# Patient Record
Sex: FEMALE | Race: WHITE | NOT HISPANIC OR LATINO | Employment: OTHER | RURAL
[De-identification: names, ages, dates, MRNs, and addresses within clinical notes are randomized per-mention and may not be internally consistent; named-entity substitution may affect disease eponyms.]

---

## 2017-01-13 ENCOUNTER — CONVERSION ENCOUNTER (OUTPATIENT)
Dept: PAIN MEDICINE | Facility: CLINIC | Age: 47
End: 2017-01-13

## 2017-01-18 ENCOUNTER — HOSPITAL ENCOUNTER (OUTPATIENT)
Dept: PAIN MEDICINE | Facility: HOSPITAL | Age: 47
Discharge: HOME | End: 2017-01-18
Attending: PHYSICAL MEDICINE & REHABILITATION | Admitting: PHYSICAL MEDICINE & REHABILITATION

## 2017-01-18 ENCOUNTER — CONVERSION ENCOUNTER (OUTPATIENT)
Dept: PAIN MEDICINE | Facility: CLINIC | Age: 47
End: 2017-01-18

## 2017-03-15 ENCOUNTER — CONVERSION ENCOUNTER (OUTPATIENT)
Dept: PAIN MEDICINE | Facility: CLINIC | Age: 47
End: 2017-03-15

## 2017-03-15 ENCOUNTER — HOSPITAL ENCOUNTER (OUTPATIENT)
Dept: PAIN MEDICINE | Facility: HOSPITAL | Age: 47
Discharge: HOME | End: 2017-03-15
Attending: PHYSICAL MEDICINE & REHABILITATION | Admitting: PHYSICAL MEDICINE & REHABILITATION

## 2017-04-03 ENCOUNTER — CONVERSION ENCOUNTER (OUTPATIENT)
Dept: PAIN MEDICINE | Facility: CLINIC | Age: 47
End: 2017-04-03

## 2017-04-24 ENCOUNTER — CONVERSION ENCOUNTER (OUTPATIENT)
Dept: PAIN MEDICINE | Facility: CLINIC | Age: 47
End: 2017-04-24

## 2017-05-02 ENCOUNTER — CONVERSION ENCOUNTER (OUTPATIENT)
Dept: PAIN MEDICINE | Facility: CLINIC | Age: 47
End: 2017-05-02

## 2017-05-10 ENCOUNTER — HOSPITAL ENCOUNTER (OUTPATIENT)
Dept: PAIN MEDICINE | Facility: HOSPITAL | Age: 47
Discharge: HOME | End: 2017-05-10
Attending: PHYSICAL MEDICINE & REHABILITATION | Admitting: PHYSICAL MEDICINE & REHABILITATION

## 2017-05-10 ENCOUNTER — CONVERSION ENCOUNTER (OUTPATIENT)
Dept: PAIN MEDICINE | Facility: CLINIC | Age: 47
End: 2017-05-10

## 2017-06-05 ENCOUNTER — HOSPITAL ENCOUNTER (OUTPATIENT)
Dept: SLEEP MEDICINE | Facility: HOSPITAL | Age: 47
Discharge: HOME | End: 2017-06-05
Attending: INTERNAL MEDICINE | Admitting: INTERNAL MEDICINE

## 2017-06-07 ENCOUNTER — CONVERSION ENCOUNTER (OUTPATIENT)
Dept: PAIN MEDICINE | Facility: CLINIC | Age: 47
End: 2017-06-07

## 2017-06-07 ENCOUNTER — HOSPITAL ENCOUNTER (OUTPATIENT)
Dept: ORTHOPEDIC SURGERY | Facility: CLINIC | Age: 47
Discharge: HOME | End: 2017-06-07
Attending: PHYSICIAN ASSISTANT | Admitting: PHYSICIAN ASSISTANT

## 2017-06-14 ENCOUNTER — CONVERSION ENCOUNTER (OUTPATIENT)
Dept: PAIN MEDICINE | Facility: CLINIC | Age: 47
End: 2017-06-14

## 2017-06-14 ENCOUNTER — HOSPITAL ENCOUNTER (OUTPATIENT)
Dept: PAIN MEDICINE | Facility: HOSPITAL | Age: 47
Discharge: HOME | End: 2017-06-14
Attending: PHYSICAL MEDICINE & REHABILITATION | Admitting: PHYSICAL MEDICINE & REHABILITATION

## 2017-07-12 ENCOUNTER — CONVERSION ENCOUNTER (OUTPATIENT)
Dept: PAIN MEDICINE | Facility: CLINIC | Age: 47
End: 2017-07-12

## 2017-07-12 ENCOUNTER — HOSPITAL ENCOUNTER (OUTPATIENT)
Dept: PAIN MEDICINE | Facility: HOSPITAL | Age: 47
Discharge: HOME | End: 2017-07-12
Attending: PHYSICAL MEDICINE & REHABILITATION | Admitting: PHYSICAL MEDICINE & REHABILITATION

## 2017-09-06 ENCOUNTER — HOSPITAL ENCOUNTER (OUTPATIENT)
Dept: PAIN MEDICINE | Facility: HOSPITAL | Age: 47
Discharge: HOME | End: 2017-09-06
Attending: PHYSICAL MEDICINE & REHABILITATION | Admitting: PHYSICAL MEDICINE & REHABILITATION

## 2017-09-06 ENCOUNTER — CONVERSION ENCOUNTER (OUTPATIENT)
Dept: PAIN MEDICINE | Facility: CLINIC | Age: 47
End: 2017-09-06

## 2017-10-18 ENCOUNTER — CONVERSION ENCOUNTER (OUTPATIENT)
Dept: PAIN MEDICINE | Facility: CLINIC | Age: 47
End: 2017-10-18

## 2017-11-08 ENCOUNTER — HOSPITAL ENCOUNTER (OUTPATIENT)
Dept: PAIN MEDICINE | Facility: HOSPITAL | Age: 47
Discharge: HOME | End: 2017-11-08
Attending: PHYSICAL MEDICINE & REHABILITATION | Admitting: PHYSICAL MEDICINE & REHABILITATION

## 2017-11-08 ENCOUNTER — CONVERSION ENCOUNTER (OUTPATIENT)
Dept: PAIN MEDICINE | Facility: CLINIC | Age: 47
End: 2017-11-08

## 2018-01-03 ENCOUNTER — HOSPITAL ENCOUNTER (OUTPATIENT)
Dept: PAIN MEDICINE | Facility: HOSPITAL | Age: 48
Discharge: HOME | End: 2018-01-03
Attending: PHYSICAL MEDICINE & REHABILITATION | Admitting: PHYSICAL MEDICINE & REHABILITATION

## 2018-01-03 ENCOUNTER — CONVERSION ENCOUNTER (OUTPATIENT)
Dept: PAIN MEDICINE | Facility: CLINIC | Age: 48
End: 2018-01-03

## 2018-01-30 ENCOUNTER — HOSPITAL ENCOUNTER (OUTPATIENT)
Dept: OTHER | Facility: HOSPITAL | Age: 48
Discharge: HOME | End: 2018-01-30
Attending: FAMILY MEDICINE | Admitting: FAMILY MEDICINE

## 2018-01-30 ENCOUNTER — CONVERSION ENCOUNTER (OUTPATIENT)
Dept: PAIN MEDICINE | Facility: CLINIC | Age: 48
End: 2018-01-30

## 2018-02-22 ENCOUNTER — CONVERSION ENCOUNTER (OUTPATIENT)
Dept: PAIN MEDICINE | Facility: CLINIC | Age: 48
End: 2018-02-22

## 2018-03-02 ENCOUNTER — OFFICE (AMBULATORY)
Dept: URBAN - METROPOLITAN AREA CLINIC 64 | Facility: CLINIC | Age: 48
End: 2018-03-02

## 2018-03-02 ENCOUNTER — HOSPITAL ENCOUNTER (OUTPATIENT)
Dept: PAIN MEDICINE | Facility: HOSPITAL | Age: 48
Discharge: HOME | End: 2018-03-02
Attending: PHYSICAL MEDICINE & REHABILITATION | Admitting: PHYSICAL MEDICINE & REHABILITATION

## 2018-03-02 ENCOUNTER — CONVERSION ENCOUNTER (OUTPATIENT)
Dept: PAIN MEDICINE | Facility: CLINIC | Age: 48
End: 2018-03-02

## 2018-03-02 VITALS
SYSTOLIC BLOOD PRESSURE: 119 MMHG | HEIGHT: 63 IN | HEART RATE: 79 BPM | DIASTOLIC BLOOD PRESSURE: 72 MMHG | WEIGHT: 257 LBS

## 2018-03-02 DIAGNOSIS — Z86.010 PERSONAL HISTORY OF COLONIC POLYPS: ICD-10-CM

## 2018-03-02 DIAGNOSIS — R14.0 ABDOMINAL DISTENSION (GASEOUS): ICD-10-CM

## 2018-03-02 DIAGNOSIS — K21.9 GASTRO-ESOPHAGEAL REFLUX DISEASE WITHOUT ESOPHAGITIS: ICD-10-CM

## 2018-03-02 DIAGNOSIS — R53.83 OTHER FATIGUE: ICD-10-CM

## 2018-03-02 DIAGNOSIS — D50.0 IRON DEFICIENCY ANEMIA SECONDARY TO BLOOD LOSS (CHRONIC): ICD-10-CM

## 2018-03-02 DIAGNOSIS — K59.00 CONSTIPATION, UNSPECIFIED: ICD-10-CM

## 2018-03-02 PROCEDURE — 99214 OFFICE O/P EST MOD 30 MIN: CPT | Performed by: NURSE PRACTITIONER

## 2018-03-02 RX ORDER — POLYETHYLENE GLYCOL 3350 17 G/17G
17 POWDER, FOR SOLUTION ORAL
Qty: 1 | Refills: 11 | Status: COMPLETED
Start: 2018-03-02 | End: 2018-07-26

## 2018-03-02 RX ORDER — ONDANSETRON 4 MG/1
TABLET, ORALLY DISINTEGRATING ORAL
Qty: 30 | Refills: 11 | Status: COMPLETED
End: 2022-08-29

## 2018-03-02 RX ORDER — METOCLOPRAMIDE HYDROCHLORIDE 5 MG/1
TABLET ORAL
Qty: 120 | Refills: 10 | Status: COMPLETED
Start: 2018-03-02 | End: 2018-12-13

## 2018-03-22 ENCOUNTER — CONVERSION ENCOUNTER (OUTPATIENT)
Dept: PAIN MEDICINE | Facility: CLINIC | Age: 48
End: 2018-03-22

## 2018-03-27 ENCOUNTER — CONVERSION ENCOUNTER (OUTPATIENT)
Dept: PAIN MEDICINE | Facility: CLINIC | Age: 48
End: 2018-03-27

## 2018-04-10 ENCOUNTER — CONVERSION ENCOUNTER (OUTPATIENT)
Dept: PAIN MEDICINE | Facility: CLINIC | Age: 48
End: 2018-04-10

## 2018-04-16 ENCOUNTER — CONVERSION ENCOUNTER (OUTPATIENT)
Dept: PAIN MEDICINE | Facility: CLINIC | Age: 48
End: 2018-04-16

## 2018-04-17 ENCOUNTER — HOSPITAL ENCOUNTER (OUTPATIENT)
Dept: MRI IMAGING | Facility: HOSPITAL | Age: 48
Discharge: HOME | End: 2018-04-17
Attending: PSYCHIATRY & NEUROLOGY | Admitting: PSYCHIATRY & NEUROLOGY

## 2018-04-19 ENCOUNTER — HOSPITAL ENCOUNTER (OUTPATIENT)
Dept: GENERAL RADIOLOGY | Facility: HOSPITAL | Age: 48
Discharge: HOME | End: 2018-04-19
Attending: INTERNAL MEDICINE | Admitting: INTERNAL MEDICINE

## 2018-04-20 ENCOUNTER — HOSPITAL ENCOUNTER (OUTPATIENT)
Dept: LAB | Facility: HOSPITAL | Age: 48
Setting detail: SPECIMEN
Discharge: HOME | End: 2018-04-20
Attending: INTERNAL MEDICINE | Admitting: INTERNAL MEDICINE

## 2018-04-20 LAB
ALBUMIN SERPL-MCNC: 4 G/DL (ref 3.5–4.8)
ALBUMIN/GLOB SERPL: 1.3 {RATIO} (ref 1–1.7)
ALP SERPL-CCNC: 113 IU/L (ref 32–91)
ALT SERPL-CCNC: 29 IU/L (ref 14–54)
ANION GAP SERPL CALC-SCNC: 11.6 MMOL/L (ref 10–20)
AST SERPL-CCNC: 25 IU/L (ref 15–41)
BILIRUB SERPL-MCNC: 0.2 MG/DL (ref 0.3–1.2)
BUN SERPL-MCNC: 7 MG/DL (ref 8–20)
BUN/CREAT SERPL: 7.8 (ref 5.4–26.2)
CALCIUM SERPL-MCNC: 9.2 MG/DL (ref 8.9–10.3)
CHLORIDE SERPL-SCNC: 105 MMOL/L (ref 101–111)
CONV CO2: 26 MMOL/L (ref 22–32)
CONV TOTAL PROTEIN: 7.1 G/DL (ref 6.1–7.9)
CREAT UR-MCNC: 0.9 MG/DL (ref 0.4–1)
GLOBULIN UR ELPH-MCNC: 3.1 G/DL (ref 2.5–3.8)
GLUCOSE SERPL-MCNC: 181 MG/DL (ref 65–99)
POTASSIUM SERPL-SCNC: 4.6 MMOL/L (ref 3.6–5.1)
SODIUM SERPL-SCNC: 138 MMOL/L (ref 136–144)

## 2018-05-09 ENCOUNTER — CONVERSION ENCOUNTER (OUTPATIENT)
Dept: PAIN MEDICINE | Facility: CLINIC | Age: 48
End: 2018-05-09

## 2018-05-09 ENCOUNTER — HOSPITAL ENCOUNTER (OUTPATIENT)
Dept: PAIN MEDICINE | Facility: HOSPITAL | Age: 48
Discharge: HOME | End: 2018-05-09
Attending: PHYSICAL MEDICINE & REHABILITATION | Admitting: PHYSICAL MEDICINE & REHABILITATION

## 2018-05-17 ENCOUNTER — CONVERSION ENCOUNTER (OUTPATIENT)
Dept: PAIN MEDICINE | Facility: CLINIC | Age: 48
End: 2018-05-17

## 2018-05-21 ENCOUNTER — HOSPITAL ENCOUNTER (OUTPATIENT)
Dept: LAB | Facility: HOSPITAL | Age: 48
Setting detail: SPECIMEN
Discharge: HOME | End: 2018-05-21
Attending: INTERNAL MEDICINE | Admitting: INTERNAL MEDICINE

## 2018-07-03 ENCOUNTER — HOSPITAL ENCOUNTER (OUTPATIENT)
Dept: LAB | Facility: HOSPITAL | Age: 48
Setting detail: SPECIMEN
Discharge: HOME | End: 2018-07-03
Attending: INTERNAL MEDICINE | Admitting: INTERNAL MEDICINE

## 2018-07-06 ENCOUNTER — HOSPITAL ENCOUNTER (OUTPATIENT)
Dept: PAIN MEDICINE | Facility: HOSPITAL | Age: 48
Discharge: HOME | End: 2018-07-06
Attending: PHYSICAL MEDICINE & REHABILITATION | Admitting: PHYSICAL MEDICINE & REHABILITATION

## 2018-07-06 ENCOUNTER — CONVERSION ENCOUNTER (OUTPATIENT)
Dept: PAIN MEDICINE | Facility: CLINIC | Age: 48
End: 2018-07-06

## 2018-07-10 ENCOUNTER — HOSPITAL ENCOUNTER (OUTPATIENT)
Dept: ULTRASOUND IMAGING | Facility: HOSPITAL | Age: 48
Discharge: HOME | End: 2018-07-10
Attending: INTERNAL MEDICINE | Admitting: INTERNAL MEDICINE

## 2018-07-10 ENCOUNTER — CONVERSION ENCOUNTER (OUTPATIENT)
Dept: PAIN MEDICINE | Facility: CLINIC | Age: 48
End: 2018-07-10

## 2018-07-26 ENCOUNTER — OFFICE (AMBULATORY)
Dept: URBAN - METROPOLITAN AREA CLINIC 64 | Facility: CLINIC | Age: 48
End: 2018-07-26

## 2018-07-26 VITALS
SYSTOLIC BLOOD PRESSURE: 120 MMHG | HEART RATE: 75 BPM | HEIGHT: 63 IN | DIASTOLIC BLOOD PRESSURE: 73 MMHG | WEIGHT: 255 LBS

## 2018-07-26 DIAGNOSIS — R11.0 NAUSEA: ICD-10-CM

## 2018-07-26 DIAGNOSIS — R53.83 OTHER FATIGUE: ICD-10-CM

## 2018-07-26 DIAGNOSIS — K58.0 IRRITABLE BOWEL SYNDROME WITH DIARRHEA: ICD-10-CM

## 2018-07-26 DIAGNOSIS — R14.0 ABDOMINAL DISTENSION (GASEOUS): ICD-10-CM

## 2018-07-26 DIAGNOSIS — K21.9 GASTRO-ESOPHAGEAL REFLUX DISEASE WITHOUT ESOPHAGITIS: ICD-10-CM

## 2018-07-26 DIAGNOSIS — K30 FUNCTIONAL DYSPEPSIA: ICD-10-CM

## 2018-07-26 PROCEDURE — 99214 OFFICE O/P EST MOD 30 MIN: CPT | Performed by: NURSE PRACTITIONER

## 2018-07-26 RX ORDER — SUCRALFATE 1 G/1
TABLET ORAL
Qty: 2400 | Refills: 0 | Status: COMPLETED
End: 2018-08-23

## 2018-08-13 ENCOUNTER — CONVERSION ENCOUNTER (OUTPATIENT)
Dept: FAMILY MEDICINE CLINIC | Facility: CLINIC | Age: 48
End: 2018-08-13

## 2018-08-14 LAB
ALBUMIN SERPL-MCNC: 3.9 G/DL (ref 3.6–5.1)
ALP SERPL-CCNC: 102 U/L (ref 33–115)
AST SERPL-CCNC: 23 U/L (ref 10–30)
BASOPHILS # BLD AUTO: 100 CELLS/UL (ref 0–200)
BILIRUB SERPL-MCNC: 0.5 MG/DL (ref 0.2–1.2)
BUN SERPL-MCNC: 8 MG/DL (ref 7–25)
BUN/CREAT SERPL: 10 (CALC) (ref 6–22)
CALCIUM SERPL-MCNC: 9.4 MG/DL (ref 8.6–10.2)
CHLORIDE SERPL-SCNC: 101 MMOL/L (ref 98–110)
CHOLEST SERPL-MCNC: 159 MG/DL (ref 125–200)
CONV CO2: 25 MMOL/L (ref 21–33)
CONV TOTAL PROTEIN: 6.9 G/DL (ref 6.2–8.3)
CREAT UR-MCNC: 0.8 MG/DL (ref 0.59–1.07)
EOSINOPHIL # BLD AUTO: 300 CELLS/UL (ref 15–500)
EOSINOPHIL # BLD AUTO: 4 %
ERYTHROCYTE [DISTWIDTH] IN BLOOD BY AUTOMATED COUNT: 14.5 % (ref 11–15)
GLOBULIN UR ELPH-MCNC: 3 MG/DL (ref 2.2–3.9)
GLUCOSE UR QL: 110 MG/DL (ref 65–99)
HCT VFR BLD AUTO: 42.6 % (ref 35–45)
HDLC SERPL-MCNC: 34 MG/DL
HGB BLD-MCNC: 14.4 G/DL (ref 11.7–15.5)
INSULIN SERPL-ACNC: 1.3 (CALC) (ref 1–2.1)
LDLC SERPL CALC-MCNC: 62 MG/DL
LYMPHOCYTES # BLD AUTO: 2000 CELLS/UL (ref 850–3900)
LYMPHOCYTES NFR BLD AUTO: 27 %
MCH RBC QN AUTO: 31.9 PG (ref 27–33)
MCHC RBC AUTO-ENTMCNC: 33.8 G/DL (ref 32–36)
MCV RBC AUTO: 94.3 FL (ref 80–100)
MONOCYTES # BLD AUTO: 300 CELLS/UL (ref 200–950)
MONOCYTES NFR BLD AUTO: 4 %
NEUTROPHILS # BLD AUTO: 4700 CELLS/UL (ref 1500–7800)
NEUTROPHILS NFR BLD AUTO: 64 %
PLATELET # BLD AUTO: 216 10*3/UL (ref 140–400)
PMV BLD AUTO: 9.6 FL (ref 7.5–11.5)
POTASSIUM SERPL-SCNC: 3.8 MMOL/L (ref 3.5–5.3)
RBC # BLD AUTO: 4.52 MILLION/UL (ref 3.8–5.1)
SODIUM SERPL-SCNC: 139 MMOL/L (ref 135–146)
TRIGL SERPL-MCNC: 314 MG/DL
TSH SERPL-ACNC: 4.49 MIU/L
WBC # BLD AUTO: 7.3 10*3/UL (ref 3.8–10.8)

## 2018-08-20 ENCOUNTER — HOSPITAL ENCOUNTER (OUTPATIENT)
Dept: GENERAL RADIOLOGY | Facility: HOSPITAL | Age: 48
Discharge: HOME | End: 2018-08-20
Attending: FAMILY MEDICINE | Admitting: FAMILY MEDICINE

## 2018-08-23 ENCOUNTER — OFFICE (AMBULATORY)
Dept: URBAN - METROPOLITAN AREA CLINIC 64 | Facility: CLINIC | Age: 48
End: 2018-08-23

## 2018-08-23 ENCOUNTER — HOSPITAL ENCOUNTER (OUTPATIENT)
Dept: LAB | Facility: HOSPITAL | Age: 48
Setting detail: SPECIMEN
Discharge: HOME | End: 2018-08-23
Attending: INTERNAL MEDICINE | Admitting: INTERNAL MEDICINE

## 2018-08-23 VITALS
DIASTOLIC BLOOD PRESSURE: 79 MMHG | SYSTOLIC BLOOD PRESSURE: 124 MMHG | HEIGHT: 63 IN | HEART RATE: 59 BPM | WEIGHT: 259 LBS

## 2018-08-23 DIAGNOSIS — E11.43 TYPE 2 DIABETES MELLITUS WITH DIABETIC AUTONOMIC (POLY)NEURO: ICD-10-CM

## 2018-08-23 DIAGNOSIS — K21.9 GASTRO-ESOPHAGEAL REFLUX DISEASE WITHOUT ESOPHAGITIS: ICD-10-CM

## 2018-08-23 DIAGNOSIS — R14.0 ABDOMINAL DISTENSION (GASEOUS): ICD-10-CM

## 2018-08-23 DIAGNOSIS — R11.0 NAUSEA: ICD-10-CM

## 2018-08-23 DIAGNOSIS — K58.0 IRRITABLE BOWEL SYNDROME WITH DIARRHEA: ICD-10-CM

## 2018-08-23 PROCEDURE — 99214 OFFICE O/P EST MOD 30 MIN: CPT | Performed by: NURSE PRACTITIONER

## 2018-08-23 RX ORDER — RIFAXIMIN 550 MG/1
TABLET ORAL
Qty: 42 | Refills: 0 | Status: COMPLETED
Start: 2018-08-23 | End: 2018-09-21

## 2018-08-23 RX ORDER — DOCUSATE SODIUM 100 MG/1
CAPSULE ORAL
Qty: 60 | Refills: 11 | Status: COMPLETED
Start: 2018-08-23 | End: 2018-09-21

## 2018-08-27 ENCOUNTER — CONVERSION ENCOUNTER (OUTPATIENT)
Dept: PAIN MEDICINE | Facility: CLINIC | Age: 48
End: 2018-08-27

## 2018-08-31 ENCOUNTER — HOSPITAL ENCOUNTER (OUTPATIENT)
Dept: PAIN MEDICINE | Facility: HOSPITAL | Age: 48
Discharge: HOME | End: 2018-08-31
Attending: PHYSICAL MEDICINE & REHABILITATION | Admitting: PHYSICAL MEDICINE & REHABILITATION

## 2018-08-31 ENCOUNTER — CONVERSION ENCOUNTER (OUTPATIENT)
Dept: PAIN MEDICINE | Facility: CLINIC | Age: 48
End: 2018-08-31

## 2018-09-21 ENCOUNTER — OFFICE (AMBULATORY)
Dept: URBAN - METROPOLITAN AREA CLINIC 64 | Facility: CLINIC | Age: 48
End: 2018-09-21

## 2018-09-21 VITALS
HEART RATE: 73 BPM | DIASTOLIC BLOOD PRESSURE: 73 MMHG | WEIGHT: 256 LBS | SYSTOLIC BLOOD PRESSURE: 121 MMHG | HEIGHT: 63 IN

## 2018-09-21 DIAGNOSIS — K21.9 GASTRO-ESOPHAGEAL REFLUX DISEASE WITHOUT ESOPHAGITIS: ICD-10-CM

## 2018-09-21 DIAGNOSIS — K58.0 IRRITABLE BOWEL SYNDROME WITH DIARRHEA: ICD-10-CM

## 2018-09-21 DIAGNOSIS — Z86.010 PERSONAL HISTORY OF COLONIC POLYPS: ICD-10-CM

## 2018-09-21 PROCEDURE — 99214 OFFICE O/P EST MOD 30 MIN: CPT | Performed by: NURSE PRACTITIONER

## 2018-09-27 ENCOUNTER — HOSPITAL ENCOUNTER (OUTPATIENT)
Dept: OTHER | Facility: HOSPITAL | Age: 48
Discharge: HOME | End: 2018-09-27
Attending: FAMILY MEDICINE | Admitting: FAMILY MEDICINE

## 2018-11-07 ENCOUNTER — HOSPITAL ENCOUNTER (OUTPATIENT)
Dept: PAIN MEDICINE | Facility: HOSPITAL | Age: 48
Discharge: HOME | End: 2018-11-07
Attending: PHYSICAL MEDICINE & REHABILITATION | Admitting: PHYSICAL MEDICINE & REHABILITATION

## 2018-11-07 ENCOUNTER — CONVERSION ENCOUNTER (OUTPATIENT)
Dept: PAIN MEDICINE | Facility: CLINIC | Age: 48
End: 2018-11-07

## 2018-11-27 ENCOUNTER — ON CAMPUS - OUTPATIENT (AMBULATORY)
Dept: URBAN - METROPOLITAN AREA HOSPITAL 2 | Facility: HOSPITAL | Age: 48
End: 2018-11-27

## 2018-11-27 ENCOUNTER — OFFICE (AMBULATORY)
Dept: URBAN - METROPOLITAN AREA PATHOLOGY 4 | Facility: PATHOLOGY | Age: 48
End: 2018-11-27

## 2018-11-27 ENCOUNTER — HOSPITAL ENCOUNTER (OUTPATIENT)
Dept: OTHER | Facility: HOSPITAL | Age: 48
Setting detail: SPECIMEN
Discharge: HOME | End: 2018-11-27
Attending: INTERNAL MEDICINE | Admitting: INTERNAL MEDICINE

## 2018-11-27 VITALS
SYSTOLIC BLOOD PRESSURE: 117 MMHG | HEIGHT: 63 IN | SYSTOLIC BLOOD PRESSURE: 138 MMHG | OXYGEN SATURATION: 100 % | SYSTOLIC BLOOD PRESSURE: 113 MMHG | TEMPERATURE: 97.7 F | HEART RATE: 79 BPM | OXYGEN SATURATION: 95 % | HEART RATE: 84 BPM | SYSTOLIC BLOOD PRESSURE: 140 MMHG | SYSTOLIC BLOOD PRESSURE: 136 MMHG | HEART RATE: 72 BPM | HEART RATE: 69 BPM | DIASTOLIC BLOOD PRESSURE: 80 MMHG | SYSTOLIC BLOOD PRESSURE: 129 MMHG | SYSTOLIC BLOOD PRESSURE: 135 MMHG | SYSTOLIC BLOOD PRESSURE: 147 MMHG | DIASTOLIC BLOOD PRESSURE: 85 MMHG | RESPIRATION RATE: 16 BRPM | OXYGEN SATURATION: 96 % | OXYGEN SATURATION: 99 % | WEIGHT: 250 LBS | DIASTOLIC BLOOD PRESSURE: 68 MMHG | RESPIRATION RATE: 18 BRPM | SYSTOLIC BLOOD PRESSURE: 116 MMHG | HEART RATE: 80 BPM | OXYGEN SATURATION: 98 % | RESPIRATION RATE: 20 BRPM | HEART RATE: 68 BPM | DIASTOLIC BLOOD PRESSURE: 72 MMHG | DIASTOLIC BLOOD PRESSURE: 79 MMHG | DIASTOLIC BLOOD PRESSURE: 75 MMHG | HEART RATE: 75 BPM | DIASTOLIC BLOOD PRESSURE: 73 MMHG | HEART RATE: 73 BPM | DIASTOLIC BLOOD PRESSURE: 95 MMHG | SYSTOLIC BLOOD PRESSURE: 125 MMHG | DIASTOLIC BLOOD PRESSURE: 81 MMHG

## 2018-11-27 DIAGNOSIS — Z86.010 PERSONAL HISTORY OF COLONIC POLYPS: ICD-10-CM

## 2018-11-27 DIAGNOSIS — I86.4 GASTRIC VARICES: ICD-10-CM

## 2018-11-27 DIAGNOSIS — K21.9 GASTRO-ESOPHAGEAL REFLUX DISEASE WITHOUT ESOPHAGITIS: ICD-10-CM

## 2018-11-27 DIAGNOSIS — R14.0 ABDOMINAL DISTENSION (GASEOUS): ICD-10-CM

## 2018-11-27 DIAGNOSIS — K62.1 RECTAL POLYP: ICD-10-CM

## 2018-11-27 DIAGNOSIS — D12.5 BENIGN NEOPLASM OF SIGMOID COLON: ICD-10-CM

## 2018-11-27 DIAGNOSIS — K63.5 POLYP OF COLON: ICD-10-CM

## 2018-11-27 LAB
GI HISTOLOGY: A. SELECT: (no result)
GI HISTOLOGY: B. UNSPECIFIED: (no result)
GI HISTOLOGY: C. UNSPECIFIED: (no result)
GI HISTOLOGY: PDF REPORT: (no result)

## 2018-11-27 PROCEDURE — 45380 COLONOSCOPY AND BIOPSY: CPT | Mod: PT | Performed by: INTERNAL MEDICINE

## 2018-11-27 PROCEDURE — 88305 TISSUE EXAM BY PATHOLOGIST: CPT | Mod: 26 | Performed by: INTERNAL MEDICINE

## 2018-11-27 PROCEDURE — 43239 EGD BIOPSY SINGLE/MULTIPLE: CPT | Mod: 59 | Performed by: INTERNAL MEDICINE

## 2018-12-13 ENCOUNTER — OFFICE (AMBULATORY)
Dept: URBAN - METROPOLITAN AREA CLINIC 64 | Facility: CLINIC | Age: 48
End: 2018-12-13

## 2018-12-13 ENCOUNTER — CONVERSION ENCOUNTER (OUTPATIENT)
Dept: FAMILY MEDICINE CLINIC | Facility: CLINIC | Age: 48
End: 2018-12-13

## 2018-12-13 VITALS
WEIGHT: 247 LBS | HEIGHT: 63 IN | DIASTOLIC BLOOD PRESSURE: 72 MMHG | HEART RATE: 79 BPM | SYSTOLIC BLOOD PRESSURE: 121 MMHG

## 2018-12-13 DIAGNOSIS — K21.9 GASTRO-ESOPHAGEAL REFLUX DISEASE WITHOUT ESOPHAGITIS: ICD-10-CM

## 2018-12-13 DIAGNOSIS — I86.4 GASTRIC VARICES: ICD-10-CM

## 2018-12-13 DIAGNOSIS — E11.9 TYPE 2 DIABETES MELLITUS WITHOUT COMPLICATIONS: ICD-10-CM

## 2018-12-13 DIAGNOSIS — R11.0 NAUSEA: ICD-10-CM

## 2018-12-13 DIAGNOSIS — D50.0 IRON DEFICIENCY ANEMIA SECONDARY TO BLOOD LOSS (CHRONIC): ICD-10-CM

## 2018-12-13 DIAGNOSIS — K58.0 IRRITABLE BOWEL SYNDROME WITH DIARRHEA: ICD-10-CM

## 2018-12-13 DIAGNOSIS — Z86.010 PERSONAL HISTORY OF COLONIC POLYPS: ICD-10-CM

## 2018-12-13 PROCEDURE — 99214 OFFICE O/P EST MOD 30 MIN: CPT | Performed by: NURSE PRACTITIONER

## 2018-12-13 RX ORDER — OMEGA-3 FATTY ACIDS 1000 MG
2 CAPSULE ORAL
Qty: 60 | Refills: 11 | Status: ACTIVE
Start: 2018-12-13

## 2018-12-13 RX ORDER — FENOFIBRATE 145 MG/1
145 TABLET ORAL
Qty: 90 | Refills: 4 | Status: COMPLETED
Start: 2018-12-13 | End: 2020-05-26

## 2019-01-02 ENCOUNTER — HOSPITAL ENCOUNTER (OUTPATIENT)
Dept: PAIN MEDICINE | Facility: HOSPITAL | Age: 49
Discharge: HOME | End: 2019-01-02
Attending: PHYSICAL MEDICINE & REHABILITATION | Admitting: PHYSICAL MEDICINE & REHABILITATION

## 2019-01-02 ENCOUNTER — CONVERSION ENCOUNTER (OUTPATIENT)
Dept: PAIN MEDICINE | Facility: CLINIC | Age: 49
End: 2019-01-02

## 2019-01-10 ENCOUNTER — OFFICE (AMBULATORY)
Dept: URBAN - METROPOLITAN AREA CLINIC 64 | Facility: CLINIC | Age: 49
End: 2019-01-10

## 2019-01-10 VITALS
WEIGHT: 243 LBS | SYSTOLIC BLOOD PRESSURE: 123 MMHG | DIASTOLIC BLOOD PRESSURE: 76 MMHG | HEIGHT: 63 IN | HEART RATE: 74 BPM

## 2019-01-10 DIAGNOSIS — I86.4 GASTRIC VARICES: ICD-10-CM

## 2019-01-10 DIAGNOSIS — E11.43 TYPE 2 DIABETES MELLITUS WITH DIABETIC AUTONOMIC (POLY)NEURO: ICD-10-CM

## 2019-01-10 DIAGNOSIS — R19.8 OTHER SPECIFIED SYMPTOMS AND SIGNS INVOLVING THE DIGESTIVE S: ICD-10-CM

## 2019-01-10 DIAGNOSIS — K21.9 GASTRO-ESOPHAGEAL REFLUX DISEASE WITHOUT ESOPHAGITIS: ICD-10-CM

## 2019-01-10 PROCEDURE — 99213 OFFICE O/P EST LOW 20 MIN: CPT | Performed by: NURSE PRACTITIONER

## 2019-01-24 ENCOUNTER — HOSPITAL ENCOUNTER (OUTPATIENT)
Dept: PHYSICAL THERAPY | Facility: HOSPITAL | Age: 49
Setting detail: RECURRING SERIES
Discharge: HOME | End: 2019-02-26
Attending: FAMILY MEDICINE | Admitting: FAMILY MEDICINE

## 2019-01-28 ENCOUNTER — CONVERSION ENCOUNTER (OUTPATIENT)
Dept: PAIN MEDICINE | Facility: CLINIC | Age: 49
End: 2019-01-28

## 2019-02-04 ENCOUNTER — HOSPITAL ENCOUNTER (OUTPATIENT)
Dept: OTHER | Facility: HOSPITAL | Age: 49
Discharge: HOME | End: 2019-02-04
Attending: FAMILY MEDICINE | Admitting: FAMILY MEDICINE

## 2019-02-12 LAB — HBA1C MFR BLD: 6.5 %

## 2019-03-06 ENCOUNTER — CONVERSION ENCOUNTER (OUTPATIENT)
Dept: PAIN MEDICINE | Facility: CLINIC | Age: 49
End: 2019-03-06

## 2019-03-06 ENCOUNTER — HOSPITAL ENCOUNTER (OUTPATIENT)
Dept: PAIN MEDICINE | Facility: HOSPITAL | Age: 49
Discharge: HOME | End: 2019-03-06
Attending: PHYSICAL MEDICINE & REHABILITATION | Admitting: PHYSICAL MEDICINE & REHABILITATION

## 2019-03-19 ENCOUNTER — CONVERSION ENCOUNTER (OUTPATIENT)
Dept: PAIN MEDICINE | Facility: CLINIC | Age: 49
End: 2019-03-19

## 2019-05-01 ENCOUNTER — HOSPITAL ENCOUNTER (OUTPATIENT)
Dept: PAIN MEDICINE | Facility: HOSPITAL | Age: 49
Discharge: HOME | End: 2019-05-01
Attending: PHYSICAL MEDICINE & REHABILITATION | Admitting: PHYSICAL MEDICINE & REHABILITATION

## 2019-05-01 ENCOUNTER — CONVERSION ENCOUNTER (OUTPATIENT)
Dept: PAIN MEDICINE | Facility: CLINIC | Age: 49
End: 2019-05-01

## 2019-06-04 VITALS
WEIGHT: 252 LBS | BODY MASS INDEX: 42.85 KG/M2 | OXYGEN SATURATION: 96 % | HEART RATE: 107 BPM | HEIGHT: 64 IN | WEIGHT: 256.38 LBS | RESPIRATION RATE: 16 BRPM | BODY MASS INDEX: 42.85 KG/M2 | HEIGHT: 64 IN | BODY MASS INDEX: 43.77 KG/M2 | SYSTOLIC BLOOD PRESSURE: 144 MMHG | RESPIRATION RATE: 16 BRPM | DIASTOLIC BLOOD PRESSURE: 85 MMHG | HEIGHT: 64 IN | OXYGEN SATURATION: 96 % | HEART RATE: 77 BPM | SYSTOLIC BLOOD PRESSURE: 136 MMHG | RESPIRATION RATE: 16 BRPM | HEART RATE: 84 BPM | OXYGEN SATURATION: 94 % | HEART RATE: 61 BPM | HEIGHT: 64 IN | BODY MASS INDEX: 43.92 KG/M2 | DIASTOLIC BLOOD PRESSURE: 77 MMHG | WEIGHT: 260 LBS | WEIGHT: 247.38 LBS | HEIGHT: 64 IN | RESPIRATION RATE: 16 BRPM | SYSTOLIC BLOOD PRESSURE: 117 MMHG | HEART RATE: 84 BPM | WEIGHT: 260 LBS | OXYGEN SATURATION: 93 % | BODY MASS INDEX: 44.16 KG/M2 | BODY MASS INDEX: 43.36 KG/M2 | HEIGHT: 64 IN | RESPIRATION RATE: 16 BRPM | HEART RATE: 77 BPM | RESPIRATION RATE: 16 BRPM | WEIGHT: 251 LBS | WEIGHT: 251 LBS | WEIGHT: 252 LBS | BODY MASS INDEX: 44.82 KG/M2 | HEIGHT: 64 IN | WEIGHT: 262.5 LBS | HEIGHT: 64 IN | RESPIRATION RATE: 16 BRPM | OXYGEN SATURATION: 96 % | RESPIRATION RATE: 16 BRPM | RESPIRATION RATE: 16 BRPM | HEART RATE: 82 BPM | RESPIRATION RATE: 16 BRPM | BODY MASS INDEX: 44.39 KG/M2 | WEIGHT: 252 LBS | OXYGEN SATURATION: 95 % | SYSTOLIC BLOOD PRESSURE: 136 MMHG | OXYGEN SATURATION: 94 % | HEIGHT: 64 IN | HEIGHT: 64 IN | RESPIRATION RATE: 16 BRPM | DIASTOLIC BLOOD PRESSURE: 84 MMHG | OXYGEN SATURATION: 96 % | RESPIRATION RATE: 16 BRPM | OXYGEN SATURATION: 96 % | HEIGHT: 64 IN | DIASTOLIC BLOOD PRESSURE: 71 MMHG | OXYGEN SATURATION: 95 % | SYSTOLIC BLOOD PRESSURE: 135 MMHG | DIASTOLIC BLOOD PRESSURE: 76 MMHG | SYSTOLIC BLOOD PRESSURE: 123 MMHG | OXYGEN SATURATION: 95 % | WEIGHT: 253.5 LBS | WEIGHT: 252.25 LBS | BODY MASS INDEX: 39.27 KG/M2 | BODY MASS INDEX: 41.32 KG/M2 | HEIGHT: 64 IN | WEIGHT: 251.13 LBS | SYSTOLIC BLOOD PRESSURE: 132 MMHG | OXYGEN SATURATION: 98 % | HEART RATE: 78 BPM | WEIGHT: 252 LBS | OXYGEN SATURATION: 98 % | WEIGHT: 253.38 LBS | RESPIRATION RATE: 16 BRPM | OXYGEN SATURATION: 95 % | RESPIRATION RATE: 18 BRPM | WEIGHT: 256 LBS | DIASTOLIC BLOOD PRESSURE: 85 MMHG | OXYGEN SATURATION: 95 % | HEART RATE: 82 BPM | RESPIRATION RATE: 16 BRPM | HEIGHT: 64 IN | OXYGEN SATURATION: 97 % | RESPIRATION RATE: 16 BRPM | SYSTOLIC BLOOD PRESSURE: 105 MMHG | DIASTOLIC BLOOD PRESSURE: 76 MMHG | HEART RATE: 71 BPM | SYSTOLIC BLOOD PRESSURE: 137 MMHG | HEART RATE: 85 BPM | HEIGHT: 64 IN | SYSTOLIC BLOOD PRESSURE: 122 MMHG | HEART RATE: 84 BPM | SYSTOLIC BLOOD PRESSURE: 126 MMHG | SYSTOLIC BLOOD PRESSURE: 122 MMHG | BODY MASS INDEX: 42.23 KG/M2 | OXYGEN SATURATION: 99 % | HEART RATE: 95 BPM | RESPIRATION RATE: 18 BRPM | SYSTOLIC BLOOD PRESSURE: 124 MMHG | HEIGHT: 64 IN | HEART RATE: 77 BPM | DIASTOLIC BLOOD PRESSURE: 77 MMHG | OXYGEN SATURATION: 96 % | BODY MASS INDEX: 44.43 KG/M2 | HEART RATE: 84 BPM | DIASTOLIC BLOOD PRESSURE: 85 MMHG | OXYGEN SATURATION: 97 % | RESPIRATION RATE: 16 BRPM | DIASTOLIC BLOOD PRESSURE: 82 MMHG | SYSTOLIC BLOOD PRESSURE: 133 MMHG | DIASTOLIC BLOOD PRESSURE: 83 MMHG | SYSTOLIC BLOOD PRESSURE: 134 MMHG | RESPIRATION RATE: 16 BRPM | DIASTOLIC BLOOD PRESSURE: 83 MMHG | DIASTOLIC BLOOD PRESSURE: 78 MMHG | BODY MASS INDEX: 43.71 KG/M2 | SYSTOLIC BLOOD PRESSURE: 110 MMHG | WEIGHT: 242 LBS | SYSTOLIC BLOOD PRESSURE: 133 MMHG | DIASTOLIC BLOOD PRESSURE: 84 MMHG | WEIGHT: 230 LBS | DIASTOLIC BLOOD PRESSURE: 78 MMHG | OXYGEN SATURATION: 94 % | DIASTOLIC BLOOD PRESSURE: 66 MMHG | BODY MASS INDEX: 43.87 KG/M2 | HEART RATE: 81 BPM | BODY MASS INDEX: 44.39 KG/M2 | RESPIRATION RATE: 16 BRPM | HEIGHT: 64 IN | BODY MASS INDEX: 42.94 KG/M2 | WEIGHT: 251.5 LBS | HEART RATE: 88 BPM | HEART RATE: 76 BPM | WEIGHT: 260.25 LBS | HEART RATE: 82 BPM | DIASTOLIC BLOOD PRESSURE: 83 MMHG | DIASTOLIC BLOOD PRESSURE: 73 MMHG | SYSTOLIC BLOOD PRESSURE: 133 MMHG | HEIGHT: 64 IN | HEART RATE: 89 BPM | RESPIRATION RATE: 16 BRPM | WEIGHT: 252 LBS | HEIGHT: 64 IN | DIASTOLIC BLOOD PRESSURE: 75 MMHG | WEIGHT: 260 LBS | HEART RATE: 79 BPM | DIASTOLIC BLOOD PRESSURE: 84 MMHG | HEART RATE: 79 BPM | HEART RATE: 81 BPM | BODY MASS INDEX: 43.71 KG/M2 | WEIGHT: 254 LBS | WEIGHT: 251 LBS | WEIGHT: 251 LBS | OXYGEN SATURATION: 93 % | HEART RATE: 91 BPM | RESPIRATION RATE: 16 BRPM | HEART RATE: 83 BPM | DIASTOLIC BLOOD PRESSURE: 75 MMHG | OXYGEN SATURATION: 95 % | DIASTOLIC BLOOD PRESSURE: 73 MMHG | BODY MASS INDEX: 42.51 KG/M2 | RESPIRATION RATE: 16 BRPM | HEART RATE: 89 BPM | OXYGEN SATURATION: 96 % | WEIGHT: 257.25 LBS | WEIGHT: 257 LBS | SYSTOLIC BLOOD PRESSURE: 125 MMHG | SYSTOLIC BLOOD PRESSURE: 130 MMHG | DIASTOLIC BLOOD PRESSURE: 78 MMHG | RESPIRATION RATE: 16 BRPM | WEIGHT: 256 LBS | SYSTOLIC BLOOD PRESSURE: 134 MMHG | WEIGHT: 249 LBS | DIASTOLIC BLOOD PRESSURE: 83 MMHG | HEIGHT: 64 IN | HEIGHT: 64 IN | OXYGEN SATURATION: 95 % | HEART RATE: 95 BPM | DIASTOLIC BLOOD PRESSURE: 78 MMHG | SYSTOLIC BLOOD PRESSURE: 133 MMHG | WEIGHT: 235.38 LBS | HEART RATE: 80 BPM | SYSTOLIC BLOOD PRESSURE: 116 MMHG | DIASTOLIC BLOOD PRESSURE: 85 MMHG | HEIGHT: 64 IN | SYSTOLIC BLOOD PRESSURE: 126 MMHG | DIASTOLIC BLOOD PRESSURE: 76 MMHG | BODY MASS INDEX: 44.05 KG/M2 | BODY MASS INDEX: 44.39 KG/M2 | SYSTOLIC BLOOD PRESSURE: 119 MMHG | SYSTOLIC BLOOD PRESSURE: 138 MMHG | OXYGEN SATURATION: 94 % | SYSTOLIC BLOOD PRESSURE: 110 MMHG | DIASTOLIC BLOOD PRESSURE: 71 MMHG | DIASTOLIC BLOOD PRESSURE: 84 MMHG | SYSTOLIC BLOOD PRESSURE: 128 MMHG | HEART RATE: 87 BPM | DIASTOLIC BLOOD PRESSURE: 88 MMHG | SYSTOLIC BLOOD PRESSURE: 125 MMHG | RESPIRATION RATE: 16 BRPM | BODY MASS INDEX: 40.19 KG/M2 | SYSTOLIC BLOOD PRESSURE: 120 MMHG | OXYGEN SATURATION: 94 % | SYSTOLIC BLOOD PRESSURE: 136 MMHG | HEART RATE: 87 BPM | OXYGEN SATURATION: 92 % | HEART RATE: 85 BPM | HEART RATE: 80 BPM | SYSTOLIC BLOOD PRESSURE: 145 MMHG | OXYGEN SATURATION: 94 % | OXYGEN SATURATION: 94 % | DIASTOLIC BLOOD PRESSURE: 84 MMHG | DIASTOLIC BLOOD PRESSURE: 70 MMHG | WEIGHT: 258 LBS

## 2019-06-16 RX ORDER — ATORVASTATIN CALCIUM 40 MG/1
TABLET, FILM COATED ORAL
Qty: 30 TABLET | Refills: 0 | Status: SHIPPED | OUTPATIENT
Start: 2019-06-16 | End: 2019-08-10 | Stop reason: SDUPTHER

## 2019-06-29 DIAGNOSIS — J45.909 ASTHMATIC BRONCHITIS WITHOUT COMPLICATION, UNSPECIFIED ASTHMA SEVERITY, UNSPECIFIED WHETHER PERSISTENT: Primary | ICD-10-CM

## 2019-07-01 RX ORDER — POTASSIUM CHLORIDE 1500 MG/1
20 TABLET, FILM COATED, EXTENDED RELEASE ORAL
COMMUNITY
Start: 2015-08-25 | End: 2020-10-26 | Stop reason: SDUPTHER

## 2019-07-01 RX ORDER — GABAPENTIN 300 MG/1
1 CAPSULE ORAL EVERY 8 HOURS
COMMUNITY
Start: 2019-05-29 | End: 2019-10-25 | Stop reason: SDUPTHER

## 2019-07-01 RX ORDER — ALBUTEROL SULFATE 90 UG/1
AEROSOL, METERED RESPIRATORY (INHALATION)
COMMUNITY
Start: 2018-05-26 | End: 2019-12-23

## 2019-07-01 RX ORDER — FLUTICASONE PROPIONATE 50 MCG
SPRAY, SUSPENSION (ML) NASAL
COMMUNITY
Start: 2019-03-02 | End: 2020-06-08

## 2019-07-01 RX ORDER — OMEGA-3/DHA/EPA/FISH OIL 60 MG-90MG
2 CAPSULE ORAL EVERY 24 HOURS
COMMUNITY
Start: 2019-01-28

## 2019-07-01 RX ORDER — THEOPHYLLINE 300 MG/1
TABLET, EXTENDED RELEASE ORAL
COMMUNITY
Start: 2016-10-24 | End: 2019-07-29

## 2019-07-01 RX ORDER — FOLIC ACID 1 MG/1
TABLET ORAL
Qty: 100 TABLET | Refills: 2 | Status: SHIPPED | OUTPATIENT
Start: 2019-07-01 | End: 2019-07-29 | Stop reason: SDUPTHER

## 2019-07-01 RX ORDER — FOLIC ACID 1 MG/1
1 TABLET ORAL EVERY 24 HOURS
COMMUNITY
Start: 2018-05-17 | End: 2020-05-27 | Stop reason: SDUPTHER

## 2019-07-01 RX ORDER — ALBUTEROL SULFATE 90 UG/1
AEROSOL, METERED RESPIRATORY (INHALATION)
Qty: 18 G | Refills: 12 | Status: SHIPPED | OUTPATIENT
Start: 2019-07-01 | End: 2019-07-29 | Stop reason: SDUPTHER

## 2019-07-01 RX ORDER — HYDROCODONE BITARTRATE AND ACETAMINOPHEN 10; 325 MG/1; MG/1
TABLET ORAL
COMMUNITY
Start: 2016-09-14 | End: 2019-07-31 | Stop reason: SDUPTHER

## 2019-07-01 RX ORDER — METFORMIN HYDROCHLORIDE 500 MG/1
500 TABLET, EXTENDED RELEASE ORAL EVERY 24 HOURS
COMMUNITY
Start: 2018-05-17 | End: 2019-10-03 | Stop reason: SDUPTHER

## 2019-07-01 RX ORDER — LANCING DEVICE/LANCETS
KIT MISCELLANEOUS
COMMUNITY

## 2019-07-01 RX ORDER — BUMETANIDE 1 MG/1
1 TABLET ORAL DAILY PRN
COMMUNITY
Start: 2017-12-17 | End: 2019-10-03 | Stop reason: SDUPTHER

## 2019-07-01 RX ORDER — FENOFIBRATE 145 MG/1
TABLET, COATED ORAL EVERY 24 HOURS
COMMUNITY
Start: 2019-01-28

## 2019-07-01 RX ORDER — ASPIRIN 81 MG/1
81 TABLET, CHEWABLE ORAL DAILY
COMMUNITY
End: 2020-01-27

## 2019-07-01 RX ORDER — DEXLANSOPRAZOLE 60 MG/1
1 CAPSULE, DELAYED RELEASE ORAL EVERY 24 HOURS
COMMUNITY
Start: 2017-10-04 | End: 2019-07-15 | Stop reason: SDUPTHER

## 2019-07-01 RX ORDER — LEVOTHYROXINE SODIUM 0.05 MG/1
TABLET ORAL EVERY 24 HOURS
COMMUNITY
Start: 2018-08-27 | End: 2019-07-29 | Stop reason: SDUPTHER

## 2019-07-01 RX ORDER — GLUCOSAMINE HCL/CHONDROITIN SU 500-400 MG
CAPSULE ORAL
COMMUNITY
End: 2022-08-19 | Stop reason: SDUPTHER

## 2019-07-01 RX ORDER — NAPROXEN 500 MG/1
500 TABLET ORAL EVERY 24 HOURS
COMMUNITY
Start: 2019-01-28 | End: 2019-08-29 | Stop reason: SDUPTHER

## 2019-07-11 ENCOUNTER — HOSPITAL ENCOUNTER (OUTPATIENT)
Dept: ULTRASOUND IMAGING | Facility: HOSPITAL | Age: 49
Discharge: HOME | End: 2019-07-11
Admitting: INTERNAL MEDICINE

## 2019-07-11 DIAGNOSIS — E04.1 NONTOXIC UNINODULAR GOITER: ICD-10-CM

## 2019-07-11 DIAGNOSIS — E04.9 ENLARGEMENT OF THYROID: ICD-10-CM

## 2019-07-11 PROCEDURE — 76536 US EXAM OF HEAD AND NECK: CPT

## 2019-07-15 RX ORDER — DEXLANSOPRAZOLE 60 MG/1
CAPSULE, DELAYED RELEASE ORAL
Qty: 30 CAPSULE | Refills: 0 | Status: SHIPPED | OUTPATIENT
Start: 2019-07-15 | End: 2019-08-29 | Stop reason: SDUPTHER

## 2019-07-18 ENCOUNTER — OFFICE VISIT (OUTPATIENT)
Dept: FAMILY MEDICINE CLINIC | Facility: CLINIC | Age: 49
End: 2019-07-18

## 2019-07-18 VITALS
TEMPERATURE: 98.5 F | RESPIRATION RATE: 18 BRPM | HEIGHT: 64 IN | WEIGHT: 236.6 LBS | BODY MASS INDEX: 40.39 KG/M2 | DIASTOLIC BLOOD PRESSURE: 82 MMHG | HEART RATE: 101 BPM | SYSTOLIC BLOOD PRESSURE: 130 MMHG | OXYGEN SATURATION: 94 %

## 2019-07-18 DIAGNOSIS — R26.9 GAIT ABNORMALITY: ICD-10-CM

## 2019-07-18 DIAGNOSIS — J30.1 SEASONAL ALLERGIC RHINITIS DUE TO POLLEN: ICD-10-CM

## 2019-07-18 DIAGNOSIS — J41.0 SIMPLE CHRONIC BRONCHITIS (HCC): ICD-10-CM

## 2019-07-18 DIAGNOSIS — R51.9 SINUS HEADACHE: ICD-10-CM

## 2019-07-18 DIAGNOSIS — J41.0 SIMPLE CHRONIC BRONCHITIS (HCC): Primary | ICD-10-CM

## 2019-07-18 DIAGNOSIS — J01.00 ACUTE NON-RECURRENT MAXILLARY SINUSITIS: ICD-10-CM

## 2019-07-18 DIAGNOSIS — M51.36 DEGENERATION OF INTERVERTEBRAL DISC OF LUMBAR REGION: ICD-10-CM

## 2019-07-18 PROBLEM — E55.9 VITAMIN D DEFICIENCY: Status: ACTIVE | Noted: 2018-05-17

## 2019-07-18 PROBLEM — J44.9 COPD (CHRONIC OBSTRUCTIVE PULMONARY DISEASE): Status: ACTIVE | Noted: 2019-07-18

## 2019-07-18 PROBLEM — E03.9 HYPOTHYROIDISM: Status: ACTIVE | Noted: 2018-08-27

## 2019-07-18 PROBLEM — E78.5 HYPERLIPIDEMIA: Status: ACTIVE | Noted: 2019-07-18

## 2019-07-18 PROBLEM — M51.369 DEGENERATION OF INTERVERTEBRAL DISC OF LUMBAR REGION: Status: ACTIVE | Noted: 2019-03-06

## 2019-07-18 PROCEDURE — 99214 OFFICE O/P EST MOD 30 MIN: CPT | Performed by: FAMILY MEDICINE

## 2019-07-18 RX ORDER — CEPHALEXIN 500 MG/1
500 CAPSULE ORAL 3 TIMES DAILY
Qty: 30 CAPSULE | Refills: 0 | Status: SHIPPED | OUTPATIENT
Start: 2019-07-18 | End: 2019-07-28

## 2019-07-18 RX ORDER — MONTELUKAST SODIUM 10 MG/1
10 TABLET ORAL NIGHTLY
Qty: 30 TABLET | Refills: 12 | Status: SHIPPED | OUTPATIENT
Start: 2019-07-18 | End: 2019-07-18 | Stop reason: SDUPTHER

## 2019-07-18 NOTE — PROGRESS NOTES
Rooming Tab(CC,VS,Pt Hx,Fall Screen)  Chief Complaint   Patient presents with   • Headache   • Facial Pain   • Difficulty Walking     foot turning in       Subjective   Riddhi Cid is a 48 y.o. female.     Headache    This is a new problem. The current episode started in the past 7 days. The problem occurs intermittently. The pain is located in the frontal and bilateral region. The pain quality is similar to prior headaches. The quality of the pain is described as squeezing. The pain is moderate. Associated symptoms include back pain. Pertinent negatives include no coughing, fever, nausea, neck pain, numbness, vomiting or weakness. Nothing aggravates the symptoms. She has tried nothing for the symptoms.   Facial Pain   This is a new problem. The current episode started in the past 7 days. The problem occurs intermittently. The problem has been gradually worsening. Associated symptoms include headaches. Pertinent negatives include no chills, coughing, fever, joint swelling, myalgias, nausea, neck pain, numbness, vomiting or weakness. Nothing aggravates the symptoms. She has tried acetaminophen for the symptoms.   Difficulty Walking   This is a new problem. The current episode started more than 1 month ago. The problem occurs constantly. The problem has been unchanged. Associated symptoms include headaches. Pertinent negatives include no chills, coughing, fever, joint swelling, myalgias, nausea, neck pain, numbness, vomiting or weakness. Nothing aggravates the symptoms. She has tried nothing for the symptoms.        The following portions of the patient's history were reviewed and updated as appropriate: allergies, current medications, past family history, past medical history, past social history, past surgical history and problem list.        Social History     Tobacco Use   • Smoking status: Current Every Day Smoker     Types: Cigarettes   • Smokeless tobacco: Never Used   Substance Use Topics   • Alcohol use:  "No     Frequency: Never       Review of Systems   Constitutional: Negative for chills and fever.   Respiratory: Negative for cough.    Gastrointestinal: Negative for blood in stool, constipation, nausea and vomiting.   Genitourinary: Negative for urinary incontinence, decreased urine volume, dysuria, frequency, hematuria and urgency.   Musculoskeletal: Positive for back pain. Negative for joint swelling, myalgias and neck pain.   Neurological: Negative for weakness and numbness.       Objective     Rooming Tab(CC,VS,Pt Hx,Fall Screen)  /82   Pulse 101   Temp 98.5 °F (36.9 °C)   Resp 18   Ht 161.3 cm (63.5\")   Wt 107 kg (236 lb 9.6 oz)   LMP  (LMP Unknown)   SpO2 94%   BMI 41.25 kg/m²     Body mass index is 41.25 kg/m².    Physical Exam   Constitutional: She is oriented to person, place, and time. She appears well-developed and well-nourished. She is cooperative. No distress.   HENT:   Right Ear: External ear normal.   Left Ear: External ear normal.   Nose: Nose normal.   Mouth/Throat: Oropharynx is clear and moist. No oropharyngeal exudate.   Cardiovascular: Normal rate, regular rhythm, normal heart sounds and intact distal pulses. Exam reveals no gallop and no friction rub.   No murmur heard.  Pulmonary/Chest: Effort normal and breath sounds normal. No respiratory distress. She has no wheezes. She has no rales.   Musculoskeletal: She exhibits no edema or deformity.        Lumbar back: She exhibits normal range of motion, no tenderness, no deformity, no pain and no spasm.        Lymphadenopathy:     She has no cervical adenopathy.   Neurological: She is alert and oriented to person, place, and time. She displays normal reflexes. She exhibits normal muscle tone. Coordination normal.   Skin: Skin is warm and dry. She is not diaphoretic.   Psychiatric: She has a normal mood and affect.   Vitals reviewed.         Problem List Items Addressed This Visit        Respiratory    Sinusitis, acute - Primary       " Musculoskeletal and Integument    Degeneration of intervertebral disc of lumbar region      Other Visit Diagnoses     Sinus headache        Gait abnormality        pronates both feet. Does not seem pathologic. Will place in PT  Discussed switching shoes daily        increase fluids, tylenol for fever, motrin for pain. Humidifier to help with congestion and to sleep at night. Dicussed OTC meds, gargle with warm salt water. If there is recurrent fever, shortness of breath, lethargy, advised to come in to the office or go to the ER.  Dicussed if there is loss of vision, confusion, weakness or numbness in an extremity, dropping of an eyelid or one side of the face, severe pain, intractable vomiting, go to the ER      Wrapup Tab  No Follow-up on file.

## 2019-07-22 ENCOUNTER — TRANSCRIBE ORDERS (OUTPATIENT)
Dept: PHYSICAL THERAPY | Facility: CLINIC | Age: 49
End: 2019-07-22

## 2019-07-22 DIAGNOSIS — R26.9 GAIT ABNORMALITY: ICD-10-CM

## 2019-07-22 DIAGNOSIS — M51.36 DDD (DEGENERATIVE DISC DISEASE), LUMBAR: Primary | ICD-10-CM

## 2019-07-22 RX ORDER — MONTELUKAST SODIUM 10 MG/1
TABLET ORAL
Qty: 90 TABLET | Refills: 12 | Status: SHIPPED | OUTPATIENT
Start: 2019-07-22 | End: 2020-10-13

## 2019-07-24 ENCOUNTER — OFFICE VISIT (OUTPATIENT)
Dept: PHYSICAL THERAPY | Facility: CLINIC | Age: 49
End: 2019-07-24

## 2019-07-24 DIAGNOSIS — R26.2 DIFFICULTY WALKING: ICD-10-CM

## 2019-07-24 DIAGNOSIS — M51.36 DEGENERATION OF LUMBAR INTERVERTEBRAL DISC: Primary | ICD-10-CM

## 2019-07-24 PROCEDURE — 97110 THERAPEUTIC EXERCISES: CPT | Performed by: PHYSICAL THERAPIST

## 2019-07-24 PROCEDURE — G0283 ELEC STIM OTHER THAN WOUND: HCPCS | Performed by: PHYSICAL THERAPIST

## 2019-07-24 PROCEDURE — 97140 MANUAL THERAPY 1/> REGIONS: CPT | Performed by: PHYSICAL THERAPIST

## 2019-07-24 PROCEDURE — 97162 PT EVAL MOD COMPLEX 30 MIN: CPT | Performed by: PHYSICAL THERAPIST

## 2019-07-24 NOTE — PROGRESS NOTES
Physical Therapy Initial Evaluation and Plan of Care    Patient: Riddhi Cid   : 1970  Diagnosis/ICD-10 Code:  Degeneration of lumbar intervertebral disc [M51.36]  Referring practitioner: June Harrison MD  Date of Initial Visit: 2019  Today's Date: 2019  Patient seen for 1 sessions           Subjective Questionnaire: Oswestry: 44%  Seen previously in January for 1 visit, did not return, states she was busy with other things.  Pain in lumbar and post legs, is wearing shoe off medially on left.  No hx of falls.  Hx of frequent ankle sprains left as a child.  Back pain hasn't changed in low back but has increased into upper back.  Hx of fibromyalgia, on [employer] disability      Subjective Evaluation    History of Present Illness  Mechanism of injury: none    Quality of life: fair    Pain  Current pain ratin  Quality: dull ache, discomfort, radiating, tight and pulling  Relieving factors: medications  Aggravating factors: prolonged positioning, ambulation and standing    Treatments  Previous treatment: physical therapy and medication  Current treatment: medication and physical therapy  Patient Goals  Patient goals for therapy: increased strength, return to work, improved balance and decreased pain             Objective       Static Posture     Comments  Kyphotic thoracic spine with increased lumbar lordosis, gait is with decreased stance on right leg with gait.      Active Range of Motion     Additional Active Range of Motion Details  Knee to chest limited to 90 degrees bilaterally and painful in lumbar/SI region bilaterally at end ranges.  Moderate tight thoracic rotation right greater than left.  SLR/hams tight at 80 degrees and negative for dural signs.  Right hip capsule/joint/piriformis limited by 50% compared to the left and painful.  Ankle df tight heel cords at +5 degrees bilaterally and general ankle strength inversion/eversion is 4/5.  Bilateral hip flexors tight and painful at end  ranges    Strength/Myotome Testing     Additional Strength Details  Generally overweight and deconditioned.  No myotomal limits noted.  Poor trunk/stab/control.      Ambulation     Comments   Gait without devices with poor push off and reduced stance noted on right         Assessment & Plan     Assessment  Impairments: abnormal gait, abnormal muscle tone, abnormal or restricted ROM, activity intolerance, impaired physical strength, lacks appropriate home exercise program and pain with function  Assessment details: Postural imbalances leading to increased lumbar/SI loading and moderate tight right hip capsule/piriformis contributing to gait dysfunction  Prognosis: fair  Functional Limitations: lifting, walking, pulling, pushing, sitting and standing  Goals  Plan Goals: Initiate ROM and strength program in 1 week    Independent HEP for self management by discharge    IADL to prior level of function by discharge    WOMAC Subjective questionnaire will be to 44% or less by discharge      Plan  Therapy options: will be seen for skilled physical therapy services  Planned modality interventions: electrical stimulation/Russian stimulation and thermotherapy (paraffin bath)  Planned therapy interventions: abdominal trunk stabilization, manual therapy, neuromuscular re-education, strengthening, stretching, joint mobilization, home exercise program and gait training  Duration in visits: 12  Plan details: Continue per POC        Timed:         Manual Therapy:    15     mins  07507;     Therapeutic Exercise:    15     mins  15158;     Neuromuscular Sha:    0    mins  82181;    Therapeutic Activity:     0     mins  29457;     Gait Trainin     mins  73932;     Ultrasound:     0     mins  45344;    Ionto                               0    mins   63399    Un-Timed:  Electrical Stimulation:    15     mins  18197 ( );  Dry Needling     0     mins self-pay  Traction     0     mins 59455  Low Eval     0     Mins   09207  Mod Eval    15  Mins  50321  High Eval                       0     Mins  65415        Timed Treatment:   30   mins   Total Treatment:     60   mins    PT SIGNATURE: Stephanie Roman, PT   DATE TREATMENT INITIATED: 7/24/2019    Medicare Initial Certification  Certification Period: 10/22/2019  I certify that the therapy services are furnished while this patient is under my care.  The services outlined above are required by this patient, and will be reviewed every 90 days.     PHYSICIAN: June Harrison MD      DATE:     Please sign and return via fax to [phone].. Thank you, [facility].

## 2019-07-25 RX ORDER — ONDANSETRON 4 MG/1
TABLET, ORALLY DISINTEGRATING ORAL
Refills: 0 | COMMUNITY
Start: 2019-06-07 | End: 2022-05-13

## 2019-07-25 RX ORDER — POLYETHYLENE GLYCOL 3350 17 G/17G
POWDER, FOR SOLUTION ORAL
Refills: 11 | COMMUNITY
Start: 2019-05-28 | End: 2022-01-14

## 2019-07-25 RX ORDER — CITALOPRAM 40 MG/1
40 TABLET ORAL DAILY
COMMUNITY
End: 2019-07-29

## 2019-07-25 RX ORDER — AMOXICILLIN 500 MG/1
CAPSULE ORAL
Refills: 0 | COMMUNITY
Start: 2019-06-23 | End: 2019-07-29

## 2019-07-25 RX ORDER — DULOXETIN HYDROCHLORIDE 60 MG/1
60 CAPSULE, DELAYED RELEASE ORAL DAILY
COMMUNITY
End: 2019-09-27

## 2019-07-25 RX ORDER — OCTISALATE, AVOBENZONE, HOMOSALATE, AND OCTOCRYLENE 29.4; 29.4; 49; 25.48 MG/ML; MG/ML; MG/ML; MG/ML
LOTION TOPICAL
COMMUNITY
End: 2019-07-29

## 2019-07-25 RX ORDER — BACLOFEN 10 MG/1
10 TABLET ORAL 3 TIMES DAILY
COMMUNITY
End: 2019-09-27

## 2019-07-29 ENCOUNTER — OFFICE VISIT (OUTPATIENT)
Dept: ENDOCRINOLOGY | Facility: CLINIC | Age: 49
End: 2019-07-29

## 2019-07-29 VITALS
OXYGEN SATURATION: 98 % | HEIGHT: 63 IN | BODY MASS INDEX: 41.82 KG/M2 | SYSTOLIC BLOOD PRESSURE: 120 MMHG | WEIGHT: 236 LBS | HEART RATE: 88 BPM | DIASTOLIC BLOOD PRESSURE: 80 MMHG

## 2019-07-29 DIAGNOSIS — E78.5 HYPERLIPIDEMIA, UNSPECIFIED HYPERLIPIDEMIA TYPE: ICD-10-CM

## 2019-07-29 DIAGNOSIS — E03.9 ACQUIRED HYPOTHYROIDISM: Primary | ICD-10-CM

## 2019-07-29 DIAGNOSIS — E04.1 SOLITARY THYROID NODULE: ICD-10-CM

## 2019-07-29 DIAGNOSIS — R73.03 PREDIABETES: ICD-10-CM

## 2019-07-29 DIAGNOSIS — E55.9 VITAMIN D DEFICIENCY: ICD-10-CM

## 2019-07-29 PROCEDURE — 99214 OFFICE O/P EST MOD 30 MIN: CPT | Performed by: INTERNAL MEDICINE

## 2019-07-29 RX ORDER — LEVOTHYROXINE SODIUM 0.05 MG/1
50 TABLET ORAL DAILY
Qty: 90 TABLET | Refills: 4 | Status: SHIPPED | OUTPATIENT
Start: 2019-07-29 | End: 2020-09-21 | Stop reason: SDUPTHER

## 2019-07-29 NOTE — PROGRESS NOTES
Endocrine Progress Note Outpatient     Patient Care Team:  June Harrison MD as PCP - General (Family Medicine)  Izaiah Fernandez MD as PCP - Claims Attributed    Chief Complaint: Follow up hypothyroidism    HPI: 48-year-old female with history of hypothyroidism, prediabetes, fibromyalgia, peripheral neuropathy, COPD, hyperlipidemia, right nodule , vitamin D deficiency is here for follow-up.    Hypothyroidism: She is on levothyroxine supplementation.  Prediabetes: She is currently on metformin. Weight is a stable.  She is trying to work on her diet and activity.  Vitamin D deficiency: On vitamin D supplementation.  Thyroid nodule: No family history of thyroid cancer no history of radiation exposure to her neck.  Denies dysphagia or choking.  Persistent change in the voice or hoarseness.    She continues to complain of fatigue and tiredness.  Complains of dry skin but denies any loss or constipation.    Past Medical History:   Diagnosis Date   • Allergic    • Anemia    • Anxiety    • Arthritis    • Asthma    • COPD (chronic obstructive pulmonary disease) (CMS/[facility])    • Costochondritis    • Depression    • Diabetes mellitus (CMS/[facility])    • GERD (gastroesophageal reflux disease)    • History of chicken pox    • Hyperlipidemia    • IBS (irritable bowel syndrome)    • Irritable colon    • Obstructive sleep apnea    • Thyroid nodule    • Tired 07/01/2019   • Vitamin D deficiency        Social History     Socioeconomic History   • Marital status:      Spouse name: Not on file   • Number of children: Not on file   • Years of education: Not on file   • Highest education level: Not on file   Tobacco Use   • Smoking status: Current Every Day Smoker     Types: Cigarettes   • Smokeless tobacco: Never Used   Substance and Sexual Activity   • Alcohol use: No     Frequency: Never   • Drug use: No       Family History   Problem Relation Age of Onset   • Stroke Mother    • Hypertension Mother    • Hyperlipidemia Mother     • Hypothyroidism Mother    • Prostate cancer Father    • Other Father    • Diabetes Father    • Hypertension Father    • Hyperlipidemia Father    • Breast cancer Paternal Grandmother    • Hypothyroidism Grandchild        Allergies   Allergen Reactions   • Sulfa Antibiotics Rash       ROS:   Constitutional:  Admit fatigue, tiredness.    Eyes:  Denies change in visual acuity   HENT:  Denies nasal congestion or sore throat   Respiratory: denies cough, shortness of breath.   Cardiovascular:  denies chest pain, edema   GI:  Denies abdominal pain, nausea, vomiting.   Musculoskeletal:  Denies back pain or joint pain   Integument:  Admit dry skin, denies rash   Neurologic:  Denies headache, focal weakness or sensory changes   Endocrine:  Denies polyuria or polydipsia   Psychiatric:  Denies depression or anxiety      Vitals:    07/29/19 1424   BP: 120/80   Pulse: 88   SpO2: 98%       Physical Exam:  GEN: NAD, conversant  EYES: EOMI, PERRL, no conjunctival erythema  NECK: no thyromegaly, full ROM   CV: RRR, no murmurs/rubs/gallops, no peripheral edema  LUNG: CTAB, no wheezes/rales/ronchi  SKIN: no rashes, no acanthosis  MSK: no deformities, full ROM of all extremities  NEURO: no tremors, DTR normal  PSYCH: AOX3, appropriate mood, affect normal      Results Review:     I reviewed the patient's new clinical results.    Lab Results   Component Value Date    HGBA1C 6.5 12/13/2018      Lab Results   Component Value Date    GLUCOSE 181 (H) 04/20/2018    BUN 10 09/25/2018    CREATININE 1.0 09/25/2018    EGFRIFNONA >60 09/25/2018    EGFRIFAFRI >60 09/25/2018    BCR 10.0 09/25/2018    K 3.7 09/25/2018    CO2 22 09/25/2018    CALCIUM 9.5 09/25/2018    ALBUMIN 4.0 09/25/2018    LABIL2 1.3 04/20/2018    AST 28 09/25/2018    ALT 29 04/20/2018    CHOL 159 08/13/2018    TRIG 314 (H) 08/13/2018    LDL 62 08/13/2018    HDL 34 (L) 08/13/2018     Lab Results   Component Value Date    TSH 4.49 08/13/2018     Labs from July 2019 showed a TSH of  2.21, free T4 1.0, A1c 6.1, sodium 141, potassium 3.7, chloride 109, CO2 25, glucose 134, BUN 11, creatinine 1.02, AST 13 and ALT 19.    Thyroid ultrasound from July 2019 showed a right side 9 mm thyroid nodule.  It has increased from 3 mm.    Medication Review: Reviewed.       Current Outpatient Medications:   •  albuterol sulfate HFA (VENTOLIN HFA) 108 (90 Base) MCG/ACT inhaler, Dx: J45.909, Asthma, Disp: , Rfl:   •  aspirin 81 MG chewable tablet, Chew 81 mg Daily. Dx: E11.9, DM type 2, controlled, Disp: , Rfl:   •  ATORVASTATIN CALCIUM PO, Take 40 mg by mouth Daily. Dx: E78.2, mixed hyperlipidema, Disp: , Rfl:   •  bumetanide (BUMEX) 1 MG tablet, Take 1 mg by mouth Daily As Needed for Edema. Dx: R60.0, lower extremity edema, Disp: , Rfl:   •  Cholecalciferol (VITAMIN D3) 5000 units capsule capsule, TAKE 1 CAPSULE BY MOUTH ONCE DAILY, Disp: 90 capsule, Rfl: 2  •  Cyanocobalamin ER (HM VITAMIN B12) 1000 MCG tablet controlled-release, Dx:, Disp: , Rfl:   •  DEXILANT 60 MG capsule, TAKE 1 CAPSULE BY MOUTH EVERY DAY, Disp: 30 capsule, Rfl: 0  •  fenofibrate (TRICOR) 145 MG tablet, Daily. Dx: E78.2, mixed hyperlipidema, Disp: , Rfl:   •  fluticasone (FLONASE) 50 MCG/ACT nasal spray, Dx: J01.00, acute non-recurrent maxillary sinusitis, Disp: , Rfl:   •  fluticasone-salmeterol (ADVAIR DISKUS) 250-50 MCG/DOSE DISKUS, Dx: J45.909, Asthma, Disp: , Rfl:   •  gabapentin (NEURONTIN) 300 MG capsule, 1 capsule Every 8 (Eight) Hours. Dx: M19.90, Arthritis, Disp: , Rfl:   •  Glucose Blood (BLOOD GLUCOSE TEST) strip, by In Vitro route. Dx: E11.9, DM type 2, controlled, Disp: , Rfl:   •  HYDROcodone-acetaminophen (NORCO)  MG per tablet, *PAIN MANAGEMENT RX'S, Disp: , Rfl:   •  Lancets Misc. (ACCU-CHEK FASTCLIX LANCET) kit, Dx: E11.9, DM type 2, controlled, Disp: , Rfl:   •  levothyroxine (SYNTHROID, LEVOTHROID) 50 MCG tablet, Daily. Dx: E40.1, thyroid nodule, Disp: , Rfl:   •  metFORMIN ER (GLUCOPHAGE-XR) 500 MG 24 hr tablet,  "Take 500 mg by mouth Daily. Dx: E11.9, DM type 2, controlled, Disp: , Rfl:   •  montelukast (SINGULAIR) 10 MG tablet, TAKE 1 TABLET BY MOUTH EVERY NIGHT, Disp: 90 tablet, Rfl: 12  •  naproxen (NAPROSYN) 500 MG tablet, Take 500 mg by mouth Daily. Dx: M54.5, acute right sided low back pain, without sciatica, Disp: , Rfl:   •  Omega-3 Fatty Acids (FISH OIL) 500 MG capsule capsule, 2 capsules Daily. Dx: E78.2, mixed hyperlipidema, Disp: , Rfl:   •  ondansetron ODT (ZOFRAN-ODT) 4 MG disintegrating tablet, DIS 1 T UNT Q 6 H PRN, Disp: , Rfl: 0  •  polyethylene glycol (MIRALAX) powder, , Disp: , Rfl: 11  •  potassium chloride ER (K-TAB) 20 MEQ tablet controlled-release ER tablet, Take 20 mEq by mouth. 1 tab to be taken only if taking 2 bumetanide. Dx: R60.0, lower extremity edema, Disp: , Rfl:   •  baclofen (LIORESAL) 10 MG tablet, Take 10 mg by mouth 3 (Three) Times a Day., Disp: , Rfl:   •  DULoxetine (CYMBALTA) 60 MG capsule, Take 60 mg by mouth Daily., Disp: , Rfl:   •  folic acid (FOLVITE) 1 MG tablet, Take 1 mg by mouth Daily., Disp: , Rfl:       Assessment/Plan   1.  Primary hypothyroidism: Well-controlled with TSH of 2.21 and free T4 1.0.  Continue current dose of levothyroxine.  2.  Vitamin D deficiency: On vitamin D replacement, continue current medications  3.  Prediabetes: On metformin.  Once he is a stable at 6.1%.  Advised to continue to work on diet and activity.  4.  Hyperlipidemia: On atorvastatin along with fish oil and fenofibrate.  New current medications.    5.  Right thyroid nodule it has increased in size to 9 mm, at this time the nodule is still too small to do biopsy.  Recommend follow-up ultrasound in 6 months.  Patient explained.          Megan Menard MD FACE.  06/15/19  4:34 PM      EMR Dragon / transcription disclaimer:     \"Dictated utilizing Dragon dictation\".                 "

## 2019-07-31 ENCOUNTER — APPOINTMENT (OUTPATIENT)
Dept: PAIN MEDICINE | Facility: CLINIC | Age: 49
End: 2019-07-31

## 2019-07-31 ENCOUNTER — OFFICE VISIT (OUTPATIENT)
Dept: PAIN MEDICINE | Facility: CLINIC | Age: 49
End: 2019-07-31

## 2019-07-31 ENCOUNTER — OFFICE VISIT (OUTPATIENT)
Dept: PHYSICAL THERAPY | Facility: CLINIC | Age: 49
End: 2019-07-31

## 2019-07-31 VITALS
DIASTOLIC BLOOD PRESSURE: 72 MMHG | BODY MASS INDEX: 41.64 KG/M2 | HEART RATE: 59 BPM | RESPIRATION RATE: 16 BRPM | SYSTOLIC BLOOD PRESSURE: 125 MMHG | OXYGEN SATURATION: 96 % | WEIGHT: 235 LBS | HEIGHT: 63 IN | TEMPERATURE: 98.5 F

## 2019-07-31 DIAGNOSIS — M50.30 DEGENERATION OF INTERVERTEBRAL DISC OF CERVICAL REGION: Primary | ICD-10-CM

## 2019-07-31 DIAGNOSIS — R26.2 DIFFICULTY WALKING: ICD-10-CM

## 2019-07-31 DIAGNOSIS — G89.29 CHRONIC MIDLINE LOW BACK PAIN WITH RIGHT-SIDED SCIATICA: ICD-10-CM

## 2019-07-31 DIAGNOSIS — M51.36 DEGENERATION OF INTERVERTEBRAL DISC OF LUMBAR REGION: ICD-10-CM

## 2019-07-31 DIAGNOSIS — M48.02 CERVICAL STENOSIS OF SPINE: ICD-10-CM

## 2019-07-31 DIAGNOSIS — M54.2 CERVICALGIA: ICD-10-CM

## 2019-07-31 DIAGNOSIS — M54.41 CHRONIC MIDLINE LOW BACK PAIN WITH RIGHT-SIDED SCIATICA: ICD-10-CM

## 2019-07-31 DIAGNOSIS — M51.36 DEGENERATION OF LUMBAR INTERVERTEBRAL DISC: Primary | ICD-10-CM

## 2019-07-31 PROCEDURE — 97110 THERAPEUTIC EXERCISES: CPT | Performed by: PHYSICAL THERAPIST

## 2019-07-31 PROCEDURE — G0283 ELEC STIM OTHER THAN WOUND: HCPCS | Performed by: PHYSICAL THERAPIST

## 2019-07-31 PROCEDURE — 97140 MANUAL THERAPY 1/> REGIONS: CPT | Performed by: PHYSICAL THERAPIST

## 2019-07-31 PROCEDURE — 99213 OFFICE O/P EST LOW 20 MIN: CPT | Performed by: PHYSICAL MEDICINE & REHABILITATION

## 2019-07-31 PROCEDURE — G0463 HOSPITAL OUTPT CLINIC VISIT: HCPCS | Performed by: PHYSICAL MEDICINE & REHABILITATION

## 2019-07-31 RX ORDER — HYDROCODONE BITARTRATE AND ACETAMINOPHEN 10; 325 MG/1; MG/1
1 TABLET ORAL EVERY 6 HOURS PRN
Qty: 120 TABLET | Refills: 0 | Status: SHIPPED | OUTPATIENT
Start: 2019-07-31 | End: 2019-07-31 | Stop reason: SDUPTHER

## 2019-07-31 RX ORDER — HYDROCODONE BITARTRATE AND ACETAMINOPHEN 10; 325 MG/1; MG/1
1 TABLET ORAL EVERY 6 HOURS PRN
Qty: 120 TABLET | Refills: 0 | Status: SHIPPED | OUTPATIENT
Start: 2019-07-31 | End: 2019-10-30 | Stop reason: SDUPTHER

## 2019-07-31 NOTE — PROGRESS NOTES
Subjective   Riddhi Cid is a 48 y.o. female.     Neck pain radiates to b/l shoulders, and left arm pain. ACDF C5/7 (4/11/2016), also h/o fibromyalgia,  also pain in lower back and b/l legs and feet. 10/10 at worst, 6/10 at best, 6/10 today, worse with activity, improves with rest and meds, always present, varies, aching, radiates in BLE. Imaging reviewed, satisfactory ACDF. Referred for pain management. Neck pain improved with ACDF, tapered off Fitzpatrick with worsening pain, still somewhat severe. Taking Cymbalta which helps, tried Lyrica with weight gain, trying to lose weight. Restarted Fitzpatrick at BID - qdaily prn but worsening pain with exercise to lose weight, increased to BID-TID #75, then TID, then QID prn with adequate relief, reduced to #105, tolerating. Gastric sleeve surgery planned for March-April 2017, had to miss cardiac clearance and EGD due to illness and social issues, has decided against surgery, trying to lose weight with diet and exercise. Worsening b/l CTS symptoms, R>L, known h/o of CTS. Saw Juan Franz for worsening neck pain to LUE, ACDF is intact, started Diclofenac for DJD, Elavil. Could not tolerate even low-dose Gabapentin with drowsiness. Started Celebrex but did not feel like doing anything, stopped, stopped Mobic. Stopping numerous meds due to side effects, started Gabapentin with Dr. Pisano with benefit. Pain worsening, DDD on MRI, started PT which is helping.         The following portions of the patient's history were reviewed and updated as appropriate: allergies, current medications, past family history, past medical history, past social history, past surgical history and problem list.    Review of Systems   Constitutional: Negative for chills, fatigue and fever.   HENT: Negative for hearing loss and trouble swallowing.    Eyes: Negative for visual disturbance.   Respiratory: Negative for shortness of breath.    Cardiovascular: Negative for chest pain.   Gastrointestinal: Positive for  constipation and nausea. Negative for abdominal pain, diarrhea and vomiting.   Genitourinary: Negative for urinary incontinence.   Musculoskeletal: Positive for arthralgias, back pain and neck pain. Negative for joint swelling and myalgias.   Neurological: Positive for headache. Negative for dizziness, weakness and numbness.       Objective   Physical Exam   Constitutional: She is oriented to person, place, and time. She appears well-developed and well-nourished.   HENT:   Head: Normocephalic and atraumatic.   Eyes: EOM are normal. Pupils are equal, round, and reactive to light.   Neck: Normal range of motion.   Cardiovascular: Normal rate, regular rhythm, normal heart sounds and intact distal pulses.   Pulmonary/Chest: Breath sounds normal.   Abdominal: Soft. Bowel sounds are normal. She exhibits no distension. There is no tenderness.   Neurological: She is alert and oriented to person, place, and time. She has normal strength and normal reflexes. She displays normal reflexes. No sensory deficit.   Psychiatric: She has a normal mood and affect. Her behavior is normal. Thought content normal.         Assessment/Plan   Riddhi was seen today for back pain, foot pain and leg pain.    Diagnoses and all orders for this visit:    Degeneration of intervertebral disc of cervical region    Degeneration of intervertebral disc of lumbar region    Chronic midline low back pain with right-sided sciatica    Cervicalgia    Cervical stenosis of spine        INspect reviewed, in order. Low risk. Repeat UDS 5/1/19 in order.  Reduced to Norco 10/325mg q6-8h #105, too painful, restart q6h prn #120, will reduce to #105 next visit if pain improves.  Afraid to start Lyrica due to possible weight gain. Restarted Gabapentin, takes 300-900mg/day as tolerated, helping a great deal.  Severe GERD, IBS, family h/o CV dz. Stopped Diclofenac, could not tolerate Celebrex 200mg qdaily. Stopped Mobic 7.5mg qdaily.  Cont other meds as prescribed.  Cont  TENS, unit provided here, patient reports it helps her pain significantly.  Cont b/l CTS braces at night only.  Patient reports she cannot start PT at this time as her daughter currently works 12h shifts, may be able to begin in future if her daughter can change to an 8h shift schedule.  Will begin neuropathic comp cream if her current cream does not work.  Ordered LSO for truncal stability. Cont PT.  RTC 3 months for f/u.

## 2019-08-02 ENCOUNTER — OFFICE VISIT (OUTPATIENT)
Dept: PHYSICAL THERAPY | Facility: CLINIC | Age: 49
End: 2019-08-02

## 2019-08-02 DIAGNOSIS — M51.36 DEGENERATION OF LUMBAR INTERVERTEBRAL DISC: Primary | ICD-10-CM

## 2019-08-02 DIAGNOSIS — R26.2 DIFFICULTY WALKING: ICD-10-CM

## 2019-08-02 PROCEDURE — G0283 ELEC STIM OTHER THAN WOUND: HCPCS | Performed by: PHYSICAL THERAPIST

## 2019-08-02 PROCEDURE — 97140 MANUAL THERAPY 1/> REGIONS: CPT | Performed by: PHYSICAL THERAPIST

## 2019-08-02 PROCEDURE — 97110 THERAPEUTIC EXERCISES: CPT | Performed by: PHYSICAL THERAPIST

## 2019-08-02 NOTE — PROGRESS NOTES
Physical Therapy Daily Progress Note      Patient: Riddhi Cid   : 1970  Diagnosis/ICD-10 Code:  Degeneration of lumbar intervertebral disc [M51.36]  Referring practitioner: June Harrison MD  Date of Initial Visit: Type: THERAPY  Noted: 2019  Today's Date: 2019  Patient seen for 3 sessions         Riddhi Cid reports: she hasn't experienced any change in the low vimal pain at this pint stating it is still very sore. Pt. States treatment has gone well so far ad has not made anything worse or better.    Objective   See Exercise, Manual, and Modality Logs for complete treatment.       Assessment/Plan   Pt. Presents with tightness B in LE musculature that responds well to manual stretching and self stretching. Pt. eversion is very weak and elicits pain with resistance. Pt was instructed to perform eversion isometricaly within comfortable range. Pt. [erformed row exercise well and demonstrated and verbalized good understanding for home exercise.    Progress per Plan of Care           Timed:         Manual Therapy:    15     mins  87049;     Therapeutic Exercise:    15     mins  27091;     Neuromuscular Sha:        mins  66356;    Therapeutic Activity:          mins  91357;     Gait Training:           mins  17566;     Ultrasound:          mins  97158;    Ionto                                   mins   23062  Self Care                            mins   31688  Canalith Repos                   mins  4209    Un-Timed:  Electrical Stimulation:    15     mins  58496 ( );  Dry Needling          mins self-pay  Traction          mins 22045  Low Eval          Mins  12193  Mod Eval          Mins  38805  High Eval                            Mins  62135    Timed Treatment:   30   mins   Total Treatment:     45   mins    Lolly Womack PTA  Physical Therapist Assistant License #[id number]

## 2019-08-07 ENCOUNTER — OFFICE VISIT (OUTPATIENT)
Dept: PHYSICAL THERAPY | Facility: CLINIC | Age: 49
End: 2019-08-07

## 2019-08-07 DIAGNOSIS — R26.2 DIFFICULTY WALKING: ICD-10-CM

## 2019-08-07 DIAGNOSIS — M51.36 DEGENERATION OF LUMBAR INTERVERTEBRAL DISC: Primary | ICD-10-CM

## 2019-08-07 PROCEDURE — 97140 MANUAL THERAPY 1/> REGIONS: CPT | Performed by: PHYSICAL THERAPIST

## 2019-08-07 PROCEDURE — G0283 ELEC STIM OTHER THAN WOUND: HCPCS | Performed by: PHYSICAL THERAPIST

## 2019-08-07 PROCEDURE — 97110 THERAPEUTIC EXERCISES: CPT | Performed by: PHYSICAL THERAPIST

## 2019-08-07 NOTE — PROGRESS NOTES
Physical Therapy Daily Progress Note      Patient: Riddhi Cid   : 1970  Diagnosis/ICD-10 Code:  Degeneration of lumbar intervertebral disc [M51.36]  Referring practitioner: June Harrison MD  Date of Initial Visit: Type: THERAPY  Noted: 2019  Today's Date: 2019  Patient seen for 4 sessions         Riddhi Cid reports: the foot pain has been increased in the top of the foot. Pt. Reports she does not tolerate lying supine well at all and cannot sleep that way.    Objective   See Exercise, Manual, and Modality Logs for complete treatment.       Assessment/Plan   Pt. Tolerated all stretching and exercise well this visit with one moment of cramping in the R hamstring muscle during an attempted stretch of the quadriceps muscle in L side lying. Pt. Reports she does not usually have cramping in her hamstrings. Pt. Had increased tension in the R lumber paraspinals this visit that responded well to STM and stretching. Pt. presented with greater control of L ankle during exercise this visit and shows improvement in strength.    Progress per Plan of Care           Timed:         Manual Therapy:    18     mins  52605;     Therapeutic Exercise:    15     mins  36271;     Neuromuscular Sha:        mins  69889;    Therapeutic Activity:         mins  30994;     Gait Training:           mins  10689;     Ultrasound:          mins  71215;    Ionto                                   mins   63241  Self Care                            mins   36082  Canalith Repos                   mins  4209    Un-Timed:  Electrical Stimulation:    15     mins  30321 ( );  Dry Needling          mins self-pay  Traction          mins 20506  Low Eval          Mins  89452  Mod Eval          Mins  81366  High Eval                            Mins  05917    Timed Treatment:   33   mins   Total Treatment:     48   mins    Lolly Womack PTA  Physical Therapist Assistant License #[id number]

## 2019-08-10 RX ORDER — ATORVASTATIN CALCIUM 40 MG/1
TABLET, FILM COATED ORAL
Qty: 30 TABLET | Refills: 0 | Status: SHIPPED | OUTPATIENT
Start: 2019-08-10 | End: 2019-09-12 | Stop reason: SDUPTHER

## 2019-08-14 ENCOUNTER — OFFICE VISIT (OUTPATIENT)
Dept: PHYSICAL THERAPY | Facility: CLINIC | Age: 49
End: 2019-08-14

## 2019-08-14 DIAGNOSIS — M51.36 DEGENERATION OF LUMBAR INTERVERTEBRAL DISC: Primary | ICD-10-CM

## 2019-08-14 DIAGNOSIS — R26.2 DIFFICULTY WALKING: ICD-10-CM

## 2019-08-14 PROCEDURE — 97140 MANUAL THERAPY 1/> REGIONS: CPT | Performed by: PHYSICAL THERAPIST

## 2019-08-14 PROCEDURE — G0283 ELEC STIM OTHER THAN WOUND: HCPCS | Performed by: PHYSICAL THERAPIST

## 2019-08-14 PROCEDURE — 97110 THERAPEUTIC EXERCISES: CPT | Performed by: PHYSICAL THERAPIST

## 2019-08-14 NOTE — PROGRESS NOTES
Physical Therapy Daily Progress Note      Patient: Riddhi Cid   : 1970  Diagnosis/ICD-10 Code:  Degeneration of lumbar intervertebral disc [M51.36]  Referring practitioner: June Harrison MD  Date of Initial Visit: Type: THERAPY  Noted: 2019  Today's Date: 2019  Patient seen for 5 sessions         Riddhi Cid reports: she is still have pain across the back and is having B foot pain today and she is beginning to wonder if it is due to the new shoes.    Objective   See Exercise, Manual, and Modality Logs for complete treatment.       Assessment/Plan  Pt. Tolerated treatment well this visit and had no increase in low back pain throughout treatment however did report B foot soreness following addition of nustep today stating the feet felt stiff. Pt. Had relief of low back pain with manual intervention and Estim with moist heat application. Pt. Tolerated greater LE stretch today particularly more so with quadriceps.    Progress per Plan of Care           Timed:         Manual Therapy:    15     mins  14930;     Therapeutic Exercise:    16     mins  85226;     Neuromuscular Sha:        mins  88612;    Therapeutic Activity:          mins  47831;     Gait Training:           mins  05578;     Ultrasound:          mins  09694;    Ionto                                   mins   06960  Self Care                            mins   28404  Canalith Repos                   mins  4209    Un-Timed:  Electrical Stimulation:    15     mins  84870 ( );  Dry Needling          mins self-pay  Traction          mins 25299  Low Eval          Mins  73900  Mod Eval          Mins  58363  High Eval                            Mins  56793    Timed Treatment:   31   mins   Total Treatment:     46   mins    Lolly Womack PTA  Physical Therapist Assistant License #[id number]

## 2019-08-16 ENCOUNTER — OFFICE VISIT (OUTPATIENT)
Dept: PHYSICAL THERAPY | Facility: CLINIC | Age: 49
End: 2019-08-16

## 2019-08-16 DIAGNOSIS — R26.2 DIFFICULTY WALKING: ICD-10-CM

## 2019-08-16 DIAGNOSIS — M51.36 DEGENERATION OF LUMBAR INTERVERTEBRAL DISC: Primary | ICD-10-CM

## 2019-08-16 PROCEDURE — G0283 ELEC STIM OTHER THAN WOUND: HCPCS | Performed by: PHYSICAL THERAPIST

## 2019-08-16 PROCEDURE — 97110 THERAPEUTIC EXERCISES: CPT | Performed by: PHYSICAL THERAPIST

## 2019-08-16 PROCEDURE — 97140 MANUAL THERAPY 1/> REGIONS: CPT | Performed by: PHYSICAL THERAPIST

## 2019-08-16 NOTE — PROGRESS NOTES
Physical Therapy Daily Progress Note      Patient: Riddhi Cid   : 1970  Diagnosis/ICD-10 Code:  Degeneration of lumbar intervertebral disc [M51.36]  Referring practitioner: June Harrison MD  Date of Initial Visit: Type: THERAPY  Noted: 2019  Today's Date: 2019  Patient seen for 6 sessions         Riddhi Cid reports: she is only sore in the L low back this morning so far but states throughout the day her foot hurts intermittently and the back pain progressively gets mor sore.    Objective   See Exercise, Manual, and Modality Logs for complete treatment.       Assessment/Plan   Pt. Tolerated exercise well this visit and manual intervention as well. However when sitting from side lying stretches this visit Pt. Reported increased R low back pain despite no reports or pain during stretches. Pt. Continues to improve with exercise but shows no long term improvements in low back relief.    Progress per Plan of Care           Timed:         Manual Therapy:    15     mins  91390;     Therapeutic Exercise:    17     mins  52548;     Neuromuscular Sha:        mins  68259;    Therapeutic Activity:          mins  54937;     Gait Training:           mins  03442;     Ultrasound:          mins  04590;    Ionto                                   mins   76181  Self Care                            mins   44381  Canalith Repos                   mins  4209    Un-Timed:  Electrical Stimulation:    15     mins  88191 ( );  Dry Needling          mins self-pay  Traction          mins 96994  Low Eval          Mins  71007  Mod Eval          Mins  22043  High Eval                            Mins  20063    Timed Treatment:   32   mins   Total Treatment:     47   mins    Lolly Womack PTA  Physical Therapist Assistant License #[id number]

## 2019-08-21 ENCOUNTER — OFFICE VISIT (OUTPATIENT)
Dept: PHYSICAL THERAPY | Facility: CLINIC | Age: 49
End: 2019-08-21

## 2019-08-21 DIAGNOSIS — R26.2 DIFFICULTY WALKING: ICD-10-CM

## 2019-08-21 DIAGNOSIS — M51.36 DEGENERATION OF LUMBAR INTERVERTEBRAL DISC: Primary | ICD-10-CM

## 2019-08-21 PROCEDURE — 97110 THERAPEUTIC EXERCISES: CPT | Performed by: PHYSICAL THERAPIST

## 2019-08-21 PROCEDURE — G0283 ELEC STIM OTHER THAN WOUND: HCPCS | Performed by: PHYSICAL THERAPIST

## 2019-08-21 PROCEDURE — 97140 MANUAL THERAPY 1/> REGIONS: CPT | Performed by: PHYSICAL THERAPIST

## 2019-08-21 NOTE — PROGRESS NOTES
Physical Therapy Daily Progress Note      Patient: Riddhi Cid   : 1970  Diagnosis/ICD-10 Code:  Degeneration of lumbar intervertebral disc [M51.36]  Referring practitioner: June Harrison MD  Date of Initial Visit: Type: THERAPY  Noted: 2019  Today's Date: 2019  Patient seen for 7 sessions         Riddhi Cid reports: she is very sore today and ha increased swelling I her R low back and states she has been have a very rough day as far as walking, standing, and sitting are concerned due to pain in the low back.    Objective   See Exercise, Manual, and Modality Logs for complete treatment.     Assessment/Plan   Pt. tolerated exercise well but continues to have discomfort with transfer on and off therapy table. Pt. Manual interventions were performed only in L side lying this visit due to discomfort with positioning and re-positioning on the therapy table. Pt. reports exercises did not aggravate pain further but she is just uncomfortable today in general. Ice pack was applied alongside Estim today due to Pt. Report of increased swelling in low back.    Progress per Plan of Care           Timed:         Manual Therapy:    11     mins  53995;     Therapeutic Exercise:    18     mins  67258;     Neuromuscular Sha:        mins  78170;    Therapeutic Activity:         mins  67790;     Gait Training:           mins  22173;     Ultrasound:          mins  03904;    Ionto                                   mins   85225  Self Care                            mins   03166  Canalith Repos                   mins  4209    Un-Timed:  Electrical Stimulation:    15     mins  46377 ( );  Dry Needling          mins self-pay  Traction          mins 44772  Low Eval          Mins  30187  Mod Eval          Mins  44687  High Eval                            Mins  48531    Timed Treatment:   29   mins   Total Treatment:     44   mins    Lolly Womack PTA  Physical Therapist Assistant License #[id number]

## 2019-08-23 ENCOUNTER — OFFICE VISIT (OUTPATIENT)
Dept: PHYSICAL THERAPY | Facility: CLINIC | Age: 49
End: 2019-08-23

## 2019-08-23 DIAGNOSIS — R26.2 DIFFICULTY WALKING: ICD-10-CM

## 2019-08-23 DIAGNOSIS — M51.36 DEGENERATION OF LUMBAR INTERVERTEBRAL DISC: Primary | ICD-10-CM

## 2019-08-23 PROCEDURE — G0283 ELEC STIM OTHER THAN WOUND: HCPCS | Performed by: PHYSICAL THERAPIST

## 2019-08-23 PROCEDURE — 97140 MANUAL THERAPY 1/> REGIONS: CPT | Performed by: PHYSICAL THERAPIST

## 2019-08-23 PROCEDURE — 97110 THERAPEUTIC EXERCISES: CPT | Performed by: PHYSICAL THERAPIST

## 2019-08-23 NOTE — PROGRESS NOTES
Physical Therapy Daily Progress Note      Patient: Riddhi Cid   : 1970  Diagnosis/ICD-10 Code:  Degeneration of lumbar intervertebral disc [M51.36]  Referring practitioner: June Harrison MD  Date of Initial Visit: Type: THERAPY  Noted: 2019  Today's Date: 2019  Patient seen for 8 sessions         Riddhi Cid reports: the R low back is still bothering her quite a bit. Pt. Also voiced concern of two raised spots on her L ankle that are soft to the touch and not tender that she states have popped up fairly recently. Pt. States the L ankle is becoming stronger and tolerates more activity now however states she can tell its still weaker than the R.    Objective   See Exercise, Manual, and Modality Logs for complete treatment.       Assessment/Plan   Pt. tolerated exercise and stretching well today. Spots on ankle were assessed and appeared to be raised areas of varicose veins. Pt. Continues to have mild discomfort with bed mobility. Pt. Response to therapy has been notable more in the L ankle however it has been minimal in affecting the low back pain. Pt. Continues to have raised swelling in R thoracic and lumbar paraspinals at this time.    Progress per Plan of Care           Timed:         Manual Therapy:    17     mins  69997;     Therapeutic Exercise:    12     mins  38740;     Neuromuscular Sha:        mins  93324;    Therapeutic Activity:          mins  94570;     Gait Training:           mins  48254;     Ultrasound:          mins  96249;    Ionto                                   mins   12901  Self Care                            mins   25110  Canalith Repos                   mins  4209    Un-Timed:  Electrical Stimulation:    15     mins  03842 ( );  Dry Needling          mins self-pay  Traction          mins 30925  Low Eval          Mins  52430  Mod Eval          Mins  41949  High Eval                            Mins  64964    Timed Treatment:   29   mins   Total Treatment:      44   mins    Lolly Womack PTA  Physical Therapist Assistant License #[id number]

## 2019-08-25 ENCOUNTER — HOSPITAL ENCOUNTER (EMERGENCY)
Facility: HOSPITAL | Age: 49
Discharge: HOME | End: 2019-08-25
Admitting: EMERGENCY MEDICINE

## 2019-08-25 VITALS
HEIGHT: 63 IN | BODY MASS INDEX: 42.38 KG/M2 | RESPIRATION RATE: 17 BRPM | DIASTOLIC BLOOD PRESSURE: 73 MMHG | SYSTOLIC BLOOD PRESSURE: 112 MMHG | WEIGHT: 239.2 LBS | HEART RATE: 71 BPM | TEMPERATURE: 98.2 F | OXYGEN SATURATION: 95 %

## 2019-08-25 DIAGNOSIS — M54.9 CHRONIC RIGHT-SIDED BACK PAIN, UNSPECIFIED BACK LOCATION: Primary | ICD-10-CM

## 2019-08-25 DIAGNOSIS — G89.29 CHRONIC RIGHT-SIDED BACK PAIN, UNSPECIFIED BACK LOCATION: Primary | ICD-10-CM

## 2019-08-25 LAB
BILIRUB UR QL STRIP: NEGATIVE
CLARITY UR: CLEAR
COLOR UR: YELLOW
GLUCOSE UR STRIP-MCNC: NEGATIVE MG/DL
HGB UR QL STRIP.AUTO: NEGATIVE
KETONES UR QL STRIP: NEGATIVE
LEUKOCYTE ESTERASE UR QL STRIP.AUTO: NEGATIVE
NITRITE UR QL STRIP: NEGATIVE
PH UR STRIP.AUTO: 5.5 [PH] (ref 5–8)
PROT UR QL STRIP: NEGATIVE
SP GR UR STRIP: 1.01 (ref 1–1.03)
UROBILINOGEN UR QL STRIP: NORMAL

## 2019-08-25 PROCEDURE — 25010000002 METHYLPREDNISOLONE PER 125 MG: Performed by: PHYSICIAN ASSISTANT

## 2019-08-25 PROCEDURE — 96372 THER/PROPH/DIAG INJ SC/IM: CPT

## 2019-08-25 PROCEDURE — 25010000002 KETOROLAC TROMETHAMINE PER 15 MG: Performed by: PHYSICIAN ASSISTANT

## 2019-08-25 PROCEDURE — 81003 URINALYSIS AUTO W/O SCOPE: CPT | Performed by: PHYSICIAN ASSISTANT

## 2019-08-25 PROCEDURE — 99283 EMERGENCY DEPT VISIT LOW MDM: CPT

## 2019-08-25 RX ORDER — PREDNISONE 20 MG/1
20 TABLET ORAL DAILY
Qty: 5 TABLET | Refills: 0 | Status: SHIPPED | OUTPATIENT
Start: 2019-08-25 | End: 2019-09-20

## 2019-08-25 RX ORDER — KETOROLAC TROMETHAMINE 30 MG/ML
30 INJECTION, SOLUTION INTRAMUSCULAR; INTRAVENOUS ONCE
Status: COMPLETED | OUTPATIENT
Start: 2019-08-25 | End: 2019-08-25

## 2019-08-25 RX ORDER — METHYLPREDNISOLONE SODIUM SUCCINATE 125 MG/2ML
125 INJECTION, POWDER, LYOPHILIZED, FOR SOLUTION INTRAMUSCULAR; INTRAVENOUS ONCE
Status: COMPLETED | OUTPATIENT
Start: 2019-08-25 | End: 2019-08-25

## 2019-08-25 RX ADMIN — METHYLPREDNISOLONE SODIUM SUCCINATE 125 MG: 125 INJECTION, POWDER, FOR SOLUTION INTRAMUSCULAR; INTRAVENOUS at 15:12

## 2019-08-25 RX ADMIN — KETOROLAC TROMETHAMINE 30 MG: 30 INJECTION, SOLUTION INTRAMUSCULAR at 15:10

## 2019-08-26 ENCOUNTER — EPISODE CHANGES (OUTPATIENT)
Dept: CASE MANAGEMENT | Facility: OTHER | Age: 49
End: 2019-08-26

## 2019-08-27 ENCOUNTER — TELEPHONE (OUTPATIENT)
Dept: FAMILY MEDICINE CLINIC | Facility: CLINIC | Age: 49
End: 2019-08-27

## 2019-08-28 ENCOUNTER — OFFICE VISIT (OUTPATIENT)
Dept: PHYSICAL THERAPY | Facility: CLINIC | Age: 49
End: 2019-08-28

## 2019-08-28 DIAGNOSIS — M51.36 DEGENERATION OF LUMBAR INTERVERTEBRAL DISC: Primary | ICD-10-CM

## 2019-08-28 DIAGNOSIS — R26.2 DIFFICULTY WALKING: ICD-10-CM

## 2019-08-28 PROCEDURE — G0283 ELEC STIM OTHER THAN WOUND: HCPCS | Performed by: PHYSICAL THERAPIST

## 2019-08-28 PROCEDURE — 97140 MANUAL THERAPY 1/> REGIONS: CPT | Performed by: PHYSICAL THERAPIST

## 2019-08-28 NOTE — PROGRESS NOTES
"     Physical Therapy Daily Progress Note        Patient: Riddhi Cid   : 1970  Diagnosis/ICD-10 Code:  Degeneration of lumbar intervertebral disc [M51.36]  Referring practitioner: June Harrison MD  Date of Initial Visit: Type: THERAPY  Noted: 2019  Today's Date: 2019  Patient seen for 9 sessions         Riddhi Cid reports: back flared up to levels requiring ER visit on Saturday.  Doesn't know of specific incident.  Left back feels \"swollen\", unable to move much at all without moderate pain.  Requests no treatment to back today.  To see MD tomorrow.  Steroid shot at ER and given script for steroids but hasn't filled it yet.  Feels like ankle left is doing much bettr      Subjective     Objective   See Exercise, Manual, and Modality Logs for complete treatment.   Modalities to back and treatment to left ankle only.  Mobs and PROM left ankle, reinforced HEP      Assessment/Plan    Note to MD regarding progress in ankle but issues with back, await MD opinion, advised to try gentle stretching for back           Timed:         Manual Therapy:    15     mins  45652;     Therapeutic Exercise:    0     mins  80998;     Neuromuscular Sha:    0    mins  42818;    Therapeutic Activity:     0     mins  54679;     Gait Trainin     mins  10241;     Ultrasound:     0     mins  50473;    Ionto                               0    mins   69746  Self Care                       0     mins   08734  Canalith Repos               0    mins  4209    Un-Timed:  Electrical Stimulation:    15     mins  78299 ( );  Dry Needling     0     mins self-pay  Traction     0     mins 34076  Low Eval     0     Mins  55955  Mod Eval     0     Mins  77637  High Eval                       0     Mins  62859    Timed Treatment:   15   mins   Total Treatment:     30   mins    Stephanie Roman, JULISSA  Physical Therapist  [place] license [id number]    " Morris Shields is a 67 year old year old male patient here today for hx of lung transplant.  He notes brown spot son legs.   .  Patient states this has been present for a while.  Patient reports the following symptoms:  none .  Patient reports the following previous treatments none.  Patient reports the following modifying factors none.  Associated symptoms: none.  Patient has no other skin complaints today.  Remainder of the HPI, Meds, PMH, Allergies, FH, and SH was reviewed in chart.      Past Medical History:   Diagnosis Date     Diabetes (H) 10/10/16    prednisone induced     GERD (gastroesophageal reflux disease)      Hearing problem      HTN (hypertension)      Hypomagnesemia 9/6/2016     Hypothyroidism      ILD (interstitial lung disease) (H)     VATS lung bx 10/2013 RML and RLL and chest CT consistent with chronic HP, + HP panel for aspergillus flavus; cellcept started 5/2014     IPF (idiopathic pulmonary fibrosis) (H)      Sleep apnea     on CPAP with 02 at4L/NC     SVT (supraventricular tachycardia) (H)        Past Surgical History:   Procedure Laterality Date     ARTHROPLASTY HIP Left 6/10/2016    Procedure: ARTHROPLASTY HIP;  Surgeon: Gaurang Aguila MD;  Location: [facility] OR     BIOPSY  8-29-16    also on 10-28-13     C HAND/FINGER SURGERY UNLISTED  3/19/12    carpal tunnel     C TOTAL KNEE ARTHROPLASTY      left partial 2006, right TKA 2012     COLONOSCOPY  12-19-08    normal     HC ECP WITH CATARACT SURGERY      2012     HC ESOPH/GAS REFLUX TEST W NASAL IMPED >1 HR N/A 4/28/2016    Procedure: ESOPHAGEAL IMPEDENCE FUNCTION TEST WITH 24 HOUR PH GREATER THAN 1 HOUR;  Surgeon: Marty Lee MD;  Location: [facility]     HC SACROPLASTY  1995    ruptured disc Dr [facility]err[facility]     LUNG SURGERY  10/28/13    lung biopsy Dr Gordillo     ORTHOPEDIC SURGERY      back surgery     ORTHOPEDIC SURGERY      L' total hip scheduled.     RELEASE CARPAL TUNNEL      2012     TRANSPLANT LUNG RECIPIENT SINGLE Left  7/29/2016    Procedure: TRANSPLANT LUNG RECIPIENT SINGLE;  Surgeon: Rhonda Woodruff MD;  Location:  OR        Family History   Problem Relation Age of Onset     Respiratory Father      pulmonary fibrosis (listed on death certificate)     Genetic Disorder Father      pulomanary fibrosis     LUNG DISEASE Father      pumonary fibrosis     Hypertension Mother      controlled     Hypothyroidism Mother      Thyroid Disease Mother      Hypothyroidism Sister      Thyroid Disease Sister        Social History     Social History     Marital status:      Spouse name: N/A     Number of children: N/A     Years of education: N/A     Occupational History     Not on file.     Social History Main Topics     Smoking status: Former Smoker     Packs/day: 1.00     Years: 34.00     Types: Cigarettes     Start date: 2/10/1970     Quit date: 1/16/2006     Smokeless tobacco: Never Used     Alcohol use No      Comment: 2-3x's/year     Drug use: No     Sexual activity: Yes     Partners: Female     Birth control/ protection: Post-menopausal     Other Topics Concern     Parent/Sibling W/ Cabg, Mi Or Angioplasty Before 65f 55m? No     Social History Narrative     for many years, now a crop farmer for 13 years.  Was exposed to hay urszula until 13 years ago with moldy hay.  Last exposure to moldy  Hay was  Approximately 2012.   . No silica exposure.  There is asbestos on the furnace in the house.    They use a wood stove in the house.       Outpatient Encounter Prescriptions as of 7/23/2018   Medication Sig Dispense Refill     alendronate (FOSAMAX) 70 MG tablet Take 1 tablet (70 mg) by mouth every 7 days Take 60 min before breakfast with over 8 oz water. Stay upright for at least 30 min after dose. 13 tablet 3     amLODIPine (NORVASC) 5 MG tablet Take 1 tablet (5 mg) by mouth daily 90 tablet 11     blood glucose monitoring (FREESTYLE) lancets Use to test blood sugar 4 times daily or as directed. 120 each 11     blood  glucose monitoring (NO BRAND SPECIFIED) test strip Use to test blood sugar 4 times daily or as directed. For FreeStyle auto-assist. 120 each 11     Calcium carb-Vitamin D 500 mg [place]-200 units (OSCAL WITH D;OYSTER SHELL CALCIUM) 500-200 MG-UNIT per tablet Take 2 tablets by mouth 2 times daily (with meals) 360 tablet 11     EPINEPHrine (EPIPEN) 0.3 MG/0.3ML injection Inject 0.3 mLs (0.3 mg) into the muscle as needed for anaphylaxis 0.6 mL 3     EPINEPHrine (EPIPEN/ADRENACLICK/OR ANY BX GENERIC EQUIV) 0.3 MG/0.3ML injection 2-pack Inject 0.3 mLs (0.3 mg) into the muscle as needed for anaphylaxis 0.6 mL 0     levothyroxine (SYNTHROID/LEVOTHROID) 75 MCG tablet Take 1 tablet (75 mcg) by mouth every morning (before breakfast) 30 tablet 11     magnesium oxide (MAG-OX) 400 (241.3 Mg) MG tablet Take 1 tablet (400 mg) by mouth 3 times daily 270 tablet 3     metoprolol (LOPRESSOR) 25 MG tablet TAKE ONE-HALF TABLET BY MOUTH TWICE A DAY 30 tablet 11     mycophenolate (GENERIC EQUIVALENT) 500 MG tablet Take 3 tablets (1,500 mg) by mouth 2 times daily 180 tablet 11     omeprazole (PRILOSEC) 40 MG capsule Take 1 capsule (40 mg) by mouth 2 times daily (before meals) 60 capsule 11     predniSONE (DELTASONE) 5 MG tablet Take one and one half tablets (7.5mg) by mouth daily. 135 tablet 3     ranitidine (ZANTAC) 150 MG tablet Take 1 tablet (150 mg) by mouth daily 60 tablet 1     sulfamethoxazole-trimethoprim (BACTRIM/SEPTRA) 400-80 MG per tablet TAKE ONE TABLET BY MOUTH EVERY DAY 90 tablet 11     tacrolimus (GENERIC EQUIVALENT) 1 MG capsule Take 4 mg am, 4 mg midday and 3mg evening 330 capsule 11     No facility-administered encounter medications on file as of 7/23/2018.              Review Of Systems  Skin: As above  Eyes: negative  Ears/Nose/Throat: negative  Respiratory: No shortness of breath, dyspnea on exertion, cough, or hemoptysis  Cardiovascular: negative  Gastrointestinal: negative  Genitourinary: negative  Musculoskeletal:  negative  Neurologic: negative  Psychiatric: negative  Hematologic/Lymphatic/Immunologic: negative  Endocrine: negative      O:   NAD, WDWN, Alert & Oriented, Mood & Affect wnl, Vitals stable   Here today alone   /71  Pulse 53  SpO2 98%   General appearance normal   Vitals stable   Alert, oriented and in no acute distress      Following lymph nodes palpated: Occipital, Cervical, Supraclavicular no lad   Stuck on papules and brown macules on trunk and ext    Red papules on trunk         The remainder of the full exam was unremarkable; the following areas were examined:  conjunctiva/lids, oral mucosa, neck, peripheral vascular system, abdomen, lymph nodes, digits/nails, eccrine and apocrine glands, scalp/hair, face, neck, chest, abdomen, buttocks, back, RUE, LUE, RLE, LLE       Eyes: Conjunctivae/lids:Normal     ENT: Lips, buccal mucosa, tongue: normal    MSK:Normal    Cardiovascular: peripheral edema none    Pulm: Breathing Normal    Lymph Nodes: No Head and Neck Lymphadenopathy     Neuro/Psych: Orientation:Normal; Mood/Affect:Normal      A/P:  1. Seborrheic keratosis, lentigo, angioma, hx of transplant  Risks of melanoma and non-melanoma skin cancer discussed with patient   BENIGN LESIONS DISCUSSED WITH PATIENT:  I discussed the specifics of tumor, prognosis, and genetics of benign lesions.  I explained that treatment of these lesions would be purely cosmetic and not medically neccessary.  I discussed with patient different removal options including excision, cautery and /or laser.      Nature and genetics of benign skin lesions dicussed with patient.  Signs and Symptoms of skin cancer discussed with patient.  ABCDEs of melanoma reviewed with patient.  Patient encouraged to perform monthly skin exams.  UV precautions reviewed with patient.  Patient to follow up with Primary Care provider regarding elevated blood pressure.  Skin care regimen reviewed with patient: Eliminate harsh soaps, i.e. Dial, zest, irsih  spring; Mild soaps such as Cetaphil or Dove sensitive skin, avoid hot or cold showers, aggressive use of emollients including vanicream, cetaphil or cerave discussed with patient.    Risks of non-melanoma skin cancer discussed with patient   Return to clinic 6 months  Patient to follow up with Primary Care provider regarding elevated blood pressure.

## 2019-08-29 ENCOUNTER — EPISODE CHANGES (OUTPATIENT)
Dept: CASE MANAGEMENT | Facility: OTHER | Age: 49
End: 2019-08-29

## 2019-08-29 ENCOUNTER — OFFICE VISIT (OUTPATIENT)
Dept: FAMILY MEDICINE CLINIC | Facility: CLINIC | Age: 49
End: 2019-08-29

## 2019-08-29 VITALS
OXYGEN SATURATION: 98 % | DIASTOLIC BLOOD PRESSURE: 68 MMHG | TEMPERATURE: 98.6 F | BODY MASS INDEX: 40.29 KG/M2 | HEIGHT: 64 IN | HEART RATE: 87 BPM | RESPIRATION RATE: 18 BRPM | SYSTOLIC BLOOD PRESSURE: 112 MMHG | WEIGHT: 236 LBS

## 2019-08-29 DIAGNOSIS — Z72.0 TOBACCO ABUSE: Chronic | ICD-10-CM

## 2019-08-29 DIAGNOSIS — M54.50 CHRONIC BILATERAL LOW BACK PAIN WITHOUT SCIATICA: Primary | ICD-10-CM

## 2019-08-29 DIAGNOSIS — M25.562 ACUTE PAIN OF LEFT KNEE: ICD-10-CM

## 2019-08-29 DIAGNOSIS — G89.29 CHRONIC BILATERAL LOW BACK PAIN WITHOUT SCIATICA: Primary | ICD-10-CM

## 2019-08-29 PROCEDURE — 99214 OFFICE O/P EST MOD 30 MIN: CPT | Performed by: FAMILY MEDICINE

## 2019-08-29 RX ORDER — DEXLANSOPRAZOLE 60 MG/1
CAPSULE, DELAYED RELEASE ORAL
Qty: 30 CAPSULE | Refills: 0 | Status: SHIPPED | OUTPATIENT
Start: 2019-08-29 | End: 2023-02-13 | Stop reason: SDUPTHER

## 2019-08-29 RX ORDER — NAPROXEN 500 MG/1
500 TABLET ORAL EVERY 24 HOURS
Qty: 60 TABLET | Refills: 2 | Status: SHIPPED | OUTPATIENT
Start: 2019-08-29 | End: 2020-04-27

## 2019-08-29 NOTE — PROGRESS NOTES
Subjective   Riddhi Cid is a 48 y.o. female.     Back Pain   This is a recurrent problem. The current episode started 1 to 4 weeks ago. The problem occurs constantly. The problem has been gradually improving since onset. The pain is present in the lumbar spine. The quality of the pain is described as aching. The pain is at a severity of 5/10. The pain is moderate. The pain is worse during the day. The symptoms are aggravated by bending and position. Stiffness is present in the morning. Pertinent negatives include no abdominal pain, bladder incontinence, bowel incontinence, chest pain, dysuria, fever, paresthesias or weakness. She has tried bed rest and NSAIDs for the symptoms. The treatment provided no relief.   Knee Pain    The incident occurred 6 to 12 hours ago. Incident location: after Physical therapy a few hours after she got home  There was no injury mechanism. The pain is present in the left knee. The pain is moderate. The pain has been intermittent since onset. Pertinent negatives include no inability to bear weight, loss of motion, loss of sensation or muscle weakness. The symptoms are aggravated by movement. She has tried NSAIDs (gabapentin) for the symptoms. The treatment provided moderate relief.   Nicotine Dependence   Presents for initial visit. Preferred tobacco types include cigarettes. Preferred cigarette types include filtered. Preferred strength is light. Preferred cigarettes are non-menthol. Preferred brands include [place]. Her urge triggers include company of smokers and stress. Her first smoke is from 8 to 10 AM. She smokes < 1/2 a pack of cigarettes per day. She started smoking when she was >16 years old. Treatments tried: chantix. The treatment provided no relief. Compliance with prior treatments has been poor. Riddhi is thinking about quitting.        The following portions of the patient's history were reviewed and updated as appropriate: allergies, current medications, past family  history, past medical history, past social history, past surgical history and problem list.    Family History   Problem Relation Age of Onset   • Stroke Mother    • Hypertension Mother    • Hyperlipidemia Mother    • Hypothyroidism Mother    • Prostate cancer Father    • Other Father    • Diabetes Father    • Hypertension Father    • Hyperlipidemia Father    • Breast cancer Paternal Grandmother    • Hypothyroidism Grandchild        Social History     Tobacco Use   • Smoking status: Current Every Day Smoker     Types: Cigarettes   • Smokeless tobacco: Never Used   Substance Use Topics   • Alcohol use: No     Frequency: Never   • Drug use: No       Past Surgical History:   Procedure Laterality Date   • CARPAL TUNNEL RELEASE     • CERVICAL DISCECTOMY ANTERIOR     • CHOLECYSTECTOMY     • ESSURE TUBAL LIGATION         Patient Active Problem List   Diagnosis   • Body mass index (BMI) of 45.0-49.9 in adult (CMS/[facility])   • Degeneration of intervertebral disc of cervical region   • Degeneration of intervertebral disc of lumbar region   • Hypothyroidism   • Hyperlipidemia   • Major depressive disorder, recurrent, moderate (CMS/[facility])   • Obstructive sleep apnea   • Sinusitis, acute   • Vitamin B12 deficiency   • Vitamin D deficiency   • COPD (chronic obstructive pulmonary disease) (CMS/[facility])   • Prediabetes   • Solitary thyroid nodule   • Chronic midline low back pain with right-sided sciatica   • Cervicalgia   • Cervical stenosis of spine       Current Outpatient Medications on File Prior to Visit   Medication Sig Dispense Refill   • albuterol sulfate HFA (VENTOLIN HFA) 108 (90 Base) MCG/ACT inhaler Dx: J45.909, Asthma     • aspirin 81 MG chewable tablet Chew 81 mg Daily. Dx: E11.9, DM type 2, controlled     • atorvastatin (LIPITOR) 40 MG tablet TAKE 1 TABLET BY MOUTH EVERY DAY 30 tablet 0   • bumetanide (BUMEX) 1 MG tablet Take 1 mg by mouth Daily As Needed for Edema. Dx: R60.0, lower extremity edema     • Cholecalciferol  (VITAMIN D3) 5000 units capsule capsule TAKE 1 CAPSULE BY MOUTH ONCE DAILY 90 capsule 2   • Cyanocobalamin ER (HM VITAMIN B12) 1000 MCG tablet controlled-release Dx:     • DEXILANT 60 MG capsule TAKE 1 CAPSULE BY MOUTH EVERY DAY 30 capsule 0   • fenofibrate (TRICOR) 145 MG tablet Daily. Dx: E78.2, mixed hyperlipidema     • fluticasone (FLONASE) 50 MCG/ACT nasal spray Dx: J01.00, acute non-recurrent maxillary sinusitis     • fluticasone-salmeterol (ADVAIR DISKUS) 250-50 MCG/DOSE DISKUS Dx: J45.909, Asthma     • folic acid (FOLVITE) 1 MG tablet Take 1 mg by mouth Daily.     • gabapentin (NEURONTIN) 300 MG capsule 1 capsule Every 8 (Eight) Hours. Dx: M19.90, Arthritis     • Glucose Blood (BLOOD GLUCOSE TEST) strip by In Vitro route. Dx: E11.9, DM type 2, controlled     • HYDROcodone-acetaminophen (NORCO)  MG per tablet Take 1 tablet by mouth Every 6 (Six) Hours As Needed for Moderate Pain . *PAIN MANAGEMENT RX'S 120 tablet 0   • Lancets Misc. (ACCU-CHEK FASTCLIX LANCET) kit Dx: E11.9, DM type 2, controlled     • levothyroxine (SYNTHROID, LEVOTHROID) 50 MCG tablet Take 1 tablet by mouth Daily. Dx: E40.1, thyroid nodule 90 tablet 4   • metFORMIN ER (GLUCOPHAGE-XR) 500 MG 24 hr tablet Take 500 mg by mouth Daily. Dx: E11.9, DM type 2, controlled     • montelukast (SINGULAIR) 10 MG tablet TAKE 1 TABLET BY MOUTH EVERY NIGHT 90 tablet 12   • Omega-3 Fatty Acids (FISH OIL) 500 MG capsule capsule 2 capsules Daily. Dx: E78.2, mixed hyperlipidema     • ondansetron ODT (ZOFRAN-ODT) 4 MG disintegrating tablet DIS 1 T UNT Q 6 H PRN  0   • polyethylene glycol (MIRALAX) powder   11   • potassium chloride ER (K-TAB) 20 MEQ tablet controlled-release ER tablet Take 20 mEq by mouth. 1 tab to be taken only if taking 2 bumetanide. Dx: R60.0, lower extremity edema     • [DISCONTINUED] naproxen (NAPROSYN) 500 MG tablet Take 500 mg by mouth Daily. Dx: M54.5, acute right sided low back pain, without sciatica     • baclofen (LIORESAL) 10  "MG tablet Take 10 mg by mouth 3 (Three) Times a Day.     • DULoxetine (CYMBALTA) 60 MG capsule Take 60 mg by mouth Daily.     • predniSONE (DELTASONE) 20 MG tablet Take 1 tablet by mouth Daily. 5 tablet 0   • [DISCONTINUED] ATORVASTATIN CALCIUM PO Take 40 mg by mouth Daily. Dx: E78.2, mixed hyperlipidema       No current facility-administered medications on file prior to visit.        Allergies   Allergen Reactions   • Sulfa Antibiotics Rash       Review of Systems   Constitutional: Negative for activity change and fever.   HENT: Negative for ear pain, rhinorrhea, sinus pressure and voice change.    Eyes: Negative for visual disturbance.   Respiratory: Negative for cough and shortness of breath.    Cardiovascular: Negative for chest pain.   Gastrointestinal: Negative for abdominal pain, bowel incontinence, diarrhea, nausea and vomiting.   Endocrine: Negative for cold intolerance and heat intolerance.   Genitourinary: Negative for dysuria, frequency, urgency and urinary incontinence.   Musculoskeletal: Positive for back pain. Negative for arthralgias.   Skin: Negative for rash.   Neurological: Negative for syncope, weakness and paresthesias.   Hematological: Does not bruise/bleed easily.   Psychiatric/Behavioral: Positive for stress. Negative for depressed mood. The patient is not nervous/anxious.        Objective   Visit Vitals  /68   Pulse 87   Temp 98.6 °F (37 °C)   Resp 18   Ht 161.3 cm (63.5\")   Wt 107 kg (236 lb)   LMP  (LMP Unknown)   SpO2 98%   BMI 41.15 kg/m²     Physical Exam   Constitutional: She is oriented to person, place, and time. She appears well-developed and well-nourished. She is cooperative. No distress.   Cardiovascular: Normal rate, regular rhythm, normal heart sounds and intact distal pulses. Exam reveals no gallop and no friction rub.   No murmur heard.  Pulmonary/Chest: Effort normal and breath sounds normal. No respiratory distress. She has no wheezes. She has no rales. "   Musculoskeletal: She exhibits no edema or deformity.        Left knee: She exhibits normal range of motion. No tenderness found.        Lumbar back: She exhibits normal range of motion, no tenderness, no deformity, no pain and no spasm.   Neurological: She is alert and oriented to person, place, and time. She displays normal reflexes. She exhibits normal muscle tone. Coordination normal.   Skin: Skin is warm and dry. She is not diaphoretic.   Psychiatric: She has a normal mood and affect.   Vitals reviewed.        Assessment/Plan .  Problem List Items Addressed This Visit        Digestive    Body mass index (BMI) of 45.0-49.9 in adult (CMS/[facility])      Other Visit Diagnoses     Chronic bilateral low back pain without sciatica    -  Primary    Relevant Medications    naproxen (NAPROSYN) 500 MG tablet    Acute pain of left knee        After PT for ankle. Discussed nsaids. Will add this to PT    Relevant Medications    naproxen (NAPROSYN) 500 MG tablet    Tobacco abuse  (Chronic)       Chantix did not help. She is going to try patches

## 2019-08-30 RX ORDER — CYANOCOBALAMIN (VITAMIN B-12) 1000 MCG
TABLET, EXTENDED RELEASE ORAL
Qty: 90 EACH | Refills: 1 | Status: SHIPPED | OUTPATIENT
Start: 2019-08-30 | End: 2020-05-26

## 2019-09-04 ENCOUNTER — EPISODE CHANGES (OUTPATIENT)
Dept: CASE MANAGEMENT | Facility: OTHER | Age: 49
End: 2019-09-04

## 2019-09-05 ENCOUNTER — OFFICE VISIT (OUTPATIENT)
Dept: PHYSICAL THERAPY | Facility: CLINIC | Age: 49
End: 2019-09-05

## 2019-09-05 DIAGNOSIS — R26.2 DIFFICULTY WALKING: ICD-10-CM

## 2019-09-05 DIAGNOSIS — M25.562 ACUTE PAIN OF LEFT KNEE: ICD-10-CM

## 2019-09-05 DIAGNOSIS — M51.36 DEGENERATION OF LUMBAR INTERVERTEBRAL DISC: Primary | ICD-10-CM

## 2019-09-05 PROCEDURE — 97164 PT RE-EVAL EST PLAN CARE: CPT | Performed by: PHYSICAL THERAPIST

## 2019-09-05 PROCEDURE — 97530 THERAPEUTIC ACTIVITIES: CPT | Performed by: PHYSICAL THERAPIST

## 2019-09-05 PROCEDURE — 97110 THERAPEUTIC EXERCISES: CPT | Performed by: PHYSICAL THERAPIST

## 2019-09-05 NOTE — PROGRESS NOTES
Physical Therapy Daily Progress Note    Patient: Riddhi Cid   : 1970  Diagnosis/ICD-10 Code:  Degeneration of lumbar intervertebral disc [M51.36]  Referring practitioner: June Harrison MD  Date of Initial Visit: Type: THERAPY  Noted: 2019  Today's Date: 2019  Patient seen for 10 sessions             Subjective Patient returns to PT with new orders from Dr. Harrison for L knee pain.  Patient reports Dr. Harrison wants to hold on treating LBP at this time.  Patient has not performed HEP since Dr. Harrison said not to focus on back pain.  She reports insidious onset of L knee pain after 19 PT session.  She had no pain during session but later after she took a nap she woke up with it.  Pain was described as sharp, couldn't tell if it was in the muscle or joint but felt it was posterior. It lasted the rest of that day then was gone the next morning.  She reports she did have L knee pain about 10 years ago and had PT for it at that time.  Current history of fibromyalgia.  Frequent L ankle sprains growing up.  Patient denies numbness or tingling.    Objective   See Exercise, Manual, and Modality Logs for complete treatment. Reviewed initial evaluation for lumbar spine.  Completed evaluation of L knee.  Asked patient to complete [place] knee score, she had difficulty completing because she is no longer having knee pain or functional limitations today other than impaired balance.  AROM knee flexion is equal and WNL B, extension = +5 deg hyperextension R, 0 deg L.  SLS = 2 sec L, 7 sec R.  L knee buckled during testing.  MMT:  4-4+/5 B LEs.  mCTSIB = 1/4 (30,5,20,4) indicating decreased balance and sensory integration with visual dependence and limited vestibular and kinesthetic.  R piriformis remains tight compared to L.  Minimally tight hamstrings B.        Assessment/Plan Reassessed and revised goals:  Initiate ROM and strength program in 1 week - MET  Improve SLS to 15 sec B by discharge - NOT MET  (4 sec initially)  Independent HEP for self management by discharge - NOT MET  IADL to prior level of function by discharge  WOMAC Subjective questionnaire will be to 44% or less by discharge    Progress per Plan of Care  Focus on LE balance and proprioception as well as flexibility.         Timed:         Manual Therapy:         mins  23976;     Therapeutic Exercise:    15     mins  92723;     Neuromuscular Sha:        mins  22752;    Therapeutic Activity:     15     mins  69591;     Gait Training:           mins  08361;     Ultrasound:          mins  98790;    Ionto                                   mins   81474  Self Care                            mins   48247      Un-Timed:  Electrical Stimulation:         mins  67453 ( );  Dry Needling          mins self-pay  Traction          mins 30347  Low Eval     15     Mins  90417  Mod Eval          Mins  35765  High Eval                            Mins  35957  Canalith Repos                   mins  02712    Timed Treatment:   30   mins   Total Treatment:     45   mins    Robin A Sprigler, PT  Physical Therapist

## 2019-09-06 ENCOUNTER — EPISODE CHANGES (OUTPATIENT)
Dept: CASE MANAGEMENT | Facility: OTHER | Age: 49
End: 2019-09-06

## 2019-09-09 ENCOUNTER — OFFICE VISIT (OUTPATIENT)
Dept: PHYSICAL THERAPY | Facility: CLINIC | Age: 49
End: 2019-09-09

## 2019-09-09 DIAGNOSIS — M51.36 DEGENERATION OF LUMBAR INTERVERTEBRAL DISC: Primary | ICD-10-CM

## 2019-09-09 DIAGNOSIS — M25.562 ACUTE PAIN OF LEFT KNEE: ICD-10-CM

## 2019-09-09 DIAGNOSIS — R26.2 DIFFICULTY WALKING: ICD-10-CM

## 2019-09-09 PROCEDURE — 97530 THERAPEUTIC ACTIVITIES: CPT | Performed by: PHYSICAL THERAPIST

## 2019-09-09 PROCEDURE — 97110 THERAPEUTIC EXERCISES: CPT | Performed by: PHYSICAL THERAPIST

## 2019-09-09 NOTE — PROGRESS NOTES
Physical Therapy Daily Progress Note      Patient: Riddhi Cid   : 1970  Diagnosis/ICD-10 Code:  Degeneration of lumbar intervertebral disc [M51.36]  Referring practitioner: June Harrison MD  Date of Initial Visit: Type: THERAPY  Noted: 2019  Today's Date: 2019  Patient seen for 11 sessions         Riddhi Cid reports: she has not had knee pain again since evaluation stating it only bothered her for the one day. Pt. States she has noticed her balance is impaired at times and states she believes it is due to the L knee pain and R hip weakness along with the back pain and ankle problems she has had . She believes everything combined has limited her balance and each thing feeds into the pain of the other.    Objective   See Exercise, Manual, and Modality Logs for complete treatment.       Assessment/Plan  Pt. Reports tingling in L foot underneath toes during seated leg press today. Pt. Had insidious onset of anterior L knee pain just distal to the patella at the patella femoral insertion while performing lateral step ups. Pt. Performed 9 slow and controlled repititions before pain had sudden onset with 10th rep on the push up. Pt. Reported pain was gone immediately upon providing support with the R foot and distributing weight through both LE's. Pt. Reported this pain was the same pain she had previously however she had also had posterior popliteal space pain accompany this pain int he past. Pt. Balance was significantly impaired with NBOS and EC on firm surface this visit requiring CGA to maintain balance for 15 sec. Pt. Denied the need for any modalities this visit stating the pain was gone upon finishing exercise today.    Progress per Plan of Care           Timed:         Manual Therapy:         mins  16822;     Therapeutic Exercise:    15     mins  79796;     Neuromuscular Sha:        mins  04682;    Therapeutic Activity:     12    mins  67499;     Gait Training:           mins   94138;     Ultrasound:          mins  70211;    Ionto                                   mins   38381  Self Care                            mins   14386  Canalith Repos                   mins  4209    Un-Timed:  Electrical Stimulation:         mins  05672 ( );  Dry Needling          mins self-pay  Traction          mins 27959  Low Eval          Mins  48268  Mod Eval          Mins  03497  High Eval                            Mins  61010    Timed Treatment:   27   mins   Total Treatment:     27   mins    Lolly Womack PTA  Physical Therapist Assistant License #[id number]

## 2019-09-11 ENCOUNTER — OFFICE VISIT (OUTPATIENT)
Dept: PHYSICAL THERAPY | Facility: CLINIC | Age: 49
End: 2019-09-11

## 2019-09-11 DIAGNOSIS — M25.562 ACUTE PAIN OF LEFT KNEE: ICD-10-CM

## 2019-09-11 DIAGNOSIS — R26.2 DIFFICULTY WALKING: ICD-10-CM

## 2019-09-11 DIAGNOSIS — M51.36 DEGENERATION OF LUMBAR INTERVERTEBRAL DISC: Primary | ICD-10-CM

## 2019-09-11 PROCEDURE — 97530 THERAPEUTIC ACTIVITIES: CPT | Performed by: PHYSICAL THERAPIST

## 2019-09-11 PROCEDURE — 97110 THERAPEUTIC EXERCISES: CPT | Performed by: PHYSICAL THERAPIST

## 2019-09-11 NOTE — PROGRESS NOTES
Physical Therapy Daily Progress Note      Patient: Riddhi Cid   : 1970  Diagnosis/ICD-10 Code:  Degeneration of lumbar intervertebral disc [M51.36]  Referring practitioner: June Harrison MD  Date of Initial Visit: Type: THERAPY  Noted: 2019  Today's Date: 2019  Patient seen for 12 sessions         Riddhi Cid reports: that she still has continued back pain but states she has not had any instances of knee pain since last visit when knee pain set insidiously with lateral step up.    Objective   See Exercise, Manual, and Modality Logs for complete treatment.     Assessment/Plan   Pt. Continues to show greater control with L LE when performing exercises however still presents with decreased strength in the L LE as compared to the R LE at this time. Pt. Continues to show minimal improvement with pain and tolerance to activity in general.    Progress per Plan of Care           Timed:         Manual Therapy:         mins  53723;     Therapeutic Exercise:    15     mins  60795;     Neuromuscular Sha:        mins  98387;    Therapeutic Activity:    13      mins  00062;     Gait Training:           mins  33350;     Ultrasound:          mins  58854;    Ionto                                   mins   85275  Self Care                            mins   66320  Canalith Repos                   mins  4209    Un-Timed:  Electrical Stimulation:         mins  48625 ( );  Dry Needling          mins self-pay  Traction          mins 71037  Low Eval          Mins  47689  Mod Eval          Mins  51351  High Eval                            Mins  81004    Timed Treatment:   28   mins   Total Treatment:     28   mins    Lolly Womack PTA  Physical Therapist Assistant License #[id number]

## 2019-09-12 RX ORDER — ATORVASTATIN CALCIUM 40 MG/1
TABLET, FILM COATED ORAL
Qty: 30 TABLET | Refills: 0 | Status: SHIPPED | OUTPATIENT
Start: 2019-09-12 | End: 2019-10-25 | Stop reason: SDUPTHER

## 2019-09-16 ENCOUNTER — OFFICE VISIT (OUTPATIENT)
Dept: PHYSICAL THERAPY | Facility: CLINIC | Age: 49
End: 2019-09-16

## 2019-09-16 DIAGNOSIS — R26.2 DIFFICULTY WALKING: ICD-10-CM

## 2019-09-16 DIAGNOSIS — M25.562 ACUTE PAIN OF LEFT KNEE: ICD-10-CM

## 2019-09-16 DIAGNOSIS — M51.36 DEGENERATION OF LUMBAR INTERVERTEBRAL DISC: Primary | ICD-10-CM

## 2019-09-16 PROCEDURE — 97110 THERAPEUTIC EXERCISES: CPT | Performed by: PHYSICAL THERAPIST

## 2019-09-16 PROCEDURE — 97530 THERAPEUTIC ACTIVITIES: CPT | Performed by: PHYSICAL THERAPIST

## 2019-09-16 NOTE — PROGRESS NOTES
Physical Therapy Daily Progress Note      Patient: Riddhi Cid   : 1970  Diagnosis/ICD-10 Code:  Degeneration of lumbar intervertebral disc [M51.36]  Referring practitioner: June Harrison MD  Date of Initial Visit: Type: THERAPY  Noted: 2019  Today's Date: 2019  Patient seen for 13 sessions         Riddhi Cid reports: her L hip is irritated today and there is some soreness in the L knee with standing and balancing today with her usual back pain as well. Pt. States she feels her balance is improving and she hasn't had bad pain in her knee like what she has had since the new diagnoses was provided.    Objective   See Exercise, Manual, and Modality Logs for complete treatment.       Assessment/Plan   Pt. Tolerated treatment well this visit with increased ability to balance on compliant surface with eyes closed particularly improved with NBS. Pt. Had reports of increased discomfort in the l knee with SLS on the L LE today stating this also made her hip feel uncomfortable. SLS was held for 10 seconds twice and Pt. Still presents with inability to remain stable during SLS.    Progress per Plan of Care           Timed:         Manual Therapy:         mins  37922;     Therapeutic Exercise:    18     mins  28790;     Neuromuscular Sha:        mins  48825;    Therapeutic Activity:     15     mins  52069;     Gait Training:           mins  30931;     Ultrasound:          mins  21746;    Ionto                                   mins   65006  Self Care                            mins   56263  Canalith Repos                   mins  4209    Un-Timed:  Electrical Stimulation:         mins  26937 ( );  Dry Needling          mins self-pay  Traction          mins 65927  Low Eval          Mins  58656  Mod Eval          Mins  50332  High Eval                            Mins  60172    Timed Treatment:   33   mins   Total Treatment:     33   mins    Lolly Womack PTA  Physical Therapist Assistant  License #[id number]

## 2019-09-18 ENCOUNTER — TREATMENT (OUTPATIENT)
Dept: PHYSICAL THERAPY | Facility: CLINIC | Age: 49
End: 2019-09-18

## 2019-09-18 DIAGNOSIS — R26.2 DIFFICULTY WALKING: ICD-10-CM

## 2019-09-18 DIAGNOSIS — M25.562 ACUTE PAIN OF LEFT KNEE: ICD-10-CM

## 2019-09-18 DIAGNOSIS — M51.36 DEGENERATION OF LUMBAR INTERVERTEBRAL DISC: Primary | ICD-10-CM

## 2019-09-18 PROCEDURE — 97110 THERAPEUTIC EXERCISES: CPT | Performed by: PHYSICAL THERAPIST

## 2019-09-18 PROCEDURE — 97530 THERAPEUTIC ACTIVITIES: CPT | Performed by: PHYSICAL THERAPIST

## 2019-09-18 NOTE — PROGRESS NOTES
Physical Therapy Daily Progress Note      Patient: Riddhi Cid   : 1970  Diagnosis/ICD-10 Code:  Degeneration of lumbar intervertebral disc [M51.36]  Referring practitioner: June Harrison MD  Date of Initial Visit: Type: THERAPY  Noted: 2019  Today's Date: 2019  Patient seen for 14 sessions         Riddhi Cid reports: she has had no knee pain today and her back is feeling much better and only gives her discomfort when she moves in awkward or fast movements. Pt. States her legs are slightly more swollen and she will be visiting her MD about the swelling.    Objective   See Exercise, Manual, and Modality Logs for complete treatment.       Assessment/Plan   Pt. tolerated treatment well today and discussion of home exercise program for maintenance was discussed at this time and Pt. Agreed to trial it for two weeks and report back if pain returns without treatment. If no pain in the L knee returns Pt. Will be discharged with approval from PT Robin Sprigler her evaluating therapist for her L knee.    Discharge to [facility] at this time.         Timed:         Manual Therapy:         mins  31085;     Therapeutic Exercise:    19     mins  16737;     Neuromuscular Sha:        mins  97968;    Therapeutic Activity:      14    mins  35823;     Gait Training:           mins  72917;     Ultrasound:          mins  82010;    Ionto                                   mins   17300  Self Care                            mins   39204  Canalith Repos                   mins  4209    Un-Timed:  Electrical Stimulation:         mins  67243 ( );  Dry Needling          mins self-pay  Traction          mins 90864  Low Eval          Mins  82644  Mod Eval          Mins  48452  High Eval                            Mins  65370    Timed Treatment:   33   mins   Total Treatment:     33   mins    Lolly Womack PTA  Physical Therapist Assistant License #[id number]

## 2019-09-20 ENCOUNTER — OFFICE VISIT (OUTPATIENT)
Dept: FAMILY MEDICINE CLINIC | Facility: CLINIC | Age: 49
End: 2019-09-20

## 2019-09-20 VITALS
WEIGHT: 241.2 LBS | DIASTOLIC BLOOD PRESSURE: 62 MMHG | RESPIRATION RATE: 20 BRPM | OXYGEN SATURATION: 97 % | HEIGHT: 64 IN | SYSTOLIC BLOOD PRESSURE: 100 MMHG | HEART RATE: 80 BPM | BODY MASS INDEX: 41.18 KG/M2 | TEMPERATURE: 97.8 F

## 2019-09-20 DIAGNOSIS — R60.9 EDEMA, UNSPECIFIED TYPE: Primary | Chronic | ICD-10-CM

## 2019-09-20 PROCEDURE — 99213 OFFICE O/P EST LOW 20 MIN: CPT | Performed by: FAMILY MEDICINE

## 2019-09-20 NOTE — PROGRESS NOTES
"Subjective   Riddhi Cid is a 48 y.o. female.         The following portions of the patient's history were reviewed and updated as appropriate: allergies, current medications, past family history, past medical history, past social history, past surgical history and problem list.    Symptoms include edema. The edema involves the entire body. Onset was 3 day(s) ago. The patient describes this as worsening. Note for \"Edema\": tingling and pain in feet    Allergies:  Allergies   Allergen Reactions   • Sulfa Antibiotics Rash       Social History:  Social History     Socioeconomic History   • Marital status:      Spouse name: Not on file   • Number of children: Not on file   • Years of education: Not on file   • Highest education level: Not on file   Tobacco Use   • Smoking status: Current Every Day Smoker     Types: Cigarettes   • Smokeless tobacco: Never Used   Substance and Sexual Activity   • Alcohol use: No     Frequency: Never   • Drug use: No   • Sexual activity: Defer       Family History:  Family History   Problem Relation Age of Onset   • Stroke Mother    • Hypertension Mother    • Hyperlipidemia Mother    • Hypothyroidism Mother    • Prostate cancer Father    • Other Father    • Diabetes Father    • Hypertension Father    • Hyperlipidemia Father    • Breast cancer Paternal Grandmother    • Hypothyroidism Grandchild        Past Medical History :  Patient Active Problem List   Diagnosis   • Body mass index (BMI) of 45.0-49.9 in adult (CMS/Formerly [facility])   • Degeneration of intervertebral disc of cervical region   • Degeneration of intervertebral disc of lumbar region   • Hypothyroidism   • Mixed hyperlipidemia   • Major depressive disorder, recurrent, moderate (CMS/[facility]ly [facility])   • Obstructive sleep apnea   • Sinusitis, acute   • Vitamin B12 deficiency   • Vitamin D deficiency   • COPD (chronic obstructive pulmonary disease) (CMS/Formerly [facility])   • Prediabetes   • Solitary thyroid nodule   • Chronic midline low back pain with " right-sided sciatica   • Cervicalgia   • Cervical stenosis of spine   • Arthritis   • Asthma   • Gastroparalysis   • History of chicken pox   • IBS (irritable bowel syndrome)   • GERD (gastroesophageal reflux disease)   • Simple chronic bronchitis (CMS/HCC)   • Thyroid nodule   • Type 2 diabetes mellitus, controlled (CMS/HCC)       Medication List:    Current Outpatient Medications:   •  albuterol sulfate HFA (VENTOLIN HFA) 108 (90 Base) MCG/ACT inhaler, Dx: J45.909, Asthma, Disp: , Rfl:   •  aspirin 81 MG chewable tablet, Chew 81 mg Daily. Dx: E11.9, DM type 2, controlled, Disp: , Rfl:   •  atorvastatin (LIPITOR) 40 MG tablet, TAKE 1 TABLET BY MOUTH EVERY DAY, Disp: 30 tablet, Rfl: 0  •  bumetanide (BUMEX) 1 MG tablet, Take 1 mg by mouth Daily As Needed for Edema. Dx: R60.0, lower extremity edema, Disp: , Rfl:   •  Cholecalciferol (VITAMIN D3) 5000 units capsule capsule, TAKE 1 CAPSULE BY MOUTH ONCE DAILY, Disp: 90 capsule, Rfl: 2  •  Cyanocobalamin (VITAMIN B-12 ER) 1000 MCG tablet controlled-release, TAKE 1 TABLET BY MOUTH EVERY DAY, Disp: 90 each, Rfl: 1  •  DEXILANT 60 MG capsule, TAKE 1 CAPSULE BY MOUTH EVERY DAY, Disp: 30 capsule, Rfl: 0  •  fenofibrate (TRICOR) 145 MG tablet, Daily. Dx: E78.2, mixed hyperlipidema, Disp: , Rfl:   •  fluticasone (FLONASE) 50 MCG/ACT nasal spray, Dx: J01.00, acute non-recurrent maxillary sinusitis, Disp: , Rfl:   •  fluticasone-salmeterol (ADVAIR DISKUS) 250-50 MCG/DOSE DISKUS, Dx: J45.909, Asthma, Disp: , Rfl:   •  folic acid (FOLVITE) 1 MG tablet, Take 1 mg by mouth Daily., Disp: , Rfl:   •  gabapentin (NEURONTIN) 300 MG capsule, 1 capsule Every 8 (Eight) Hours. Dx: M19.90, Arthritis, Disp: , Rfl:   •  Glucose Blood (BLOOD GLUCOSE TEST) strip, by In Vitro route. Dx: E11.9, DM type 2, controlled, Disp: , Rfl:   •  HYDROcodone-acetaminophen (NORCO)  MG per tablet, Take 1 tablet by mouth Every 6 (Six) Hours As Needed for Moderate Pain . *PAIN MANAGEMENT RX'S, Disp: 120  tablet, Rfl: 0  •  Lancets Misc. (ACCU-CHEK FASTCLIX LANCET) kit, Dx: E11.9, DM type 2, controlled, Disp: , Rfl:   •  levothyroxine (SYNTHROID, LEVOTHROID) 50 MCG tablet, Take 1 tablet by mouth Daily. Dx: E40.1, thyroid nodule, Disp: 90 tablet, Rfl: 4  •  metFORMIN ER (GLUCOPHAGE-XR) 500 MG 24 hr tablet, Take 500 mg by mouth Daily. Dx: E11.9, DM type 2, controlled, Disp: , Rfl:   •  montelukast (SINGULAIR) 10 MG tablet, TAKE 1 TABLET BY MOUTH EVERY NIGHT, Disp: 90 tablet, Rfl: 12  •  naproxen (NAPROSYN) 500 MG tablet, Take 1 tablet by mouth Daily. Dx: M54.5, acute right sided low back pain, without sciatica, Disp: 60 tablet, Rfl: 2  •  Omega-3 Fatty Acids (FISH OIL) 500 MG capsule capsule, 2 capsules Daily. Dx: E78.2, mixed hyperlipidema, Disp: , Rfl:   •  ondansetron ODT (ZOFRAN-ODT) 4 MG disintegrating tablet, DIS 1 T UNT Q 6 H PRN, Disp: , Rfl: 0  •  polyethylene glycol (MIRALAX) powder, , Disp: , Rfl: 11  •  potassium chloride ER (K-TAB) 20 MEQ tablet controlled-release ER tablet, Take 20 mEq by mouth. 1 tab to be taken only if taking 2 bumetanide. Dx: R60.0, lower extremity edema, Disp: , Rfl:   •  citalopram (CeleXA) 20 MG tablet, TAKE 1 TABLET BY MOUTH DAILY, Disp: 90 tablet, Rfl: 2    Past Surgical History:  Past Surgical History:   Procedure Laterality Date   • CARPAL TUNNEL RELEASE     • CERVICAL DISCECTOMY ANTERIOR     • CHOLECYSTECTOMY     • ESSURE TUBAL LIGATION         Review of Systems:  Review of Systems   Constitutional: Negative for activity change and fever.   HENT: Negative for ear pain, rhinorrhea, sinus pressure and voice change.    Eyes: Negative for visual disturbance.   Respiratory: Negative for cough and shortness of breath.    Cardiovascular: Negative for chest pain.   Gastrointestinal: Negative for abdominal pain, diarrhea, nausea and vomiting.   Endocrine: Negative for cold intolerance and heat intolerance.   Genitourinary: Negative for frequency and urgency.   Musculoskeletal: Negative  "for arthralgias.   Skin: Negative for rash.   Neurological: Negative for syncope.   Hematological: Does not bruise/bleed easily.   Psychiatric/Behavioral: Negative for depressed mood. The patient is not nervous/anxious.        Physical Exam:  Vital Signs:  Visit Vitals  /62 (Cuff Size: Large Adult)   Pulse 80   Temp 97.8 °F (36.6 °C)   Resp 20   Ht 161.3 cm (63.5\")   Wt 109 kg (241 lb 3.2 oz)   LMP  (LMP Unknown)   SpO2 97%   BMI 42.06 kg/m²       Physical Exam   Constitutional: She is oriented to person, place, and time. She appears well-developed and well-nourished. She is cooperative.   Cardiovascular: Normal rate, regular rhythm and normal heart sounds. Exam reveals no gallop and no friction rub.   No murmur heard.  Pulmonary/Chest: Effort normal and breath sounds normal. She has no wheezes. She has no rales.   Musculoskeletal: She exhibits edema.   +2 edema both legs   Neurological: She is alert and oriented to person, place, and time. Coordination normal.   Skin: Skin is warm and dry.   Psychiatric: She has a normal mood and affect.   Vitals reviewed.      Assessment and Plan:  Problem List Items Addressed This Visit     None      Visit Diagnoses     Edema, unspecified type  (Chronic)   -  Primary    legs, hands  Will check labs  Seems to happen same time every year    Relevant Orders    CBC & Differential (Completed)    Comprehensive Metabolic Panel (Completed)    TSH (Completed)        Hold one of the gabapentin and see if taking twice a day instead of three times makes a difference  Recheck in a week  An After Visit Summary and PPPS were given to the patient.     "

## 2019-09-21 LAB
ALBUMIN SERPL-MCNC: 4.5 G/DL (ref 3.5–5.5)
ALBUMIN/GLOB SERPL: 1.7 {RATIO} (ref 1.2–2.2)
ALP SERPL-CCNC: 69 IU/L (ref 39–117)
ALT SERPL-CCNC: 35 IU/L (ref 0–32)
AST SERPL-CCNC: 22 IU/L (ref 0–40)
BASOPHILS # BLD AUTO: 0 X10E3/UL (ref 0–0.2)
BASOPHILS NFR BLD AUTO: 0 %
BILIRUB SERPL-MCNC: 0.3 MG/DL (ref 0–1.2)
BUN SERPL-MCNC: 18 MG/DL (ref 6–24)
BUN/CREAT SERPL: 15 (ref 9–23)
CALCIUM SERPL-MCNC: 9.4 MG/DL (ref 8.7–10.2)
CHLORIDE SERPL-SCNC: 104 MMOL/L (ref 96–106)
CO2 SERPL-SCNC: 24 MMOL/L (ref 20–29)
CREAT SERPL-MCNC: 1.2 MG/DL (ref 0.57–1)
EOSINOPHIL # BLD AUTO: 0.4 X10E3/UL (ref 0–0.4)
EOSINOPHIL NFR BLD AUTO: 4 %
ERYTHROCYTE [DISTWIDTH] IN BLOOD BY AUTOMATED COUNT: 13.6 % (ref 12.3–15.4)
GLOBULIN SER CALC-MCNC: 2.6 G/DL (ref 1.5–4.5)
GLUCOSE SERPL-MCNC: 77 MG/DL (ref 65–99)
HCT VFR BLD AUTO: 39.4 % (ref 34–46.6)
HGB BLD-MCNC: 13.4 G/DL (ref 11.1–15.9)
IMM GRANULOCYTES # BLD AUTO: 0 X10E3/UL (ref 0–0.1)
IMM GRANULOCYTES NFR BLD AUTO: 0 %
LYMPHOCYTES # BLD AUTO: 2.9 X10E3/UL (ref 0.7–3.1)
LYMPHOCYTES NFR BLD AUTO: 31 %
MCH RBC QN AUTO: 30.6 PG (ref 26.6–33)
MCHC RBC AUTO-ENTMCNC: 34 G/DL (ref 31.5–35.7)
MCV RBC AUTO: 90 FL (ref 79–97)
MONOCYTES # BLD AUTO: 0.3 X10E3/UL (ref 0.1–0.9)
MONOCYTES NFR BLD AUTO: 3 %
NEUTROPHILS # BLD AUTO: 5.9 X10E3/UL (ref 1.4–7)
NEUTROPHILS NFR BLD AUTO: 62 %
PLATELET # BLD AUTO: 269 X10E3/UL (ref 150–450)
POTASSIUM SERPL-SCNC: 4 MMOL/L (ref 3.5–5.2)
PROT SERPL-MCNC: 7.1 G/DL (ref 6–8.5)
RBC # BLD AUTO: 4.38 X10E6/UL (ref 3.77–5.28)
SODIUM SERPL-SCNC: 143 MMOL/L (ref 134–144)
TSH SERPL DL<=0.005 MIU/L-ACNC: 1.55 UIU/ML (ref 0.45–4.5)
WBC # BLD AUTO: 9.5 X10E3/UL (ref 3.4–10.8)

## 2019-09-27 ENCOUNTER — OFFICE VISIT (OUTPATIENT)
Dept: FAMILY MEDICINE CLINIC | Facility: CLINIC | Age: 49
End: 2019-09-27

## 2019-09-27 VITALS
OXYGEN SATURATION: 94 % | HEART RATE: 86 BPM | TEMPERATURE: 98.6 F | WEIGHT: 238.8 LBS | RESPIRATION RATE: 18 BRPM | HEIGHT: 64 IN | BODY MASS INDEX: 40.77 KG/M2 | SYSTOLIC BLOOD PRESSURE: 128 MMHG | DIASTOLIC BLOOD PRESSURE: 82 MMHG

## 2019-09-27 DIAGNOSIS — F33.1 MAJOR DEPRESSIVE DISORDER, RECURRENT, MODERATE (HCC): Primary | ICD-10-CM

## 2019-09-27 DIAGNOSIS — F33.1 MAJOR DEPRESSIVE DISORDER, RECURRENT, MODERATE (HCC): ICD-10-CM

## 2019-09-27 DIAGNOSIS — R60.0 LOWER EXTREMITY EDEMA: ICD-10-CM

## 2019-09-27 PROBLEM — F41.9 ANXIETY AND DEPRESSION: Status: ACTIVE | Noted: 2019-09-27

## 2019-09-27 PROBLEM — Z86.19 HISTORY OF CHICKEN POX: Status: ACTIVE | Noted: 2019-09-27

## 2019-09-27 PROBLEM — K31.84 GASTROPARALYSIS: Status: ACTIVE | Noted: 2019-09-27

## 2019-09-27 PROBLEM — F32.A ANXIETY AND DEPRESSION: Status: RESOLVED | Noted: 2019-09-27 | Resolved: 2019-09-27

## 2019-09-27 PROBLEM — K58.9 IBS (IRRITABLE BOWEL SYNDROME): Status: ACTIVE | Noted: 2019-09-27

## 2019-09-27 PROBLEM — J41.0 SIMPLE CHRONIC BRONCHITIS: Status: ACTIVE | Noted: 2019-09-27

## 2019-09-27 PROBLEM — F32.A ANXIETY AND DEPRESSION: Status: ACTIVE | Noted: 2019-09-27

## 2019-09-27 PROBLEM — E78.2 MIXED HYPERLIPIDEMIA: Status: ACTIVE | Noted: 2019-07-18

## 2019-09-27 PROBLEM — J45.909 ASTHMA: Status: ACTIVE | Noted: 2019-09-27

## 2019-09-27 PROBLEM — E04.1 THYROID NODULE: Status: ACTIVE | Noted: 2019-09-27

## 2019-09-27 PROBLEM — F41.9 ANXIETY AND DEPRESSION: Status: RESOLVED | Noted: 2019-09-27 | Resolved: 2019-09-27

## 2019-09-27 PROBLEM — K21.9 GERD (GASTROESOPHAGEAL REFLUX DISEASE): Status: ACTIVE | Noted: 2019-09-27

## 2019-09-27 PROBLEM — E11.9 TYPE 2 DIABETES MELLITUS, CONTROLLED: Status: ACTIVE | Noted: 2019-09-27

## 2019-09-27 PROBLEM — M19.90 ARTHRITIS: Status: ACTIVE | Noted: 2019-09-27

## 2019-09-27 PROCEDURE — 99214 OFFICE O/P EST MOD 30 MIN: CPT | Performed by: FAMILY MEDICINE

## 2019-09-27 RX ORDER — CITALOPRAM 20 MG/1
20 TABLET ORAL DAILY
Qty: 30 TABLET | Refills: 2 | Status: SHIPPED | OUTPATIENT
Start: 2019-09-27 | End: 2019-09-27 | Stop reason: SDUPTHER

## 2019-09-27 RX ORDER — CITALOPRAM 20 MG/1
20 TABLET ORAL DAILY
Qty: 90 TABLET | Refills: 2 | Status: SHIPPED | OUTPATIENT
Start: 2019-09-27 | End: 2020-07-02

## 2019-09-27 NOTE — PROGRESS NOTES
Subjective   Riddhi Cid is a 48 y.o. female.     Chief Complaint   Patient presents with   • Edema   • Depression       Depression   Visit Type: initial  Onset of symptoms: more than 5 years ago  Progression since onset: gradually worsening  Patient is not experiencing: depressed mood, nervousness/anxiety and shortness of breath.  Frequency of symptoms: constantly   Episode duration: 6 days  Exacerbated by: Dog has cancer.  Sleep per night: 7 hours  Sleep quality: good  Nighttime awakenings: one to two    Leg Swelling   This is a new problem. The current episode started 1 to 4 weeks ago. The problem occurs constantly. The problem has been rapidly improving. Pertinent negatives include no abdominal pain, arthralgias, chest pain, coughing, fever, nausea, rash or vomiting. Nothing aggravates the symptoms.    Her swelling is better. She is doing better cutting down on gabapentin. It is most likely what caused the swelling    The following portions of the patient's history were reviewed and updated as appropriate: allergies, current medications, past family history, past medical history, past social history, past surgical history and problem list.    Allergies:  Allergies   Allergen Reactions   • Sulfa Antibiotics Rash       Social History:  Social History     Socioeconomic History   • Marital status:      Spouse name: Not on file   • Number of children: Not on file   • Years of education: Not on file   • Highest education level: Not on file   Tobacco Use   • Smoking status: Current Every Day Smoker     Types: Cigarettes   • Smokeless tobacco: Never Used   Substance and Sexual Activity   • Alcohol use: No     Frequency: Never   • Drug use: No   • Sexual activity: Defer       Family History:  Family History   Problem Relation Age of Onset   • Stroke Mother    • Hypertension Mother    • Hyperlipidemia Mother    • Hypothyroidism Mother    • Prostate cancer Father    • Other Father    • Diabetes Father    •  Hypertension Father    • Hyperlipidemia Father    • Breast cancer Paternal Grandmother    • Hypothyroidism Grandchild        Past Medical History :  Patient Active Problem List   Diagnosis   • Body mass index (BMI) of 45.0-49.9 in adult (CMS/Former[facility])   • Degeneration of intervertebral disc of cervical region   • Degeneration of intervertebral disc of lumbar region   • Hypothyroidism   • Mixed hyperlipidemia   • Major depressive disorder, recurrent, moderate (CMS/[facility])   • Obstructive sleep apnea   • Sinusitis, acute   • Vitamin B12 deficiency   • Vitamin D deficiency   • COPD (chronic obstructive pulmonary disease) (CMS/[facility])   • Prediabetes   • Solitary thyroid nodule   • Chronic midline low back pain with right-sided sciatica   • Cervicalgia   • Cervical stenosis of spine   • Arthritis   • Asthma   • Gastroparalysis   • History of chicken pox   • IBS (irritable bowel syndrome)   • GERD (gastroesophageal reflux disease)   • Simple chronic bronchitis (CMS/[facility])   • Thyroid nodule   • Type 2 diabetes mellitus, controlled (CMS/[facility])       Medication List:    Current Outpatient Medications:   •  albuterol sulfate HFA (VENTOLIN HFA) 108 (90 Base) MCG/ACT inhaler, Dx: J45.909, Asthma, Disp: , Rfl:   •  aspirin 81 MG chewable tablet, Chew 81 mg Daily. Dx: E11.9, DM type 2, controlled, Disp: , Rfl:   •  atorvastatin (LIPITOR) 40 MG tablet, TAKE 1 TABLET BY MOUTH EVERY DAY, Disp: 30 tablet, Rfl: 0  •  bumetanide (BUMEX) 1 MG tablet, Take 1 mg by mouth Daily As Needed for Edema. Dx: R60.0, lower extremity edema, Disp: , Rfl:   •  Cholecalciferol (VITAMIN D3) 5000 units capsule capsule, TAKE 1 CAPSULE BY MOUTH ONCE DAILY, Disp: 90 capsule, Rfl: 2  •  Cyanocobalamin (VITAMIN B-12 ER) 1000 MCG tablet controlled-release, TAKE 1 TABLET BY MOUTH EVERY DAY, Disp: 90 each, Rfl: 1  •  DEXILANT 60 MG capsule, TAKE 1 CAPSULE BY MOUTH EVERY DAY, Disp: 30 capsule, Rfl: 0  •  fenofibrate (TRICOR) 145 MG tablet, Daily. Dx: E78.2, mixed  hyperlipidema, Disp: , Rfl:   •  fluticasone (FLONASE) 50 MCG/ACT nasal spray, Dx: J01.00, acute non-recurrent maxillary sinusitis, Disp: , Rfl:   •  fluticasone-salmeterol (ADVAIR DISKUS) 250-50 MCG/DOSE DISKUS, Dx: J45.909, Asthma, Disp: , Rfl:   •  folic acid (FOLVITE) 1 MG tablet, Take 1 mg by mouth Daily., Disp: , Rfl:   •  gabapentin (NEURONTIN) 300 MG capsule, 1 capsule Every 8 (Eight) Hours. Dx: M19.90, Arthritis, Disp: , Rfl:   •  Glucose Blood (BLOOD GLUCOSE TEST) strip, by In Vitro route. Dx: E11.9, DM type 2, controlled, Disp: , Rfl:   •  HYDROcodone-acetaminophen (NORCO)  MG per tablet, Take 1 tablet by mouth Every 6 (Six) Hours As Needed for Moderate Pain . *PAIN MANAGEMENT RX'S, Disp: 120 tablet, Rfl: 0  •  Lancets Misc. (ACCU-CHEK FASTCLIX LANCET) kit, Dx: E11.9, DM type 2, controlled, Disp: , Rfl:   •  levothyroxine (SYNTHROID, LEVOTHROID) 50 MCG tablet, Take 1 tablet by mouth Daily. Dx: E40.1, thyroid nodule, Disp: 90 tablet, Rfl: 4  •  metFORMIN ER (GLUCOPHAGE-XR) 500 MG 24 hr tablet, Take 500 mg by mouth Daily. Dx: E11.9, DM type 2, controlled, Disp: , Rfl:   •  montelukast (SINGULAIR) 10 MG tablet, TAKE 1 TABLET BY MOUTH EVERY NIGHT, Disp: 90 tablet, Rfl: 12  •  naproxen (NAPROSYN) 500 MG tablet, Take 1 tablet by mouth Daily. Dx: M54.5, acute right sided low back pain, without sciatica, Disp: 60 tablet, Rfl: 2  •  Omega-3 Fatty Acids (FISH OIL) 500 MG capsule capsule, 2 capsules Daily. Dx: E78.2, mixed hyperlipidema, Disp: , Rfl:   •  ondansetron ODT (ZOFRAN-ODT) 4 MG disintegrating tablet, DIS 1 T UNT Q 6 H PRN, Disp: , Rfl: 0  •  polyethylene glycol (MIRALAX) powder, , Disp: , Rfl: 11  •  potassium chloride ER (K-TAB) 20 MEQ tablet controlled-release ER tablet, Take 20 mEq by mouth. 1 tab to be taken only if taking 2 bumetanide. Dx: R60.0, lower extremity edema, Disp: , Rfl:     Past Surgical History:  Past Surgical History:   Procedure Laterality Date   • CARPAL TUNNEL RELEASE     •  "CERVICAL DISCECTOMY ANTERIOR     • CHOLECYSTECTOMY     • ESSURE TUBAL LIGATION         Review of Systems:  Review of Systems   Constitutional: Negative for activity change and fever.   HENT: Negative for ear pain, rhinorrhea, sinus pressure and voice change.    Eyes: Negative for visual disturbance.   Respiratory: Negative for cough and shortness of breath.    Cardiovascular: Negative for chest pain.   Gastrointestinal: Negative for abdominal pain, diarrhea, nausea and vomiting.   Endocrine: Negative for cold intolerance and heat intolerance.   Genitourinary: Negative for frequency and urgency.   Musculoskeletal: Negative for arthralgias.   Skin: Negative for rash.   Neurological: Negative for syncope.   Hematological: Does not bruise/bleed easily.   Psychiatric/Behavioral: Negative for depressed mood. The patient is not nervous/anxious.        Physical Exam:  Vital Signs:  Visit Vitals  /82   Pulse 86   Temp 98.6 °F (37 °C)   Resp 18   Ht 161.3 cm (63.5\")   Wt 108 kg (238 lb 12.8 oz)   LMP  (LMP Unknown)   SpO2 94%   BMI 41.64 kg/m²       Physical Exam   Constitutional: She is oriented to person, place, and time. She appears well-developed and well-nourished. She is cooperative.   Cardiovascular: Normal rate, regular rhythm and normal heart sounds. Exam reveals no gallop and no friction rub.   No murmur heard.  Pulmonary/Chest: Effort normal and breath sounds normal. She has no wheezes. She has no rales.   Musculoskeletal: She exhibits edema.   Edema 1+, better   Neurological: She is alert and oriented to person, place, and time. Coordination normal.   Skin: Skin is warm and dry.   Psychiatric: She has a normal mood and affect.   Vitals reviewed.      Assessment and Plan:  Problem List Items Addressed This Visit        Other    Major depressive disorder, recurrent, moderate (CMS/HCC) - Primary    Overview     Restart celexa  Good social support, no suicidal or homicidal ideation. Diagnosis and treatment " discussed. Discussed counseling. Discussed medication, dosing and adverse effects. Return in 4 weeks             Other Visit Diagnoses     Lower extremity edema        much better          An After Visit Summary and PPPS were given to the patient.

## 2019-10-03 RX ORDER — METFORMIN HYDROCHLORIDE 500 MG/1
TABLET, EXTENDED RELEASE ORAL
Qty: 90 TABLET | Refills: 0 | Status: SHIPPED | OUTPATIENT
Start: 2019-10-03 | End: 2020-03-24 | Stop reason: SDUPTHER

## 2019-10-09 RX ORDER — BUMETANIDE 1 MG/1
TABLET ORAL
Qty: 180 TABLET | Refills: 0 | Status: SHIPPED | OUTPATIENT
Start: 2019-10-09 | End: 2020-03-23

## 2019-10-25 RX ORDER — ATORVASTATIN CALCIUM 40 MG/1
TABLET, FILM COATED ORAL
Qty: 30 TABLET | Refills: 0 | OUTPATIENT
Start: 2019-10-25

## 2019-10-25 RX ORDER — GABAPENTIN 300 MG/1
CAPSULE ORAL
Qty: 90 CAPSULE | Refills: 0 | OUTPATIENT
Start: 2019-10-25

## 2019-10-29 RX ORDER — GABAPENTIN 300 MG/1
CAPSULE ORAL
Qty: 90 CAPSULE | Refills: 0 | Status: SHIPPED | OUTPATIENT
Start: 2019-10-29 | End: 2019-11-27 | Stop reason: SDUPTHER

## 2019-10-29 RX ORDER — ATORVASTATIN CALCIUM 40 MG/1
TABLET, FILM COATED ORAL
Qty: 90 TABLET | Refills: 0 | Status: SHIPPED | OUTPATIENT
Start: 2019-10-29 | End: 2020-01-28

## 2019-10-30 ENCOUNTER — OFFICE VISIT (OUTPATIENT)
Dept: PAIN MEDICINE | Facility: CLINIC | Age: 49
End: 2019-10-30

## 2019-10-30 VITALS
HEART RATE: 76 BPM | BODY MASS INDEX: 39.78 KG/M2 | TEMPERATURE: 98.3 F | WEIGHT: 233 LBS | SYSTOLIC BLOOD PRESSURE: 135 MMHG | RESPIRATION RATE: 16 BRPM | OXYGEN SATURATION: 92 % | HEIGHT: 64 IN | DIASTOLIC BLOOD PRESSURE: 68 MMHG

## 2019-10-30 DIAGNOSIS — M54.41 CHRONIC MIDLINE LOW BACK PAIN WITH RIGHT-SIDED SCIATICA: ICD-10-CM

## 2019-10-30 DIAGNOSIS — M50.30 DEGENERATION OF INTERVERTEBRAL DISC OF CERVICAL REGION: ICD-10-CM

## 2019-10-30 DIAGNOSIS — M48.02 CERVICAL STENOSIS OF SPINE: ICD-10-CM

## 2019-10-30 DIAGNOSIS — M54.2 CERVICALGIA: Primary | ICD-10-CM

## 2019-10-30 DIAGNOSIS — M51.36 DEGENERATION OF INTERVERTEBRAL DISC OF LUMBAR REGION: ICD-10-CM

## 2019-10-30 DIAGNOSIS — G89.29 CHRONIC MIDLINE LOW BACK PAIN WITH RIGHT-SIDED SCIATICA: ICD-10-CM

## 2019-10-30 PROCEDURE — G0463 HOSPITAL OUTPT CLINIC VISIT: HCPCS | Performed by: PHYSICAL MEDICINE & REHABILITATION

## 2019-10-30 PROCEDURE — 99213 OFFICE O/P EST LOW 20 MIN: CPT | Performed by: PHYSICAL MEDICINE & REHABILITATION

## 2019-10-30 RX ORDER — HYDROCODONE BITARTRATE AND ACETAMINOPHEN 10; 325 MG/1; MG/1
1 TABLET ORAL EVERY 6 HOURS PRN
Qty: 105 TABLET | Refills: 0 | Status: SHIPPED | OUTPATIENT
Start: 2019-10-30 | End: 2019-10-30 | Stop reason: SDUPTHER

## 2019-10-30 RX ORDER — ASPIRIN 325 MG
TABLET ORAL
Refills: 0 | COMMUNITY
Start: 2019-10-26 | End: 2020-01-27

## 2019-10-30 RX ORDER — HYDROCODONE BITARTRATE AND ACETAMINOPHEN 10; 325 MG/1; MG/1
1 TABLET ORAL EVERY 6 HOURS PRN
Qty: 105 TABLET | Refills: 0 | Status: SHIPPED | OUTPATIENT
Start: 2019-10-30 | End: 2020-01-22 | Stop reason: SDUPTHER

## 2019-10-30 RX ORDER — LORATADINE 10 MG/1
TABLET ORAL
Refills: 0 | COMMUNITY
Start: 2019-10-26 | End: 2020-02-11 | Stop reason: SDUPTHER

## 2019-10-30 NOTE — PROGRESS NOTES
Subjective   Riddhi Cid is a 49 y.o. female.     Neck pain radiates to b/l shoulders, and left arm pain. ACDF C5/7 (4/11/2016), also h/o fibromyalgia,  also pain in lower back and b/l legs and feet. 10/10 at worst, 6/10 at best, 6/10 today, worse with activity, improves with rest and meds, always present, varies, aching, radiates in BLE. Imaging reviewed, satisfactory ACDF. Referred for pain management. Neck pain improved with ACDF, tapered off [place] with worsening pain, still somewhat severe. Taking Cymbalta which helps, tried Lyrica with weight gain, trying to lose weight. Restarted [place] at BID - qdaily prn but worsening pain with exercise to lose weight, increased to BID-TID #75, then TID, then QID prn with adequate relief, reduced to #105, tolerating. Gastric sleeve surgery planned for March-April 2017, had to miss cardiac clearance and EGD due to illness and social issues, has decided against surgery, trying to lose weight with diet and exercise. Worsening b/l CTS symptoms, R>L, known h/o of CTS. Saw Juan Franz for worsening neck pain to LUE, ACDF is intact, started Diclofenac for DJD, Elavil. Could not tolerate even low-dose Gabapentin with drowsiness. Started Celebrex but did not feel like doing anything, stopped, stopped Mobic. Stopping numerous meds due to side effects, started Gabapentin with Dr. Pisano with benefit. Pain worsening, DDD on MRI, started PT which is helping.         The following portions of the patient's history were reviewed and updated as appropriate: allergies, current medications, past family history, past medical history, past social history, past surgical history and problem list.    Review of Systems   Constitutional: Negative for chills, fatigue and fever.   HENT: Negative for hearing loss and trouble swallowing.    Eyes: Negative for visual disturbance.   Respiratory: Negative for shortness of breath.    Cardiovascular: Negative for chest pain.   Gastrointestinal: Positive for  constipation and nausea. Negative for abdominal pain, diarrhea and vomiting.   Genitourinary: Negative for urinary incontinence.   Musculoskeletal: Positive for arthralgias, back pain and neck pain. Negative for joint swelling and myalgias.   Neurological: Positive for headache. Negative for dizziness, weakness and numbness.       Objective   Physical Exam   Constitutional: She is oriented to person, place, and time. She appears well-developed and well-nourished.   HENT:   Head: Normocephalic and atraumatic.   Eyes: EOM are normal. Pupils are equal, round, and reactive to light.   Neck: Normal range of motion.   Cardiovascular: Normal rate, regular rhythm, normal heart sounds and intact distal pulses.   Pulmonary/Chest: Breath sounds normal.   Abdominal: Soft. Bowel sounds are normal. She exhibits no distension. There is no tenderness.   Neurological: She is alert and oriented to person, place, and time. She has normal strength and normal reflexes. She displays normal reflexes. No sensory deficit.   Psychiatric: She has a normal mood and affect. Her behavior is normal. Thought content normal.         Assessment/Plan   Riddhi was seen today for back pain.    Diagnoses and all orders for this visit:    Cervicalgia    Chronic midline low back pain with right-sided sciatica    Degeneration of intervertebral disc of cervical region    Degeneration of intervertebral disc of lumbar region    Cervical stenosis of spine        [place]spect reviewed, in order. Low risk. Repeat UDS 5/1/19 in order.  Reduced to Norco 10/325mg q6-8h #105, too painful, restarted q6h prn #120, will reduce to #105 next refill.  Afraid to start Lyrica due to possible weight gain. Restarted Gabapentin, takes 300-900mg/day as tolerated, helping a great deal.  Severe GERD, IBS, family h/o CV dz. Stopped Diclofenac, could not tolerate Celebrex 200mg qdaily. Stopped Mobic 7.5mg qdaily.  Cont other meds as prescribed.  Cont TENS, unit provided here, patient  reports it helps her pain significantly.  Cont b/l CTS braces at night only.  Patient reports she cannot start PT at this time as her daughter currently works 12h shifts, may be able to begin in future if her daughter can change to an 8h shift schedule.  Will begin neuropathic comp cream if her current cream does not work.  Ordered LSO for truncal stability. Cont PT.  RTC 3 months for f/u.

## 2019-11-25 RX ORDER — ALBUTEROL SULFATE 2.5 MG/3ML
SOLUTION RESPIRATORY (INHALATION)
Qty: 180 ML | Refills: 0 | Status: SHIPPED | OUTPATIENT
Start: 2019-11-25 | End: 2021-06-10 | Stop reason: SDUPTHER

## 2019-12-02 RX ORDER — GABAPENTIN 300 MG/1
CAPSULE ORAL
Qty: 90 CAPSULE | Refills: 0 | Status: SHIPPED | OUTPATIENT
Start: 2019-12-02 | End: 2020-05-15

## 2019-12-19 ENCOUNTER — DOCUMENTATION (OUTPATIENT)
Dept: PHYSICAL THERAPY | Facility: CLINIC | Age: 49
End: 2019-12-19

## 2019-12-19 NOTE — PROGRESS NOTES
Discharge Summary  Discharge Summary from Physical Therapy Report      Last seen: 9/18/2019  Patient seen for 14 sessions            Riddhi Cid reports: she has had no knee pain today and her back is feeling much better and only gives her discomfort when she moves in awkward or fast movements. Pt. States her legs are slightly more swollen and she will be visiting her MD about the swelling.     Objective   See Exercise, Manual, and Modality Logs for complete treatment.         Assessment/Plan   Pt. tolerated treatment well today and discussion of home exercise program for maintenance was discussed at this time and Pt. Agreed to trial it for two weeks and report back if pain returns without treatment. If no pain in the L knee returns Pt. Will be discharged with approval from PT Robin Sprigler her evaluating therapist for her L knee.     Discharge to [facility] at this time.             Robin A Sprigler, PT  Physical Therapist

## 2019-12-23 RX ORDER — ALBUTEROL SULFATE 90 UG/1
AEROSOL, METERED RESPIRATORY (INHALATION)
Qty: 18 G | Refills: 12 | Status: SHIPPED | OUTPATIENT
Start: 2019-12-23 | End: 2019-12-26

## 2019-12-26 RX ORDER — ALBUTEROL SULFATE 90 UG/1
AEROSOL, METERED RESPIRATORY (INHALATION)
Qty: 90 G | Refills: 0 | Status: SHIPPED | OUTPATIENT
Start: 2019-12-26 | End: 2020-07-09

## 2020-01-13 ENCOUNTER — HOSPITAL ENCOUNTER (OUTPATIENT)
Dept: ULTRASOUND IMAGING | Facility: HOSPITAL | Age: 50
Discharge: HOME | End: 2020-01-13
Admitting: INTERNAL MEDICINE

## 2020-01-13 DIAGNOSIS — E03.9 ACQUIRED HYPOTHYROIDISM: ICD-10-CM

## 2020-01-13 DIAGNOSIS — R73.03 PREDIABETES: ICD-10-CM

## 2020-01-13 DIAGNOSIS — E04.1 SOLITARY THYROID NODULE: ICD-10-CM

## 2020-01-13 DIAGNOSIS — E55.9 VITAMIN D DEFICIENCY: ICD-10-CM

## 2020-01-13 PROCEDURE — 76536 US EXAM OF HEAD AND NECK: CPT

## 2020-01-15 RX ORDER — METHYLPREDNISOLONE 4 MG/1
TABLET ORAL
COMMUNITY
Start: 2019-11-15 | End: 2020-01-27

## 2020-01-15 RX ORDER — PREDNISONE 20 MG/1
TABLET ORAL
Refills: 0 | COMMUNITY
Start: 2019-10-26 | End: 2020-01-27

## 2020-01-15 RX ORDER — AZITHROMYCIN 250 MG/1
TABLET, FILM COATED ORAL
Refills: 0 | COMMUNITY
Start: 2019-10-26 | End: 2020-01-27

## 2020-01-21 LAB
ALBUMIN SERPL-MCNC: 5 G/DL (ref 3.8–4.8)
ALBUMIN/GLOB SERPL: 1.9 {RATIO} (ref 1.2–2.2)
ALP SERPL-CCNC: 85 IU/L (ref 39–117)
ALT SERPL-CCNC: 23 IU/L (ref 0–32)
AST SERPL-CCNC: 18 IU/L (ref 0–40)
BILIRUB SERPL-MCNC: 0.3 MG/DL (ref 0–1.2)
BUN SERPL-MCNC: 12 MG/DL (ref 6–24)
BUN/CREAT SERPL: 11 (ref 9–23)
CALCIUM SERPL-MCNC: 9.5 MG/DL (ref 8.7–10.2)
CHLORIDE SERPL-SCNC: 101 MMOL/L (ref 96–106)
CO2 SERPL-SCNC: 24 MMOL/L (ref 20–29)
CREAT SERPL-MCNC: 1.14 MG/DL (ref 0.57–1)
GLOBULIN SER CALC-MCNC: 2.6 G/DL (ref 1.5–4.5)
GLUCOSE SERPL-MCNC: ABNORMAL MG/DL
POTASSIUM SERPL-SCNC: ABNORMAL MMOL/L
PROT SERPL-MCNC: 7.6 G/DL (ref 6–8.5)
SODIUM SERPL-SCNC: 143 MMOL/L (ref 134–144)
T4 FREE SERPL-MCNC: 1.31 NG/DL (ref 0.82–1.77)
TSH SERPL DL<=0.005 MIU/L-ACNC: 1.05 UIU/ML (ref 0.45–4.5)

## 2020-01-22 ENCOUNTER — OFFICE VISIT (OUTPATIENT)
Dept: PAIN MEDICINE | Facility: CLINIC | Age: 50
End: 2020-01-22

## 2020-01-22 VITALS
RESPIRATION RATE: 16 BRPM | WEIGHT: 235 LBS | HEART RATE: 68 BPM | BODY MASS INDEX: 41.64 KG/M2 | SYSTOLIC BLOOD PRESSURE: 121 MMHG | HEIGHT: 63 IN | DIASTOLIC BLOOD PRESSURE: 78 MMHG | TEMPERATURE: 98.1 F | OXYGEN SATURATION: 94 %

## 2020-01-22 DIAGNOSIS — M54.41 CHRONIC MIDLINE LOW BACK PAIN WITH RIGHT-SIDED SCIATICA: ICD-10-CM

## 2020-01-22 DIAGNOSIS — M48.02 CERVICAL STENOSIS OF SPINE: ICD-10-CM

## 2020-01-22 DIAGNOSIS — M54.2 CERVICALGIA: Primary | ICD-10-CM

## 2020-01-22 DIAGNOSIS — G89.29 CHRONIC MIDLINE LOW BACK PAIN WITH RIGHT-SIDED SCIATICA: ICD-10-CM

## 2020-01-22 DIAGNOSIS — M51.36 DEGENERATION OF INTERVERTEBRAL DISC OF LUMBAR REGION: ICD-10-CM

## 2020-01-22 DIAGNOSIS — M50.30 DEGENERATION OF INTERVERTEBRAL DISC OF CERVICAL REGION: ICD-10-CM

## 2020-01-22 PROCEDURE — 99213 OFFICE O/P EST LOW 20 MIN: CPT | Performed by: PHYSICAL MEDICINE & REHABILITATION

## 2020-01-22 PROCEDURE — G0463 HOSPITAL OUTPT CLINIC VISIT: HCPCS | Performed by: PHYSICAL MEDICINE & REHABILITATION

## 2020-01-22 RX ORDER — HYDROCODONE BITARTRATE AND ACETAMINOPHEN 10; 325 MG/1; MG/1
1 TABLET ORAL EVERY 6 HOURS PRN
Qty: 105 TABLET | Refills: 0 | Status: SHIPPED | OUTPATIENT
Start: 2020-01-22 | End: 2020-01-22 | Stop reason: SDUPTHER

## 2020-01-22 RX ORDER — HYDROCODONE BITARTRATE AND ACETAMINOPHEN 10; 325 MG/1; MG/1
1 TABLET ORAL EVERY 6 HOURS PRN
Qty: 105 TABLET | Refills: 0 | Status: SHIPPED | OUTPATIENT
Start: 2020-01-22 | End: 2020-02-10 | Stop reason: SDUPTHER

## 2020-01-22 NOTE — PROGRESS NOTES
Subjective   Riddhi Cid is a 49 y.o. female.     Neck pain radiates to b/l shoulders, and left arm pain. ACDF C5/7 (4/11/2016), also h/o fibromyalgia,  also pain in lower back and b/l legs and feet. 10/10 at worst, 6/10 at best, 6/10 today, worse with activity, improves with rest and meds, always present, varies, aching, radiates in BLE. Imaging reviewed, satisfactory ACDF. Referred for pain management. Neck pain improved with ACDF, tapered off [place] with worsening pain, still somewhat severe. Taking Cymbalta which helps, tried Lyrica with weight gain, trying to lose weight. Restarted [place] at BID - qdaily prn but worsening pain with exercise to lose weight, increased to BID-TID #75, then TID, then QID prn with adequate relief, reduced to #105, tolerating. Gastric sleeve surgery planned for March-April 2017, had to miss cardiac clearance and EGD due to illness and social issues, has decided against surgery, trying to lose weight with diet and exercise. Worsening b/l CTS symptoms, R>L, known h/o of CTS. Saw Juan Franz for worsening neck pain to LUE, ACDF is intact, started Diclofenac for DJD, Elavil. Could not tolerate even low-dose Gabapentin with drowsiness. Started Celebrex but did not feel like doing anything, stopped, stopped Mobic. Stopping numerous meds due to side effects, started Gabapentin with Dr. Pisano with benefit. Pain worsening, DDD on MRI, started PT which is helping.       The following portions of the patient's history were reviewed and updated as appropriate: allergies, current medications, past family history, past medical history, past social history, past surgical history and problem list.    Review of Systems   Constitutional: Negative for chills, fatigue and fever.   HENT: Negative for hearing loss and trouble swallowing.    Eyes: Negative for visual disturbance.   Respiratory: Negative for shortness of breath.    Cardiovascular: Negative for chest pain.   Gastrointestinal: Positive for  constipation and nausea. Negative for abdominal pain, diarrhea and vomiting.   Genitourinary: Negative for urinary incontinence.   Musculoskeletal: Positive for arthralgias, back pain and neck pain. Negative for joint swelling and myalgias.   Neurological: Positive for headache. Negative for dizziness, weakness and numbness.       Objective   Physical Exam   Constitutional: She is oriented to person, place, and time. She appears well-developed and well-nourished.   HENT:   Head: Normocephalic and atraumatic.   Eyes: Pupils are equal, round, and reactive to light. EOM are normal.   Neck: Normal range of motion.   Cardiovascular: Normal rate, regular rhythm, normal heart sounds and intact distal pulses.   Pulmonary/Chest: Breath sounds normal.   Abdominal: Soft. Bowel sounds are normal. She exhibits no distension. There is no tenderness.   Neurological: She is alert and oriented to person, place, and time. She has normal strength and normal reflexes. She displays normal reflexes. No sensory deficit.   Psychiatric: She has a normal mood and affect. Her behavior is normal. Thought content normal.         Assessment/Plan   Riddhi was seen today for back pain and leg pain.    Diagnoses and all orders for this visit:    Cervicalgia    Chronic midline low back pain with right-sided sciatica    Degeneration of intervertebral disc of cervical region    Degeneration of intervertebral disc of lumbar region    Cervical stenosis of spine        INspect reviewed, in order. Low risk. Repeat UDS 5/1/19 in order.  Reduced to Norco 10/325mg q6-8h #105, too painful, restarted q6h prn #120, then reduced to #105 successfully, will try and reduce to #90 next refill.  Afraid to start Lyrica due to possible weight gain. Restarted Gabapentin, takes 300-900mg/day as tolerated, helping a great deal.  Severe GERD, IBS, family h/o CV dz. Stopped Diclofenac, could not tolerate Celebrex 200mg qdaily. Stopped Mobic 7.5mg qdaily.  Cont other meds as  prescribed.  Cont TENS, unit provided here, patient reports it helps her pain significantly.  Cont b/l CTS braces at night only.  Patient reports she cannot start PT at this time as her daughter currently works 12h shifts, may be able to begin in future if her daughter can change to an 8h shift schedule.  Will begin neuropathic comp cream if her current cream does not work.  Ordered LSO for truncal stability. Cont PT.  RTC 3 months for f/u.

## 2020-01-25 ENCOUNTER — RESULTS ENCOUNTER (OUTPATIENT)
Dept: ENDOCRINOLOGY | Facility: CLINIC | Age: 50
End: 2020-01-25

## 2020-01-25 DIAGNOSIS — R73.03 PREDIABETES: ICD-10-CM

## 2020-01-25 DIAGNOSIS — E55.9 VITAMIN D DEFICIENCY: ICD-10-CM

## 2020-01-25 DIAGNOSIS — E03.9 ACQUIRED HYPOTHYROIDISM: ICD-10-CM

## 2020-01-25 DIAGNOSIS — E04.1 SOLITARY THYROID NODULE: ICD-10-CM

## 2020-01-27 ENCOUNTER — OFFICE VISIT (OUTPATIENT)
Dept: ENDOCRINOLOGY | Facility: CLINIC | Age: 50
End: 2020-01-27

## 2020-01-27 VITALS
HEART RATE: 84 BPM | BODY MASS INDEX: 40.75 KG/M2 | DIASTOLIC BLOOD PRESSURE: 70 MMHG | WEIGHT: 230 LBS | HEIGHT: 63 IN | OXYGEN SATURATION: 97 % | SYSTOLIC BLOOD PRESSURE: 130 MMHG

## 2020-01-27 DIAGNOSIS — E04.1 THYROID NODULE: ICD-10-CM

## 2020-01-27 DIAGNOSIS — E03.9 ACQUIRED HYPOTHYROIDISM: Primary | ICD-10-CM

## 2020-01-27 DIAGNOSIS — E55.9 VITAMIN D DEFICIENCY: ICD-10-CM

## 2020-01-27 DIAGNOSIS — E78.2 MIXED HYPERLIPIDEMIA: ICD-10-CM

## 2020-01-27 PROCEDURE — 99214 OFFICE O/P EST MOD 30 MIN: CPT | Performed by: INTERNAL MEDICINE

## 2020-01-27 NOTE — PROGRESS NOTES
Endocrine Progress Note Outpatient     Patient Care Team:  June Harrison MD as PCP - General (Family Medicine)  June Harrison MD as PCP - Claims Attributed    Chief Complaint: Follow up hypothyroidism    HPI: 49-year-old female with history of hypothyroidism, prediabetes, fibromyalgia, peripheral neuropathy, COPD, hyperlipidemia, right nodule , vitamin D deficiency is here for follow-up.    Hypothyroidism: She is on levothyroxine supplementation.  Prediabetes: She is currently on metformin.  He lost 8 pounds since last seen.  She is trying to work on her diet and activity.  Vitamin D deficiency: On vitamin D supplementation.  Thyroid nodule: No family history of thyroid cancer no history of radiation exposure to her neck.  Denies dysphagia or choking.  Persistent change in the voice or hoarseness.    She continues to complain of fatigue and tiredness.  Complains of dry skin but denies constipation.    Past Medical History:   Diagnosis Date   • Allergic    • Anemia    • Anxiety    • Arthritis    • Asthma    • COPD (chronic obstructive pulmonary disease) (CMS/[facility])    • Costochondritis    • Degenerative disc disease, lumbar    • Depression    • Diabetes mellitus (CMS/[facility])    • GERD (gastroesophageal reflux disease)    • History of chicken pox    • Hyperlipidemia    • IBS (irritable bowel syndrome)    • Injury of back    • Irritable colon    • Knee pain, left    • Obstructive sleep apnea    • Thyroid nodule    • Tired 07/01/2019   • Vitamin D deficiency        Social History     Socioeconomic History   • Marital status:      Spouse name: Not on file   • Number of children: Not on file   • Years of education: Not on file   • Highest education level: Not on file   Tobacco Use   • Smoking status: Current Every Day Smoker     Packs/day: 0.50     Types: Cigarettes   • Smokeless tobacco: Never Used   Substance and Sexual Activity   • Alcohol use: No     Frequency: Never   • Drug use: No   • Sexual activity:  Defer       Family History   Problem Relation Age of Onset   • Stroke Mother    • Hypertension Mother    • Hyperlipidemia Mother    • Hypothyroidism Mother    • Prostate cancer Father    • Other Father    • Diabetes Father    • Hypertension Father    • Hyperlipidemia Father    • Breast cancer Paternal Grandmother    • Hypothyroidism Grandchild        Allergies   Allergen Reactions   • Sulfa Antibiotics Rash       ROS:   Constitutional:  Admit fatigue, tiredness.    Eyes:  Denies change in visual acuity   HENT:  Denies nasal congestion or sore throat   Respiratory: denies cough, shortness of breath.   Cardiovascular:  denies chest pain, edema   GI:  Denies abdominal pain, nausea, vomiting.   Musculoskeletal:  Denies back pain or joint pain   Integument:  Admit dry skin, denies rash   Neurologic:  Denies headache, focal weakness or sensory changes   Endocrine:  Denies polyuria or polydipsia   Psychiatric:  Denies depression or anxiety      Vitals:    01/27/20 1405   BP: 130/70   Pulse: 84   SpO2: 97%       Physical Exam:  GEN: NAD, conversant, obese  EYES: EOMI, PERRL, no conjunctival erythema  NECK: no thyromegaly, full ROM   CV: RRR, no murmurs/rubs/gallops, no peripheral edema  LUNG: CTAB, no wheezes/rales/ronchi  SKIN: no rashes, no acanthosis  MSK: no deformities, full ROM of all extremities  NEURO: no tremors, DTR normal  PSYCH: AOX3, appropriate mood, affect normal      Results Review:     I reviewed the patient's new clinical results.    Lab Results   Component Value Date    HGBA1C 6.5 12/13/2018      Lab Results   Component Value Date    GLUCOSE 181 (H) 04/20/2018    BUN 12 01/20/2020    CREATININE 1.14 (H) 01/20/2020    EGFRIFNONA 56 (L) 01/20/2020    EGFRIFAFRI 65 01/20/2020    BCR 11 01/20/2020    K CANCELED 01/20/2020    CO2 24 01/20/2020    CALCIUM 9.5 01/20/2020    PROTENTOTREF 7.6 01/20/2020    ALBUMIN 5.0 (H) 01/20/2020    LABIL2 1.9 01/20/2020    AST 18 01/20/2020    ALT 23 01/20/2020    CHOL 159  08/13/2018    TRIG 314 (H) 08/13/2018    LDL 62 08/13/2018    HDL 34 (L) 08/13/2018     Lab Results   Component Value Date    TSH 1.050 01/20/2020    FREET4 1.31 01/20/2020     Labs from July 2019 showed a TSH of 2.21, free T4 1.0, A1c 6.1, sodium 141, potassium 3.7, chloride 109, CO2 25, glucose 134, BUN 11, creatinine 1.02, AST 13 and ALT 19.    US Thyroid [[id number]] (Order [id number])   Order   Status: Final result   Appointment Information     PACS Images      Radiology Images   Study Result     Exam: Thyroid Ultrasound      History:  Right thyroid nodule, acquired hypothyroidism.     Technique: Gray scale and color doppler ultrasound of the thyroid gland.     Comparison: Thyroid ultrasound 07/11/2019, 07/10/2018, 04/19/2018.     THYROID FINDINGS:   Size:  Right lobe:  4.0 x 1.2 x 1.6 cm.  Left lobe:  3.6 x 1.4 x 1.7 cm.  Isthmus:  5 mm in thickness.  Overall Texture:  Homogeneous  Blood flow:  Normal     NODULE # 1  Location: right; lower; central.   Size: 0.9 x 0.5 x 0.8 cm, previously 9 x 5 x 8 mm on 07/11/2019 and not  definitely detected on previous ultrasound exams.  Composition: solid/almost completely solid (2 points).   Echogenicity: isoechoic (1 point).   Shape: wider than tall (0 points).   Margin: smooth (0 points).   Echogenic foci: none (0 points).  Additional echogenic foci:  none.     ACR TI-RADS total points: 3. ACR risk category: TR3. Mildly Suspicious.      ACR TI-RADS  recommendations:  No followup if TR1, TR2, or TR3 <1.5 cm.  Followup US for TR3 1.5-2.4 cm, TR4 1- 1.4 cm, TR5 0.5 - 0.9 cm.  FNA if TR 3 > or = 2.5 cm, TR4 > or = 1.5 cm, TR5 1 cm and larger        ADDITIONAL FINDINGS: There is no anterior cervical lymphadenopathy in  the vicinity of the thyroid gland.     IMPRESSION:  Impression:  Stable size of 9 mm TI-RADS 3 right thyroid nodule. No  additional follow-up is needed based on consensus guidelines.     Reference:  Yessenia BENITEZ, et al. (2017). ACR Thyroid Imaging, Reporting  and  Data System (TI-RADS): White Paper of the ACR TI-RADS Committee.  Journal of the American College of Radiology (JACR), 14(5), pp.587-595.     Electronically Signed By-Kayley Thomson On:1/13/2020 11:56 AM  This report was finalized on [id number] by  Kayley Thomson, .       Medication Review: Reviewed.       Current Outpatient Medications:   •  albuterol (PROVENTIL) (2.5 MG/3ML) 0.083% nebulizer solution, USE 1 VIAL PER NEBULIZER EVERY 4 HOURS AS NEEDED, Disp: 180 mL, Rfl: 0  •  ALBUTEROL SULFATE  (90 Base) MCG/ACT inhaler, INHALE 2 PUFFS BY MOUTH EVERY 4 TO 6 HOURS AS NEEDED. MAX OF 12 PUFFS DAILY, Disp: 90 g, Rfl: 0  •  atorvastatin (LIPITOR) 40 MG tablet, TAKE 1 TABLET BY MOUTH DAILY, Disp: 90 tablet, Rfl: 0  •  bumetanide (BUMEX) 1 MG tablet, TAKE 1 TO 2 TABLETS BY MOUTH EVERY DAY AS NEEDED, Disp: 180 tablet, Rfl: 0  •  Cholecalciferol (VITAMIN D3) 5000 units capsule capsule, TAKE 1 CAPSULE BY MOUTH ONCE DAILY, Disp: 90 capsule, Rfl: 2  •  citalopram (CeleXA) 20 MG tablet, TAKE 1 TABLET BY MOUTH DAILY, Disp: 90 tablet, Rfl: 2  •  Cyanocobalamin (VITAMIN B-12 ER) 1000 MCG tablet controlled-release, TAKE 1 TABLET BY MOUTH EVERY DAY, Disp: 90 each, Rfl: 1  •  DEXILANT 60 MG capsule, TAKE 1 CAPSULE BY MOUTH EVERY DAY, Disp: 30 capsule, Rfl: 0  •  fenofibrate (TRICOR) 145 MG tablet, Daily. Dx: E78.2, mixed hyperlipidema, Disp: , Rfl:   •  fluticasone (FLONASE) 50 MCG/ACT nasal spray, Dx: J01.00, acute non-recurrent maxillary sinusitis, Disp: , Rfl:   •  fluticasone-salmeterol (ADVAIR DISKUS) 250-50 MCG/DOSE DISKUS, Dx: J45.909, Asthma, Disp: , Rfl:   •  folic acid (FOLVITE) 1 MG tablet, Take 1 mg by mouth Daily., Disp: , Rfl:   •  gabapentin (NEURONTIN) 300 MG capsule, TAKE ONE CAPSULE BY MOUTH THREE TIMES DAILY, Disp: 90 capsule, Rfl: 0  •  Glucose Blood (BLOOD GLUCOSE TEST) strip, by In Vitro route. Dx: E11.9, DM type 2, controlled, Disp: , Rfl:   •  HYDROcodone-acetaminophen (NORCO)  MG per tablet,  "Take 1 tablet by mouth Every 6 (Six) Hours As Needed for Moderate Pain . *PAIN MANAGEMENT RX'S, Disp: 105 tablet, Rfl: 0  •  Lancets Misc. (ACCU-CHEK FASTCLIX LANCET) kit, Dx: E11.9, DM type 2, controlled, Disp: , Rfl:   •  levothyroxine (SYNTHROID, LEVOTHROID) 50 MCG tablet, Take 1 tablet by mouth Daily. Dx: E40.1, thyroid nodule, Disp: 90 tablet, Rfl: 4  •  loratadine (CLARITIN) 10 MG tablet, TK 1 T PO D FOR 14 DAYS, Disp: , Rfl: 0  •  metFORMIN ER (GLUCOPHAGE-XR) 500 MG 24 hr tablet, TAKE 1 TABLET BY MOUTH EVERY DAY, Disp: 90 tablet, Rfl: 0  •  montelukast (SINGULAIR) 10 MG tablet, TAKE 1 TABLET BY MOUTH EVERY NIGHT, Disp: 90 tablet, Rfl: 12  •  naproxen (NAPROSYN) 500 MG tablet, Take 1 tablet by mouth Daily. Dx: M54.5, acute right sided low back pain, without sciatica, Disp: 60 tablet, Rfl: 2  •  Omega-3 Fatty Acids (FISH OIL) 500 MG capsule capsule, 2 capsules Daily. Dx: E78.2, mixed hyperlipidema, Disp: , Rfl:   •  ondansetron ODT (ZOFRAN-ODT) 4 MG disintegrating tablet, DIS 1 T UNT Q 6 H PRN, Disp: , Rfl: 0  •  polyethylene glycol (MIRALAX) powder, , Disp: , Rfl: 11  •  potassium chloride ER (K-TAB) 20 MEQ tablet controlled-release ER tablet, Take 20 mEq by mouth. 1 tab to be taken only if taking 2 bumetanide. Dx: R60.0, lower extremity edema, Disp: , Rfl:       Assessment/Plan   1.  Primary hypothyroidism: Well-controlled, continue current dose of levothyroxine.  2.  Vitamin D deficiency: On vitamin D replacement, continue current medications  3.  Prediabetes: On metformin.  Has lost 8 pounds of weight since last seen.  Advised to continue to work on diet and activity.  4.  Hyperlipidemia: On atorvastatin, continue current medications.  5.  Right thyroid nodule: Is stable at 9 mm.  No risk factors for thyroid cancer.  Ultrasound was done in January 2020.          Megan Menard MD FACE.      EMR Dragon / transcription disclaimer:     \"Dictated utilizing Dragon dictation\".                 "

## 2020-01-27 NOTE — PATIENT INSTRUCTIONS
Continue current medications  Please consider seriously to stop smoking  Follow-up in 6 months with labs.  Please continue to work on diet and activity.

## 2020-01-28 RX ORDER — ATORVASTATIN CALCIUM 40 MG/1
TABLET, FILM COATED ORAL
Qty: 90 TABLET | Refills: 0 | Status: SHIPPED | OUTPATIENT
Start: 2020-01-28 | End: 2020-06-14

## 2020-02-10 DIAGNOSIS — M54.41 CHRONIC MIDLINE LOW BACK PAIN WITH RIGHT-SIDED SCIATICA: ICD-10-CM

## 2020-02-10 DIAGNOSIS — G89.29 CHRONIC MIDLINE LOW BACK PAIN WITH RIGHT-SIDED SCIATICA: ICD-10-CM

## 2020-02-10 NOTE — TELEPHONE ENCOUNTER
Pt was only able to get #42 filled on 1-30 due to insurance.  Needs rx for rest of script. Put in note for #63 bc pt normally gets

## 2020-02-11 ENCOUNTER — OFFICE VISIT (OUTPATIENT)
Dept: FAMILY MEDICINE CLINIC | Facility: CLINIC | Age: 50
End: 2020-02-11

## 2020-02-11 VITALS
SYSTOLIC BLOOD PRESSURE: 117 MMHG | OXYGEN SATURATION: 95 % | HEART RATE: 91 BPM | HEIGHT: 64 IN | WEIGHT: 232 LBS | BODY MASS INDEX: 39.61 KG/M2 | TEMPERATURE: 98 F | RESPIRATION RATE: 16 BRPM | DIASTOLIC BLOOD PRESSURE: 80 MMHG

## 2020-02-11 DIAGNOSIS — J01.00 ACUTE NON-RECURRENT MAXILLARY SINUSITIS: ICD-10-CM

## 2020-02-11 DIAGNOSIS — J30.89 NON-SEASONAL ALLERGIC RHINITIS DUE TO OTHER ALLERGIC TRIGGER: ICD-10-CM

## 2020-02-11 DIAGNOSIS — J41.0 SIMPLE CHRONIC BRONCHITIS (HCC): Primary | ICD-10-CM

## 2020-02-11 DIAGNOSIS — E78.2 MIXED HYPERLIPIDEMIA: Primary | ICD-10-CM

## 2020-02-11 PROBLEM — E04.1 THYROID NODULE: Status: RESOLVED | Noted: 2019-09-27 | Resolved: 2020-02-11

## 2020-02-11 PROBLEM — J45.40 MODERATE PERSISTENT ASTHMA WITHOUT COMPLICATION: Status: ACTIVE | Noted: 2019-09-27

## 2020-02-11 PROBLEM — R73.03 PREDIABETES: Status: RESOLVED | Noted: 2019-07-29 | Resolved: 2020-02-11

## 2020-02-11 PROCEDURE — 99213 OFFICE O/P EST LOW 20 MIN: CPT | Performed by: FAMILY MEDICINE

## 2020-02-11 RX ORDER — CEPHALEXIN 500 MG/1
500 CAPSULE ORAL 3 TIMES DAILY
Qty: 30 CAPSULE | Refills: 0 | Status: SHIPPED | OUTPATIENT
Start: 2020-02-11 | End: 2020-07-02

## 2020-02-11 RX ORDER — LORATADINE 10 MG/1
10 TABLET ORAL DAILY
Qty: 30 TABLET | Refills: 12 | Status: SHIPPED | OUTPATIENT
Start: 2020-02-11 | End: 2022-01-14

## 2020-02-11 NOTE — PROGRESS NOTES
Subjective   Riddhi Cid is a 49 y.o. female.     Chief Complaint   Patient presents with   • URI       URI    This is a new problem. The current episode started in the past 7 days. The problem has been gradually worsening. There has been no fever. Associated symptoms include congestion, coughing, headaches and a sore throat. Pertinent negatives include no abdominal pain, chest pain, diarrhea, ear pain, nausea, rash, rhinorrhea, sinus pain, sneezing, swollen glands, vomiting or wheezing. She has tried antihistamine and acetaminophen for the symptoms. The treatment provided mild relief.        The following portions of the patient's history were reviewed and updated as appropriate: allergies, current medications, past family history, past medical history, past social history, past surgical history and problem list.    Allergies:  Allergies   Allergen Reactions   • Sulfa Antibiotics Rash       Social History:  Social History     Socioeconomic History   • Marital status:      Spouse name: Not on file   • Number of children: Not on file   • Years of education: Not on file   • Highest education level: Not on file   Tobacco Use   • Smoking status: Current Every Day Smoker     Packs/day: 0.50     Types: Cigarettes   • Smokeless tobacco: Never Used   Substance and Sexual Activity   • Alcohol use: No     Frequency: Never   • Drug use: No   • Sexual activity: Defer       Family History:  Family History   Problem Relation Age of Onset   • Stroke Mother    • Hypertension Mother    • Hyperlipidemia Mother    • Hypothyroidism Mother    • Prostate cancer Father    • Other Father    • Diabetes Father    • Hypertension Father    • Hyperlipidemia Father    • Breast cancer Paternal Grandmother    • Hypothyroidism Grandchild        Past Medical History :  Patient Active Problem List   Diagnosis   • Degeneration of intervertebral disc of cervical region   • Degeneration of intervertebral disc of lumbar region   • Acquired  hypothyroidism   • Mixed hyperlipidemia   • Major depressive disorder, recurrent, moderate (CMS/[facility])   • Obstructive sleep apnea   • Vitamin B12 deficiency   • Vitamin D deficiency   • COPD (chronic obstructive pulmonary disease) (CMS/[facility])   • Solitary thyroid nodule   • Chronic midline low back pain with right-sided sciatica   • Cervicalgia   • Cervical stenosis of spine   • Arthritis   • Moderate persistent asthma without complication   • Gastroparalysis   • History of chicken pox   • IBS (irritable bowel syndrome)   • GERD (gastroesophageal reflux disease)   • Type 2 diabetes mellitus, controlled (CMS/[facility])       Medication List:  Outpatient Encounter Medications as of 2/11/2020   Medication Sig Dispense Refill   • albuterol (PROVENTIL) (2.5 MG/3ML) 0.083% nebulizer solution USE 1 VIAL PER NEBULIZER EVERY 4 HOURS AS NEEDED 180 mL 0   • ALBUTEROL SULFATE  (90 Base) MCG/ACT inhaler INHALE 2 PUFFS BY MOUTH EVERY 4 TO 6 HOURS AS NEEDED. MAX OF 12 PUFFS DAILY 90 g 0   • atorvastatin (LIPITOR) 40 MG tablet TAKE 1 TABLET BY MOUTH DAILY 90 tablet 0   • bumetanide (BUMEX) 1 MG tablet TAKE 1 TO 2 TABLETS BY MOUTH EVERY DAY AS NEEDED 180 tablet 0   • Cholecalciferol (VITAMIN D3) 5000 units capsule capsule TAKE 1 CAPSULE BY MOUTH ONCE DAILY 90 capsule 2   • citalopram (CeleXA) 20 MG tablet TAKE 1 TABLET BY MOUTH DAILY 90 tablet 2   • Cyanocobalamin (VITAMIN B-12 ER) 1000 MCG tablet controlled-release TAKE 1 TABLET BY MOUTH EVERY DAY 90 each 1   • DEXILANT 60 MG capsule TAKE 1 CAPSULE BY MOUTH EVERY DAY 30 capsule 0   • fenofibrate (TRICOR) 145 MG tablet Daily. Dx: E78.2, mixed hyperlipidema     • fluticasone (FLONASE) 50 MCG/ACT nasal spray Dx: J01.00, acute non-recurrent maxillary sinusitis     • fluticasone-salmeterol (ADVAIR DISKUS) 250-50 MCG/DOSE DISKUS Dx: J45.909, Asthma     • folic acid (FOLVITE) 1 MG tablet Take 1 mg by mouth Daily.     • gabapentin (NEURONTIN) 300 MG capsule TAKE ONE CAPSULE BY MOUTH THREE  TIMES DAILY 90 capsule 0   • Glucose Blood (BLOOD GLUCOSE TEST) strip by In Vitro route. Dx: E11.9, DM type 2, controlled     • HYDROcodone-acetaminophen (NORCO)  MG per tablet Take 1 tablet by mouth Every 6 (Six) Hours As Needed for Moderate Pain . *PAIN MANAGEMENT RX'S 105 tablet 0   • Lancets Misc. (ACCU-CHEK FASTCLIX LANCET) kit Dx: E11.9, DM type 2, controlled     • levothyroxine (SYNTHROID, LEVOTHROID) 50 MCG tablet Take 1 tablet by mouth Daily. Dx: E40.1, thyroid nodule 90 tablet 4   • loratadine (CLARITIN) 10 MG tablet Take 1 tablet by mouth Daily. 30 tablet 12   • metFORMIN ER (GLUCOPHAGE-XR) 500 MG 24 hr tablet TAKE 1 TABLET BY MOUTH EVERY DAY 90 tablet 0   • montelukast (SINGULAIR) 10 MG tablet TAKE 1 TABLET BY MOUTH EVERY NIGHT 90 tablet 12   • naproxen (NAPROSYN) 500 MG tablet Take 1 tablet by mouth Daily. Dx: M54.5, acute right sided low back pain, without sciatica 60 tablet 2   • Omega-3 Fatty Acids (FISH OIL) 500 MG capsule capsule 2 capsules Daily. Dx: E78.2, mixed hyperlipidema     • ondansetron ODT (ZOFRAN-ODT) 4 MG disintegrating tablet DIS 1 T UNT Q 6 H PRN  0   • polyethylene glycol (MIRALAX) powder   11   • potassium chloride ER (K-TAB) 20 MEQ tablet controlled-release ER tablet Take 20 mEq by mouth. 1 tab to be taken only if taking 2 bumetanide. Dx: R60.0, lower extremity edema     • [DISCONTINUED] loratadine (CLARITIN) 10 MG tablet TK 1 T PO D FOR 14 DAYS  0   • cephalexin (KEFLEX) 500 MG capsule Take 1 capsule by mouth 3 (Three) Times a Day. 30 capsule 0     No facility-administered encounter medications on file as of 2/11/2020.        Past Surgical History:  Past Surgical History:   Procedure Laterality Date   • CARPAL TUNNEL RELEASE     • CERVICAL DISCECTOMY ANTERIOR     • CHOLECYSTECTOMY     • ESSURE TUBAL LIGATION         Review of Systems:  Review of Systems   Constitutional: Negative for activity change, chills and fever.   HENT: Positive for congestion and sore throat. Negative  "for ear pain, postnasal drip, rhinorrhea, sinus pressure, sneezing, swollen glands, trouble swallowing and voice change.    Eyes: Negative for pain, redness, itching and visual disturbance.   Respiratory: Positive for cough. Negative for shortness of breath and wheezing.    Cardiovascular: Negative for chest pain.   Gastrointestinal: Negative for abdominal pain, diarrhea, nausea and vomiting.   Endocrine: Negative for cold intolerance and heat intolerance.   Genitourinary: Negative for frequency and urgency.   Musculoskeletal: Negative for arthralgias.   Skin: Negative for rash.   Neurological: Negative for syncope.   Hematological: Does not bruise/bleed easily.   Psychiatric/Behavioral: Negative for depressed mood. The patient is not nervous/anxious.        I have reviewed and confirmed the accuracy of the ROS as documented by the MA/LPN/RN June Harrison MD    Vital Signs:  Visit Vitals  /80   Pulse 91   Temp 98 °F (36.7 °C)   Resp 16   Ht 162.6 cm (64\")   Wt 105 kg (232 lb)   LMP  (LMP Unknown)   SpO2 95%   BMI 39.82 kg/m²       Physical Exam   Constitutional: She is oriented to person, place, and time. She appears well-developed and well-nourished. She is cooperative.   HENT:   Head: Normocephalic and atraumatic.   Right Ear: External ear normal. Tympanic membrane is not injected, not erythematous, not retracted and not bulging. No middle ear effusion.   Left Ear: External ear normal. Tympanic membrane is not injected, not erythematous, not retracted and not bulging.  No middle ear effusion.   Nose: Nose normal. No rhinorrhea.   Mouth/Throat: Oropharynx is clear and moist. No oropharyngeal exudate.   Cardiovascular: Normal rate, regular rhythm and normal heart sounds. Exam reveals no gallop and no friction rub.   No murmur heard.  Pulmonary/Chest: Effort normal and breath sounds normal. No respiratory distress. She has no wheezes. She has no rales.   Lymphadenopathy:     She has no cervical adenopathy. "   Neurological: She is alert and oriented to person, place, and time. Coordination normal.   Skin: Skin is warm and dry.   Psychiatric: She has a normal mood and affect.   Vitals reviewed.      Assessment and Plan:  Problem List Items Addressed This Visit        Respiratory    COPD (chronic obstructive pulmonary disease) (CMS/[facility]) - Primary    Overview     Not exacerbated. Continue current medications         Relevant Medications    loratadine (CLARITIN) 10 MG tablet      Other Visit Diagnoses     Non-seasonal allergic rhinitis due to other allergic trigger        Relevant Medications    loratadine (CLARITIN) 10 MG tablet    Acute non-recurrent maxillary sinusitis        Relevant Medications    cephalexin (KEFLEX) 500 MG capsule          An After Visit Summary and PPPS were given to the patient.

## 2020-02-12 RX ORDER — HYDROCODONE BITARTRATE AND ACETAMINOPHEN 10; 325 MG/1; MG/1
1 TABLET ORAL EVERY 6 HOURS PRN
Qty: 63 TABLET | Refills: 0 | Status: SHIPPED | OUTPATIENT
Start: 2020-02-12 | End: 2020-04-21 | Stop reason: SDUPTHER

## 2020-03-02 ENCOUNTER — TELEPHONE (OUTPATIENT)
Dept: FAMILY MEDICINE CLINIC | Facility: CLINIC | Age: 50
End: 2020-03-02

## 2020-03-02 DIAGNOSIS — J11.1 INFLUENZA-LIKE ILLNESS: Primary | ICD-10-CM

## 2020-03-02 RX ORDER — OSELTAMIVIR PHOSPHATE 75 MG/1
75 CAPSULE ORAL 2 TIMES DAILY
Qty: 10 CAPSULE | Refills: 0 | Status: SHIPPED | OUTPATIENT
Start: 2020-03-02 | End: 2020-07-02

## 2020-03-23 RX ORDER — BUMETANIDE 1 MG/1
TABLET ORAL
Qty: 180 TABLET | Refills: 12 | Status: SHIPPED | OUTPATIENT
Start: 2020-03-23 | End: 2021-04-15

## 2020-03-24 RX ORDER — METFORMIN HYDROCHLORIDE 500 MG/1
500 TABLET, EXTENDED RELEASE ORAL DAILY
Qty: 90 TABLET | Refills: 1 | Status: SHIPPED | OUTPATIENT
Start: 2020-03-24 | End: 2020-10-21 | Stop reason: SDUPTHER

## 2020-04-20 ENCOUNTER — RESULTS ENCOUNTER (OUTPATIENT)
Dept: FAMILY MEDICINE CLINIC | Facility: CLINIC | Age: 50
End: 2020-04-20

## 2020-04-20 DIAGNOSIS — E78.2 MIXED HYPERLIPIDEMIA: ICD-10-CM

## 2020-04-21 ENCOUNTER — OFFICE VISIT (OUTPATIENT)
Dept: PAIN MEDICINE | Facility: CLINIC | Age: 50
End: 2020-04-21

## 2020-04-21 VITALS — WEIGHT: 231 LBS | BODY MASS INDEX: 39.44 KG/M2 | HEIGHT: 64 IN

## 2020-04-21 DIAGNOSIS — M50.30 DEGENERATION OF INTERVERTEBRAL DISC OF CERVICAL REGION: ICD-10-CM

## 2020-04-21 DIAGNOSIS — M54.41 CHRONIC MIDLINE LOW BACK PAIN WITH RIGHT-SIDED SCIATICA: ICD-10-CM

## 2020-04-21 DIAGNOSIS — M48.02 CERVICAL STENOSIS OF SPINE: ICD-10-CM

## 2020-04-21 DIAGNOSIS — M51.36 DEGENERATION OF INTERVERTEBRAL DISC OF LUMBAR REGION: ICD-10-CM

## 2020-04-21 DIAGNOSIS — G89.29 CHRONIC MIDLINE LOW BACK PAIN WITH RIGHT-SIDED SCIATICA: ICD-10-CM

## 2020-04-21 DIAGNOSIS — M54.2 CERVICALGIA: Primary | ICD-10-CM

## 2020-04-21 PROCEDURE — 99441 PR PHYS/QHP TELEPHONE EVALUATION 5-10 MIN: CPT | Performed by: PHYSICAL MEDICINE & REHABILITATION

## 2020-04-21 RX ORDER — HYDROCODONE BITARTRATE AND ACETAMINOPHEN 10; 325 MG/1; MG/1
1 TABLET ORAL EVERY 6 HOURS PRN
Qty: 120 TABLET | Refills: 0 | Status: SHIPPED | OUTPATIENT
Start: 2020-04-21 | End: 2020-05-22 | Stop reason: SDUPTHER

## 2020-04-21 NOTE — PROGRESS NOTES
Subjective  You have chosen to receive care through a telephone visit. Do you consent to use a telephone visit for your medical care today? Yes  Riddhi Cid is a 49 y.o. female.     Neck pain radiates to b/l shoulders, and left arm pain. ACDF C5/7 (4/11/2016), also h/o fibromyalgia,  also pain in lower back and b/l legs and feet. 10/10 at worst, 6/10 at best, 6/10 today, worse with activity, improves with rest and meds, always present, varies, aching, radiates in BLE. Imaging reviewed, satisfactory ACDF. Referred for pain management. Neck pain improved with ACDF, tapered off Cinc[place]atus with worsening pain, still somewhat severe. Taking Cymbalta which helps, tried Lyrica with weight gain, trying to lose weight. Restarted Cincinnatus at BID - qdaily prn but worsening pain with exercise to lose weight, increased to BID-TID #75, then TID, then QID prn with adequate relief, reduced to #105, tolerating. Gastric sleeve surgery planned for March-April 2017, had to miss cardiac clearance and EGD due to illness and social issues, has decided against surgery, trying to lose weight with diet and exercise. Worsening b/l CTS symptoms, R>L, known h/o of CTS. Saw Juan Franz for worsening neck pain to LUE, ACDF is intact, started Diclofenac for DJD, Elavil. Could not tolerate even low-dose Gabapentin with drowsiness. Started Celebrex but did not feel like doing anything, stopped, stopped Mobic. Stopping numerous meds due to side effects, started Gabapentin with Dr. Pisano with benefit. Pain worsening, DDD on MRI, started PT which is helping.       The following portions of the patient's history were reviewed and updated as appropriate: allergies, current medications, past family history, past medical history, past social history, past surgical history and problem list.    Review of Systems   Constitutional: Negative for chills, fatigue and fever.   HENT: Negative for hearing loss and trouble swallowing.    Eyes: Negative for visual  disturbance.   Respiratory: Negative for shortness of breath.    Cardiovascular: Negative for chest pain.   Gastrointestinal: Positive for constipation and nausea. Negative for abdominal pain, diarrhea and vomiting.   Genitourinary: Negative for urinary incontinence.   Musculoskeletal: Positive for arthralgias, back pain and neck pain. Negative for joint swelling and myalgias.   Neurological: Positive for headache. Negative for dizziness, weakness and numbness.       Objective   Physical Exam   Constitutional: She is oriented to person, place, and time.   Neurological: She is alert and oriented to person, place, and time. She has normal strength and normal reflexes.   Psychiatric: She has a normal mood and affect. Her behavior is normal. Thought content normal.         Assessment/Plan   Diagnoses and all orders for this visit:    Cervicalgia    Chronic midline low back pain with right-sided sciatica    Degeneration of intervertebral disc of cervical region    Degeneration of intervertebral disc of lumbar region    Cervical stenosis of spine        [facility] reviewed, in order, filled 3/30/20. Low risk. Repeat UDS 5/1/19 in order.  Reduced to Norco 10/325mg q6-8h #105, too painful, restarted q6h prn #120, then reduced to #105 successfully. Will increase to #120 next refill.  Afraid to start Lyrica due to possible weight gain. Restarted Gabapentin, takes 300-900mg/day as tolerated, helping a great deal.  Severe GERD, IBS, family h/o CV dz. Stopped Diclofenac, could not tolerate Celebrex 200mg qdaily. Stopped Mobic 7.5mg qdaily.  Cont other meds as prescribed.  Cont TENS, unit provided here, patient reports it helps her pain significantly.  Cont b/l CTS braces at night only.  Patient reports she cannot start PT at this time as her daughter currently works 12h shifts, may be able to begin in future if her daughter can change to an 8h shift schedule.  Will begin neuropathic comp cream if her current cream does not  work.  Ordered LSO for truncal stability. Cont PT.  RTC 3 months for f/u. Telephone visit, with 7 minutes spent discussing her plan of care and medications, and COVID-19 avoidance strategies.

## 2020-04-24 ENCOUNTER — TELEPHONE (OUTPATIENT)
Dept: FAMILY MEDICINE CLINIC | Facility: CLINIC | Age: 50
End: 2020-04-24

## 2020-04-24 DIAGNOSIS — G89.29 CHRONIC BILATERAL LOW BACK PAIN WITHOUT SCIATICA: ICD-10-CM

## 2020-04-24 DIAGNOSIS — M54.50 CHRONIC BILATERAL LOW BACK PAIN WITHOUT SCIATICA: ICD-10-CM

## 2020-04-24 DIAGNOSIS — M25.562 ACUTE PAIN OF LEFT KNEE: ICD-10-CM

## 2020-04-27 RX ORDER — NAPROXEN 500 MG/1
TABLET ORAL
Qty: 60 TABLET | Refills: 2 | Status: SHIPPED | OUTPATIENT
Start: 2020-04-27 | End: 2020-11-30 | Stop reason: SDUPTHER

## 2020-04-27 NOTE — TELEPHONE ENCOUNTER
Medication sent     Surgical Oncology Follow Up       [address] SURGICAL ONCOLOGY [place]  [address]  Juli FARRIS [address]  1955  [phone]  [address] SURGICAL ONCOLOGY [place]  [address]  Juli FARRIS [id number]    Diagnoses and all orders for this visit:    Malignant neoplasm of lower third of esophagus ([address] )  -     XR chest pa & lateral; Future        Chief Complaint   Patient presents with    Post-op       Return in about 2 weeks (around 3/5/2020) for Office Visit           Malignant neoplasm of lower third of esophagus (HCC)    8/24/2019 Initial Diagnosis     Malignant neoplasm of lower third of esophagus (HCC)  At least intramucosal carcinoma  Her2/SHIMON positive      10/8/2019 - 12/30/2019 Chemotherapy     palonosetron (ALOXI) injection 0 25 mg, 0 25 mg, Intravenous, Once, 6 of 6 cycles  Administration: 0 25 mg (10/8/2019), 0 25 mg (10/22/2019), 0 25 mg (11/5/2019), 0 25 mg (11/19/2019), 0 25 mg (12/3/2019), 0 25 mg (12/17/2019)  pegfilgrastim (NEULASTA ONPRO) subcutaneous injection kit 6 mg, 6 mg, Subcutaneous, Once, 6 of 6 cycles  Administration: 6 mg (10/9/2019), 6 mg (10/23/2019), 6 mg (11/6/2019), 6 mg (11/20/2019), 6 mg (12/4/2019), 6 mg (12/18/2019)  fosaprepitant (EMEND) 150 mg in sodium chloride 0 9 % 250 mL IVPB, 150 mg, Intravenous, Once, 6 of 6 cycles  Administration: 150 mg (10/8/2019), 150 mg (10/22/2019), 150 mg (11/5/2019), 150 mg (11/19/2019), 150 mg (12/3/2019), 150 mg (12/17/2019)  DOCEtaxel (TAXOTERE) 99 mg in sodium chloride 0 9 % 250 mL chemo infusion, 50 mg/m2 = 99 mg (83 3 % of original dose 60 mg/m2), Intravenous, Once, 6 of 6 cycles  Dose modification: 50 mg/m2 (original dose 60 mg/m2, Cycle 1, Reason: Other (See Comments))  Administration: 99 mg (10/8/2019), 99 mg (10/22/2019), 99 mg (11/5/2019), 99 mg (11/19/2019), 99 mg (12/3/2019), 99 mg (12/17/2019)  leucovorin 396 mg in dextrose 5 % 250 mL IVPB, 200 mg/m2 = 396 mg (50 % of original dose 400 mg/m2), Intravenous, Once, 6 of 6 cycles  Dose modification: 200 mg/m2 (original dose 400 mg/m2, Cycle 1, Reason: Other (See Comments), Comment: FLOT regimen standard)  Administration: 396 mg (10/8/2019), 396 mg (10/22/2019), 396 mg (11/5/2019), 396 mg (11/19/2019), 396 mg (12/3/2019), 396 mg (12/17/2019)  oxaliplatin (ELOXATIN) 168 3 mg in dextrose 5 % 250 mL chemo infusion, 85 mg/m2 = 168 3 mg, Intravenous, Once, 6 of 6 cycles  Administration: 168 3 mg (10/8/2019), 168 3 mg (10/22/2019), 168 3 mg (11/5/2019), 168 3 mg (11/19/2019), 168 3 mg (12/3/2019), 168 3 mg (12/17/2019)  trastuzumab (HERCEPTIN) 496 mg in sodium chloride 0 9 % 250 mL chemo infusion, 6 mg/kg = 496 mg, Intravenous, Once, 6 of 6 cycles  Administration: 496 mg (10/8/2019), 331 mg (10/22/2019), 331 mg (11/5/2019), 331 mg (11/19/2019), 331 mg (12/3/2019), 331 mg (12/17/2019)      1/28/2020 Surgery     Esophagogastrectomy  - Microscopic focus of residual adenocarcinoma in muscularis propria  - Ulceration and associated histologic changes compatible with prior neoadjuvant chemotherapy  - Adjacent betancur's esophagus with associated atypia indeterminate for dysplasia  - Seven lymph nodes identified; all negative for malignancy (0/7)          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[address] )    8/24/2019 Biopsy     Esophagus (biopsy):  - At least intramucosal carcinoma arising in a background of Betancur's esophagus and high grade dysplasia       9/24/2019 Initial Diagnosis     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HCC)      10/8/2019 - 12/30/2019 Chemotherapy     palonosetron (ALOXI) injection 0 25 mg, 0 25 mg, Intravenous, Once, 6 of 6 cycles  Administration: 0 25 mg (10/8/2019), 0 25 mg (10/22/2019), 0 25 mg (11/5/2019), 0 25 mg (11/19/2019), 0 25 mg (12/3/2019), 0 25 mg (12/17/2019)  pegfilgrastim (NEULASTA ONPRO) subcutaneous injection kit 6 mg, 6 mg, Subcutaneous, Once, 6 of 6 cycles  Administration: 6 mg (10/9/2019), 6 mg (10/23/2019), 6 mg (11/6/2019), 6 mg (11/20/2019), 6 mg (12/4/2019), 6 mg (12/18/2019)  fosaprepitant (EMEND) 150 mg in sodium chloride 0 9 % 250 mL IVPB, 150 mg, Intravenous, Once, 6 of 6 cycles  Administration: 150 mg (10/8/2019), 150 mg (10/22/2019), 150 mg (11/5/2019), 150 mg (11/19/2019), 150 mg (12/3/2019), 150 mg (12/17/2019)  DOCEtaxel (TAXOTERE) 99 mg in sodium chloride 0 9 % 250 mL chemo infusion, 50 mg/m2 = 99 mg (83 3 % of original dose 60 mg/m2), Intravenous, Once, 6 of 6 cycles  Dose modification: 50 mg/m2 (original dose 60 mg/m2, Cycle 1, Reason: Other (See Comments))  Administration: 99 mg (10/8/2019), 99 mg (10/22/2019), 99 mg (11/5/2019), 99 mg (11/19/2019), 99 mg (12/3/2019), 99 mg (12/17/2019)  leucovorin 396 mg in dextrose 5 % 250 mL IVPB, 200 mg/m2 = 396 mg (50 % of original dose 400 mg/m2), Intravenous, Once, 6 of 6 cycles  Dose modification: 200 mg/m2 (original dose 400 mg/m2, Cycle 1, Reason: Other (See Comments), Comment: FLOT regimen standard)  Administration: 396 mg (10/8/2019), 396 mg (10/22/2019), 396 mg (11/5/2019), 396 mg (11/19/2019), 396 mg (12/3/2019), 396 mg (12/17/2019)  oxaliplatin (ELOXATIN) 168 3 mg in dextrose 5 % 250 mL chemo infusion, 85 mg/m2 = 168 3 mg, Intravenous, Once, 6 of 6 cycles  Administration: 168 3 mg (10/8/2019), 168 3 mg (10/22/2019), 168 3 mg (11/5/2019), 168 3 mg (11/19/2019), 168 3 mg (12/3/2019), 168 3 mg (12/17/2019)  trastuzumab (HERCEPTIN) 496 mg in sodium chloride 0 9 % 250 mL chemo infusion, 6 mg/kg = 496 mg, Intravenous, Once, 6 of 6 cycles  Administration: 496 mg (10/8/2019), 331 mg (10/22/2019), 331 mg (11/5/2019), 331 mg (11/19/2019), 331 mg (12/3/2019), 331 mg (12/17/2019)         Staging: [id number] esophageal adenocarcinoma, January 2020  Treatment history:  Neoadjuvant chemo radiation  Transhiatal esophagectomy, January 2020  Current treatment:  Observation  Disease status: HUNG    History of Present Illness:  Patient returns in follow-up of his transhiatal esophagectomy  He did undergo IR embolization of a thoracic duct leak as well as medialization of his left vocal cord  He is currently doing well  He did have a low-grade fever at home yesterday  He did not take his temperature but he did drenched  He is tolerating 3 cans of tube feeds without difficulty  He does still have some difficulty knowing how much to eat because of early satiety  No significant nausea or vomiting  He has lost approximately 10 lb, most of which he attributes to were weight since his leg swelling has decreased  He did have a CT to rule out a pulmonary embolism because of some shortness of breath earlier this week  This reveals severe right-sided atelectasis as well as some new pleural fluid  His chest x-ray shows new small right pleural effusion  Personally reviewed the films  He denies any shortness of breath when sitting up straight  No dysphagia or cough  Review of Systems  Complete ROS Surg Onc:   Complete ROS Surg Onc:   Constitutional: The patient denies new or recent history of general fatigue, no recent weight loss, no change in appetite  Eyes: No complaints of visual problems, no scleral icterus  ENT: no complaints of ear pain, no hoarseness, no difficulty swallowing,  no tinnitus and no new masses in head, oral cavity, or neck  Cardiovascular: No complaints of chest pain, no palpitations  There is ankle edema  Respiratory: No complaints of shortness of breath, no cough  Gastrointestinal: No complaints of jaundice, no bloody stools, no pale stools  Genitourinary: No complaints of dysuria, no hematuria, no nocturia, no frequent urination, no urethral discharge  Musculoskeletal: No complaints of weakness, paralysis, joint stiffness or arthralgias  Integumentary: No complaints of rash, no new lesions  Neurological: No complaints of convulsions, no seizures, no dizziness     Hematologic/Lymphatic: No complaints of easy bruising  Endocrine:  No hot or cold intolerance  No polydipsia, polyphagia, or polyuria  Allergy/immunology:  No environmental allergies  No food allergies  Not immunocompromised  Skin:  No pallor or rash  Surgical wound  Patient Active Problem List   Diagnosis    COPD (chronic obstructive pulmonary disease) (HCC)    Headaches due to old head injury    High cholesterol    Obstructive sleep apnea syndrome    Type 2 diabetes mellitus with hyperglycemia, without long-term current use of insulin (HCC)    Malignant neoplasm of lower third of esophagus (HCC)    Esophageal obstruction    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HCC)    Chemotherapy induced neutropenia (HCC)    Anemia, unspecified    Insomnia due to medical condition    Encounter for care related to feeding tube    Paralysis of left vocal fold    Dysphonia    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    Mild protein-calorie malnutrition (HCC)    Atrial fibrillation ([facility] )    Port-A-Cath in place     Past Medical History:   Diagnosis Date    COPD (chronic obstructive pulmonary disease) ([facility] )     Diabetes mellitus ([facility] )     Difficulty swallowing     Disease of thyroid gland     Esophageal mass     History of chemotherapy     History of transfusion     Malignant neoplasm of lower third of esophagus ([facility] )     Port-A-Cath in place     LCW    Sleep apnea     no CPAP     Past Surgical History:   Procedure Laterality Date    BACK SURGERY      x2    DISC REMOVAL      ESOPHAGECTOMY N/A 1/28/2020    Procedure: ESOPHAGECTOMY, TRANSHIATAL;  Surgeon: Tika Zhang MD;  [facility];  Service: Surgical Oncology    ESOPHAGOGASTRODUODENOSCOPY N/A 1/28/2020    Procedure: ESOPHAGOGASTRODUODENOSCOPY (EGD);   Surgeon: Tika Zhang MD;  [facility];  Service: Surgical Oncology    FL GUIDED CENTRAL VENOUS ACCESS DEVICE INSERTION  9/26/2019    GASTROJEJUNOSTOMY W/ JEJUNOSTOMY TUBE N/A 9/26/2019    Procedure: INSERTION JEJUNOSTOMY TUBE OPEN;  Surgeon: Jobe Bloch, MD;  [facility];  Service: Surgical Oncology    TONSILLECTOMY      TUNNELED VENOUS PORT PLACEMENT N/A 2019    Procedure: INSERTION VENOUS PORT (PORT-A-CATH);   Surgeon: Jobe Bloch, MD;  [facility];  Service: Surgical Oncology    VOCAL CORD INJECTION N/A 2020    Procedure: MICROLARYNGOSCOPY WITH INJECTION;  Surgeon: Mitchell Saunders MD;  [facility];  Service: ENT     Family History   Problem Relation Age of Onset    Diabetes Mother     No Known Problems Father      Social History     Socioeconomic History    Marital status: Single     Spouse name: Not on file    Number of children: Not on file    Years of education: Not on file    Highest education level: Not on file   Occupational History    Not on file   Social Needs    Financial resource strain: Not on file    Food insecurity:     Worry: Not on file     Inability: Not on file    Transportation needs:     Medical: Not on file     Non-medical: Not on file   Tobacco Use    Smoking status: Former Smoker     Packs/day: 0 50     Years: 50 00     Pack years: 25 00     Types: Cigarettes     Last attempt to quit: 2017     Years since quittin 1    Smokeless tobacco: Never Used   Substance and Sexual Activity    Alcohol use: Not Currently     Alcohol/week: 0 0 standard drinks     Frequency: Never     Drinks per session: 1 or 2     Binge frequency: Never    Drug use: Never    Sexual activity: Not on file   Lifestyle    Physical activity:     Days per week: Not on file     Minutes per session: Not on file    Stress: Not on file   Relationships    Social connections:     Talks on phone: Not on file     Gets together: Not on file     Attends [employer] service: Not on file     Active member of club or organization: Not on file     Attends meetings of clubs or organizations: Not on file     Relationship status: Not on file    Intimate partner violence:     Fear of current or ex partner: Not on file Emotionally abused: Not on file     Physically abused: Not on file     Forced sexual activity: Not on file   Other Topics Concern    Not on file   Social History Narrative    Not on file       Current Outpatient Medications:     amiodarone 200 mg tablet, Take 1 tablet (200 mg total) by mouth 3 (three) times a day with meals, Disp: 90 tablet, Rfl: 0    insulin NPH (HumuLIN N,NovoLIN N) 100 Units/mL subcutaneous injection, Inject 12 Units under the skin daily before dinner To be given prior to tube feeds nightly, Disp: 3 mL, Rfl: 0    levothyroxine 25 mcg tablet, Take 25 mcg by mouth daily in the early morning, Disp: , Rfl:     Nutritional Supplements (JEVITY 1 5 SHUN) LIQD, Take 60 mL by mouth continuous Jevity 1 5@ 60 ml/hr for cycles of 12 hours daily  (7pm-7am nightly), Disp: 1000 mL, Rfl: 0    gabapentin (NEURONTIN) 300 mg capsule, take 1 capsule by mouth daily at bedtime (Patient not taking: Reported on 2/20/2020), Disp: 30 capsule, Rfl: 0    melatonin 3 mg, Take 1 tablet (3 mg total) by mouth daily at bedtime (Patient not taking: Reported on 2/20/2020), Disp: 30 tablet, Rfl: 0    pantoprazole (PROTONIX) 40 mg tablet, Take 1 tablet (40 mg total) by mouth daily (Patient not taking: Reported on 2/20/2020), Disp: 30 tablet, Rfl: 1    rOPINIRole (REQUIP) 0 25 mg tablet, take 1 tablet by mouth daily at bedtime (Patient not taking: Reported on 2/20/2020), Disp: 30 tablet, Rfl: 0    senna-docusate sodium (SENOKOT S) 8 6-50 mg per tablet, Take 1 tablet by mouth daily (Patient not taking: Reported on 2/20/2020), Disp: 30 tablet, Rfl: 0    tamsulosin (FLOMAX) 0 4 mg, Take 1 capsule (0 4 mg total) by mouth daily with dinner (Patient not taking: Reported on 2/20/2020), Disp: 30 capsule, Rfl: 0  Allergies   Allergen Reactions    Penicillins Itching     Vitals:    02/20/20 0955   BP: 100/68   Pulse: 88       Physical Exam  Constitutional: General appearance:  The Patient is well-developed and well-nourished who appears the stated age in no acute distress  Patient is pleasant and talkative  HEENT:  Normocephalic  Sclerae are anicteric  Mucous membranes are moist  Neck is supple without adenopathy  No JVD  Chest: The lungs are clear to auscultation, but there are decreased sounds at the right base  Cardiac: Heart is regular rate  Abdomen: Abdomen is soft, non-tender, non-distended and without masses  Incision is C/D/I  Extremities: There is no clubbing or cyanosis  There is no edema  Symmetric  Neuro: Grossly nonfocal  Gait is normal         Skin: Warm, anicteric  Psych:  Patient is pleasant and talkative  Breasts:        Pathology:  Final Diagnosis   A  Esophagastrectomy:     - Microscopic focus of residual adenocarcinoma in muscularis propria  - Ulceration and associated histologic changes compatible with prior neoadjuvant chemotherapy  - Adjacent betancur's esophagus with associated atypia indeterminate for dysplasia  - Seven lymph nodes identified; all negative for malignancy (0/7)  - Proximal and distal margins are negative for dysplasia and malignacny      B  Final esophageal margin:     - Negative or malignancy  Electronically signed by Kristopher Holliday MD on 2/7/2020 at  2:50 PM   Comments: This is an appended report  These results have been appended to a previously preliminary verified report  Note  [facility]   The celiac lymph node shows extensive fibrosis associated with collections of histiocytes and scattered dystrophic calcifications  Focally, these calcifications are associated with epithelioid cells which on immunostaining are negative for pankeratin (AE1/AE3) but positive for CD68 which is consistent with histiocytic origin  Comment: This is an appended report  These results have been appended to a previously preliminary verified report     Additional Information  [facility]   All controls performed with the immunohistochemical stains reported above reacted appropriately  These tests were developed and their performance characteristics determined by [facility] or [facility]  They may not be cleared or approved by the [place]  Food and Drug Administration  The FDA has determined that such clearance or approval is not necessary  These tests are used for clinical purposes  They should not be regarded as investigational or for research  This laboratory has been approved by [facility], designated as a high-complexity laboratory and is qualified to perform these tests      - Interpretation performed at [place], [address]: This is an appended report  These results have been appended to a previously preliminary verified report  Synoptic Checklist   ESOPHAGUS   Esophagus - All Specimens   SPECIMEN   Procedure  Esophagectomy    TUMOR   Tumor Site  Distal esophagus (low thoracic esophagus)    Relationship of Tumor to Esophagogastric Junction  Tumor is entirely located within the tubular esophagus and does not involve the esophagogastric junction    Distance of Tumor Center from Esophagogastric Junction in Centimeters (cm)  Cannot be determined: Extenstive ulceration of GEJ by neoadjuvant chemotherapy obscurs landmarks      Histologic Type  Adenocarcinoma    Histologic Grade  G1:  Well differentiated    Tumor Size  Greatest dimension in Centimeters (cm): 0 7 Centimeters (cm)   Tumor Extent (Note E)     Tumor Extension  Tumor invades the muscularis propria    Accessory Findings     Treatment Effect  Present      Single cells or rare small groups of cancer cells (near complete response, score 1)    Lymphovascular Invasion  Not identified    Perineural Invasion  Not identified    MARGINS   Margins  All margins are uninvolved by invasive carcinoma, dysplasia, and intestinal metaplasia    Margins Examined  Proximal      Distal      Radial    Distance of Invasive Carcinoma from Closest Margin  4 Millimeters (mm)   Closest Margin  Radial    LYMPH NODES   Number of Lymph Nodes Involved  0    Number of Lymph Nodes Examined  7    PATHOLOGIC STAGE CLASSIFICATION (pTNM, AJCC 8th Edition)   TNM Descriptors  y (post-treatment)    Primary Tumor (pT)  pT2    Regional Lymph Nodes (pN)  pN0    ADDITIONAL FINDINGS   Additional Pathologic Findings  Intestinal metaplasia (Duong's esophagus)    Comment(s)   Comment(s)  AJCC Prognostic Stage Group (8th ed ) Postneoadjuvant Therapy:  At least Stage 1 - ypT2, ypN0, MX         Intraoperative Consultation  [facility]   FSA1  Proximal esophageal margin:  Negative for malignancy  FSA2-9  Distal gastric margin:  Negative for malignancy  Reviewed by HANG Bassett                Labs:      Imaging  Xr Chest Portable    Result Date: 2/5/2020  Narrative: CHEST INDICATION:   f/u chest tubes, PNA, chylothorax  GE junction carcinoma, status post esophagectomy on 1/28/2020  COMPARISON:  Chest radiograph from 2/4/2020  EXAM PERFORMED/VIEWS:  XR CHEST PORTABLE FINDINGS:  Right jugular catheter in upper SVC  Left port at cavoatrial junction  Normal heart size  Gas-filled structure adjacent to the right heart border due to the gastric pull-up  Bilateral chest tubes with slight increase in small left pneumothorax  Persistent bibasilar aspiration  Osseous structures appear within normal limits for patient age  Impression: Bilateral chest tubes with slight increase in small left pneumothorax  Persistent bilateral aspiration  Esophagectomy  Workstation performed: [id number]     Xr Chest Portable    Result Date: 2/3/2020  Narrative: CHEST INDICATION:   s/p b/l CT placement  COMPARISON:  2/2/2020 EXAM PERFORMED/VIEWS:  XR CHEST PORTABLE FINDINGS:  There has been interval placement of bilateral chest tubes  Lines and tubes are otherwise unchanged from prior exam  Cardiomediastinal silhouette appears unremarkable   There is asymmetric opacity throughout the right hemithorax likely related to layering pleural effusion with right basilar atelectasis  The appearance on the right is similar to the prior study though the left lung has cleared likely secondary to decreased pleural effusion after chest tube placement  There is a small left apical pneumothorax  Osseous structures appear within normal limits for patient age  Impression: 1  Clearing of the left hemithorax likely secondary to decreased left pleural effusion status post chest tube placement  Persistent pleural effusion/atelectasis in the right lung despite chest tube  2   Small left apical pneumothorax  The study was marked in [facility] for immediate notification  Workstation performed: [id number]     Xr Chest Portable    Result Date: 2/3/2020  Narrative: CHEST INDICATION:   s/p R CT placement  GE junction cancer with esophagectomy on 1/28/2020  COMPARISON:  Chest radiograph and chest CT from 2/2/2020  EXAM PERFORMED/VIEWS:  XR CHEST PORTABLE FINDINGS:  Right jugular catheter in upper SVC  ET tube 4 cm above the gregg  NG tube in gastric pull up  Left port at cavoatrial junction  Cardiomediastinal silhouette appears unremarkable  Right chest tube insertion with drainage of right effusion  Redemonstration of left chest tube with no left effusion  Mild bibasilar atelectasis  No pneumothorax  Osseous structures appear within normal limits for patient age  Impression: Right chest tube insertion with drainage of right effusion with no pneumothorax  Recent esophagectomy  Workstation performed: [id number]     Xr Chest Portable    Result Date: 2/3/2020  Narrative: CHEST INDICATION:   Acute Resp Failure  GE junction carcinoma, status post esophagectomy 1/28/2020  COMPARISON:  Chest radiograph from 2/2/2020 and chest CT from 2/2/2020  EXAM PERFORMED/VIEWS:  XR CHEST PORTABLE FINDINGS:  ET tube 5 cm above the gregg  NG tube in the gastric pull-up with tip below the diaphragm  Left port at cavoatrial junction    Right jugular catheter in right brachycephalic vein  Cardiomediastinal silhouette appears unremarkable  Persistence of bilateral chest tubes with drainage of effusions with no pneumothorax  New bibasilar airspace consolidation, greater on the right  Osseous structures appear within normal limits for patient age  Impression: Esophagectomy  New bibasilar consolidation, greater on the right, likely aspiration  Retention pulmonary edema is also possible  The study was marked in [facility] for immediate notification  Workstation performed: [id number]     Xr Chest Portable    Result Date: 2/2/2020  Narrative: CHEST INDICATION:   s/p central line placement  COMPARISON:  Chest radiograph from prior day EXAM PERFORMED/VIEWS:  XR CHEST PORTABLE FINDINGS:  ET tube 3 3 cm above the gregg OG tube tip in stomach Esophageal temperature probe Left Mediport  Right IJ catheter tip at the mid SVC Heart shadow is obscured by adjacent opacity  Bilateral pleural effusions with adjacent basal airspace opacities  No pneumothorax  Osseous structures appear within normal limits for patient age  Impression: 1  Right IJ catheter tip at mid SVC 2  Additional lines and tubes unchanged 3  Bilateral pleural effusions with adjacent opacities, unchanged Workstation performed: [id number]     Xr Chest Portable    Result Date: 2/2/2020  Narrative: CHEST INDICATION:   Please assess ETT placement after episode of agitation  Thanks    COMPARISON:  February 1, 2020  EXAM PERFORMED/VIEWS:  XR CHEST PORTABLE FINDINGS: Endotracheal tube again demonstrates satisfactory position with its tip above the level of the gregg  Enteric tube has been repositioned with its tip in satisfactory position below left hemidiaphragm  Again noted is a left chest port  Moderate bilateral pleural effusions with overlying airspace opacities  No pneumothorax  Transcutaneous pacer pad is again noted  Osseous structures appear within normal limits for patient age       Impression: Satisfactory positioning of endotracheal tube above the level of the gregg  Satisfactory positioning of enteric tube with its tip below left hemidiaphragm  Bilateral pleural effusions with overlying airspace opacities  Workstation performed: [id number]     Xr Chest Portable    Result Date: 1/30/2020  Narrative: CHEST INDICATION:   s/p bilateral thoracentesis  COMPARISON:  Chest x-ray from January 29, 2020  EXAM PERFORMED/VIEWS:  XR CHEST PORTABLE FINDINGS:  Stable position of the orogastric tube with the tip at the approximate T11 level  The tip of the catheter is not clearly beyond the GE junction and diaphragm  The side port is identified at the approximate T8 level  A left chest wall Chemo-Port is identified with its tip in the superior vena cava  Surgical clips and a drain are again identified within the superior left mediastinum  The cardiomediastinal silhouette is slightly better defined with interval decrease in the bibasilar opacities thought to represent a combination of pleural effusion and atelectasis  There is no pneumothorax  Lower lobe pneumonia would be difficult to exclude in the setting of the bibasilar opacities  Osseous structures appear within normal limits for patient age  Impression: Interval bilateral thoracentesis with decrease in the right more than left pleural effusion  Persistent bibasilar pleural effusions and airspace densities thought to represent atelectasis  Underlying pneumonia not excluded  No pneumothorax  Orogastric catheter tip is at the GE junction with the side port at the approximate T8 vertebral level  Consider advancement prior to use  I personally discussed this study with Sarah Santos on 1/30/2020 at 9:13 AM  Workstation performed: [id number]     Xr Chest Portable    Result Date: 1/28/2020  Narrative: CHEST INDICATION:   s/p esophagectomy  COMPARISON:  9/26/2019 EXAM PERFORMED/VIEWS:  XR CHEST PORTABLE FINDINGS:  Left chest port unchanged    NG tube in the gastric pull-through located within the chest  Cardiomediastinal silhouette appears stable  Mild vascular congestion with patchy bibasilar atelectasis  No pneumothorax  Surgical staples and surgical gauze in the left neck  Osseous structures appear within normal limits for patient age  Impression: Mild vascular congestion with patchy bibasilar atelectasis  No pneumothorax  Workstation performed: [id number]     Xr Chest Pa & Lateral    Result Date: 2/18/2020  Narrative: CHEST INDICATION:   C15 9: Malignant neoplasm of esophagus, unspecified  COMPARISON:  Chest radiograph from 2/11/2020 and chest CT from 2/2/2020  EXAM PERFORMED/VIEWS:  XR CHEST PA & LATERAL  DUAL ENERGY SUBTRACTION TECHNIQUE FINDINGS:  Left port at cavoatrial junction  Normal heart size  Esophagectomy and gastric pull-up  Hyperdensity in the mediastinum and right upper quadrant due to a lymphangiogram with thoracic duct embolization  Small right effusion with right base atelectasis, new since 2/11/2020  No change in trace left effusion  Osseous structures appear within normal limits for patient age  Sutures in the anterior abdominal wall  Impression: Small right effusion and right base atelectasis, new since 2/11/2020  Workstation performed: [id number]     Xr Chest Pa & Lateral    Result Date: 2/11/2020  Narrative: CHEST INDICATION:   post pull cxr for R CT removal  COMPARISON:  2/10/2020 EXAM PERFORMED/VIEWS:  XR CHEST PA & LATERAL FINDINGS:  The right-sided chest tube has been removed  Cardiomediastinal silhouette is stable  There is contrast material visible in the mediastinum which is within portions of the patient's gastric pull-through  Additional contrast material is seen outside of the GI tract contour and apparently this is related to a prior thoracic duct embolization procedure  Stable from prior  A right PICC is visible  A left-sided infusion port catheter is visible  Positioning seems satisfactory   There is a small right apical pneumothorax which is stable in size compared with the prior study  No interval increase after chest tube removal   There is also a small persistent left apical pneumothorax  There is minimal pleural effusion in the posterior costophrenic angles  No mediastinal shift  No gross evidence of pulmonary infiltrates  Probable minimal atelectasis left base  Osseous structures appear within normal limits for patient age  Impression: Stable small bilateral apical pneumothoraces  Right chest tube has been removed  Central line and infusion port catheter device appears satisfactory  Persistent contrast material in the mediastinum and GI tract Workstation performed: [id number]     Xr Chest Pa & Lateral    Result Date: 2/10/2020  Narrative: CHEST INDICATION:   f/u L CT removal  COMPARISON:  2/7/2020 EXAM PERFORMED/VIEWS:  XR CHEST PA & LATERAL Images: 2 FINDINGS:  Left chest wall Port-A-Cath unchanged in position  There is a right-sided PICC line present  The tip of the PICC line terminates at the level of the cavoatrial junction  Stable cardiomediastinal structures  Left basilar subsegmental atelectasis and effusion  No congestive failure  Small bore left-sided chest tube has been removed  Small pneumothorax at the left apex is minimally smaller than the prior study  There is a right-sided small bore chest tube which is unchanged in position  There is a small right apical pneumothorax which is slightly larger than the prior examination  A small amount of contrast partially outlines the right tracheobronchial tree  This is suggestive of aspiration from recent barium swallow  Osseous structures appear within normal limits for patient age  Impression: Status post left chest tube removal   Subsegmental atelectasis with small effusion at the base  There is a small apical pneumothorax present, slightly smaller than the prior exam  There is a small bore right-sided chest tube present    There is a small right apical pneumothorax which is slightly larger than the prior exam  There is a small collection of contrast identified within the posterior mediastinum as well as contrast present at the GE junction and within the large bowel  Contrast within the mediastinum is of unclear etiology  Possibility of surgical leak not excluded  Small amount of contrast is seen within the tracheobronchial tree suggesting aspiration during recently performed barium studies 2/8  I personally discussed this study with the surgical resident Dr Katie Miranda on 2/10/2020 at 2:27 PM  Workstation performed: [id number]     Fl Barium Swallow    Result Date: 2/8/2020  Narrative: BARIUM SWALLOW-ESOPHAGRAM INDICATION:   esophagram--r/o leak  Patient is status post recent esophagectomy  COMPARISON:  Previous chest x-ray examinations IMAGES:  170  (this includes cine capture images) FLUOROSCOPY TIME:   1 40 minutes  TECHNIQUE: Due to risk of aspiration, and lesser concern for leak, thin liquid barium was chosen after discussion with clinical service  This was given via straw FINDINGS: Examination demonstrates pre-existing radiopaque material within the right upper mediastinum related to thoracic duct embolization  There is no evidence of proximal anastomotic leak  Anticipated changes status post soft subjectively and gastric pull-through noted  Very mild laryngeal penetration was noted  Distal portion of anastomosis appears grossly intact  Impression: Postoperative changes and changes status post thoracic duct embolization  Mild degree of laryngeal penetration was noted  Speech pathology followed this examination with further assessment of swallowing mechanism No findings to suggest an anastomotic leak Workstation performed: [id number]     Fl Barium Swallow Video W Speech    Result Date: 2/8/2020  Narrative: A video barium swallow study was performed by the Department of Speech Pathology  Please refer to the report for the official interpretation  The images are stored for archival purposes only  Study images were not formally reviewed by the Radiology Department  Ct Chest Abdomen Pelvis W Contrast    Result Date: 2/2/2020  Narrative: CT CHEST, ABDOMEN AND PELVIS WITH IV CONTRAST INDICATION:   Sepsis  COMPARISON:  Chest CT dated 9/6/2019  TECHNIQUE: CT examination of the chest, abdomen and pelvis was performed  Axial, sagittal, and coronal 2D reformatted images were created from the source data and submitted for interpretation  Radiation dose length product (DLP) for this visit:  1488 2 mGy-cm   This examination, like all CT scans performed in the [facility], was performed utilizing techniques to minimize radiation dose exposure, including the use of iterative  reconstruction and automated exposure control  IV Contrast:  100 mL of iodixanol (VISIPAQUE) Enteric Contrast: Enteric contrast was administered  FINDINGS: CHEST LUNGS:  Complete collapse of both lower lobes related to compression from effusions  No central obstructing lesion demonstrated in the lobar bronchi  No other acute consolidation  No interstitial thickening  No endobronchial masses  Endotracheal tube tip  terminates appropriately in the mid thoracic trachea  PLEURA:  Large bilateral pleural effusions  No grossly loculated components  HEART/GREAT VESSELS:  Borderline enlarged  Few coronary calcifications  No pericardial thickening or effusion  Central venous catheter tip terminates at the cavoatrial junction  Normal thoracic aorta  MEDIASTINUM AND SHEA:  Postsurgical changes after esophagectomy with gastric conduit  Anastomotic sutures just above the level of the gregg  Surgical drains in place  No suspicious mediastinal masses  CHEST WALL AND LOWER NECK:   Trace anasarca  ABDOMEN LIVER/BILIARY TREE:  Unremarkable  GALLBLADDER:  There are gallstone(s) within the gallbladder, without pericholecystic inflammatory changes   SPLEEN:  Focal posterior infarct with intracapsular necrosis of the spleen  Less than a quarter of the splenic volume is involved  PANCREAS:  Unremarkable  ADRENAL GLANDS:  Unremarkable  KIDNEYS/URETERS:  Unremarkable  No hydronephrosis  STOMACH AND BOWEL:  Postsurgical changes from gastric conduit formation  Nasogastric tube tip terminates in the subdiaphragmatic distal gastric body  Percutaneous jejunostomy tube appears appropriately positioned  No dilated or inflamed loops of small bowel  Moderate stool burden in the colon without dilation or pericolonic inflammation  APPENDIX:  No findings to suggest appendicitis  ABDOMINOPELVIC CAVITY:  Mild perihepatic and perisplenic ascites  Mild to moderate amount of free pelvic fluid  There is gradient density within the pelvic fluid measuring up to 49 HU dependently suggesting small amount layering blood products  No evidence for active intraperitoneal hemorrhage  No encapsulated hematoma or abscess  VESSELS:  Aortoiliac atherosclerosis without aneurysm or significant occlusive atheromatous disease  PELVIS REPRODUCTIVE ORGANS:  Unremarkable for patient's age  URINARY BLADDER:  Doshi catheter in place  Bladder is decompressed  Iatrogenic at the dependent air in the lumen  ABDOMINAL WALL/INGUINAL REGIONS:  Mild anasarca  No encapsulated collections  Ventral incisional staples persist  No wound dehiscence  OSSEOUS STRUCTURES: L5-S1 discectomy with interbody fusion device  Partial osseous ankylosis  No evidence for loosening or infection  No acute fractures or focally destructive osseous lesions  Impression: 1  Large bilateral pleural effusions with compressive collapse of both lower lobes  2   Moderate-sized focal posterior splenic infarct  3   Mild ascites in the abdomen and pelvis  Gradient density in the pelvic fluid suggests settling postsurgical blood products  No evidence for active intra-abdominal hemorrhage or hematoma formation, though   4   Postsurgical changes after esophagectomy and gastric conduit formation with posterior mediastinal surgical drain in place  No encapsulated-appearing collection at the intrathoracic anastomotic site  5   Appropriately positioned central venous catheter, endotracheal tube, nasogastric tube, and percutaneous jejunostomy tube  Workstation performed: [id number]     Xr Chest Portable Icu    Result Date: 2/8/2020  Narrative: CHEST INDICATION:   f/u CTs  COMPARISON:  3/5/2020 EXAM PERFORMED/VIEWS:  XR CHEST PORTABLE ICU  AP semierect Images: 1 FINDINGS:  Right IJ catheter terminates in the SVC with a left subclavian Port-A-Cath at the caval atrial junction  Radiopaque material in the right upper mediastinum noted status post thoracic duct embolization  Left basilar chest tube in stable position with improvement of the small pneumothorax  Right-sided chest tube in stable position as well  Cardiomediastinal silhouette appears unremarkable  Small pneumomediastinum noted to the left of midline  Improved right lower lobe aeration with persistent left basilar subsegmental atelectasis  Osseous structures appear within normal limits for patient age  Impression: 1  Left basilar chest tube in stable position with improvement of small pneumothorax  Lines and tubes otherwise stable  2   Improved right lower lobe aeration with persistent left basilar atelectasis  3   Evidence of thoracic duct embolization  4   Small pneumomediastinum  Workstation performed: [id number]     Xr Chest Portable Icu    Result Date: 2/4/2020  Narrative: CHEST INDICATION:   chylothorax and PNA  GE junction carcinoma, status post esophagectomy and 1/28/2020  COMPARISON:  Chest radiograph from 2/3/2020 and chest CT from 2/2/2020  EXAM PERFORMED/VIEWS:  XR CHEST PORTABLE ICU FINDINGS:  ET tube and NG tube removed  Left port at cavoatrial junction  Right jugular catheter in upper SVC  Cardiomediastinal silhouette appears unremarkable  Persistent bilateral chest tubes  Improving right base pneumonia    Increased left base opacity  No effusion  New small left pneumothorax  Osseous structures appear within normal limits for patient age  Impression: Improving right base pneumonia  Increased left base opacity, likely aspiration and atelectasis  Persistent bilateral chest tubes with new small left pneumothorax  Workstation performed: [id number]     Xr Chest Portable Icu    Result Date: 2/2/2020  Narrative: CHEST INDICATION:   post code/ETT placement  COMPARISON:  January 30, 2020 EXAM PERFORMED/VIEWS:  XR CHEST PORTABLE ICU FINDINGS:  Endotracheal tube is present, in satisfactory position with its tip above the level of the gregg  Enteric tube is present coiling over the lower chest but with its tip below left hemidiaphragm     Left chest port is present  Moderate bilateral pleural effusions with overlying airspace opacities  No pneumothorax  Translocation  Noted  Osseous structures appear within normal limits for patient age  Impression: Satisfactory positioning of endotracheal tube above the level of the gregg  Enteric tube coils over the lower chest presumably within gastric pull-up but tip extends to left upper quadrant of the abdomen  Bilateral pleural effusions with overlying airspace opacities  Workstation performed: [id number]     Xr Chest Portable Icu    Result Date: 1/30/2020  Narrative: CHEST INDICATION:   R pleural effusion, worsening SOB  GE junction carcinoma, status post transhiatal esophagectomy  COMPARISON:  Chest radiograph from 1/29/2020 and chest CT from 9/16/2019  EXAM PERFORMED/VIEWS:  XR CHEST PORTABLE ICU FINDINGS:  NG tube below the diaphragm  Left port at cavoatrial junction  Clips and a drain over the left superior mediastinum  Cardiomediastinal silhouette appears unremarkable  Persistent moderate right effusion and bibasilar atelectasis  Osseous structures appear within normal limits for patient age  Impression: Persistent moderate right effusion and bibasilar atelectasis   Workstation performed: [id number]     Xr Chest Portable Icu    Result Date: 1/29/2020  Narrative: CHEST INDICATION:   SOB  GE junction carcinoma, status post transhiatal esophagectomy  COMPARISON:  Chest radiograph from 1/28/2020  Chest CT from 1/30/2019  EXAM PERFORMED/VIEWS:  XR CHEST PORTABLE ICU FINDINGS:  Left port at cavoatrial junction  NG tube at the level of the diaphragm  Cardiomediastinal silhouette appears unremarkable  Skin staples and a drain over the superior mediastinum/left lower neck  New moderate right effusion and moderate bibasilar atelectasis  No pneumothorax  Osseous structures appear within normal limits for patient age  Impression: New moderate right effusion and moderate bibasilar atelectasis  Workstation performed: [id number]     Cta Chest Pe Study    Result Date: 2/18/2020  Narrative: CTA - CHEST WITH IV CONTRAST - PULMONARY ANGIOGRAM INDICATION:   R06 02: Shortness of breath  Chest tightness  Bilateral foot swelling  Esophageal cancer  COMPARISON: Multiple prior examinations including most recently CT of the chest, abdomen and pelvis performed February 2, 2020  TECHNIQUE: CTA examination of the chest was performed using angiographic technique according to a protocol specifically tailored to evaluate for pulmonary embolism  Axial, sagittal, and coronal 2D reformatted images were created from the source data and  submitted for interpretation  In addition, coronal 3D MIP postprocessing was performed on the acquisition scanner  Radiation dose length product (DLP) for this visit:  490 mGy-cm   This examination, like all CT scans performed in the [facility], was performed utilizing techniques to minimize radiation dose exposure, including the use of iterative reconstruction and automated exposure control  IV Contrast:  60 mL of iohexol (OMNIPAQUE)  FINDINGS: PULMONARY ARTERIAL TREE:  No pulmonary embolus is seen   LUNGS:  Compressive atelectasis is reidentified, most severe in right lung base and right mid chest with a small amount of compressive atelectasis at the left lung base  No suspicious pulmonary parenchymal mass  No consolidative opacity in mid or upper  lung zones  No tracheal or endobronchial mass noted  PLEURA:  There is moderate sized loculated right pleural effusion, decreased in size when compared to February 2, 2020  Some fluid is loculated in the major fissure  There is a small to moderate loculated left pleural effusion predominantly located in the left posterior costophrenic angle, significantly smaller when compared to February 2, 2020 examination  HEART/GREAT VESSELS:  Coronary artery calcification is noted  There is trace pericardial fluid  No thoracic aortic aneurysm  MEDIASTINUM AND SHEA:  Surgical changes of gastric pull-up procedure  High density embolization material is seen within the gastric pull-up  There is very high density within the thoracic duct and within lymphatic channels throughout the posterior mediastinum and right posterior hemithorax as well as along the right hemidiaphragm and posterior pleural surfaces related to thoracic duct embolization performed February 6, 2020  CHEST WALL AND LOWER NECK:   Left chest port is noted  VISUALIZED STRUCTURES IN THE UPPER ABDOMEN:  Material associated with thoracic duct embolization  Posterior subcapsular splenic fluid collection, similar to February 2, 2020  OSSEOUS STRUCTURES:  No acute fracture or destructive osseous lesion  Impression: No pulmonary embolus  Moderate loculated right pleural effusion, and small loculated left pleural effusion are both significantly decreased in size when compared to February 2, 2020 but are both new and slowly increasing when compared with chest x-ray performed February 10, 2020  High density embolization material is seen in the posterior mediastinum along the pleural Surgical changes of gastric pull-up procedure    Surface in the right hemithorax related to thoracic duct embolization performed February 10, 2020  Workstation performed: [id number]     Vas Lower Limb Venous Duplex Study, Complete Bilateral    Result Date: 2/19/2020  Narrative:  [facility] REPORT CLINICAL: Indications: Bilateral Localized edema [R60 0] L>R with large dorsal blisters  Patient had last chemotherapy in December, 2019  Risk Factors The patient has history of previous smoking (quit 1-5yrs ago)  He has no history of DVT or Recent Trauma  FINDINGS:  Segment  Right            Left              Impression       Impression       CFV      Normal (Patent)  Normal (Patent)     CONCLUSION:  Impression: RIGHT LOWER LIMB: No evidence of acute or chronic deep vein thrombosis  No evidence of superficial thrombophlebitis noted  Doppler evaluation shows a normal response to augmentation maneuvers  Popliteal, posterior tibial and anterior tibial arterial Doppler waveforms are triphasic  LEFT LOWER LIMB: No evidence of acute or chronic deep vein thrombosis  No evidence of superficial thrombophlebitis noted  Doppler evaluation shows a normal response to augmentation maneuvers  Popliteal, posterior tibial and anterior tibial arterial Doppler waveforms are triphasic  SIGNATURE: Electronically Signed by: Sameer Palacio on 2020-02-19 02:29:58 PM    Ir Other    Result Date: 2/10/2020  Narrative: PROCEDURE: 1   Real-time ultrasound guided access of 4 separate groin lymph nodes 2  Lymphangiography of the pelvis and trunk 3  Coil and glue embolization of active lymphatic extravasation arising from the thoracic duct 4   82 modifier STAFF: HANG Luke  (primary), HANG Pearce  (secondary) CONTRAST: 50 mL's of lipiodol intralymphatic  FLUOROSCOPY TIME: 30 4 minutes  NUMBER OF IMAGES: Multiple COMPLICATIONS: None  MEDICATIONS: Sedation was provided in the form of general anesthesia by the Anesthesia service  Please see their associated records    INDICATION: History of recent transhiatal esophagectomy complicated by high output right chylothorax  PROCEDURE: A qualified resident surgeon was not available for this procedure  Real-time ultrasound was used to access 4 separate bilateral groin lymph nodes using 25-gauge needles  Images were stored in PACS  Lipiodol was gently instilled into the bilateral groin lower extremity lymphatics  Lymphangiography of the pelvis and abdomen was performed  After lipiodol had extended into the cisterna chyli, a 22-gauge needle was used to access a lymphatic duct immediately inferior to the cisterna chyli  A microwire was extended into the thoracic duct, followed by a microcatheter  Further lymphangiography was performed  Based on the results of lymphangiography, the decision was made to embolize the thoracic duct  This was done using a combination of 1-4 mm x 7 cm Emanuel coil, 4-4 mm x 14 cm Emanuel microcoils, and an emulsion of 1:2 of nBCA with lipiodol  The microcatheter was removed  Needles in the groin were removed  FINDINGS: 1   Real-time ultrasound showed a needle to extend into a hypoechoic groin lymph nodes x4  2   Lymphangiography of both the abdomen and pelvis showed normal filling of lymphatic channels, without leak  3   Interval lymphangiography after accessing the cisterna chyli confirmed catheter positioning within the thoracic duct  4   Lymphangiography of the chest showed brisk lymphatic extravasation into the right hemithorax  There was complete transection of the thoracic duct, with no filling of the expected course of the more superior thoracic duct, likely postsurgical  5   Interval lymphangiography after coiling of the mid thoracic duct confirmed stasis of flow  6   Final  image showed good positioning of both coils and glue throughout the course of the thoracic duct and at the cisterna chyli  Impression: Thoracic duct embolization   Workstation performed: [id number]     I reviewed the above laboratory and imaging data  Discussion/Summary:  69-year-old male who underwent transhiatal esophagectomy for a T3 N1 M0 esophageal adenocarcinoma after neoadjuvant chemotherapy  He did have an excellent response to chemo radiation  His final pathology revealed this was a [id number] adenocarcinoma with only a 7 mm residual tumor  He should not require any further therapy  Discussed using nutritional supplements  He will continue using his cycle tube feeds at night, 3 cans daily  He is seeing Dr Heather Bonner next week  I have recommended that he obtain another chest x-ray prior to that visit  If there is a worsening pleural effusion, this may need to be tapped  I will see him again in 2 weeks  He is agreeable to this  All of his questions were answered

## 2020-05-15 ENCOUNTER — TELEPHONE (OUTPATIENT)
Dept: FAMILY MEDICINE CLINIC | Facility: CLINIC | Age: 50
End: 2020-05-15

## 2020-05-15 DIAGNOSIS — M54.50 CHRONIC BILATERAL LOW BACK PAIN WITHOUT SCIATICA: Primary | ICD-10-CM

## 2020-05-15 DIAGNOSIS — G89.29 CHRONIC BILATERAL LOW BACK PAIN WITHOUT SCIATICA: Primary | ICD-10-CM

## 2020-05-15 RX ORDER — GABAPENTIN 300 MG/1
CAPSULE ORAL
Qty: 90 CAPSULE | Refills: 3 | Status: SHIPPED | OUTPATIENT
Start: 2020-05-15 | End: 2021-06-01

## 2020-05-22 DIAGNOSIS — M54.41 CHRONIC MIDLINE LOW BACK PAIN WITH RIGHT-SIDED SCIATICA: ICD-10-CM

## 2020-05-22 DIAGNOSIS — G89.29 CHRONIC MIDLINE LOW BACK PAIN WITH RIGHT-SIDED SCIATICA: ICD-10-CM

## 2020-05-22 RX ORDER — HYDROCODONE BITARTRATE AND ACETAMINOPHEN 10; 325 MG/1; MG/1
1 TABLET ORAL EVERY 6 HOURS PRN
Qty: 120 TABLET | Refills: 0 | Status: SHIPPED | OUTPATIENT
Start: 2020-05-22 | End: 2020-06-18 | Stop reason: SDUPTHER

## 2020-05-26 ENCOUNTER — OFFICE (AMBULATORY)
Dept: URBAN - METROPOLITAN AREA CLINIC 64 | Facility: CLINIC | Age: 50
End: 2020-05-26

## 2020-05-26 VITALS
DIASTOLIC BLOOD PRESSURE: 81 MMHG | WEIGHT: 238 LBS | SYSTOLIC BLOOD PRESSURE: 133 MMHG | HEIGHT: 63 IN | HEART RATE: 82 BPM

## 2020-05-26 DIAGNOSIS — R14.0 ABDOMINAL DISTENSION (GASEOUS): ICD-10-CM

## 2020-05-26 DIAGNOSIS — E03.9 HYPOTHYROIDISM, UNSPECIFIED: ICD-10-CM

## 2020-05-26 DIAGNOSIS — K21.9 GASTRO-ESOPHAGEAL REFLUX DISEASE WITHOUT ESOPHAGITIS: ICD-10-CM

## 2020-05-26 DIAGNOSIS — K75.81 NONALCOHOLIC STEATOHEPATITIS (NASH): ICD-10-CM

## 2020-05-26 DIAGNOSIS — R10.13 EPIGASTRIC PAIN: ICD-10-CM

## 2020-05-26 DIAGNOSIS — I86.4 GASTRIC VARICES: ICD-10-CM

## 2020-05-26 PROCEDURE — 99214 OFFICE O/P EST MOD 30 MIN: CPT | Performed by: INTERNAL MEDICINE

## 2020-05-26 RX ORDER — DEXLANSOPRAZOLE 60 MG/1
CAPSULE, DELAYED RELEASE ORAL
Qty: 90 | Refills: 3 | Status: ACTIVE

## 2020-05-26 RX ORDER — FAMOTIDINE 40 MG/1
TABLET, FILM COATED ORAL
Qty: 90 | Refills: 0 | Status: ACTIVE

## 2020-05-26 RX ORDER — METOCLOPRAMIDE 10 MG/1
TABLET ORAL
Qty: 0 | Refills: 0 | Status: COMPLETED
End: 2022-08-29

## 2020-05-26 RX ORDER — CHOLECALCIFEROL (VITAMIN D3) 25 MCG
TABLET,CHEWABLE ORAL
Qty: 90 TABLET | Refills: 3 | Status: SHIPPED | OUTPATIENT
Start: 2020-05-26 | End: 2021-09-23

## 2020-05-27 RX ORDER — FOLIC ACID 1 MG/1
1 TABLET ORAL EVERY 24 HOURS
Qty: 90 TABLET | Refills: 1 | Status: SHIPPED | OUTPATIENT
Start: 2020-05-27 | End: 2020-12-21 | Stop reason: SDUPTHER

## 2020-06-08 RX ORDER — FLUTICASONE PROPIONATE 50 MCG
SPRAY, SUSPENSION (ML) NASAL
Qty: 48 G | Refills: 3 | Status: SHIPPED | OUTPATIENT
Start: 2020-06-08 | End: 2021-06-10 | Stop reason: SDUPTHER

## 2020-06-08 RX ORDER — FLUTICASONE PROPIONATE 50 MCG
SPRAY, SUSPENSION (ML) NASAL
Qty: 16 G | Refills: 3 | Status: SHIPPED | OUTPATIENT
Start: 2020-06-08 | End: 2020-06-08

## 2020-06-14 RX ORDER — ATORVASTATIN CALCIUM 40 MG/1
TABLET, FILM COATED ORAL
Qty: 90 TABLET | Refills: 3 | Status: SHIPPED | OUTPATIENT
Start: 2020-06-14 | End: 2021-09-28

## 2020-06-18 DIAGNOSIS — M54.41 CHRONIC MIDLINE LOW BACK PAIN WITH RIGHT-SIDED SCIATICA: ICD-10-CM

## 2020-06-18 DIAGNOSIS — G89.29 CHRONIC MIDLINE LOW BACK PAIN WITH RIGHT-SIDED SCIATICA: ICD-10-CM

## 2020-06-18 RX ORDER — HYDROCODONE BITARTRATE AND ACETAMINOPHEN 10; 325 MG/1; MG/1
1 TABLET ORAL EVERY 6 HOURS PRN
Qty: 120 TABLET | Refills: 0 | Status: SHIPPED | OUTPATIENT
Start: 2020-06-18 | End: 2020-07-22 | Stop reason: SDUPTHER

## 2020-06-18 NOTE — TELEPHONE ENCOUNTER
6/18/20-- pt req refill--- Hydrocodone due  To be filled soon-- last fill date 5/24- uses Misael Johnson-- pt call back #[phone]--please escribe when due-- will print inspect/lks

## 2020-06-30 LAB
ALBUMIN SERPL-MCNC: 4.4 G/DL (ref 3.8–4.8)
ALBUMIN/GLOB SERPL: 2.1 {RATIO} (ref 1.2–2.2)
ALP SERPL-CCNC: 80 IU/L (ref 39–117)
ALT SERPL-CCNC: 37 IU/L (ref 0–32)
AST SERPL-CCNC: 19 IU/L (ref 0–40)
BILIRUB SERPL-MCNC: 0.3 MG/DL (ref 0–1.2)
BUN SERPL-MCNC: 17 MG/DL (ref 6–24)
BUN/CREAT SERPL: 17 (ref 9–23)
CALCIUM SERPL-MCNC: 9.4 MG/DL (ref 8.7–10.2)
CHLORIDE SERPL-SCNC: 108 MMOL/L (ref 96–106)
CHOLEST SERPL-MCNC: 168 MG/DL (ref 100–199)
CHOLEST/HDLC SERPL: 4.8 RATIO (ref 0–4.4)
CO2 SERPL-SCNC: 27 MMOL/L (ref 20–29)
CREAT SERPL-MCNC: 1.03 MG/DL (ref 0.57–1)
GLOBULIN SER CALC-MCNC: 2.1 G/DL (ref 1.5–4.5)
GLUCOSE SERPL-MCNC: 114 MG/DL (ref 65–99)
HDLC SERPL-MCNC: 35 MG/DL
LDLC SERPL CALC-MCNC: 97 MG/DL (ref 0–99)
POTASSIUM SERPL-SCNC: 4.7 MMOL/L (ref 3.5–5.2)
PROT SERPL-MCNC: 6.5 G/DL (ref 6–8.5)
SODIUM SERPL-SCNC: 145 MMOL/L (ref 134–144)
TRIGL SERPL-MCNC: 179 MG/DL (ref 0–149)
VLDLC SERPL CALC-MCNC: 36 MG/DL (ref 5–40)

## 2020-07-02 ENCOUNTER — OFFICE VISIT (OUTPATIENT)
Dept: FAMILY MEDICINE CLINIC | Facility: CLINIC | Age: 50
End: 2020-07-02

## 2020-07-02 VITALS
BODY MASS INDEX: 41.48 KG/M2 | WEIGHT: 243 LBS | RESPIRATION RATE: 16 BRPM | OXYGEN SATURATION: 96 % | DIASTOLIC BLOOD PRESSURE: 78 MMHG | HEIGHT: 64 IN | HEART RATE: 84 BPM | SYSTOLIC BLOOD PRESSURE: 126 MMHG | TEMPERATURE: 98.6 F

## 2020-07-02 DIAGNOSIS — J41.0 SIMPLE CHRONIC BRONCHITIS (HCC): ICD-10-CM

## 2020-07-02 DIAGNOSIS — E78.2 MIXED HYPERLIPIDEMIA: ICD-10-CM

## 2020-07-02 DIAGNOSIS — R60.9 DEPENDENT EDEMA: ICD-10-CM

## 2020-07-02 DIAGNOSIS — E11.8 CONTROLLED DIABETES MELLITUS TYPE 2 WITH COMPLICATIONS, UNSPECIFIED WHETHER LONG TERM INSULIN USE (HCC): Primary | ICD-10-CM

## 2020-07-02 DIAGNOSIS — F33.1 MAJOR DEPRESSIVE DISORDER, RECURRENT, MODERATE (HCC): ICD-10-CM

## 2020-07-02 DIAGNOSIS — E03.9 ACQUIRED HYPOTHYROIDISM: ICD-10-CM

## 2020-07-02 LAB
BILIRUB BLD-MCNC: NEGATIVE MG/DL
CLARITY, POC: ABNORMAL
COLOR UR: YELLOW
GLUCOSE BLDC GLUCOMTR-MCNC: 112 MG/DL (ref 70–130)
GLUCOSE UR STRIP-MCNC: NEGATIVE MG/DL
HBA1C MFR BLD: 6.1 %
KETONES UR QL: ABNORMAL
LEUKOCYTE EST, POC: NEGATIVE
NITRITE UR-MCNC: NEGATIVE MG/ML
PH UR: 6.5 [PH] (ref 5–8)
PROT UR STRIP-MCNC: ABNORMAL MG/DL
RBC # UR STRIP: ABNORMAL /UL
SP GR UR: 1.01 (ref 1–1.03)
UROBILINOGEN UR QL: NORMAL

## 2020-07-02 PROCEDURE — 82962 GLUCOSE BLOOD TEST: CPT | Performed by: FAMILY MEDICINE

## 2020-07-02 PROCEDURE — 83036 HEMOGLOBIN GLYCOSYLATED A1C: CPT | Performed by: FAMILY MEDICINE

## 2020-07-02 PROCEDURE — 81003 URINALYSIS AUTO W/O SCOPE: CPT | Performed by: FAMILY MEDICINE

## 2020-07-02 PROCEDURE — 99214 OFFICE O/P EST MOD 30 MIN: CPT | Performed by: FAMILY MEDICINE

## 2020-07-02 RX ORDER — FAMOTIDINE 40 MG/1
TABLET, FILM COATED ORAL
COMMUNITY
Start: 2020-05-26 | End: 2023-02-13

## 2020-07-02 RX ORDER — CITALOPRAM 40 MG/1
40 TABLET ORAL DAILY
Qty: 30 TABLET | Refills: 12 | Status: SHIPPED | OUTPATIENT
Start: 2020-07-02 | End: 2020-08-25

## 2020-07-02 RX ORDER — METOCLOPRAMIDE 10 MG/1
TABLET ORAL
COMMUNITY
Start: 2020-05-26 | End: 2022-10-24

## 2020-07-02 NOTE — PROGRESS NOTES
Subjective   Riddhi Cid is a 49 y.o. female.     Chief Complaint   Patient presents with   • Diabetes   • Hyperlipidemia   • Leg Swelling       Diabetes   She presents for her follow-up diabetic visit. She has type 2 diabetes mellitus. Her disease course has been stable. There are no hypoglycemic associated symptoms. Pertinent negatives for hypoglycemia include no nervousness/anxiousness. Pertinent negatives for diabetes include no chest pain, no foot paresthesias and no foot ulcerations. There are no hypoglycemic complications. Symptoms are stable. Risk factors for coronary artery disease include diabetes mellitus, dyslipidemia, obesity and post-menopausal. Current diabetic treatment includes oral agent (monotherapy). She is compliant with treatment all of the time. Her weight is fluctuating minimally. She is following a generally healthy diet. Meal planning includes avoidance of concentrated sweets. She has not had a previous visit with a dietitian. She participates in exercise weekly. Her home blood glucose trend is fluctuating minimally. Her overall blood glucose range is  mg/dl. An ACE inhibitor/angiotensin II receptor blocker is being taken. She does not see a podiatrist.Eye exam is not current.   Hyperlipidemia   This is a chronic problem. The current episode started more than 1 year ago. The problem is controlled. Recent lipid tests were reviewed and are high. Exacerbating diseases include diabetes and obesity. Factors aggravating her hyperlipidemia include beta blockers. Pertinent negatives include no chest pain or shortness of breath. Current antihyperlipidemic treatment includes statins. There are no compliance problems.  Risk factors for coronary artery disease include diabetes mellitus, dyslipidemia, hypertension, obesity and post-menopausal.   Leg Swelling   This is a recurrent problem. The current episode started in the past 7 days. The problem occurs constantly. The problem has been waxing  and waning. Pertinent negatives include no abdominal pain, arthralgias, chest pain, coughing, fever, nausea, rash or vomiting. The symptoms are aggravated by walking. She has tried nothing for the symptoms. The treatment provided no relief.    worse at the end of the day    The following portions of the patient's history were reviewed and updated as appropriate: allergies, current medications, past family history, past medical history, past social history, past surgical history and problem list.    Allergies:  Allergies   Allergen Reactions   • Sulfa Antibiotics Rash       Social History:  Social History     Socioeconomic History   • Marital status:      Spouse name: Not on file   • Number of children: Not on file   • Years of education: Not on file   • Highest education level: Not on file   Tobacco Use   • Smoking status: Current Every Day Smoker     Packs/day: 0.50     Types: Cigarettes   • Smokeless tobacco: Never Used   Substance and Sexual Activity   • Alcohol use: No     Frequency: Never   • Drug use: No   • Sexual activity: Defer       Family History:  Family History   Problem Relation Age of Onset   • Stroke Mother    • Hypertension Mother    • Hyperlipidemia Mother    • Hypothyroidism Mother    • Prostate cancer Father    • Other Father    • Diabetes Father    • Hypertension Father    • Hyperlipidemia Father    • Breast cancer Paternal Grandmother    • Hypothyroidism Grandchild        Past Medical History :  Patient Active Problem List   Diagnosis   • Degeneration of intervertebral disc of cervical region   • Degeneration of intervertebral disc of lumbar region   • Acquired hypothyroidism   • Mixed hyperlipidemia   • Major depressive disorder, recurrent, moderate (CMS/HCC)   • Obstructive sleep apnea   • Vitamin B12 deficiency   • Vitamin D deficiency   • COPD (chronic obstructive pulmonary disease) (CMS/HCC)   • Solitary thyroid nodule   • Chronic midline low back pain with right-sided sciatica   •  Cervicalgia   • Cervical stenosis of spine   • Arthritis   • Moderate persistent asthma without complication   • Gastroparalysis   • History of chicken pox   • IBS (irritable bowel syndrome)   • GERD (gastroesophageal reflux disease)   • Prediabetes   • Dependent edema       Medication List:    Current Outpatient Medications:   •  albuterol (PROVENTIL) (2.5 MG/3ML) 0.083% nebulizer solution, USE 1 VIAL PER NEBULIZER EVERY 4 HOURS AS NEEDED, Disp: 180 mL, Rfl: 0  •  atorvastatin (LIPITOR) 40 MG tablet, TAKE 1 TABLET BY MOUTH DAILY, Disp: 90 tablet, Rfl: 3  •  bumetanide (BUMEX) 1 MG tablet, TAKE 1 TO 2 TABLETS BY MOUTH EVERY DAY AS NEEDED, Disp: 180 tablet, Rfl: 12  •  Cholecalciferol (VITAMIN D3) 5000 units capsule capsule, TAKE 1 CAPSULE BY MOUTH ONCE DAILY, Disp: 90 capsule, Rfl: 2  •  Cyanocobalamin (B-12) 1000 MCG tablet controlled-release, TAKE 1 TABLET BY MOUTH EVERY DAY, Disp: 90 tablet, Rfl: 3  •  DEXILANT 60 MG capsule, TAKE 1 CAPSULE BY MOUTH EVERY DAY, Disp: 30 capsule, Rfl: 0  •  fenofibrate (TRICOR) 145 MG tablet, Daily. Dx: E78.2, mixed hyperlipidema, Disp: , Rfl:   •  fluticasone-salmeterol (ADVAIR DISKUS) 250-50 MCG/DOSE DISKUS, Dx: J45.909, Asthma, Disp: , Rfl:   •  folic acid (FOLVITE) 1 MG tablet, Take 1 tablet by mouth Daily., Disp: 90 tablet, Rfl: 1  •  gabapentin (NEURONTIN) 300 MG capsule, TAKE ONE CAPSULE BY MOUTH THREE TIMES DAILY, Disp: 90 capsule, Rfl: 3  •  Glucose Blood (BLOOD GLUCOSE TEST) strip, by In Vitro route. Dx: E11.9, DM type 2, controlled, Disp: , Rfl:   •  HYDROcodone-acetaminophen (NORCO)  MG per tablet, Take 1 tablet by mouth Every 6 (Six) Hours As Needed for Moderate Pain . *PAIN MANAGEMENT RX'S, Disp: 120 tablet, Rfl: 0  •  Lancets Misc. (ACCU-CHEK FASTCLIX LANCET) kit, Dx: E11.9, DM type 2, controlled, Disp: , Rfl:   •  levothyroxine (SYNTHROID, LEVOTHROID) 50 MCG tablet, Take 1 tablet by mouth Daily. Dx: E40.1, thyroid nodule, Disp: 90 tablet, Rfl: 4  •  loratadine  (CLARITIN) 10 MG tablet, Take 1 tablet by mouth Daily., Disp: 30 tablet, Rfl: 12  •  metFORMIN ER (GLUCOPHAGE-XR) 500 MG 24 hr tablet, Take 1 tablet by mouth Daily., Disp: 90 tablet, Rfl: 1  •  montelukast (SINGULAIR) 10 MG tablet, TAKE 1 TABLET BY MOUTH EVERY NIGHT, Disp: 90 tablet, Rfl: 12  •  naproxen (NAPROSYN) 500 MG tablet, TAKE 1 TABLET BY MOUTH DAILY, Disp: 60 tablet, Rfl: 2  •  Omega-3 Fatty Acids (FISH OIL) 500 MG capsule capsule, 2 capsules Daily. Dx: E78.2, mixed hyperlipidema, Disp: , Rfl:   •  ondansetron ODT (ZOFRAN-ODT) 4 MG disintegrating tablet, DIS 1 T UNT Q 6 H PRN, Disp: , Rfl: 0  •  polyethylene glycol (MIRALAX) powder, , Disp: , Rfl: 11  •  potassium chloride ER (K-TAB) 20 MEQ tablet controlled-release ER tablet, Take 20 mEq by mouth. 1 tab to be taken only if taking 2 bumetanide. Dx: R60.0, lower extremity edema, Disp: , Rfl:   •  ALBUTEROL SULFATE  (90 Base) MCG/ACT inhaler, TAKE 2 PUFFS BY MOUTH EVERY 4-6 HOURS AS NEEDED. MAX OF 12 PUFFS DAILY., Disp: 18 g, Rfl: 12  •  citalopram (CeleXA) 40 MG tablet, Take 1 tablet by mouth Daily., Disp: 30 tablet, Rfl: 12  •  escitalopram (LEXAPRO) 20 MG tablet, , Disp: , Rfl:   •  famotidine (PEPCID) 40 MG tablet, TK 1 T PO HS UTD, Disp: , Rfl:   •  fluticasone (FLONASE) 50 MCG/ACT nasal spray, SHAKE LIQUID AND USE 2 SPRAYS IN EACH NOSTRIL EVERY DAY, Disp: 48 g, Rfl: 3  •  metoclopramide (REGLAN) 10 MG tablet, TK 1 T PO HS, Disp: , Rfl:     Past Surgical History:  Past Surgical History:   Procedure Laterality Date   • CARPAL TUNNEL RELEASE     • CERVICAL DISCECTOMY ANTERIOR     • CHOLECYSTECTOMY     • ESSURE TUBAL LIGATION         Review of Systems:  Review of Systems   Constitutional: Negative for activity change and fever.   HENT: Negative for ear pain, rhinorrhea, sinus pressure and voice change.    Eyes: Negative for visual disturbance.   Respiratory: Negative for cough and shortness of breath.    Cardiovascular: Negative for chest pain.  "  Gastrointestinal: Negative for abdominal pain, diarrhea, nausea and vomiting.   Endocrine: Negative for cold intolerance and heat intolerance.   Genitourinary: Negative for frequency and urgency.   Musculoskeletal: Negative for arthralgias.   Skin: Negative for rash.   Neurological: Negative for syncope.   Hematological: Does not bruise/bleed easily.   Psychiatric/Behavioral: Negative for depressed mood. The patient is not nervous/anxious.        Physical Exam:  Vital Signs:  Visit Vitals  /78 (BP Location: Right arm)   Pulse 84   Temp 98.6 °F (37 °C)   Resp 16   Ht 162.6 cm (64\")   Wt 110 kg (243 lb)   LMP  (LMP Unknown)   SpO2 96%   BMI 41.71 kg/m²       Physical Exam   Constitutional: She is oriented to person, place, and time. She appears well-developed and well-nourished. She is cooperative.   Cardiovascular: Normal rate, regular rhythm and normal heart sounds. Exam reveals no gallop and no friction rub.   No murmur heard.  Pulmonary/Chest: Effort normal and breath sounds normal. She has no wheezes. She has no rales.   Musculoskeletal:   Trace edema bilaterally   Neurological: She is alert and oriented to person, place, and time. Coordination normal.   Skin: Skin is warm and dry.   Psychiatric: She has a normal mood and affect.   Vitals reviewed.      Assessment and Plan:  Problem List Items Addressed This Visit        Cardiovascular and Mediastinum    Mixed hyperlipidemia       Respiratory    COPD (chronic obstructive pulmonary disease) (CMS/HCC)    Overview     Not exacerbated. Continue current medications            Endocrine    Acquired hypothyroidism    Prediabetes - Primary    Overview     Stable  A1C 7/2/2020 6.1    She sees Dr Menard            Other    Major depressive disorder, recurrent, moderate (CMS/HCC)    Overview     Restart celexa  Good social support, no suicidal or homicidal ideation. Diagnosis and treatment discussed. Discussed counseling. Discussed medication, dosing and adverse " effects. Return in 4 weeks           Relevant Medications    citalopram (CeleXA) 40 MG tablet    Dependent edema    Current Assessment & Plan     Mild. Advised to elevated legs. She has bumex               An After Visit Summary and PPPS were given to the patient.             I wore protective equipment throughout this patient encounter to include mask and gloves. Hand hygiene was performed before donning protective equipment and after removal when leaving the room.

## 2020-07-08 ENCOUNTER — LAB (OUTPATIENT)
Dept: LAB | Facility: HOSPITAL | Age: 50
End: 2020-07-08

## 2020-07-08 DIAGNOSIS — E03.9 ACQUIRED HYPOTHYROIDISM: ICD-10-CM

## 2020-07-08 DIAGNOSIS — E55.9 VITAMIN D DEFICIENCY: ICD-10-CM

## 2020-07-08 DIAGNOSIS — E04.1 THYROID NODULE: ICD-10-CM

## 2020-07-08 DIAGNOSIS — E78.2 MIXED HYPERLIPIDEMIA: ICD-10-CM

## 2020-07-08 LAB
ALBUMIN SERPL-MCNC: 4.4 G/DL (ref 3.5–5.2)
ALBUMIN/GLOB SERPL: 1.6 G/DL
ALP SERPL-CCNC: 67 U/L (ref 39–117)
ALT SERPL W P-5'-P-CCNC: 30 U/L (ref 1–33)
ANION GAP SERPL CALCULATED.3IONS-SCNC: 9.8 MMOL/L (ref 5–15)
AST SERPL-CCNC: 20 U/L (ref 1–32)
BILIRUB SERPL-MCNC: 0.3 MG/DL (ref 0–1.2)
BUN SERPL-MCNC: 13 MG/DL (ref 6–20)
BUN/CREAT SERPL: 12.3 (ref 7–25)
CALCIUM SPEC-SCNC: 9.5 MG/DL (ref 8.6–10.5)
CHLORIDE SERPL-SCNC: 105 MMOL/L (ref 98–107)
CHOLEST SERPL-MCNC: 148 MG/DL (ref 0–200)
CO2 SERPL-SCNC: 27.2 MMOL/L (ref 22–29)
CREAT SERPL-MCNC: 1.06 MG/DL (ref 0.57–1)
GFR SERPL CREATININE-BSD FRML MDRD: 55 ML/MIN/1.73
GLOBULIN UR ELPH-MCNC: 2.8 GM/DL
GLUCOSE SERPL-MCNC: 131 MG/DL (ref 65–99)
HDLC SERPL-MCNC: 33 MG/DL (ref 40–60)
LDLC SERPL CALC-MCNC: 83 MG/DL (ref 0–100)
LDLC/HDLC SERPL: 2.5 {RATIO}
POTASSIUM SERPL-SCNC: 4 MMOL/L (ref 3.5–5.2)
PROT SERPL-MCNC: 7.2 G/DL (ref 6–8.5)
SODIUM SERPL-SCNC: 142 MMOL/L (ref 136–145)
T4 FREE SERPL-MCNC: 1.32 NG/DL (ref 0.93–1.7)
TRIGL SERPL-MCNC: 162 MG/DL (ref 0–150)
TSH SERPL DL<=0.05 MIU/L-ACNC: 1.88 UIU/ML (ref 0.27–4.2)
VLDLC SERPL-MCNC: 32.4 MG/DL (ref 5–40)

## 2020-07-08 PROCEDURE — 80061 LIPID PANEL: CPT

## 2020-07-08 PROCEDURE — 80053 COMPREHEN METABOLIC PANEL: CPT

## 2020-07-08 PROCEDURE — 36415 COLL VENOUS BLD VENIPUNCTURE: CPT

## 2020-07-08 PROCEDURE — 84443 ASSAY THYROID STIM HORMONE: CPT

## 2020-07-08 PROCEDURE — 84439 ASSAY OF FREE THYROXINE: CPT

## 2020-07-09 RX ORDER — ALBUTEROL SULFATE 90 UG/1
AEROSOL, METERED RESPIRATORY (INHALATION)
Qty: 18 G | Refills: 12 | Status: SHIPPED | OUTPATIENT
Start: 2020-07-09 | End: 2021-02-11

## 2020-07-10 ENCOUNTER — OFFICE VISIT (OUTPATIENT)
Dept: ENDOCRINOLOGY | Facility: CLINIC | Age: 50
End: 2020-07-10

## 2020-07-10 VITALS
HEIGHT: 64 IN | BODY MASS INDEX: 39.95 KG/M2 | SYSTOLIC BLOOD PRESSURE: 125 MMHG | TEMPERATURE: 97.7 F | OXYGEN SATURATION: 94 % | WEIGHT: 234 LBS | HEART RATE: 74 BPM | DIASTOLIC BLOOD PRESSURE: 75 MMHG

## 2020-07-10 DIAGNOSIS — E04.1 SOLITARY THYROID NODULE: ICD-10-CM

## 2020-07-10 DIAGNOSIS — E55.9 VITAMIN D DEFICIENCY: ICD-10-CM

## 2020-07-10 DIAGNOSIS — E78.2 MIXED HYPERLIPIDEMIA: ICD-10-CM

## 2020-07-10 DIAGNOSIS — E03.9 ACQUIRED HYPOTHYROIDISM: Primary | ICD-10-CM

## 2020-07-10 DIAGNOSIS — R73.03 PREDIABETES: ICD-10-CM

## 2020-07-10 PROBLEM — R60.9 DEPENDENT EDEMA: Status: ACTIVE | Noted: 2020-07-10

## 2020-07-10 PROCEDURE — 99214 OFFICE O/P EST MOD 30 MIN: CPT | Performed by: INTERNAL MEDICINE

## 2020-07-10 RX ORDER — THEOPHYLLINE 300 MG/1
TABLET, EXTENDED RELEASE ORAL
COMMUNITY
End: 2020-07-10

## 2020-07-10 RX ORDER — ESCITALOPRAM OXALATE 20 MG/1
TABLET ORAL
COMMUNITY
End: 2020-08-20

## 2020-07-22 ENCOUNTER — RESULTS ENCOUNTER (OUTPATIENT)
Dept: PAIN MEDICINE | Facility: CLINIC | Age: 50
End: 2020-07-22

## 2020-07-22 ENCOUNTER — OFFICE VISIT (OUTPATIENT)
Dept: PAIN MEDICINE | Facility: CLINIC | Age: 50
End: 2020-07-22

## 2020-07-22 VITALS
DIASTOLIC BLOOD PRESSURE: 79 MMHG | SYSTOLIC BLOOD PRESSURE: 138 MMHG | BODY MASS INDEX: 42.17 KG/M2 | HEART RATE: 75 BPM | TEMPERATURE: 98 F | OXYGEN SATURATION: 98 % | WEIGHT: 238 LBS | HEIGHT: 63 IN | RESPIRATION RATE: 16 BRPM

## 2020-07-22 DIAGNOSIS — M48.02 CERVICAL STENOSIS OF SPINE: ICD-10-CM

## 2020-07-22 DIAGNOSIS — G89.29 CHRONIC MIDLINE LOW BACK PAIN WITH RIGHT-SIDED SCIATICA: ICD-10-CM

## 2020-07-22 DIAGNOSIS — Z79.899 ENCOUNTER FOR LONG-TERM (CURRENT) USE OF OTHER MEDICATIONS: ICD-10-CM

## 2020-07-22 DIAGNOSIS — M51.36 DEGENERATION OF INTERVERTEBRAL DISC OF LUMBAR REGION: ICD-10-CM

## 2020-07-22 DIAGNOSIS — M54.41 CHRONIC MIDLINE LOW BACK PAIN WITH RIGHT-SIDED SCIATICA: ICD-10-CM

## 2020-07-22 DIAGNOSIS — M50.30 DEGENERATION OF INTERVERTEBRAL DISC OF CERVICAL REGION: ICD-10-CM

## 2020-07-22 DIAGNOSIS — M54.2 CERVICALGIA: Primary | ICD-10-CM

## 2020-07-22 PROCEDURE — 99213 OFFICE O/P EST LOW 20 MIN: CPT | Performed by: PHYSICAL MEDICINE & REHABILITATION

## 2020-07-22 PROCEDURE — G0463 HOSPITAL OUTPT CLINIC VISIT: HCPCS | Performed by: PHYSICAL MEDICINE & REHABILITATION

## 2020-07-22 RX ORDER — HYDROCODONE BITARTRATE AND ACETAMINOPHEN 10; 325 MG/1; MG/1
1 TABLET ORAL EVERY 6 HOURS PRN
Qty: 120 TABLET | Refills: 0 | Status: SHIPPED | OUTPATIENT
Start: 2020-07-22 | End: 2020-07-22 | Stop reason: SDUPTHER

## 2020-07-22 RX ORDER — HYDROCODONE BITARTRATE AND ACETAMINOPHEN 10; 325 MG/1; MG/1
1 TABLET ORAL EVERY 6 HOURS PRN
Qty: 120 TABLET | Refills: 0 | Status: SHIPPED | OUTPATIENT
Start: 2020-07-22 | End: 2020-10-19 | Stop reason: SDUPTHER

## 2020-07-22 NOTE — PROGRESS NOTES
Subjective   Riddhi Cid is a 49 y.o. female.     Neck pain radiates to b/l shoulders, and left arm pain. ACDF C5/7 (4/11/2016), also h/o fibromyalgia,  also pain in lower back and b/l legs and feet. 10/10 at worst, 6/10 at best, 6/10 today, worse with activity, improves with rest and meds, always present, varies, aching, radiates in BLE. Imaging reviewed, satisfactory ACDF. Referred for pain management. Neck pain improved with ACDF, tapered off Sontag with worsening pain, still somewhat severe. Taking Cymbalta which helps, tried Lyrica with weight gain, trying to lose weight. Restarted Sontag at BID - qdaily prn but worsening pain with exercise to lose weight, increased to BID-TID #75, then TID, then QID prn with adequate relief, reduced to #105, tolerating. Gastric sleeve surgery planned for March-April 2017, had to miss cardiac clearance and EGD due to illness and social issues, has decided against surgery, trying to lose weight with diet and exercise. Worsening b/l CTS symptoms, R>L, known h/o of CTS. Saw Juan Franz for worsening neck pain to LUE, ACDF is intact, started Diclofenac for DJD, Elavil. Could not tolerate even low-dose Gabapentin with drowsiness. Started Celebrex but did not feel like doing anything, stopped, stopped Mobic. Stopping numerous meds due to side effects, started Gabapentin with Dr. Pisano with benefit. Pain worsening, DDD on MRI, started PT which is helping.       The following portions of the patient's history were reviewed and updated as appropriate: allergies, current medications, past family history, past medical history, past social history, past surgical history and problem list.    Review of Systems   Constitutional: Negative for chills, fatigue and fever.   HENT: Negative for hearing loss and trouble swallowing.    Eyes: Negative for visual disturbance.   Respiratory: Negative for shortness of breath.    Cardiovascular: Negative for chest pain.   Gastrointestinal: Positive for  constipation and nausea. Negative for abdominal pain, diarrhea and vomiting.   Genitourinary: Negative for urinary incontinence.   Musculoskeletal: Positive for arthralgias, back pain and neck pain. Negative for joint swelling and myalgias.   Neurological: Positive for headache. Negative for dizziness, weakness and numbness.       Objective   Physical Exam   Constitutional: She is oriented to person, place, and time. She appears well-developed and well-nourished.   HENT:   Head: Normocephalic and atraumatic.   Eyes: Pupils are equal, round, and reactive to light. EOM are normal.   Neck: Normal range of motion.   Cardiovascular: Normal rate, regular rhythm, normal heart sounds and intact distal pulses.   Pulmonary/Chest: Breath sounds normal.   Abdominal: Soft. Bowel sounds are normal. She exhibits no distension. There is no tenderness.   Neurological: She is alert and oriented to person, place, and time. She has normal strength and normal reflexes. She displays normal reflexes. No sensory deficit.   Psychiatric: She has a normal mood and affect. Her behavior is normal. Thought content normal.         Assessment/Plan   Riddhi was seen today for back pain.    Diagnoses and all orders for this visit:    Cervicalgia    Chronic midline low back pain with right-sided sciatica    Degeneration of intervertebral disc of cervical region    Degeneration of intervertebral disc of lumbar region    Cervical stenosis of spine        [facility] reviewed, in order, filled 3/30/20. Low risk. Repeat UDS 5/1/19 in order.  Reduced to Norco 10/325mg q6-8h #105, too painful, restarted q6h prn #120, then reduced to #105 successfully but pain worsening. Increases to #120.  Afraid to start Lyrica due to possible weight gain. Restarted Gabapentin, takes 300-900mg/day as tolerated, helping a great deal.  Severe GERD, IBS, family h/o CV dz. Stopped Diclofenac, could not tolerate Celebrex 200mg qdaily. Stopped Mobic 7.5mg qdaily.  Cont other meds as  prescribed.  Cont TENS, unit provided here, patient reports it helps her pain significantly.  Cont b/l CTS braces at night only.  Patient reports she cannot start PT at this time as her daughter currently works 12h shifts, may be able to begin in future if her daughter can change to an 8h shift schedule.  Will begin neuropathic comp cream if her current cream does not work.  Ordered LSO for truncal stability. Cont PT.  Letter to return to work. Does not drive or operate heavy machinery while using pain medication.  RTC 3 months for f/u.

## 2020-08-24 ENCOUNTER — TELEPHONE (OUTPATIENT)
Dept: FAMILY MEDICINE CLINIC | Facility: CLINIC | Age: 50
End: 2020-08-24

## 2020-08-25 ENCOUNTER — TELEPHONE (OUTPATIENT)
Dept: FAMILY MEDICINE CLINIC | Facility: CLINIC | Age: 50
End: 2020-08-25

## 2020-08-25 DIAGNOSIS — F33.1 MAJOR DEPRESSIVE DISORDER, RECURRENT, MODERATE (HCC): ICD-10-CM

## 2020-08-25 RX ORDER — CITALOPRAM 20 MG/1
20 TABLET ORAL DAILY
Qty: 30 TABLET | Refills: 12 | Status: SHIPPED | OUTPATIENT
Start: 2020-08-25 | End: 2021-08-30

## 2020-08-25 RX ORDER — CITALOPRAM 40 MG/1
40 TABLET ORAL DAILY
Qty: 30 TABLET | Refills: 12 | Status: CANCELLED | OUTPATIENT
Start: 2020-08-25

## 2020-09-03 ENCOUNTER — TELEPHONE (OUTPATIENT)
Dept: NEUROLOGY | Facility: CLINIC | Age: 50
End: 2020-09-03

## 2020-09-03 NOTE — TELEPHONE ENCOUNTER
Returned call, no answer left message to call back and she needs follow up appt and she can bring card in for a reading to see how pap is working for her.

## 2020-09-03 NOTE — TELEPHONE ENCOUNTER
PT CALLING CAUSE SHE IS ON THE BIPAP MACHINE AND WHEN SHE HAS THE BIPAP MACHINE ON SHE STATES SHE GETS TO [employer] AND WHEN SHE DON'T HAVE IT ON THEN SHE DON'T GET  ENOUGH OXYGEN. SHE KEEPS WAKING UP AND WHEN SHE HAS IT ON IT FEELS LIKE SHE IS SUFFICATING. PLEASE GIVE HER A CALL BACK [phone] AND LET HER KNOW WHAT CAN SHE DO.

## 2020-09-10 NOTE — TELEPHONE ENCOUNTER
Spoke with pt, states pressure is ok at the start, but wakes her up freq. And is too much pressure. I explained the ramp button to her which she has never used. She hasn't used bipap for some time now. She is going to wear it and use ramp and then bring her card in to be read.She is sched for appt in Nov.

## 2020-09-21 RX ORDER — LEVOTHYROXINE SODIUM 0.05 MG/1
50 TABLET ORAL DAILY
Qty: 90 TABLET | Refills: 4 | Status: SHIPPED | OUTPATIENT
Start: 2020-09-21 | End: 2021-10-07

## 2020-10-06 ENCOUNTER — OFFICE VISIT (OUTPATIENT)
Dept: FAMILY MEDICINE CLINIC | Facility: CLINIC | Age: 50
End: 2020-10-06

## 2020-10-06 VITALS
TEMPERATURE: 97.5 F | RESPIRATION RATE: 18 BRPM | SYSTOLIC BLOOD PRESSURE: 134 MMHG | HEART RATE: 95 BPM | BODY MASS INDEX: 42.42 KG/M2 | WEIGHT: 239.4 LBS | OXYGEN SATURATION: 96 % | HEIGHT: 63 IN | DIASTOLIC BLOOD PRESSURE: 82 MMHG

## 2020-10-06 DIAGNOSIS — Z12.31 SCREENING MAMMOGRAM, ENCOUNTER FOR: ICD-10-CM

## 2020-10-06 DIAGNOSIS — J01.00 ACUTE NON-RECURRENT MAXILLARY SINUSITIS: ICD-10-CM

## 2020-10-06 DIAGNOSIS — E78.2 MIXED HYPERLIPIDEMIA: ICD-10-CM

## 2020-10-06 DIAGNOSIS — F33.1 MAJOR DEPRESSIVE DISORDER, RECURRENT, MODERATE (HCC): ICD-10-CM

## 2020-10-06 DIAGNOSIS — Z00.00 MEDICARE ANNUAL WELLNESS VISIT, SUBSEQUENT: ICD-10-CM

## 2020-10-06 DIAGNOSIS — E03.9 ACQUIRED HYPOTHYROIDISM: ICD-10-CM

## 2020-10-06 DIAGNOSIS — J41.0 SIMPLE CHRONIC BRONCHITIS (HCC): Primary | ICD-10-CM

## 2020-10-06 DIAGNOSIS — R73.03 PREDIABETES: ICD-10-CM

## 2020-10-06 LAB
BILIRUB BLD-MCNC: NEGATIVE MG/DL
CLARITY, POC: CLEAR
COLOR UR: YELLOW
GLUCOSE UR STRIP-MCNC: NEGATIVE MG/DL
KETONES UR QL: NEGATIVE
LEUKOCYTE EST, POC: NEGATIVE
NITRITE UR-MCNC: NEGATIVE MG/ML
PH UR: 6.5 [PH] (ref 5–8)
PROT UR STRIP-MCNC: NEGATIVE MG/DL
RBC # UR STRIP: NEGATIVE /UL
SP GR UR: 1.01 (ref 1–1.03)
UROBILINOGEN UR QL: NORMAL

## 2020-10-06 PROCEDURE — G0439 PPPS, SUBSEQ VISIT: HCPCS | Performed by: FAMILY MEDICINE

## 2020-10-06 PROCEDURE — 81003 URINALYSIS AUTO W/O SCOPE: CPT | Performed by: FAMILY MEDICINE

## 2020-10-06 PROCEDURE — 99213 OFFICE O/P EST LOW 20 MIN: CPT | Performed by: FAMILY MEDICINE

## 2020-10-06 RX ORDER — AZELASTINE HYDROCHLORIDE 0.5 MG/ML
SOLUTION/ DROPS OPHTHALMIC
COMMUNITY
Start: 2020-09-14

## 2020-10-06 RX ORDER — CEPHALEXIN 500 MG/1
500 CAPSULE ORAL 3 TIMES DAILY
Qty: 30 CAPSULE | Refills: 0 | Status: SHIPPED | OUTPATIENT
Start: 2020-10-06 | End: 2020-10-19

## 2020-10-06 NOTE — PROGRESS NOTES
QUICK REFERENCE INFORMATION:  The ABCs of the Annual Wellness Visit    Subsequent Medicare Wellness Visit     HEALTH RISK ASSESSMENT    : 1970    Recent Hospitalizations:  No hospitalization(s) within the last year..  ccc    Current Medical Providers:  Patient Care Team:  June Harrison MD as PCP - General (Family Medicine)  June Harrison MD as PCP - Claims Attributed    Smoking Status:  Social History     Tobacco Use   Smoking Status Current Every Day Smoker   • Packs/day: 0.50   • Types: Cigarettes   Smokeless Tobacco Never Used       Alcohol Consumption:  Social History     Substance and Sexual Activity   Alcohol Use No   • Frequency: Never       Depression Screen:   PHQ-2/PHQ-9 Depression Screening 10/6/2020   Little interest or pleasure in doing things 2   Feeling down, depressed, or hopeless 1   Trouble falling or staying asleep, or sleeping too much 3   Feeling tired or having little energy 3   Poor appetite or overeating 0   Feeling bad about yourself - or that you are a failure or have let yourself or your family down 0   Trouble concentrating on things, such as reading the newspaper or watching television 1   Moving or speaking so slowly that other people could have noticed. Or the opposite - being so fidgety or restless that you have been moving around a lot more than usual 0   Thoughts that you would be better off dead, or of hurting yourself in some way 0   Total Score 10   If you checked off any problems, how difficult have these problems made it for you to do your work, take care of things at home, or get along with other people? Very difficult     We spent more than 16 minutes asking patient questions, counseling and documenting in the chart.    Health Habits and Functional and Cognitive Screening:  Functional & Cognitive Status 10/6/2020   Do you have difficulty preparing food and eating? No   Do you have difficulty bathing yourself, getting dressed or grooming yourself? No   Do you  have difficulty using the toilet? No   Do you have difficulty moving around from place to place? No   Do you have trouble with steps or getting out of a bed or a chair? No   Current Diet Limited Junk Food   Dental Exam Up to date   Eye Exam Up to date   Exercise (times per week) 7 times per week   Current Exercises Include Walking   Do you need help using the phone?  No   Are you deaf or do you have serious difficulty hearing?  No   Do you need help with transportation? No   Do you need help shopping? No   Do you need help preparing meals?  No   Do you need help with housework?  No   Do you need help with laundry? No   Do you need help taking your medications? No   Do you need help managing money? No   Do you ever drive or ride in a car without wearing a seat belt? No   Have you felt unusual stress, anger or loneliness in the last month? No   Who do you live with? Child   If you need help, do you have trouble finding someone available to you? No   Have you been bothered in the last four weeks by sexual problems? No   Do you have difficulty concentrating, remembering or making decisions? No       Does the patient have evidence of cognitive impairment? No    Aspirin use counseling: Does not need ASA (and currently is not on it)    Recent Lab Results:    Lab Results   Component Value Date     (H) 06/29/2020     Lab Results   Component Value Date    HGBA1C 6.1 07/02/2020     Lab Results   Component Value Date    CHOL 148 07/08/2020    TRIG 162 (H) 07/08/2020    HDL 33 (L) 07/08/2020    VLDL 32.4 07/08/2020    LDLHDL 2.50 07/08/2020       Age-appropriate Screening Schedule:  Refer to the list below for future screening recommendations based on patient's age, sex and/or medical conditions. Orders for these recommended tests are listed in the plan section. The patient has been provided with a written plan.    Health Maintenance   Topic Date Due   • ZOSTER VACCINE (1 of 2) 10/04/2019   • INFLUENZA VACCINE  08/01/2020    • MAMMOGRAM  08/20/2020   • PAP SMEAR  11/27/2020 (Originally 7/1/2019)   • HEMOGLOBIN A1C  01/02/2021   • LIPID PANEL  07/08/2021   • COLONOSCOPY  11/27/2028   • TDAP/TD VACCINES (2 - Td) 07/21/2030   • DIABETIC FOOT EXAM  Discontinued   • DIABETIC EYE EXAM  Discontinued   • URINE MICROALBUMIN  Discontinued        Subjective   History of Present Illness    Riddhi Cid is a 50 y.o. female who presents for an Annual Wellness Visit.  Hyperlipidemia  This is a chronic problem. The current episode started more than 1 year ago. Exacerbating diseases include hypothyroidism. There are no known factors aggravating her hyperlipidemia. Pertinent negatives include no chest pain, focal weakness, leg pain or shortness of breath. Current antihyperlipidemic treatment includes statins. The current treatment provides significant improvement of lipids. There are no compliance problems.  Risk factors for coronary artery disease include post-menopausal.   Hypothyroidism  This is a chronic problem. The current episode started more than 1 year ago. The problem occurs constantly. The problem has been waxing and waning. Pertinent negatives include no abdominal pain, arthralgias, chest pain, chills, coughing, fatigue, fever, nausea, rash or vomiting. Nothing aggravates the symptoms. Treatments tried: levothyroxine. The treatment provided significant relief.   Diabetes  She presents for her follow-up (prediabetes) diabetic visit. She has type 2 diabetes mellitus. Her disease course has been stable. There are no hypoglycemic associated symptoms. Pertinent negatives for hypoglycemia include no nervousness/anxiousness. There are no diabetic associated symptoms. Pertinent negatives for diabetes include no chest pain and no fatigue. There are no hypoglycemic complications. Symptoms are stable. Risk factors for coronary artery disease include diabetes mellitus, obesity and post-menopausal. Current diabetic treatment includes oral agent  (monotherapy). She is compliant with treatment most of the time. Her weight is fluctuating minimally. She is following a diabetic diet. Meal planning includes avoidance of concentrated sweets. She participates in exercise daily. Her overall blood glucose range is  mg/dl. An ACE inhibitor/angiotensin II receptor blocker is not being taken. She does not see a podiatrist.Eye exam is current.   COPD  There is no cough or shortness of breath. This is a chronic problem. The current episode started more than 1 year ago. The problem occurs intermittently. The problem has been unchanged. Pertinent negatives include no chest pain, ear pain, fever or rhinorrhea. Her symptoms are aggravated by change in weather and URI. Her symptoms are alleviated by beta-agonist. She reports significant improvement on treatment.   Depression  Visit Type: follow-up  Patient is not experiencing: depressed mood, feelings of hopelessness, feelings of worthlessness, irritability, nervousness/anxiety, shortness of breath, suicidal ideas and suicidal planning.  Frequency of symptoms: occasionally   Severity: mild   Sleep quality: fair  Compliance with medications:  %            The following portions of the patient's history were reviewed and updated as appropriate: allergies, current medications, past family history, past medical history, past social history, past surgical history and problem list.    Outpatient Medications Prior to Visit   Medication Sig Dispense Refill   • albuterol (PROVENTIL) (2.5 MG/3ML) 0.083% nebulizer solution USE 1 VIAL PER NEBULIZER EVERY 4 HOURS AS NEEDED 180 mL 0   • ALBUTEROL SULFATE  (90 Base) MCG/ACT inhaler TAKE 2 PUFFS BY MOUTH EVERY 4-6 HOURS AS NEEDED. MAX OF 12 PUFFS DAILY. 18 g 12   • atorvastatin (LIPITOR) 40 MG tablet TAKE 1 TABLET BY MOUTH DAILY 90 tablet 3   • bumetanide (BUMEX) 1 MG tablet TAKE 1 TO 2 TABLETS BY MOUTH EVERY DAY AS NEEDED 180 tablet 12   • Cholecalciferol (VITAMIN D3) 125  MCG (5000 UT) capsule capsule Take one daily 90 capsule 2   • citalopram (CeleXA) 20 MG tablet Take 1 tablet by mouth Daily. 30 tablet 12   • Cyanocobalamin (B-12) 1000 MCG tablet controlled-release TAKE 1 TABLET BY MOUTH EVERY DAY 90 tablet 3   • DEXILANT 60 MG capsule TAKE 1 CAPSULE BY MOUTH EVERY DAY 30 capsule 0   • famotidine (PEPCID) 40 MG tablet TK 1 T PO HS UTD     • fenofibrate (TRICOR) 145 MG tablet Daily. Dx: E78.2, mixed hyperlipidema     • fluticasone (FLONASE) 50 MCG/ACT nasal spray SHAKE LIQUID AND USE 2 SPRAYS IN EACH NOSTRIL EVERY DAY 48 g 3   • folic acid (FOLVITE) 1 MG tablet Take 1 tablet by mouth Daily. 90 tablet 1   • gabapentin (NEURONTIN) 300 MG capsule TAKE ONE CAPSULE BY MOUTH THREE TIMES DAILY 90 capsule 3   • Glucose Blood (BLOOD GLUCOSE TEST) strip by In Vitro route. Dx: E11.9, DM type 2, controlled     • HYDROcodone-acetaminophen (NORCO)  MG per tablet Take 1 tablet by mouth Every 6 (Six) Hours As Needed for Moderate Pain . *PAIN MANAGEMENT RX'S 120 tablet 0   • Lancets Misc. (ACCU-CHEK FASTCLIX LANCET) kit Dx: E11.9, DM type 2, controlled     • levothyroxine (SYNTHROID, LEVOTHROID) 50 MCG tablet Take 1 tablet by mouth Daily. Dx: E40.1, thyroid nodule 90 tablet 4   • loratadine (CLARITIN) 10 MG tablet Take 1 tablet by mouth Daily. 30 tablet 12   • metFORMIN ER (GLUCOPHAGE-XR) 500 MG 24 hr tablet Take 1 tablet by mouth Daily. 90 tablet 1   • metoclopramide (REGLAN) 10 MG tablet TK 1 T PO HS     • montelukast (SINGULAIR) 10 MG tablet TAKE 1 TABLET BY MOUTH EVERY NIGHT 90 tablet 12   • naproxen (NAPROSYN) 500 MG tablet TAKE 1 TABLET BY MOUTH DAILY 60 tablet 2   • Omega-3 Fatty Acids (FISH OIL) 500 MG capsule capsule 2 capsules Daily. Dx: E78.2, mixed hyperlipidema     • ondansetron ODT (ZOFRAN-ODT) 4 MG disintegrating tablet DIS 1 T UNT Q 6 H PRN  0   • polyethylene glycol (MIRALAX) powder   11   • potassium chloride ER (K-TAB) 20 MEQ tablet controlled-release ER tablet Take 20 mEq  by mouth. 1 tab to be taken only if taking 2 bumetanide. Dx: R60.0, lower extremity edema     • WIXELA INHUB 250-50 MCG/DOSE DISKUS INHALE 1 PUFF BY MOUTH TWICE DAILY 60 each 12   • azelastine (OPTIVAR) 0.05 % ophthalmic solution        No facility-administered medications prior to visit.        Patient Active Problem List   Diagnosis   • Degeneration of intervertebral disc of cervical region   • Degeneration of intervertebral disc of lumbar region   • Acquired hypothyroidism   • Mixed hyperlipidemia   • Major depressive disorder, recurrent, moderate (CMS/[facility])   • Obstructive sleep apnea   • Acute non-recurrent maxillary sinusitis   • Vitamin B12 deficiency   • Vitamin D deficiency   • COPD (chronic obstructive pulmonary disease) (CMS/HCC)   • Solitary thyroid nodule   • Chronic midline low back pain with right-sided sciatica   • Cervicalgia   • Cervical stenosis of spine   • Arthritis   • Moderate persistent asthma without complication   • Gastroparalysis   • History of chicken pox   • IBS (irritable bowel syndrome)   • GERD (gastroesophageal reflux disease)   • Prediabetes   • Dependent edema       Advance Care Planning:  ACP discussion was held with the patient during this visit. Patient does not have an advance directive, information provided.     A face-to-face visit was completed today with patient.  Counseling explanation, and discussion of advanced directives was performed.   The last advanced care visit was performed in 2018.  In a near life ending situation, from which she is not expected to recover functionally, and she is not able to speak for her, she does not want life sustaining measures. We discussed feeding tubes, ventilators and cardiac support as well as dialysis.    We spent less than 16 minutes discussing Advanced Care Planning information and documenting in the chart.      Identification of Risk Factors:  Risk factors include: Advance Directive Discussion.    Review of Systems   Constitutional:  Negative for activity change, chills, fatigue, fever and irritability.   HENT: Negative for ear pain, rhinorrhea, sinus pressure and voice change.    Eyes: Negative for visual disturbance.   Respiratory: Negative for cough and shortness of breath.    Cardiovascular: Negative for chest pain.   Gastrointestinal: Negative for abdominal pain, diarrhea, nausea and vomiting.   Endocrine: Negative for cold intolerance and heat intolerance.   Genitourinary: Negative for frequency and urgency.   Musculoskeletal: Negative for arthralgias.   Skin: Negative for rash.   Neurological: Negative for focal weakness and syncope.   Hematological: Does not bruise/bleed easily.   Psychiatric/Behavioral: Negative for suicidal ideas and depressed mood. The patient is not nervous/anxious.          Compared to one year ago, the patient feels her physical health is the same.  Compared to one year ago, the patient feels her mental health is the same.    Objective     Physical Exam  Vitals signs reviewed. Exam conducted with a chaperone present.   Constitutional:       General: She is not in acute distress.     Appearance: She is well-developed. She is not diaphoretic.   HENT:      Head: Normocephalic and atraumatic.      Right Ear: Hearing and external ear normal.      Left Ear: Hearing and external ear normal.      Nose: Nose normal.      Mouth/Throat:      Pharynx: No oropharyngeal exudate.   Eyes:      General: No scleral icterus.        Right eye: No discharge.         Left eye: No discharge.      Conjunctiva/sclera: Conjunctivae normal.      Pupils: Pupils are equal, round, and reactive to light.   Neck:      Musculoskeletal: Normal range of motion and neck supple.      Thyroid: No thyromegaly.      Trachea: No tracheal deviation.   Cardiovascular:      Rate and Rhythm: Normal rate and regular rhythm.      Heart sounds: Normal heart sounds. No murmur. No friction rub. No gallop.    Pulmonary:      Effort: Pulmonary effort is normal. No  "respiratory distress.      Breath sounds: Normal breath sounds. No wheezing or rales.   Chest:      Breasts:         Right: No inverted nipple, mass, nipple discharge, skin change or tenderness.         Left: No inverted nipple, mass, nipple discharge, skin change or tenderness.   Abdominal:      General: Bowel sounds are normal. There is no distension.      Palpations: Abdomen is soft. There is no mass.      Tenderness: There is no abdominal tenderness. There is no guarding or rebound.      Hernia: No hernia is present.   Genitourinary:     Labia:         Right: No rash or lesion.         Left: No rash or lesion.       Vagina: Normal.      Adnexa:         Right: No mass.          Left: No mass.        Rectum: Normal.   Musculoskeletal: Normal range of motion.         General: No tenderness or deformity.   Lymphadenopathy:      Cervical: No cervical adenopathy.   Skin:     General: Skin is warm and dry.      Capillary Refill: Capillary refill takes less than 2 seconds.      Findings: No erythema or rash.   Neurological:      Mental Status: She is alert and oriented to person, place, and time.      Cranial Nerves: No cranial nerve deficit.      Sensory: No sensory deficit.      Motor: No abnormal muscle tone.      Coordination: Coordination normal.      Deep Tendon Reflexes: Reflexes normal.   Psychiatric:         Behavior: Behavior normal.         Vitals:    10/06/20 1316   BP: 134/82   BP Location: Right arm   Pulse: 95   Resp: 18   Temp: 97.5 °F (36.4 °C)   TempSrc: Temporal   SpO2: 96%   Weight: 109 kg (239 lb 6.4 oz)   Height: 160 cm (63\")       Patient's Body mass index is 42.41 kg/m². BMI is above normal parameters. Recommendations include: exercise counseling and nutrition counseling.      Assessment/Plan   Patient Self-Management and Personalized Health Advice  The patient has been provided with information about: diet and exercise and preventive services including:   · Annual Wellness Visit (AWV).    Visit " Diagnoses:    Problem List Items Addressed This Visit        Cardiovascular and Mediastinum    Mixed hyperlipidemia    Relevant Orders    CBC & Differential    Comprehensive Metabolic Panel    Lipid Panel With / Chol / HDL Ratio       Respiratory    Acute non-recurrent maxillary sinusitis     increase fluids, tylenol for fever, motrin for pain. Humidifier to help with congestion and to sleep at night. Dicussed OTC meds, gargle with warm salt water. If there is recurrent fever, shortness of breath, lethargy, advised to come in to the office or go to the ER.           Relevant Medications    cephalexin (KEFLEX) 500 MG capsule    COPD (chronic obstructive pulmonary disease) (CMS/HCC) - Primary     COPD is improving with treatment.  Continue current medications.                Endocrine    Acquired hypothyroidism    Relevant Orders    TSH    Prediabetes    Relevant Orders    POCT urinalysis dipstick, multipro (Completed)       Other    Major depressive disorder, recurrent, moderate (CMS/HCC)     Psychological condition is improving with treatment.  Continue current treatment regimen.  Psychological condition  will be reassessed at the next regular appointment.           Other Visit Diagnoses     Medicare annual wellness visit, subsequent        Screening mammogram, encounter for        Relevant Orders    Mammo Screening Digital Tomosynthesis Bilateral With CAD           Reviewed use of high risk medication in the elderly: not applicable  Reviewed for potential of harmful drug interactions in the elderly: not applicable    Follow Up:  No follow-ups on file.     An After Visit Summary and PPPS with all of these plans were given to the patient.           Discussed wearing sunscreen, seatbelts. Avoidance or caution with alcohol. Discussed screening as appropriate for age.     I wore protective equipment throughout this patient encounter to include mask, gloves and eye protection. Hand hygiene was performed before donning  protective equipment and after removal when leaving the room.

## 2020-10-13 DIAGNOSIS — J41.0 SIMPLE CHRONIC BRONCHITIS (HCC): ICD-10-CM

## 2020-10-13 RX ORDER — MONTELUKAST SODIUM 10 MG/1
TABLET ORAL
Qty: 90 TABLET | Refills: 0 | Status: SHIPPED | OUTPATIENT
Start: 2020-10-13 | End: 2022-01-14 | Stop reason: SDUPTHER

## 2020-10-16 LAB
ALBUMIN SERPL-MCNC: 4.3 G/DL (ref 3.8–4.9)
ALBUMIN/GLOB SERPL: 1.7 {RATIO} (ref 1.2–2.2)
ALP SERPL-CCNC: 96 IU/L (ref 39–117)
ALT SERPL-CCNC: 23 IU/L (ref 0–32)
AST SERPL-CCNC: 17 IU/L (ref 0–40)
BASOPHILS # BLD AUTO: 0.1 X10E3/UL (ref 0–0.2)
BASOPHILS NFR BLD AUTO: 1 %
BILIRUB SERPL-MCNC: 0.3 MG/DL (ref 0–1.2)
BUN SERPL-MCNC: 13 MG/DL (ref 6–24)
BUN/CREAT SERPL: 14 (ref 9–23)
CALCIUM SERPL-MCNC: 9.5 MG/DL (ref 8.7–10.2)
CHLORIDE SERPL-SCNC: 106 MMOL/L (ref 96–106)
CHOLEST SERPL-MCNC: 146 MG/DL (ref 100–199)
CHOLEST/HDLC SERPL: 4.1 RATIO (ref 0–4.4)
CO2 SERPL-SCNC: 23 MMOL/L (ref 20–29)
CREAT SERPL-MCNC: 0.92 MG/DL (ref 0.57–1)
EOSINOPHIL # BLD AUTO: 0.2 X10E3/UL (ref 0–0.4)
EOSINOPHIL NFR BLD AUTO: 3 %
ERYTHROCYTE [DISTWIDTH] IN BLOOD BY AUTOMATED COUNT: 12.4 % (ref 11.7–15.4)
GLOBULIN SER CALC-MCNC: 2.5 G/DL (ref 1.5–4.5)
GLUCOSE SERPL-MCNC: 111 MG/DL (ref 65–99)
HCT VFR BLD AUTO: 40.5 % (ref 34–46.6)
HDLC SERPL-MCNC: 36 MG/DL
HGB BLD-MCNC: 13.8 G/DL (ref 11.1–15.9)
IMM GRANULOCYTES # BLD AUTO: 0 X10E3/UL (ref 0–0.1)
IMM GRANULOCYTES NFR BLD AUTO: 0 %
LDLC SERPL CALC-MCNC: 86 MG/DL (ref 0–99)
LYMPHOCYTES # BLD AUTO: 2.7 X10E3/UL (ref 0.7–3.1)
LYMPHOCYTES NFR BLD AUTO: 32 %
MCH RBC QN AUTO: 31.4 PG (ref 26.6–33)
MCHC RBC AUTO-ENTMCNC: 34.1 G/DL (ref 31.5–35.7)
MCV RBC AUTO: 92 FL (ref 79–97)
MONOCYTES # BLD AUTO: 0.5 X10E3/UL (ref 0.1–0.9)
MONOCYTES NFR BLD AUTO: 6 %
NEUTROPHILS # BLD AUTO: 4.9 X10E3/UL (ref 1.4–7)
NEUTROPHILS NFR BLD AUTO: 58 %
PLATELET # BLD AUTO: 252 X10E3/UL (ref 150–450)
POTASSIUM SERPL-SCNC: 5 MMOL/L (ref 3.5–5.2)
PROT SERPL-MCNC: 6.8 G/DL (ref 6–8.5)
RBC # BLD AUTO: 4.4 X10E6/UL (ref 3.77–5.28)
SODIUM SERPL-SCNC: 143 MMOL/L (ref 134–144)
TRIGL SERPL-MCNC: 136 MG/DL (ref 0–149)
TSH SERPL DL<=0.005 MIU/L-ACNC: 1.76 UIU/ML (ref 0.45–4.5)
VLDLC SERPL CALC-MCNC: 24 MG/DL (ref 5–40)
WBC # BLD AUTO: 8.4 X10E3/UL (ref 3.4–10.8)

## 2020-10-19 ENCOUNTER — CLINICAL SUPPORT (OUTPATIENT)
Dept: FAMILY MEDICINE CLINIC | Facility: CLINIC | Age: 50
End: 2020-10-19

## 2020-10-19 ENCOUNTER — OFFICE VISIT (OUTPATIENT)
Dept: PAIN MEDICINE | Facility: CLINIC | Age: 50
End: 2020-10-19

## 2020-10-19 ENCOUNTER — TELEPHONE (OUTPATIENT)
Dept: FAMILY MEDICINE CLINIC | Facility: CLINIC | Age: 50
End: 2020-10-19

## 2020-10-19 VITALS
HEART RATE: 73 BPM | TEMPERATURE: 98 F | SYSTOLIC BLOOD PRESSURE: 139 MMHG | RESPIRATION RATE: 16 BRPM | WEIGHT: 237 LBS | BODY MASS INDEX: 41.99 KG/M2 | OXYGEN SATURATION: 96 % | HEIGHT: 63 IN | DIASTOLIC BLOOD PRESSURE: 77 MMHG

## 2020-10-19 DIAGNOSIS — Z23 NEED FOR INFLUENZA VACCINATION: Primary | ICD-10-CM

## 2020-10-19 DIAGNOSIS — G89.29 CHRONIC MIDLINE LOW BACK PAIN WITH RIGHT-SIDED SCIATICA: Primary | ICD-10-CM

## 2020-10-19 DIAGNOSIS — M54.41 CHRONIC MIDLINE LOW BACK PAIN WITH RIGHT-SIDED SCIATICA: Primary | ICD-10-CM

## 2020-10-19 DIAGNOSIS — M48.02 CERVICAL STENOSIS OF SPINE: ICD-10-CM

## 2020-10-19 DIAGNOSIS — M51.36 DEGENERATION OF INTERVERTEBRAL DISC OF LUMBAR REGION: ICD-10-CM

## 2020-10-19 DIAGNOSIS — M50.30 DEGENERATION OF INTERVERTEBRAL DISC OF CERVICAL REGION: ICD-10-CM

## 2020-10-19 DIAGNOSIS — M54.2 CERVICALGIA: ICD-10-CM

## 2020-10-19 PROCEDURE — G0463 HOSPITAL OUTPT CLINIC VISIT: HCPCS | Performed by: PHYSICAL MEDICINE & REHABILITATION

## 2020-10-19 PROCEDURE — 99213 OFFICE O/P EST LOW 20 MIN: CPT | Performed by: PHYSICAL MEDICINE & REHABILITATION

## 2020-10-19 PROCEDURE — 90471 IMMUNIZATION ADMIN: CPT | Performed by: FAMILY MEDICINE

## 2020-10-19 PROCEDURE — 90686 IIV4 VACC NO PRSV 0.5 ML IM: CPT | Performed by: FAMILY MEDICINE

## 2020-10-19 RX ORDER — HYDROCODONE BITARTRATE AND ACETAMINOPHEN 10; 325 MG/1; MG/1
1 TABLET ORAL EVERY 6 HOURS PRN
Qty: 120 TABLET | Refills: 0 | Status: SHIPPED | OUTPATIENT
Start: 2020-10-19 | End: 2020-10-19 | Stop reason: SDUPTHER

## 2020-10-19 RX ORDER — HYDROCODONE BITARTRATE AND ACETAMINOPHEN 10; 325 MG/1; MG/1
1 TABLET ORAL EVERY 6 HOURS PRN
Qty: 120 TABLET | Refills: 0 | Status: SHIPPED | OUTPATIENT
Start: 2020-10-19 | End: 2021-01-18 | Stop reason: SDUPTHER

## 2020-10-19 NOTE — PROGRESS NOTES
Subjective   Riddhi Cid is a 50 y.o. female.     Neck pain radiates to b/l shoulders, and left arm pain. ACDF C5/7 (4/11/2016), also h/o fibromyalgia,  also pain in lower back and b/l legs and feet. 10/10 at worst, 6/10 at best, 6/10 today, worse with activity, improves with rest and meds, always present, varies, aching, radiates in BLE. Imaging reviewed, satisfactory ACDF. Referred for pain management. Neck pain improved with ACDF, tapered off Howell with worsening pain, still somewhat severe. Taking Cymbalta which helps, tried Lyrica with weight gain, trying to lose weight. Restarted Howell at BID - qdaily prn but worsening pain with exercise to lose weight, increased to BID-TID #75, then TID, then QID prn with adequate relief, reduced to #105, tolerating. Gastric sleeve surgery planned for March-April 2017, had to miss cardiac clearance and EGD due to illness and social issues, has decided against surgery, trying to lose weight with diet and exercise. Worsening b/l CTS symptoms, R>L, known h/o of CTS. Saw Juan Franz for worsening neck pain to LUE, ACDF is intact, started Diclofenac for DJD, Elavil. Could not tolerate even low-dose Gabapentin with drowsiness. Started Celebrex but did not feel like doing anything, stopped, stopped Mobic. Stopping numerous meds due to side effects, started Gabapentin with Dr. Pisano with benefit. Pain worsening, DDD on MRI, started PT which is helping.       The following portions of the patient's history were reviewed and updated as appropriate: allergies, current medications, past family history, past medical history, past social history, past surgical history and problem list.    Review of Systems   Constitutional: Negative for chills, fatigue and fever.   HENT: Negative for hearing loss and trouble swallowing.    Eyes: Negative for visual disturbance.   Respiratory: Negative for shortness of breath.    Cardiovascular: Negative for chest pain.   Gastrointestinal: Positive for  constipation and nausea. Negative for abdominal pain, diarrhea and vomiting.   Genitourinary: Negative for urinary incontinence.   Musculoskeletal: Positive for arthralgias, back pain and neck pain. Negative for joint swelling and myalgias.   Neurological: Positive for headache. Negative for dizziness, weakness and numbness.       Objective   Physical Exam   Constitutional: She is oriented to person, place, and time. She appears well-developed and well-nourished.   HENT:   Head: Normocephalic and atraumatic.   Eyes: Pupils are equal, round, and reactive to light.   Neck: Normal range of motion.   Cardiovascular: Normal rate, regular rhythm and normal heart sounds.   Pulmonary/Chest: Breath sounds normal.   Abdominal: Soft. Bowel sounds are normal. She exhibits no distension. There is no abdominal tenderness.   Neurological: She is alert and oriented to person, place, and time. She has normal reflexes. She displays normal reflexes. No sensory deficit.   Psychiatric: Her behavior is normal. Thought content normal.         Assessment/Plan   Diagnoses and all orders for this visit:    1. Chronic midline low back pain with right-sided sciatica (Primary)    2. Cervicalgia    3. Degeneration of intervertebral disc of cervical region    4. Degeneration of intervertebral disc of lumbar region    5. Cervical stenosis of spine        [facility] reviewed, in order, filled 3/30/20. Low risk. Repeat UDS 7/22/20 in order.  Reduced to Norco 10/325mg q6-8h #105, too painful, restarted q6h prn #120, then reduced to #105 successfully but pain worsening. Cont to #120.  Afraid to start Lyrica due to possible weight gain. Restarted Gabapentin, takes 300-900mg/day as tolerated, helping a great deal.  Severe GERD, IBS, family h/o CV dz. Stopped Diclofenac, could not tolerate Celebrex 200mg qdaily. Stopped Mobic 7.5mg qdaily.  Cont other meds as prescribed.  Cont TENS, unit provided here, patient reports it helps her pain significantly.  Cont  b/l CTS braces at night only.  Patient reports she cannot start PT at this time as her daughter currently works 12h shifts, may be able to begin in future if her daughter can change to an 8h shift schedule.  Will begin neuropathic comp cream if her current cream does not work.  Ordered LSO for truncal stability. Cont PT.  Letter to return to work. Does not drive or operate heavy machinery while using pain medication.  RTC 3 months for f/u.

## 2020-10-21 RX ORDER — METFORMIN HYDROCHLORIDE 500 MG/1
500 TABLET, EXTENDED RELEASE ORAL DAILY
Qty: 90 TABLET | Refills: 1 | Status: SHIPPED | OUTPATIENT
Start: 2020-10-21 | End: 2021-06-09 | Stop reason: SDUPTHER

## 2020-10-26 DIAGNOSIS — E87.6 HYPOKALEMIA: Primary | ICD-10-CM

## 2020-10-26 RX ORDER — POTASSIUM CHLORIDE 1500 MG/1
20 TABLET, FILM COATED, EXTENDED RELEASE ORAL DAILY
Qty: 60 TABLET | Refills: 12 | Status: SHIPPED | OUTPATIENT
Start: 2020-10-26 | End: 2022-03-25 | Stop reason: SDUPTHER

## 2020-11-19 ENCOUNTER — TELEPHONE (OUTPATIENT)
Dept: FAMILY MEDICINE CLINIC | Facility: CLINIC | Age: 50
End: 2020-11-19

## 2020-11-19 DIAGNOSIS — E04.1 SOLITARY THYROID NODULE: ICD-10-CM

## 2020-11-19 DIAGNOSIS — Z12.31 SCREENING MAMMOGRAM, ENCOUNTER FOR: ICD-10-CM

## 2020-11-30 ENCOUNTER — TELEPHONE (OUTPATIENT)
Dept: FAMILY MEDICINE CLINIC | Facility: CLINIC | Age: 50
End: 2020-11-30

## 2020-11-30 DIAGNOSIS — M54.50 CHRONIC BILATERAL LOW BACK PAIN WITHOUT SCIATICA: ICD-10-CM

## 2020-11-30 DIAGNOSIS — G89.29 CHRONIC BILATERAL LOW BACK PAIN WITHOUT SCIATICA: ICD-10-CM

## 2020-11-30 DIAGNOSIS — M25.562 ACUTE PAIN OF LEFT KNEE: ICD-10-CM

## 2020-12-01 RX ORDER — NAPROXEN 500 MG/1
500 TABLET ORAL DAILY
Qty: 60 TABLET | Refills: 2 | Status: SHIPPED | OUTPATIENT
Start: 2020-12-01 | End: 2021-03-30

## 2020-12-01 NOTE — TELEPHONE ENCOUNTER
The wilfred is ok the VY might be hard on the liver. The combo is basically multivitamins and green tea. But the VY has stuff that may hurt the liver

## 2020-12-04 ENCOUNTER — TELEPHONE (OUTPATIENT)
Dept: FAMILY MEDICINE CLINIC | Facility: CLINIC | Age: 50
End: 2020-12-04

## 2020-12-11 ENCOUNTER — APPOINTMENT (OUTPATIENT)
Dept: MAMMOGRAPHY | Facility: HOSPITAL | Age: 50
End: 2020-12-11

## 2020-12-15 ENCOUNTER — HOSPITAL ENCOUNTER (OUTPATIENT)
Dept: MAMMOGRAPHY | Facility: HOSPITAL | Age: 50
Discharge: HOME | End: 2020-12-15
Admitting: FAMILY MEDICINE

## 2020-12-15 PROCEDURE — 77063 BREAST TOMOSYNTHESIS BI: CPT

## 2020-12-15 PROCEDURE — 77067 SCR MAMMO BI INCL CAD: CPT

## 2020-12-21 RX ORDER — FOLIC ACID 1 MG/1
1 TABLET ORAL EVERY 24 HOURS
Qty: 90 TABLET | Refills: 3 | Status: SHIPPED | OUTPATIENT
Start: 2020-12-21 | End: 2022-01-19

## 2020-12-22 DIAGNOSIS — L60.0 INGROWN TOENAIL: Primary | ICD-10-CM

## 2021-01-18 ENCOUNTER — OFFICE VISIT (OUTPATIENT)
Dept: PAIN MEDICINE | Facility: CLINIC | Age: 51
End: 2021-01-18

## 2021-01-18 VITALS
DIASTOLIC BLOOD PRESSURE: 92 MMHG | BODY MASS INDEX: 41.99 KG/M2 | WEIGHT: 237 LBS | HEIGHT: 63 IN | TEMPERATURE: 97.1 F | SYSTOLIC BLOOD PRESSURE: 144 MMHG | OXYGEN SATURATION: 97 % | RESPIRATION RATE: 16 BRPM | HEART RATE: 70 BPM

## 2021-01-18 DIAGNOSIS — M48.02 CERVICAL STENOSIS OF SPINE: ICD-10-CM

## 2021-01-18 DIAGNOSIS — M54.41 CHRONIC MIDLINE LOW BACK PAIN WITH RIGHT-SIDED SCIATICA: Primary | ICD-10-CM

## 2021-01-18 DIAGNOSIS — G89.29 CHRONIC MIDLINE LOW BACK PAIN WITH RIGHT-SIDED SCIATICA: Primary | ICD-10-CM

## 2021-01-18 DIAGNOSIS — M54.2 CERVICALGIA: ICD-10-CM

## 2021-01-18 DIAGNOSIS — M50.30 DEGENERATION OF INTERVERTEBRAL DISC OF CERVICAL REGION: ICD-10-CM

## 2021-01-18 DIAGNOSIS — M51.36 DEGENERATION OF INTERVERTEBRAL DISC OF LUMBAR REGION: ICD-10-CM

## 2021-01-18 PROCEDURE — 99214 OFFICE O/P EST MOD 30 MIN: CPT | Performed by: PHYSICAL MEDICINE & REHABILITATION

## 2021-01-18 PROCEDURE — G0463 HOSPITAL OUTPT CLINIC VISIT: HCPCS | Performed by: PHYSICAL MEDICINE & REHABILITATION

## 2021-01-18 RX ORDER — MELOXICAM 7.5 MG/1
7.5 TABLET ORAL DAILY PRN
Qty: 30 TABLET | Refills: 2 | Status: SHIPPED | OUTPATIENT
Start: 2021-01-18 | End: 2021-04-19

## 2021-01-18 RX ORDER — HYDROCODONE BITARTRATE AND ACETAMINOPHEN 10; 325 MG/1; MG/1
1 TABLET ORAL EVERY 6 HOURS PRN
Qty: 120 TABLET | Refills: 0 | Status: SHIPPED | OUTPATIENT
Start: 2021-01-18 | End: 2021-03-30 | Stop reason: SDUPTHER

## 2021-01-18 RX ORDER — HYDROCODONE BITARTRATE AND ACETAMINOPHEN 10; 325 MG/1; MG/1
1 TABLET ORAL EVERY 6 HOURS PRN
Qty: 120 TABLET | Refills: 0 | Status: SHIPPED | OUTPATIENT
Start: 2021-01-18 | End: 2021-04-19 | Stop reason: SDUPTHER

## 2021-01-18 NOTE — PROGRESS NOTES
Subjective   Riddhi Cid is a 50 y.o. female.     Neck pain radiates to b/l shoulders, and left arm pain. ACDF C5/7 (4/11/2016), also h/o fibromyalgia,  also pain in lower back and b/l legs and feet. 10/10 at worst, 6/10 at best, 6/10 today, worse with activity, improves with rest and meds, always present, varies, aching, radiates in BLE. Imaging reviewed, satisfactory ACDF. Referred for pain management. Neck pain improved with ACDF, tapered off [place] with worsening pain, still somewhat severe. Taking Cymbalta which helps, tried Lyrica with weight gain, trying to lose weight. Restarted [place] at BID - qdaily prn but worsening pain with exercise to lose weight, increased to BID-TID #75, then TID, then QID prn with adequate relief, reduced to #105, tolerating. Gastric sleeve surgery planned for March-April 2017, had to miss cardiac clearance and EGD due to illness and social issues, has decided against surgery, trying to lose weight with diet and exercise. Worsening b/l CTS symptoms, R>L, known h/o of CTS. Saw Juan Franz for worsening neck pain to LUE, ACDF is intact, started Diclofenac for DJD, Elavil. Could not tolerate even low-dose Gabapentin with drowsiness. Started Celebrex but did not feel like doing anything, stopped, stopped Mobic. Stopping numerous meds due to side effects, started Gabapentin with Dr. Pisano with benefit. Pain worsening, DDD on MRI, started PT which is helping.       The following portions of the patient's history were reviewed and updated as appropriate: allergies, current medications, past family history, past medical history, past social history, past surgical history and problem list.    Review of Systems   Constitutional: Negative for chills, fatigue and fever.   HENT: Negative for hearing loss and trouble swallowing.    Eyes: Negative for visual disturbance.   Respiratory: Negative for shortness of breath.    Cardiovascular: Negative for chest pain.   Gastrointestinal: Positive for  constipation and nausea. Negative for abdominal pain, diarrhea and vomiting.   Genitourinary: Negative for urinary incontinence.   Musculoskeletal: Positive for arthralgias, back pain and neck pain. Negative for joint swelling and myalgias.   Neurological: Positive for headache. Negative for dizziness, weakness and numbness.       Objective   Physical Exam   Constitutional: She is oriented to person, place, and time. She appears well-developed and well-nourished.   HENT:   Head: Normocephalic and atraumatic.   Eyes: Pupils are equal, round, and reactive to light.   Neck: Normal range of motion.   Cardiovascular: Normal rate, regular rhythm and normal heart sounds.   Pulmonary/Chest: Breath sounds normal.   Abdominal: Soft. Bowel sounds are normal. She exhibits no distension. There is no abdominal tenderness.   Neurological: She is alert and oriented to person, place, and time. She has normal reflexes. She displays normal reflexes. No sensory deficit.   Psychiatric: Her behavior is normal. Thought content normal.         Assessment/Plan   Diagnoses and all orders for this visit:    1. Chronic midline low back pain with right-sided sciatica (Primary)    2. Cervicalgia    3. Cervical stenosis of spine    4. Degeneration of intervertebral disc of cervical region    5. Degeneration of intervertebral disc of lumbar region        INspect reviewed, in order, filled 3/30/20. Low risk. Repeat UDS 7/22/20 in order.  Reduced to Norco 10/325mg q6-8h #105, too painful, restarted q6h prn #120, then reduced to #105 successfully but pain worsening. Cont to #120.  Afraid to start Lyrica due to possible weight gain. Restarted Gabapentin, takes 300-900mg/day as tolerated, helping a great deal.  Severe GERD, IBS, family h/o CV dz. Stopped Diclofenac, could not tolerate Celebrex 200mg qdaily. Taking Naproxen with PCP but hard on her stomach, no personal CV issues. Begin Mobic 7.5mg qdaily prn.  Cont other meds as prescribed.  Cont TENS,  unit provided here, patient reports it helps her pain significantly.  Cont b/l CTS braces at night only.  Patient reports she cannot start PT at this time as her daughter currently works 12h shifts, may be able to begin in future if her daughter can change to an 8h shift schedule.  Will begin neuropathic comp cream if her current cream does not work.  Ordered LSO for truncal stability. Cont PT.  Letter to return to work. Does not drive or operate heavy machinery while using pain medication.  RTC 3 months for f/u.

## 2021-02-11 RX ORDER — ALBUTEROL SULFATE 90 UG/1
AEROSOL, METERED RESPIRATORY (INHALATION)
Qty: 18 G | Refills: 12 | Status: SHIPPED | OUTPATIENT
Start: 2021-02-11 | End: 2021-04-15

## 2021-03-26 ENCOUNTER — TELEPHONE (OUTPATIENT)
Dept: ENDOCRINOLOGY | Facility: CLINIC | Age: 51
End: 2021-03-26

## 2021-03-26 NOTE — TELEPHONE ENCOUNTER
Patient called stating she is feeling very tired, the nodule in her thyroid feels bigger, states everything she eats just sits on her stomach. Last ultrasound was July 2020. Last labs were Oct. 2020. Advised Dr. Menard is out until later next week. She states she has an appt with her PCP on Tuesday and she will see if she can draw labs. Advised if she feels she has an emergent need to go to ER or urgent care.

## 2021-03-29 NOTE — TELEPHONE ENCOUNTER
Check TSH, free T4 and thyroid ultrasound.  She needs to her primary care physician for the food that stays in the stomach.

## 2021-03-30 ENCOUNTER — OFFICE VISIT (OUTPATIENT)
Dept: FAMILY MEDICINE CLINIC | Facility: CLINIC | Age: 51
End: 2021-03-30

## 2021-03-30 VITALS
HEART RATE: 98 BPM | DIASTOLIC BLOOD PRESSURE: 82 MMHG | WEIGHT: 246 LBS | SYSTOLIC BLOOD PRESSURE: 112 MMHG | RESPIRATION RATE: 18 BRPM | OXYGEN SATURATION: 96 % | TEMPERATURE: 97.7 F | BODY MASS INDEX: 43.59 KG/M2 | HEIGHT: 63 IN

## 2021-03-30 DIAGNOSIS — G44.209 TENSION HEADACHE: ICD-10-CM

## 2021-03-30 DIAGNOSIS — E78.2 MIXED HYPERLIPIDEMIA: ICD-10-CM

## 2021-03-30 DIAGNOSIS — E04.1 SOLITARY THYROID NODULE: Primary | ICD-10-CM

## 2021-03-30 DIAGNOSIS — F33.1 MAJOR DEPRESSIVE DISORDER, RECURRENT, MODERATE (HCC): ICD-10-CM

## 2021-03-30 DIAGNOSIS — R60.9 DEPENDENT EDEMA: ICD-10-CM

## 2021-03-30 DIAGNOSIS — E03.9 ACQUIRED HYPOTHYROIDISM: ICD-10-CM

## 2021-03-30 DIAGNOSIS — E11.8 CONTROLLED DIABETES MELLITUS TYPE 2 WITH COMPLICATIONS, UNSPECIFIED WHETHER LONG TERM INSULIN USE (HCC): ICD-10-CM

## 2021-03-30 DIAGNOSIS — J41.0 SIMPLE CHRONIC BRONCHITIS (HCC): ICD-10-CM

## 2021-03-30 DIAGNOSIS — K21.9 GASTROESOPHAGEAL REFLUX DISEASE, UNSPECIFIED WHETHER ESOPHAGITIS PRESENT: Primary | ICD-10-CM

## 2021-03-30 PROBLEM — H47.399 OPTIC DISC HEMORRHAGE: Status: ACTIVE | Noted: 2020-09-14

## 2021-03-30 PROCEDURE — 99214 OFFICE O/P EST MOD 30 MIN: CPT | Performed by: FAMILY MEDICINE

## 2021-03-31 LAB
ALBUMIN SERPL-MCNC: 4.3 G/DL (ref 3.8–4.9)
ALBUMIN/GLOB SERPL: 1.4 {RATIO} (ref 1.2–2.2)
ALP SERPL-CCNC: 89 IU/L (ref 39–117)
ALT SERPL-CCNC: 30 IU/L (ref 0–32)
AST SERPL-CCNC: 19 IU/L (ref 0–40)
BILIRUB SERPL-MCNC: 0.2 MG/DL (ref 0–1.2)
BUN SERPL-MCNC: 13 MG/DL (ref 6–24)
BUN/CREAT SERPL: 11 (ref 9–23)
CALCIUM SERPL-MCNC: 9.6 MG/DL (ref 8.7–10.2)
CHLORIDE SERPL-SCNC: 103 MMOL/L (ref 96–106)
CHOLEST SERPL-MCNC: 176 MG/DL (ref 100–199)
CHOLEST/HDLC SERPL: 5.7 RATIO (ref 0–4.4)
CO2 SERPL-SCNC: 23 MMOL/L (ref 20–29)
CREAT SERPL-MCNC: 1.21 MG/DL (ref 0.57–1)
GLOBULIN SER CALC-MCNC: 3 G/DL (ref 1.5–4.5)
GLUCOSE SERPL-MCNC: 115 MG/DL (ref 65–99)
HDLC SERPL-MCNC: 31 MG/DL
LDLC SERPL CALC-MCNC: 89 MG/DL (ref 0–99)
POTASSIUM SERPL-SCNC: 4.1 MMOL/L (ref 3.5–5.2)
PROT SERPL-MCNC: 7.3 G/DL (ref 6–8.5)
SODIUM SERPL-SCNC: 142 MMOL/L (ref 134–144)
TRIGL SERPL-MCNC: 335 MG/DL (ref 0–149)
VLDLC SERPL CALC-MCNC: 56 MG/DL (ref 5–40)

## 2021-04-05 NOTE — ASSESSMENT & PLAN NOTE
Better in the AM and worsens as day goes on. No s/s of CHF    Discussed lowering salt intake. Elevate and wear stockings. Check labs.

## 2021-04-05 NOTE — ASSESSMENT & PLAN NOTE
She is on famotidine and dexilant. Advised to go with a bland diet. She Sees GI. Call GI for an appt. Make sure to take famotidine at night    Limit tobacco, alcohol, caffeine, chocalate, citrus fruits, recumbency after meals and large portions. Discussed link between PPI's and increased risk of hip, wrist, and spine fractures

## 2021-04-06 ENCOUNTER — OFFICE VISIT (OUTPATIENT)
Dept: FAMILY MEDICINE CLINIC | Facility: CLINIC | Age: 51
End: 2021-04-06

## 2021-04-06 VITALS
OXYGEN SATURATION: 99 % | SYSTOLIC BLOOD PRESSURE: 118 MMHG | HEART RATE: 95 BPM | TEMPERATURE: 97.3 F | RESPIRATION RATE: 18 BRPM | WEIGHT: 245 LBS | DIASTOLIC BLOOD PRESSURE: 80 MMHG | BODY MASS INDEX: 43.41 KG/M2 | HEIGHT: 63 IN

## 2021-04-06 DIAGNOSIS — L73.9 FOLLICULITIS: Primary | ICD-10-CM

## 2021-04-06 PROCEDURE — 99212 OFFICE O/P EST SF 10 MIN: CPT | Performed by: FAMILY MEDICINE

## 2021-04-06 NOTE — PROGRESS NOTES
Subjective   Riddhi Cid is a 50 y.o. female.     Chief Complaint   Patient presents with   • Rash       Breast Problem  This is a new problem. The current episode started 1 to 4 weeks ago. The problem occurs constantly. The problem has been waxing and waning. Pertinent negatives include no abdominal pain, arthralgias, chest pain, chills, congestion, coughing, fatigue, fever, nausea, rash or vomiting. Nothing aggravates the symptoms. Treatments tried: dial antibacterial. The treatment provided significant relief.    rash is gone.     I personally reviewed and updated the patient's allergies, medications, problem list, and past medical, surgical, social, and family history. I have reviewed and confirmed the accuracy of the History of Present Illness and Review of Symptoms as documented by the MA/LPN/RN. June Harrison MD    Allergies:  Allergies   Allergen Reactions   • Sulfa Antibiotics Rash       Social History:  Social History     Socioeconomic History   • Marital status:      Spouse name: Not on file   • Number of children: Not on file   • Years of education: Not on file   • Highest education level: Not on file   Tobacco Use   • Smoking status: Current Every Day Smoker     Packs/day: 0.50     Types: Cigarettes   • Smokeless tobacco: Never Used   Vaping Use   • Vaping Use: Never used   Substance and Sexual Activity   • Alcohol use: No   • Drug use: No   • Sexual activity: Defer       Family History:  Family History   Problem Relation Age of Onset   • Stroke Mother    • Hypertension Mother    • Hyperlipidemia Mother    • Hypothyroidism Mother    • Prostate cancer Father    • Other Father    • Diabetes Father    • Hypertension Father    • Hyperlipidemia Father    • Breast cancer Paternal Grandmother    • Hypothyroidism Grandchild    • Ovarian cancer Neg Hx        Past Medical History :  Patient Active Problem List   Diagnosis   • Degeneration of intervertebral disc of cervical region   • Degeneration of  intervertebral disc of lumbar region   • Acquired hypothyroidism   • Mixed hyperlipidemia   • Major depressive disorder, recurrent, moderate (CMS/HCC)   • Obstructive sleep apnea   • Vitamin B12 deficiency   • Vitamin D deficiency   • COPD (chronic obstructive pulmonary disease) (CMS/HCC)   • Solitary thyroid nodule   • Chronic midline low back pain with right-sided sciatica   • Cervicalgia   • Cervical stenosis of spine   • Arthritis   • Moderate persistent asthma without complication   • Gastroparalysis   • History of chicken pox   • IBS (irritable bowel syndrome)   • GERD (gastroesophageal reflux disease)   • Prediabetes   • Dependent edema   • Optic disc hemorrhage   • Controlled diabetes mellitus type 2 with complications (CMS/HCC)   • Tension headache   • Folliculitis       Medication List:    Current Outpatient Medications:   •  albuterol (PROVENTIL) (2.5 MG/3ML) 0.083% nebulizer solution, USE 1 VIAL PER NEBULIZER EVERY 4 HOURS AS NEEDED, Disp: 180 mL, Rfl: 0  •  albuterol sulfate  (90 Base) MCG/ACT inhaler, TAKE 2 PUFFS BY MOUTH EVERY 4-6 HOURS AS NEEDED. MAX OF 12 PUFFS DAILY., Disp: 18 g, Rfl: 12  •  atorvastatin (LIPITOR) 40 MG tablet, TAKE 1 TABLET BY MOUTH DAILY, Disp: 90 tablet, Rfl: 3  •  azelastine (OPTIVAR) 0.05 % ophthalmic solution, , Disp: , Rfl:   •  bumetanide (BUMEX) 1 MG tablet, TAKE 1 TO 2 TABLETS BY MOUTH EVERY DAY AS NEEDED, Disp: 180 tablet, Rfl: 12  •  Cholecalciferol (VITAMIN D3) 125 MCG (5000 UT) capsule capsule, Take one daily, Disp: 90 capsule, Rfl: 2  •  citalopram (CeleXA) 20 MG tablet, Take 1 tablet by mouth Daily., Disp: 30 tablet, Rfl: 12  •  Cyanocobalamin (B-12) 1000 MCG tablet controlled-release, TAKE 1 TABLET BY MOUTH EVERY DAY, Disp: 90 tablet, Rfl: 3  •  DEXILANT 60 MG capsule, TAKE 1 CAPSULE BY MOUTH EVERY DAY, Disp: 30 capsule, Rfl: 0  •  famotidine (PEPCID) 40 MG tablet, TK 1 T PO HS UTD, Disp: , Rfl:   •  fenofibrate (TRICOR) 145 MG tablet, Daily. Dx: E78.2,  mixed hyperlipidema, Disp: , Rfl:   •  fluticasone (FLONASE) 50 MCG/ACT nasal spray, SHAKE LIQUID AND USE 2 SPRAYS IN EACH NOSTRIL EVERY DAY, Disp: 48 g, Rfl: 3  •  folic acid (FOLVITE) 1 MG tablet, Take 1 tablet by mouth Daily., Disp: 90 tablet, Rfl: 3  •  gabapentin (NEURONTIN) 300 MG capsule, TAKE ONE CAPSULE BY MOUTH THREE TIMES DAILY, Disp: 90 capsule, Rfl: 3  •  Glucose Blood (BLOOD GLUCOSE TEST) strip, by In Vitro route. Dx: E11.9, DM type 2, controlled, Disp: , Rfl:   •  HYDROcodone-acetaminophen (NORCO)  MG per tablet, Take 1 tablet by mouth Every 6 (Six) Hours As Needed for Moderate Pain . *PAIN MANAGEMENT RX'S, Disp: 120 tablet, Rfl: 0  •  Lancets Misc. (ACCU-CHEK FASTCLIX LANCET) kit, Dx: E11.9, DM type 2, controlled, Disp: , Rfl:   •  levothyroxine (SYNTHROID, LEVOTHROID) 50 MCG tablet, Take 1 tablet by mouth Daily. Dx: E40.1, thyroid nodule, Disp: 90 tablet, Rfl: 4  •  loratadine (CLARITIN) 10 MG tablet, Take 1 tablet by mouth Daily., Disp: 30 tablet, Rfl: 12  •  meloxicam (MOBIC) 7.5 MG tablet, Take 1 tablet by mouth Daily As Needed (swelling). Instead of Naproxen., Disp: 30 tablet, Rfl: 2  •  metFORMIN ER (GLUCOPHAGE-XR) 500 MG 24 hr tablet, Take 1 tablet by mouth Daily., Disp: 90 tablet, Rfl: 1  •  metoclopramide (REGLAN) 10 MG tablet, TK 1 T PO HS, Disp: , Rfl:   •  montelukast (SINGULAIR) 10 MG tablet, TAKE 1 TABLET BY MOUTH EVERY NIGHT, Disp: 90 tablet, Rfl: 0  •  Omega-3 Fatty Acids (FISH OIL) 500 MG capsule capsule, 2 capsules Daily. Dx: E78.2, mixed hyperlipidema, Disp: , Rfl:   •  ondansetron ODT (ZOFRAN-ODT) 4 MG disintegrating tablet, DIS 1 T UNT Q 6 H PRN, Disp: , Rfl: 0  •  polyethylene glycol (MIRALAX) powder, , Disp: , Rfl: 11  •  potassium chloride ER (K-Tab) 20 MEQ tablet controlled-release ER tablet, Take 1 tablet by mouth Daily. 1 tab to be taken only if taking 2 bumetanide. Dx: R60.0, lower extremity edema, Disp: 60 tablet, Rfl: 12  •  WIXELA INHUB 250-50 MCG/DOSE DISKUS,  "INHALE 1 PUFF BY MOUTH TWICE DAILY, Disp: 60 each, Rfl: 12    Past Surgical History:  Past Surgical History:   Procedure Laterality Date   • BREAST BIOPSY Right    • CARPAL TUNNEL RELEASE     • CERVICAL DISCECTOMY ANTERIOR     • CHOLECYSTECTOMY     • ESSURE TUBAL LIGATION         Review of Systems:  Review of Systems   Constitutional: Negative for activity change, chills, fatigue and fever.   HENT: Negative for congestion, ear pain, rhinorrhea, sinus pressure and voice change.    Eyes: Negative for visual disturbance.   Respiratory: Negative for cough and shortness of breath.    Cardiovascular: Negative for chest pain.   Gastrointestinal: Negative for abdominal pain, diarrhea, nausea and vomiting.   Endocrine: Negative for cold intolerance and heat intolerance.   Genitourinary: Negative for frequency and urgency.   Musculoskeletal: Negative for arthralgias.   Skin: Negative for rash.   Neurological: Negative for syncope.   Hematological: Does not bruise/bleed easily.   Psychiatric/Behavioral: Negative for depressed mood. The patient is not nervous/anxious.        Physical Exam:  Vital Signs:  Vital Signs:   /80   Pulse 95   Temp 97.3 °F (36.3 °C)   Resp 18   Ht 160 cm (62.99\")   Wt 111 kg (245 lb)   SpO2 99%   BMI 43.41 kg/m²     Result Review :                Physical Exam  Vitals reviewed.   Constitutional:       Appearance: Normal appearance. She is well-developed.   HENT:      Head: Normocephalic and atraumatic.   Eyes:      General:         Right eye: No discharge.         Left eye: No discharge.   Cardiovascular:      Rate and Rhythm: Normal rate and regular rhythm.      Heart sounds: Normal heart sounds. No murmur heard.   No friction rub. No gallop.    Pulmonary:      Effort: Pulmonary effort is normal. No respiratory distress.      Breath sounds: Normal breath sounds. No wheezing or rales.   Chest:      Comments: Left breast with folliculitis that is gone  No erythema. Hair follicles are " predominate  Skin:     General: Skin is warm and dry.      Findings: No rash.   Neurological:      Mental Status: She is alert and oriented to person, place, and time.      Coordination: Coordination normal.      Gait: Gait normal.   Psychiatric:         Behavior: Behavior is cooperative.         Assessment and Plan:  Problems Addressed this Visit        Skin    Folliculitis - Primary     Resolved left breast           Diagnoses       Codes Comments    Folliculitis    -  Primary ICD-10-CM: L73.9  ICD-9-CM: 704.8            An After Visit Summary and PPPS were given to the patient.         I wore protective equipment throughout this patient encounter to include mask and gloves. Hand hygiene was performed before donning protective equipment and after removal when leaving the room.

## 2021-04-07 ENCOUNTER — HOSPITAL ENCOUNTER (OUTPATIENT)
Dept: ULTRASOUND IMAGING | Facility: HOSPITAL | Age: 51
Discharge: HOME | End: 2021-04-07
Admitting: INTERNAL MEDICINE

## 2021-04-07 DIAGNOSIS — E03.9 ACQUIRED HYPOTHYROIDISM: ICD-10-CM

## 2021-04-07 DIAGNOSIS — E04.1 SOLITARY THYROID NODULE: ICD-10-CM

## 2021-04-07 PROCEDURE — 76536 US EXAM OF HEAD AND NECK: CPT

## 2021-04-08 LAB
T4 FREE SERPL-MCNC: 1.52 NG/DL (ref 0.82–1.77)
TSH SERPL DL<=0.005 MIU/L-ACNC: 3.51 UIU/ML (ref 0.45–4.5)

## 2021-04-13 ENCOUNTER — TELEPHONE (OUTPATIENT)
Dept: FAMILY MEDICINE CLINIC | Facility: CLINIC | Age: 51
End: 2021-04-13

## 2021-04-15 RX ORDER — ALBUTEROL SULFATE 90 UG/1
AEROSOL, METERED RESPIRATORY (INHALATION)
Qty: 18 G | Refills: 12 | Status: SHIPPED | OUTPATIENT
Start: 2021-04-15 | End: 2021-12-30 | Stop reason: SDUPTHER

## 2021-04-15 RX ORDER — BUMETANIDE 1 MG/1
TABLET ORAL
Qty: 180 TABLET | Refills: 12 | Status: SHIPPED | OUTPATIENT
Start: 2021-04-15 | End: 2022-09-12

## 2021-04-19 ENCOUNTER — OFFICE VISIT (OUTPATIENT)
Dept: PAIN MEDICINE | Facility: CLINIC | Age: 51
End: 2021-04-19

## 2021-04-19 VITALS
WEIGHT: 240 LBS | SYSTOLIC BLOOD PRESSURE: 133 MMHG | DIASTOLIC BLOOD PRESSURE: 69 MMHG | OXYGEN SATURATION: 95 % | HEIGHT: 63 IN | HEART RATE: 76 BPM | TEMPERATURE: 96.9 F | RESPIRATION RATE: 16 BRPM | BODY MASS INDEX: 42.52 KG/M2

## 2021-04-19 DIAGNOSIS — M54.50 CHRONIC BILATERAL LOW BACK PAIN WITHOUT SCIATICA: ICD-10-CM

## 2021-04-19 DIAGNOSIS — M54.2 CERVICALGIA: ICD-10-CM

## 2021-04-19 DIAGNOSIS — G89.29 CHRONIC BILATERAL LOW BACK PAIN WITHOUT SCIATICA: ICD-10-CM

## 2021-04-19 DIAGNOSIS — M54.41 CHRONIC MIDLINE LOW BACK PAIN WITH RIGHT-SIDED SCIATICA: ICD-10-CM

## 2021-04-19 DIAGNOSIS — M51.36 DEGENERATION OF INTERVERTEBRAL DISC OF LUMBAR REGION: ICD-10-CM

## 2021-04-19 DIAGNOSIS — M48.02 CERVICAL STENOSIS OF SPINE: ICD-10-CM

## 2021-04-19 DIAGNOSIS — M50.30 DEGENERATION OF INTERVERTEBRAL DISC OF CERVICAL REGION: ICD-10-CM

## 2021-04-19 DIAGNOSIS — Z79.899 MEDICATION MANAGEMENT: Primary | ICD-10-CM

## 2021-04-19 DIAGNOSIS — G89.29 CHRONIC MIDLINE LOW BACK PAIN WITH RIGHT-SIDED SCIATICA: ICD-10-CM

## 2021-04-19 PROCEDURE — 99213 OFFICE O/P EST LOW 20 MIN: CPT | Performed by: PHYSICAL MEDICINE & REHABILITATION

## 2021-04-19 PROCEDURE — G0463 HOSPITAL OUTPT CLINIC VISIT: HCPCS | Performed by: PHYSICAL MEDICINE & REHABILITATION

## 2021-04-19 RX ORDER — HYDROCODONE BITARTRATE AND ACETAMINOPHEN 10; 325 MG/1; MG/1
1 TABLET ORAL EVERY 6 HOURS PRN
Qty: 120 TABLET | Refills: 0 | Status: SHIPPED | OUTPATIENT
Start: 2021-04-19 | End: 2021-07-14 | Stop reason: SDUPTHER

## 2021-04-19 NOTE — PROGRESS NOTES
Subjective   Riddhi Cid is a 50 y.o. female.     Neck pain radiates to b/l shoulders, and left arm pain. ACDF C5/7 (4/11/2016), also h/o fibromyalgia,  also pain in lower back and b/l legs and feet. 10/10 at worst, 6/10 at best, 6/10 today, worse with activity, improves with rest and meds, always present, varies, aching, radiates in BLE. Imaging reviewed, satisfactory ACDF. Referred for pain management. Neck pain improved with ACDF, tapered off [place] with worsening pain, still somewhat severe. Taking Cymbalta which helps, tried Lyrica with weight gain, trying to lose weight. Restarted [place] at BID - qdaily prn but worsening pain with exercise to lose weight, increased to BID-TID #75, then TID, then QID prn with adequate relief, reduced to #105, tolerating. Gastric sleeve surgery planned for March-April 2017, had to miss cardiac clearance and EGD due to illness and social issues, has decided against surgery, trying to lose weight with diet and exercise. Worsening b/l CTS symptoms, R>L, known h/o of CTS. Saw Juan Franz for worsening neck pain to LUE, ACDF is intact, started Diclofenac for DJD, Elavil. Could not tolerate even low-dose Gabapentin with drowsiness. Started Celebrex but did not feel like doing anything, stopped, stopped Mobic. Stopping numerous meds due to side effects, started Gabapentin with Dr. Pisano with benefit. Pain worsening, DDD on MRI, started PT which is helping.       The following portions of the patient's history were reviewed and updated as appropriate: allergies, current medications, past family history, past medical history, past social history, past surgical history and problem list.    Review of Systems   Constitutional: Negative for chills, fatigue and fever.   HENT: Negative for hearing loss and trouble swallowing.    Eyes: Negative for visual disturbance.   Respiratory: Negative for shortness of breath.    Cardiovascular: Negative for chest pain.   Gastrointestinal: Positive for  constipation and nausea. Negative for abdominal pain, diarrhea and vomiting.   Genitourinary: Negative for urinary incontinence.   Musculoskeletal: Positive for arthralgias, back pain and neck pain. Negative for joint swelling and myalgias.   Neurological: Positive for headache. Negative for dizziness, weakness and numbness.       Objective   Physical Exam   Constitutional: She is oriented to person, place, and time. She appears well-developed and well-nourished.   HENT:   Head: Normocephalic and atraumatic.   Eyes: Pupils are equal, round, and reactive to light.   Cardiovascular: Normal rate, regular rhythm and normal heart sounds.   Pulmonary/Chest: Breath sounds normal.   Abdominal: Soft. Bowel sounds are normal. She exhibits no distension. There is no abdominal tenderness.   Neurological: She is alert and oriented to person, place, and time. She has normal reflexes. She displays normal reflexes. No sensory deficit.   Psychiatric: Her behavior is normal. Thought content normal.         Assessment/Plan   Diagnoses and all orders for this visit:    1. Chronic midline low back pain with right-sided sciatica (Primary)    2. Cervicalgia    3. Cervical stenosis of spine    4. Degeneration of intervertebral disc of cervical region    5. Degeneration of intervertebral disc of lumbar region        INspect reviewed, in order, filled 3/30/20. Low risk. Repeat UDS 7/22/20 in order.  Reduced to Norco 10/325mg q6-8h #105, too painful, restarted q6h prn #120, then reduced to #105 successfully but pain worsening. Cont at #120.  Afraid to start Lyrica due to possible weight gain. Restarted Gabapentin, takes 300-900mg/day as tolerated, helping a great deal.  Severe GERD, IBS, family h/o CV dz. Stopped Diclofenac, could not tolerate Celebrex 200mg qdaily. Taking Naproxen with PCP but hard on her stomach, no personal CV issues. Began Mobic 7.5mg qdaily prn.  Cont other meds as prescribed.  Cont TENS, unit provided here, patient  reports it helps her pain significantly.  Cont b/l CTS braces at night only.  Patient reports she cannot start PT at this time as her daughter currently works 12h shifts, may be able to begin in future if her daughter can change to an 8h shift schedule.  Will begin neuropathic comp cream if her current cream does not work.  Ordered LSO for truncal stability. Cont PT.  Letter to return to work. Does not drive or operate heavy machinery while using pain medication.  RTC 3 months for f/u.

## 2021-04-22 DIAGNOSIS — Z79.899 MEDICATION MANAGEMENT: ICD-10-CM

## 2021-04-29 ENCOUNTER — TELEPHONE (OUTPATIENT)
Dept: FAMILY MEDICINE CLINIC | Facility: CLINIC | Age: 51
End: 2021-04-29

## 2021-04-30 ENCOUNTER — TELEPHONE (OUTPATIENT)
Dept: FAMILY MEDICINE CLINIC | Facility: CLINIC | Age: 51
End: 2021-04-30

## 2021-04-30 NOTE — TELEPHONE ENCOUNTER
PATIENT CALLING STATING SHE WENT TO THE ER AT [facility] PER   ADVISED. PATIENT STATED SHE WAS  REFERRED TO  A CARDIOLOGIST DR.JACOB GUZMANM  SHE WANTS TO KNOW IF SHE SHOULD GO TO HIM OR SEE SOMEONE ELSE THAT  WOULD RECOMMEND.    PLEASE ADVISE  [phone]

## 2021-05-06 ENCOUNTER — TELEPHONE (OUTPATIENT)
Dept: FAMILY MEDICINE CLINIC | Facility: CLINIC | Age: 51
End: 2021-05-06

## 2021-05-06 NOTE — TELEPHONE ENCOUNTER
Caller: Riddhi Cid    Relationship to patient: Self    Best call back number: [phone]    Patient is needing: PATIENT STATES SHE IS STILL HAVING SWELLING IN BOTH LEGS. PATIENT STATES THE RIGHT LEG IS WORST THAN THE LEFT. PATIENT IS CURRENTLY TAKING BUMETANIDE BUT IT IS NOT WORKING. PATIENT STATES SHE IS HAVING EXHAUSTION AS WELL. PATIENT ALSO STATES IF SHE PUSHES DOWN ON THE SKIN ON HER LEG IT DOES NOT BOUNCE BACK TO ORIGINAL POSITION  PATIENT WANTS TO KNOW IF THERE IS SOMETHING DR. GRACIA CAN PRESCRIBE OR IF SHE NEEDS TO BE SEEN. CALLBACK REQUESTED    [id number] [place]  [address] [address] [address] AT [facility] [address]  &  - [phone]  - [phone]   [phone]

## 2021-05-13 ENCOUNTER — OFFICE (AMBULATORY)
Dept: URBAN - METROPOLITAN AREA CLINIC 64 | Facility: CLINIC | Age: 51
End: 2021-05-13

## 2021-05-13 VITALS
DIASTOLIC BLOOD PRESSURE: 81 MMHG | WEIGHT: 248 LBS | HEART RATE: 78 BPM | SYSTOLIC BLOOD PRESSURE: 130 MMHG | HEIGHT: 63 IN

## 2021-05-13 DIAGNOSIS — R14.0 ABDOMINAL DISTENSION (GASEOUS): ICD-10-CM

## 2021-05-13 DIAGNOSIS — K21.9 GASTRO-ESOPHAGEAL REFLUX DISEASE WITHOUT ESOPHAGITIS: ICD-10-CM

## 2021-05-13 DIAGNOSIS — K75.81 NONALCOHOLIC STEATOHEPATITIS (NASH): ICD-10-CM

## 2021-05-13 DIAGNOSIS — Z86.010 PERSONAL HISTORY OF COLONIC POLYPS: ICD-10-CM

## 2021-05-13 DIAGNOSIS — R10.84 GENERALIZED ABDOMINAL PAIN: ICD-10-CM

## 2021-05-13 DIAGNOSIS — E11.9 TYPE 2 DIABETES MELLITUS WITHOUT COMPLICATIONS: ICD-10-CM

## 2021-05-13 PROCEDURE — 99213 OFFICE O/P EST LOW 20 MIN: CPT | Performed by: NURSE PRACTITIONER

## 2021-05-13 RX ORDER — ONDANSETRON 4 MG/1
TABLET, ORALLY DISINTEGRATING ORAL
Qty: 30 | Refills: 11 | Status: COMPLETED
End: 2022-08-29

## 2021-05-13 RX ORDER — ERYTHROMYCIN ETHYLSUCCINATE 200 MG/5ML
4000 GRANULE, FOR SUSPENSION ORAL
Qty: 600 | Refills: 1 | Status: COMPLETED
Start: 2021-05-13 | End: 2022-02-17

## 2021-05-28 ENCOUNTER — TELEPHONE (OUTPATIENT)
Dept: FAMILY MEDICINE CLINIC | Facility: CLINIC | Age: 51
End: 2021-05-28

## 2021-05-28 DIAGNOSIS — G89.29 CHRONIC BILATERAL LOW BACK PAIN WITHOUT SCIATICA: ICD-10-CM

## 2021-05-28 DIAGNOSIS — M54.50 CHRONIC BILATERAL LOW BACK PAIN WITHOUT SCIATICA: ICD-10-CM

## 2021-06-01 RX ORDER — GABAPENTIN 300 MG/1
CAPSULE ORAL
Qty: 90 CAPSULE | Refills: 12 | Status: SHIPPED | OUTPATIENT
Start: 2021-06-01 | End: 2021-10-11 | Stop reason: SDUPTHER

## 2021-06-09 RX ORDER — METFORMIN HYDROCHLORIDE 500 MG/1
500 TABLET, EXTENDED RELEASE ORAL DAILY
Qty: 90 TABLET | Refills: 3 | Status: SHIPPED | OUTPATIENT
Start: 2021-06-09 | End: 2022-06-06

## 2021-06-10 RX ORDER — FLUTICASONE PROPIONATE 50 MCG
SPRAY, SUSPENSION (ML) NASAL
Qty: 48 G | Refills: 3 | Status: SHIPPED | OUTPATIENT
Start: 2021-06-10 | End: 2022-11-28

## 2021-06-10 RX ORDER — ALBUTEROL SULFATE 2.5 MG/3ML
SOLUTION RESPIRATORY (INHALATION)
Qty: 180 ML | Refills: 0 | Status: SHIPPED | OUTPATIENT
Start: 2021-06-10 | End: 2022-02-14

## 2021-06-25 ENCOUNTER — TELEPHONE (OUTPATIENT)
Dept: FAMILY MEDICINE CLINIC | Facility: CLINIC | Age: 51
End: 2021-06-25

## 2021-06-29 RX ORDER — POTASSIUM CHLORIDE 20 MEQ/1
20 TABLET, EXTENDED RELEASE ORAL DAILY
COMMUNITY
Start: 2021-05-12 | End: 2021-07-06

## 2021-06-29 RX ORDER — DOXYCYCLINE 100 MG/1
CAPSULE ORAL
COMMUNITY
Start: 2021-06-23 | End: 2021-07-06

## 2021-07-03 LAB
T4 FREE SERPL-MCNC: 1.24 NG/DL (ref 0.82–1.77)
TSH SERPL DL<=0.005 MIU/L-ACNC: 3.12 UIU/ML (ref 0.45–4.5)

## 2021-07-06 ENCOUNTER — TELEMEDICINE (OUTPATIENT)
Dept: ENDOCRINOLOGY | Facility: CLINIC | Age: 51
End: 2021-07-06

## 2021-07-06 VITALS — BODY MASS INDEX: 41.64 KG/M2 | WEIGHT: 235 LBS | HEIGHT: 63 IN

## 2021-07-06 DIAGNOSIS — E03.9 ACQUIRED HYPOTHYROIDISM: Primary | ICD-10-CM

## 2021-07-06 DIAGNOSIS — E55.9 VITAMIN D DEFICIENCY: ICD-10-CM

## 2021-07-06 DIAGNOSIS — E78.2 MIXED HYPERLIPIDEMIA: ICD-10-CM

## 2021-07-06 DIAGNOSIS — E04.1 SOLITARY THYROID NODULE: ICD-10-CM

## 2021-07-06 DIAGNOSIS — R73.9 HYPERGLYCEMIA: ICD-10-CM

## 2021-07-06 PROBLEM — Z76.89 ENCOUNTER TO ESTABLISH CARE: Status: ACTIVE | Noted: 2021-05-21

## 2021-07-06 PROBLEM — R60.9 EDEMA: Status: ACTIVE | Noted: 2021-05-21

## 2021-07-06 PROBLEM — R06.09 DYSPNEA ON EXERTION: Status: ACTIVE | Noted: 2021-05-21

## 2021-07-06 PROBLEM — R07.9 CHEST PAIN: Status: ACTIVE | Noted: 2021-05-21

## 2021-07-06 PROCEDURE — 99214 OFFICE O/P EST MOD 30 MIN: CPT | Performed by: INTERNAL MEDICINE

## 2021-07-06 RX ORDER — PRENATAL VIT 91/IRON/FOLIC/DHA 28-975-200
COMBINATION PACKAGE (EA) ORAL
COMMUNITY
Start: 2021-06-24 | End: 2021-10-19

## 2021-07-06 NOTE — PROGRESS NOTES
Endocrine Progress Note Outpatient     Patient Care Team:  June Harrison MD as PCP - General (Family Medicine)  Izaiah Fernandez MD as Consulting Physician (Pain Medicine)  You have chosen to receive care through a telehealth visit.  Do you consent to use a video/audio connection for your medical care today? Yes.     Chief Complaint: Follow up hypothyroidism: Visit conducted through [facility].    HPI: 50-year-old female with history of hypothyroidism, prediabetes, fibromyalgia, peripheral neuropathy, COPD, hyperlipidemia, right nodule , vitamin D deficiency is followed through telehealth.    Hypothyroidism: She is on levothyroxine supplementation.    Prediabetes: She is currently on metformin.  She is trying to work on her diet and activity.    Vitamin D deficiency: On vitamin D supplementation.    Thyroid nodule: No family history of thyroid cancer no history of radiation exposure to her neck.  Denies dysphagia or choking.  Persistent change in the voice or hoarseness.    She continues to complain of fatigue and tiredness.  She denies any other significant complaints.    Past Medical History:   Diagnosis Date   • Allergic    • Anemia    • Anxiety    • Arthritis    • Asthma    • COPD (chronic obstructive pulmonary disease) (CMS/[facility])    • Costochondritis    • Degenerative disc disease, lumbar    • Depression    • Diabetes mellitus (CMS/[facility])    • GERD (gastroesophageal reflux disease)    • History of chicken pox    • Hyperlipidemia    • IBS (irritable bowel syndrome)    • Injury of back    • Irritable colon    • Knee pain, left    • Obstructive sleep apnea    • Thyroid nodule    • Tired 07/01/2019   • Vitamin D deficiency        Social History     Socioeconomic History   • Marital status:      Spouse name: Not on file   • Number of children: Not on file   • Years of education: Not on file   • Highest education level: Not on file   Tobacco Use   • Smoking status: Current Every Day Smoker     Packs/day:  0.50     Types: Cigarettes   • Smokeless tobacco: Never Used   Vaping Use   • Vaping Use: Never used   Substance and Sexual Activity   • Alcohol use: No   • Drug use: No   • Sexual activity: Defer       Family History   Problem Relation Age of Onset   • Stroke Mother    • Hypertension Mother    • Hyperlipidemia Mother    • Hypothyroidism Mother    • Prostate cancer Father    • Other Father    • Diabetes Father    • Hypertension Father    • Hyperlipidemia Father    • Breast cancer Paternal Grandmother    • Hypothyroidism Grandchild    • Ovarian cancer Neg Hx        Allergies   Allergen Reactions   • Sulfa Antibiotics Rash   • Meloxicam GI Intolerance       ROS:   Constitutional:  Admit fatigue, tiredness.    Eyes:  Denies change in visual acuity   HENT:  Denies nasal congestion or sore throat   Respiratory: denies cough, shortness of breath.   Cardiovascular:  denies chest pain, edema   GI:  Denies abdominal pain, nausea, vomiting.   Musculoskeletal:  Denies back pain or joint pain   Integument:   Denies dry skin, denies rash   Neurologic:  Denies headache, focal weakness or sensory changes   Endocrine:  Denies polyuria or polydipsia   Psychiatric:  Denies depression or anxiety      There were no vitals filed for this visit.    Physical Exam:  GEN: NAD, conversant, obese  EYES: EOMI, PERRL, no conjunctival erythema  NECK: no thyromegaly, full ROM   CV: RRR, no murmurs/rubs/gallops, no peripheral edema  LUNG: CTAB, no wheezes/rales/ronchi  SKIN: no rashes, no acanthosis  MSK: no deformities, full ROM of all extremities  NEURO: no tremors, DTR normal  PSYCH: AOX3, appropriate mood, affect normal      Results Review:     I reviewed the patient's new clinical results.    Lab Results   Component Value Date    HGBA1C 6.1 07/02/2020    HGBA1C 6.5 12/13/2018      Lab Results   Component Value Date    GLUCOSE 131 (H) 07/08/2020    BUN 13 03/30/2021    CREATININE 1.21 (H) 03/30/2021    EGFRIFNONA 52 (L) 03/30/2021     EGFRIFAFRI 60 03/30/2021    BCR 11 03/30/2021    K 4.1 03/30/2021    CO2 23 03/30/2021    CALCIUM 9.6 03/30/2021    PROTENTOTREF 7.3 03/30/2021    ALBUMIN 4.3 03/30/2021    LABIL2 1.4 03/30/2021    AST 19 03/30/2021    ALT 30 03/30/2021    CHOL 148 07/08/2020    TRIG 335 (H) 03/30/2021    LDL 89 03/30/2021    HDL 31 (L) 03/30/2021     Lab Results   Component Value Date    TSH 3.120 07/02/2021    FREET4 1.24 07/02/2021       Labs from July 2021 showed a TSH of 3.12, free T4 1.24  Labs from April 29, 2021 showed a sodium 140, potassium 3.8, chloride 105, CO2 25, glucose 151, BUN 11, creatinine 1.1, AST 37, ALT 52  Hemoglobin was 13.2 with hematocrit 39.9 and platelet count 216.    US Thyroid [[id number]] (Order [id number])   Order   Status: Final result   Appointment Information     PACS Images      Radiology Images   Study Result     Exam: Thyroid Ultrasound      History:  Right thyroid nodule, acquired hypothyroidism.     Technique: Gray scale and color doppler ultrasound of the thyroid gland.     Comparison: Thyroid ultrasound 07/11/2019, 07/10/2018, 04/19/2018.     THYROID FINDINGS:   Size:  Right lobe:  4.0 x 1.2 x 1.6 cm.  Left lobe:  3.6 x 1.4 x 1.7 cm.  Isthmus:  5 mm in thickness.  Overall Texture:  Homogeneous  Blood flow:  Normal     NODULE # 1  Location: right; lower; central.   Size: 0.9 x 0.5 x 0.8 cm, previously 9 x 5 x 8 mm on 07/11/2019 and not  definitely detected on previous ultrasound exams.  Composition: solid/almost completely solid (2 points).   Echogenicity: isoechoic (1 point).   Shape: wider than tall (0 points).   Margin: smooth (0 points).   Echogenic foci: none (0 points).  Additional echogenic foci:  none.     ACR TI-RADS total points: 3. ACR risk category: TR3. Mildly Suspicious.      ACR TI-RADS  recommendations:  No followup if TR1, TR2, or TR3 <1.5 cm.  Followup US for TR3 1.5-2.4 cm, TR4 1- 1.4 cm, TR5 0.5 - 0.9 cm.  FNA if TR 3 > or = 2.5 cm, TR4 > or = 1.5 cm, TR5 1 cm and larger         ADDITIONAL FINDINGS: There is no anterior cervical lymphadenopathy in  the vicinity of the thyroid gland.     IMPRESSION:  Impression:  Stable size of 9 mm TI-RADS 3 right thyroid nodule. No  additional follow-up is needed based on consensus guidelines.     Reference:  Yessenia BENITEZ, et al. (2017). ACR Thyroid Imaging, Reporting  and Data System (TI-RADS): White Paper of the ACR TI-RADS Committee.  Journal of the American College of Radiology (JACR), 14(5), pp.587-595.     Electronically Signed By-Kayley Thomson On:1/13/2020 11:56 AM  This report was finalized on [id number] by  Kayley Thomson, .       Medication Review: Reviewed.       Current Outpatient Medications:   •  albuterol (PROVENTIL) (2.5 MG/3ML) 0.083% nebulizer solution, USE 1 VIAL PER NEBULIZER EVERY 4 HOURS AS NEEDED, Disp: 180 mL, Rfl: 0  •  albuterol sulfate  (90 Base) MCG/ACT inhaler, TAKE 2 PUFFS BY MOUTH EVERY 4-6 HOURS AS NEEDED. MAX OF 12 PUFFS DAILY., Disp: 18 g, Rfl: 12  •  atorvastatin (LIPITOR) 40 MG tablet, TAKE 1 TABLET BY MOUTH DAILY, Disp: 90 tablet, Rfl: 3  •  azelastine (OPTIVAR) 0.05 % ophthalmic solution, , Disp: , Rfl:   •  bumetanide (BUMEX) 1 MG tablet, TAKE 1 TO 2 TABLETS BY MOUTH EVERY DAY AS NEEDED, Disp: 180 tablet, Rfl: 12  •  citalopram (CeleXA) 20 MG tablet, Take 1 tablet by mouth Daily., Disp: 30 tablet, Rfl: 12  •  Cyanocobalamin (B-12) 1000 MCG tablet controlled-release, TAKE 1 TABLET BY MOUTH EVERY DAY, Disp: 90 tablet, Rfl: 3  •  DEXILANT 60 MG capsule, TAKE 1 CAPSULE BY MOUTH EVERY DAY, Disp: 30 capsule, Rfl: 0  •  famotidine (PEPCID) 40 MG tablet, TK 1 T PO HS UTD, Disp: , Rfl:   •  fenofibrate (TRICOR) 145 MG tablet, Daily. Dx: E78.2, mixed hyperlipidema, Disp: , Rfl:   •  fluticasone (FLONASE) 50 MCG/ACT nasal spray, SHAKE LIQUID AND USE 2 SPRAYS IN EACH NOSTRIL EVERY DAY, Disp: 48 g, Rfl: 3  •  folic acid (FOLVITE) 1 MG tablet, Take 1 tablet by mouth Daily., Disp: 90 tablet, Rfl: 3  •  gabapentin  (NEURONTIN) 300 MG capsule, TAKE 1 CAPSULE BY MOUTH THREE TIMES DAILY, Disp: 90 capsule, Rfl: 12  •  Glucose Blood (BLOOD GLUCOSE TEST) strip, by In Vitro route. Dx: E11.9, DM type 2, controlled, Disp: , Rfl:   •  HYDROcodone-acetaminophen (NORCO)  MG per tablet, Take 1 tablet by mouth Every 6 (Six) Hours As Needed for Moderate Pain . *PAIN MANAGEMENT RX'S, Disp: 120 tablet, Rfl: 0  •  HYDROcodone-acetaminophen (Norco)  MG per tablet, Take 1 tablet by mouth Every 6 (Six) Hours As Needed for Moderate Pain ., Disp: 120 tablet, Rfl: 0  •  HYDROcodone-acetaminophen (Norco)  MG per tablet, Take 1 tablet by mouth Every 6 (Six) Hours As Needed for Moderate Pain ., Disp: 120 tablet, Rfl: 0  •  Lancets Misc. (ACCU-CHEK FASTCLIX LANCET) kit, Dx: E11.9, DM type 2, controlled, Disp: , Rfl:   •  levothyroxine (SYNTHROID, LEVOTHROID) 50 MCG tablet, Take 1 tablet by mouth Daily. Dx: E40.1, thyroid nodule, Disp: 90 tablet, Rfl: 4  •  loratadine (CLARITIN) 10 MG tablet, Take 1 tablet by mouth Daily., Disp: 30 tablet, Rfl: 12  •  metFORMIN ER (GLUCOPHAGE-XR) 500 MG 24 hr tablet, TAKE 1 TABLET BY MOUTH DAILY, Disp: 90 tablet, Rfl: 3  •  metoclopramide (REGLAN) 10 MG tablet, TK 1 T PO HS, Disp: , Rfl:   •  montelukast (SINGULAIR) 10 MG tablet, TAKE 1 TABLET BY MOUTH EVERY NIGHT, Disp: 90 tablet, Rfl: 0  •  Omega-3 Fatty Acids (FISH OIL) 500 MG capsule capsule, 2 capsules Daily. Dx: E78.2, mixed hyperlipidema, Disp: , Rfl:   •  ondansetron ODT (ZOFRAN-ODT) 4 MG disintegrating tablet, DIS 1 T UNT Q 6 H PRN, Disp: , Rfl: 0  •  polyethylene glycol (MIRALAX) powder, , Disp: , Rfl: 11  •  potassium chloride ER (K-Tab) 20 MEQ tablet controlled-release ER tablet, Take 1 tablet by mouth Daily. 1 tab to be taken only if taking 2 bumetanide. Dx: R60.0, lower extremity edema, Disp: 60 tablet, Rfl: 12  •  terbinafine (lamISIL) 1 % cream, APPLY BETWEEN TOES ON BOTH FEET DAILY FOR 2 WEEKS, Disp: , Rfl:   •  vitamin D3 (vitamin d)  "125 MCG (5000 UT) capsule capsule, TAKE 1 CAPSULE BY MOUTH DAILY, Disp: , Rfl:   •  WIXELA INHUB 250-50 MCG/DOSE DISKUS, INHALE 1 PUFF BY MOUTH TWICE DAILY, Disp: 60 each, Rfl: 12      Assessment/Plan   1.  Primary hypothyroidism: Well-controlled, continue current dose of levothyroxine 50 mcg p.o. daily.    2.  Vitamin D deficiency: On vitamin D replacement, continue current medications.    3.  Prediabetes: On metformin.  Advised to continue to work on diet and activity.    4.  Hyperlipidemia: On atorvastatin, continue current medications.    5.  Right thyroid nodule: Is stable at 9 mm.  No risk factors for thyroid cancer.  Ultrasound was done in January 2020.          Megan Menard MD FACE.      EMR Dragon / transcription disclaimer:     \"Dictated utilizing Dragon dictation\".                 "

## 2021-07-14 ENCOUNTER — OFFICE VISIT (OUTPATIENT)
Dept: PAIN MEDICINE | Facility: CLINIC | Age: 51
End: 2021-07-14

## 2021-07-14 VITALS
HEIGHT: 63 IN | DIASTOLIC BLOOD PRESSURE: 81 MMHG | BODY MASS INDEX: 41.64 KG/M2 | WEIGHT: 235 LBS | SYSTOLIC BLOOD PRESSURE: 166 MMHG | RESPIRATION RATE: 16 BRPM | HEART RATE: 84 BPM | TEMPERATURE: 96.9 F | OXYGEN SATURATION: 96 %

## 2021-07-14 DIAGNOSIS — M54.41 CHRONIC MIDLINE LOW BACK PAIN WITH RIGHT-SIDED SCIATICA: Primary | ICD-10-CM

## 2021-07-14 DIAGNOSIS — M51.36 DEGENERATION OF INTERVERTEBRAL DISC OF LUMBAR REGION: ICD-10-CM

## 2021-07-14 DIAGNOSIS — M50.30 DEGENERATION OF INTERVERTEBRAL DISC OF CERVICAL REGION: ICD-10-CM

## 2021-07-14 DIAGNOSIS — G89.29 CHRONIC MIDLINE LOW BACK PAIN WITH RIGHT-SIDED SCIATICA: Primary | ICD-10-CM

## 2021-07-14 DIAGNOSIS — M48.02 CERVICAL STENOSIS OF SPINE: ICD-10-CM

## 2021-07-14 DIAGNOSIS — M54.2 CERVICALGIA: ICD-10-CM

## 2021-07-14 PROCEDURE — G0463 HOSPITAL OUTPT CLINIC VISIT: HCPCS | Performed by: PHYSICAL MEDICINE & REHABILITATION

## 2021-07-14 PROCEDURE — 99213 OFFICE O/P EST LOW 20 MIN: CPT | Performed by: PHYSICAL MEDICINE & REHABILITATION

## 2021-07-14 RX ORDER — HYDROCODONE BITARTRATE AND ACETAMINOPHEN 10; 325 MG/1; MG/1
1 TABLET ORAL EVERY 6 HOURS PRN
Qty: 120 TABLET | Refills: 0 | Status: SHIPPED | OUTPATIENT
Start: 2021-07-14 | End: 2021-07-26 | Stop reason: SDUPTHER

## 2021-07-14 RX ORDER — HYDROCODONE BITARTRATE AND ACETAMINOPHEN 10; 325 MG/1; MG/1
1 TABLET ORAL EVERY 6 HOURS PRN
Qty: 120 TABLET | Refills: 0 | Status: SHIPPED | OUTPATIENT
Start: 2021-07-14 | End: 2021-10-11 | Stop reason: SDUPTHER

## 2021-07-14 NOTE — PROGRESS NOTES
Subjective   Riddhi Cid is a 50 y.o. female.     Neck pain radiates to b/l shoulders, and left arm pain. ACDF C5/7 (4/11/2016), also h/o fibromyalgia,  also pain in lower back and b/l legs and feet. 10/10 at worst, 6/10 at best, 6/10 today, worse with activity, improves with rest and meds, always present, varies, aching, radiates in BLE. Imaging reviewed, satisfactory ACDF. Referred for pain management. Neck pain improved with ACDF, tapered off [place] with worsening pain, still somewhat severe. Taking Cymbalta which helps, tried Lyrica with weight gain, trying to lose weight. Restarted [place] at BID - qdaily prn but worsening pain with exercise to lose weight, increased to BID-TID #75, then TID, then QID prn with adequate relief, reduced to #105, tolerating. Gastric sleeve surgery planned for March-April 2017, had to miss cardiac clearance and EGD due to illness and social issues, has decided against surgery, trying to lose weight with diet and exercise. Worsening b/l CTS symptoms, R>L, known h/o of CTS. Saw Juan Franz for worsening neck pain to LUE, ACDF is intact, started Diclofenac for DJD, Elavil. Could not tolerate even low-dose Gabapentin with drowsiness. Started Celebrex but did not feel like doing anything, stopped, stopped Mobic. Stopping numerous meds due to side effects, started Gabapentin with Dr. Pisano with benefit. Pain worsening, DDD on MRI, started PT which is helping.       The following portions of the patient's history were reviewed and updated as appropriate: allergies, current medications, past family history, past medical history, past social history, past surgical history and problem list.    Review of Systems   Constitutional: Negative for chills, fatigue and fever.   HENT: Negative for hearing loss and trouble swallowing.    Eyes: Negative for visual disturbance.   Respiratory: Negative for shortness of breath.    Cardiovascular: Negative for chest pain.   Gastrointestinal: Positive for  constipation and nausea. Negative for abdominal pain, diarrhea and vomiting.   Genitourinary: Negative for urinary incontinence.   Musculoskeletal: Positive for arthralgias, back pain and neck pain. Negative for joint swelling and myalgias.   Neurological: Positive for headache. Negative for dizziness, weakness and numbness.       Objective   Physical Exam   Constitutional: She is oriented to person, place, and time. She appears well-developed and well-nourished.   HENT:   Head: Normocephalic and atraumatic.   Eyes: Pupils are equal, round, and reactive to light.   Cardiovascular: Normal rate, regular rhythm and normal heart sounds.   Pulmonary/Chest: Breath sounds normal.   Abdominal: Soft. Bowel sounds are normal. She exhibits no distension. There is no abdominal tenderness.   Neurological: She is alert and oriented to person, place, and time. She has normal reflexes. She displays normal reflexes. No sensory deficit.   Psychiatric: Her behavior is normal. Thought content normal.         Assessment/Plan   Diagnoses and all orders for this visit:    1. Chronic midline low back pain with right-sided sciatica (Primary)    2. Cervicalgia    3. Cervical stenosis of spine    4. Degeneration of intervertebral disc of cervical region    5. Degeneration of intervertebral disc of lumbar region        INspect reviewed, in order. Low risk. Repeat UDS 4/19/21 in order.  Reduced to Norco 10/325mg q6-8h #105, too painful, restarted q6h prn #120, then reduced to #105 successfully but pain worsening. Cont at #120.  Afraid to start Lyrica due to possible weight gain. Restarted Gabapentin, takes 300-900mg/day as tolerated, helping a great deal.  Severe GERD, IBS, family h/o CV dz. Stopped Diclofenac, could not tolerate Celebrex 200mg qdaily. Taking Naproxen with PCP but hard on her stomach, no personal CV issues. Began Mobic 7.5mg qdaily prn.  Cont other meds as prescribed.  Cont TENS, unit provided here, patient reports it helps her  pain significantly.  Cont b/l CTS braces at night only.  Patient reports she cannot start PT at this time as her daughter currently works 12h shifts, may be able to begin in future if her daughter can change to an 8h shift schedule.  Will begin neuropathic comp cream if her current cream does not work.  Ordered LSO for truncal stability. Cont PT.  Letter to return to work. Does not drive or operate heavy machinery while using pain medication.  RTC 3 months for f/u.

## 2021-07-26 ENCOUNTER — OFFICE VISIT (OUTPATIENT)
Dept: FAMILY MEDICINE CLINIC | Facility: CLINIC | Age: 51
End: 2021-07-26

## 2021-07-26 VITALS
HEIGHT: 63 IN | DIASTOLIC BLOOD PRESSURE: 84 MMHG | HEART RATE: 91 BPM | WEIGHT: 246 LBS | BODY MASS INDEX: 43.59 KG/M2 | OXYGEN SATURATION: 99 % | TEMPERATURE: 96.9 F | RESPIRATION RATE: 18 BRPM | SYSTOLIC BLOOD PRESSURE: 126 MMHG

## 2021-07-26 DIAGNOSIS — E03.9 ACQUIRED HYPOTHYROIDISM: ICD-10-CM

## 2021-07-26 DIAGNOSIS — L30.4 INTERTRIGO: ICD-10-CM

## 2021-07-26 DIAGNOSIS — E11.8 CONTROLLED TYPE 2 DIABETES MELLITUS WITH COMPLICATION, WITHOUT LONG-TERM CURRENT USE OF INSULIN (HCC): ICD-10-CM

## 2021-07-26 DIAGNOSIS — B35.3 TINEA PEDIS OF RIGHT FOOT: ICD-10-CM

## 2021-07-26 DIAGNOSIS — E78.2 MIXED HYPERLIPIDEMIA: Primary | ICD-10-CM

## 2021-07-26 PROBLEM — R07.9 CHEST PAIN: Status: RESOLVED | Noted: 2021-05-21 | Resolved: 2021-07-26

## 2021-07-26 PROBLEM — R73.03 PREDIABETES: Status: RESOLVED | Noted: 2019-09-27 | Resolved: 2021-07-26

## 2021-07-26 PROBLEM — R06.09 DYSPNEA ON EXERTION: Status: RESOLVED | Noted: 2021-05-21 | Resolved: 2021-07-26

## 2021-07-26 LAB
GLUCOSE BLDC GLUCOMTR-MCNC: 103 MG/DL (ref 70–130)
HBA1C MFR BLD: 6.2 %

## 2021-07-26 PROCEDURE — 83036 HEMOGLOBIN GLYCOSYLATED A1C: CPT | Performed by: FAMILY MEDICINE

## 2021-07-26 PROCEDURE — 99214 OFFICE O/P EST MOD 30 MIN: CPT | Performed by: FAMILY MEDICINE

## 2021-07-26 RX ORDER — FLUCONAZOLE 150 MG/1
150 TABLET ORAL ONCE
Qty: 1 TABLET | Refills: 1 | Status: SHIPPED | OUTPATIENT
Start: 2021-07-26 | End: 2021-07-26

## 2021-07-26 RX ORDER — NYSTATIN 100000 U/G
CREAM TOPICAL 2 TIMES DAILY
Qty: 60 G | Refills: 0 | Status: SHIPPED | OUTPATIENT
Start: 2021-07-26 | End: 2021-10-19

## 2021-07-26 NOTE — PROGRESS NOTES
Subjective   Riddhi Cid is a 50 y.o. female.     Chief Complaint   Patient presents with   • Toe Pain   • Diabetes   • Hyperlipidemia       Toe Pain   The incident occurred more than 1 week ago. The incident occurred at home. The injury mechanism was a direct blow. The pain is present in the right foot and right toes. The quality of the pain is described as shooting. The pain is at a severity of 6/10. The pain is moderate. The pain has been fluctuating since onset. Pertinent negatives include no inability to bear weight, loss of motion or loss of sensation. She reports no foreign bodies present. Nothing aggravates the symptoms. She has tried nothing for the symptoms.   Diabetes  She presents for her follow-up diabetic visit. She has type 2 diabetes mellitus. No MedicAlert identification noted. There are no hypoglycemic associated symptoms. Pertinent negatives for hypoglycemia include no nervousness/anxiousness. There are no diabetic associated symptoms. Pertinent negatives for diabetes include no chest pain. There are no known risk factors for coronary artery disease. She is compliant with treatment some of the time. When asked about meal planning, she reported none. She participates in exercise intermittently. There is no change in her home blood glucose trend. She does not see a podiatrist.Eye exam is not current.   Hyperlipidemia  This is a chronic problem. The current episode started more than 1 year ago. The problem is controlled. Recent lipid tests were reviewed and are normal. Exacerbating diseases include diabetes. There are no known factors aggravating her hyperlipidemia. Pertinent negatives include no chest pain or shortness of breath. Current antihyperlipidemic treatment includes statins. The current treatment provides moderate improvement of lipids. Compliance problems include adherence to exercise and adherence to diet.  Risk factors for coronary artery disease include diabetes mellitus,  dyslipidemia, a sedentary lifestyle and obesity.        I personally reviewed and updated the patient's allergies, medications, problem list, and past medical, surgical, social, and family history. I have reviewed and confirmed the accuracy of the History of Present Illness and Review of Symptoms as documented by the MA/LPN/RN. June Harrison MD    Allergies:  Allergies   Allergen Reactions   • Sulfa Antibiotics Rash   • Meloxicam GI Intolerance       Social History:  Social History     Socioeconomic History   • Marital status:      Spouse name: Not on file   • Number of children: Not on file   • Years of education: Not on file   • Highest education level: Not on file   Tobacco Use   • Smoking status: Current Every Day Smoker     Packs/day: 0.50     Types: Cigarettes   • Smokeless tobacco: Never Used   Vaping Use   • Vaping Use: Never used   Substance and Sexual Activity   • Alcohol use: No   • Drug use: No   • Sexual activity: Defer       Family History:  Family History   Problem Relation Age of Onset   • Stroke Mother    • Hypertension Mother    • Hyperlipidemia Mother    • Hypothyroidism Mother    • Prostate cancer Father    • Other Father    • Diabetes Father    • Hypertension Father    • Hyperlipidemia Father    • Breast cancer Paternal Grandmother    • Hypothyroidism Grandchild    • Ovarian cancer Neg Hx        Past Medical History :  Patient Active Problem List   Diagnosis   • Degeneration of intervertebral disc of cervical region   • Degeneration of intervertebral disc of lumbar region   • Acquired hypothyroidism   • Mixed hyperlipidemia   • Major depressive disorder, recurrent, moderate (CMS/HCC)   • Obstructive sleep apnea   • Vitamin B12 deficiency   • Vitamin D deficiency   • COPD (chronic obstructive pulmonary disease) (CMS/HCC)   • Solitary thyroid nodule   • Chronic midline low back pain with right-sided sciatica   • Cervicalgia   • Cervical stenosis of spine   • Arthritis   • Moderate  persistent asthma without complication   • Gastroparalysis   • History of chicken pox   • IBS (irritable bowel syndrome)   • GERD (gastroesophageal reflux disease)   • Dependent edema   • Optic disc hemorrhage   • Controlled diabetes mellitus type 2 with complications (CMS/HCC)   • Tension headache   • Folliculitis   • Encounter to establish care   • Edema   • Tinea pedis of right foot   • Intertrigo       Medication List:    Current Outpatient Medications:   •  albuterol (PROVENTIL) (2.5 MG/3ML) 0.083% nebulizer solution, USE 1 VIAL PER NEBULIZER EVERY 4 HOURS AS NEEDED, Disp: 180 mL, Rfl: 0  •  albuterol sulfate  (90 Base) MCG/ACT inhaler, TAKE 2 PUFFS BY MOUTH EVERY 4-6 HOURS AS NEEDED. MAX OF 12 PUFFS DAILY., Disp: 18 g, Rfl: 12  •  atorvastatin (LIPITOR) 40 MG tablet, TAKE 1 TABLET BY MOUTH DAILY, Disp: 90 tablet, Rfl: 3  •  azelastine (OPTIVAR) 0.05 % ophthalmic solution, , Disp: , Rfl:   •  bumetanide (BUMEX) 1 MG tablet, TAKE 1 TO 2 TABLETS BY MOUTH EVERY DAY AS NEEDED, Disp: 180 tablet, Rfl: 12  •  citalopram (CeleXA) 20 MG tablet, Take 1 tablet by mouth Daily., Disp: 30 tablet, Rfl: 12  •  Cyanocobalamin (B-12) 1000 MCG tablet controlled-release, TAKE 1 TABLET BY MOUTH EVERY DAY, Disp: 90 tablet, Rfl: 3  •  DEXILANT 60 MG capsule, TAKE 1 CAPSULE BY MOUTH EVERY DAY, Disp: 30 capsule, Rfl: 0  •  famotidine (PEPCID) 40 MG tablet, TK 1 T PO HS UTD, Disp: , Rfl:   •  fenofibrate (TRICOR) 145 MG tablet, Daily. Dx: E78.2, mixed hyperlipidema, Disp: , Rfl:   •  fluticasone (FLONASE) 50 MCG/ACT nasal spray, SHAKE LIQUID AND USE 2 SPRAYS IN EACH NOSTRIL EVERY DAY, Disp: 48 g, Rfl: 3  •  folic acid (FOLVITE) 1 MG tablet, Take 1 tablet by mouth Daily., Disp: 90 tablet, Rfl: 3  •  gabapentin (NEURONTIN) 300 MG capsule, TAKE 1 CAPSULE BY MOUTH THREE TIMES DAILY, Disp: 90 capsule, Rfl: 12  •  Glucose Blood (BLOOD GLUCOSE TEST) strip, by In Vitro route. Dx: E11.9, DM type 2, controlled, Disp: , Rfl:   •   HYDROcodone-acetaminophen (NORCO)  MG per tablet, Take 1 tablet by mouth Every 6 (Six) Hours As Needed for Moderate Pain . *PAIN MANAGEMENT RX'S, Disp: 120 tablet, Rfl: 0  •  Lancets Misc. (ACCU-CHEK FASTCLIX LANCET) kit, Dx: E11.9, DM type 2, controlled, Disp: , Rfl:   •  levothyroxine (SYNTHROID, LEVOTHROID) 50 MCG tablet, Take 1 tablet by mouth Daily. Dx: E40.1, thyroid nodule, Disp: 90 tablet, Rfl: 4  •  loratadine (CLARITIN) 10 MG tablet, Take 1 tablet by mouth Daily., Disp: 30 tablet, Rfl: 12  •  metFORMIN ER (GLUCOPHAGE-XR) 500 MG 24 hr tablet, TAKE 1 TABLET BY MOUTH DAILY, Disp: 90 tablet, Rfl: 3  •  metoclopramide (REGLAN) 10 MG tablet, TK 1 T PO HS, Disp: , Rfl:   •  montelukast (SINGULAIR) 10 MG tablet, TAKE 1 TABLET BY MOUTH EVERY NIGHT, Disp: 90 tablet, Rfl: 0  •  Omega-3 Fatty Acids (FISH OIL) 500 MG capsule capsule, 2 capsules Daily. Dx: E78.2, mixed hyperlipidema, Disp: , Rfl:   •  ondansetron ODT (ZOFRAN-ODT) 4 MG disintegrating tablet, DIS 1 T UNT Q 6 H PRN, Disp: , Rfl: 0  •  polyethylene glycol (MIRALAX) powder, , Disp: , Rfl: 11  •  potassium chloride ER (K-Tab) 20 MEQ tablet controlled-release ER tablet, Take 1 tablet by mouth Daily. 1 tab to be taken only if taking 2 bumetanide. Dx: R60.0, lower extremity edema, Disp: 60 tablet, Rfl: 12  •  terbinafine (lamISIL) 1 % cream, APPLY BETWEEN TOES ON BOTH FEET DAILY FOR 2 WEEKS, Disp: , Rfl:   •  vitamin D3 (vitamin d) 125 MCG (5000 UT) capsule capsule, TAKE 1 CAPSULE BY MOUTH DAILY, Disp: , Rfl:   •  WIXELA INHUB 250-50 MCG/DOSE DISKUS, INHALE 1 PUFF BY MOUTH TWICE DAILY, Disp: 60 each, Rfl: 12  •  fluconazole (DIFLUCAN) 150 MG tablet, Take 150 mg by mouth 1 (One) Time., Disp: , Rfl:   •  nystatin (MYCOSTATIN) 696933 UNIT/GM cream, Apply  topically to the appropriate area as directed 2 (Two) Times a Day., Disp: 60 g, Rfl: 0    Past Surgical History:  Past Surgical History:   Procedure Laterality Date   • BREAST BIOPSY Right    • CARPAL TUNNEL  "RELEASE     • CERVICAL DISCECTOMY ANTERIOR     • CHOLECYSTECTOMY     • ESSURE TUBAL LIGATION         Review of Systems:  Review of Systems   Constitutional: Negative for activity change and fever.   HENT: Negative for ear pain, rhinorrhea, sinus pressure and voice change.    Eyes: Negative for visual disturbance.   Respiratory: Negative for cough and shortness of breath.    Cardiovascular: Negative for chest pain.   Gastrointestinal: Negative for abdominal pain, diarrhea, nausea and vomiting.   Endocrine: Negative for cold intolerance and heat intolerance.   Genitourinary: Negative for frequency and urgency.   Musculoskeletal: Negative for arthralgias.   Skin: Negative for rash.   Neurological: Negative for syncope.   Hematological: Does not bruise/bleed easily.   Psychiatric/Behavioral: Negative for depressed mood. The patient is not nervous/anxious.        Physical Exam:  Vital Signs:  Vital Signs:   /84 (BP Location: Left arm, Patient Position: Sitting, Cuff Size: Adult)   Pulse 91   Temp 96.9 °F (36.1 °C)   Resp 18   Ht 160 cm (63\")   Wt 112 kg (246 lb)   SpO2 99%   BMI 43.58 kg/m²     Result Review :   The following data was reviewed by: June Harrison MD on 07/26/2021:  Most Recent A1C    HGBA1C Most Recent 7/26/21   Hemoglobin A1C 6.2           Brief Urine Lab Results  (Last result in the past 365 days)      Color   Clarity   Blood   Leuk Est   Nitrite   Protein   CREAT   Urine HCG        10/06/20 1342 Yellow Clear Negative Negative Negative Negative                          Physical Exam  Vitals reviewed.   Constitutional:       Appearance: Normal appearance. She is well-developed.   HENT:      Head: Normocephalic and atraumatic.   Eyes:      General:         Right eye: No discharge.         Left eye: No discharge.   Cardiovascular:      Rate and Rhythm: Normal rate and regular rhythm.      Heart sounds: Normal heart sounds. No murmur heard.   No friction rub. No gallop.    Pulmonary:      " Effort: Pulmonary effort is normal. No respiratory distress.      Breath sounds: Normal breath sounds. No wheezing or rales.   Skin:     General: Skin is warm and dry.      Comments: Groin with erythema and odor  In between 4th and 5th digit of right foot skin is split. No drainage, no erythema   Neurological:      Mental Status: She is alert and oriented to person, place, and time.      Coordination: Coordination normal.      Gait: Gait normal.   Psychiatric:         Behavior: Behavior is cooperative.         Assessment and Plan:  Problems Addressed this Visit        Cardiac and Vasculature    Mixed hyperlipidemia - Primary    Relevant Orders    Comprehensive Metabolic Panel    Lipid Panel With / Chol / HDL Ratio       Endocrine and Metabolic    Acquired hypothyroidism    Relevant Orders    TSH    Controlled diabetes mellitus type 2 with complications (CMS/[facility])     Seeing Dr Menard. A1C is 6.2 today. She is doing well.         Relevant Orders    POC Glycosylated Hemoglobin (Hb A1C) (Completed)    POC Glucose (Completed)    POC Urinalysis Dipstick, Multipro       Skin    Tinea pedis of right foot     Diagnosis, treatment and and course discussed. Potential side effects discussed. Return if there is worsening or persistence of symptoms.            Relevant Medications    nystatin (MYCOSTATIN) 067809 UNIT/GM cream    fluconazole (DIFLUCAN) 150 MG tablet    Intertrigo     Groin  Discussed treatment with nystatin and using powder to help keep area dry         Relevant Medications    nystatin (MYCOSTATIN) 146285 UNIT/GM cream      Diagnoses       Codes Comments    Mixed hyperlipidemia    -  Primary ICD-10-CM: E78.2  ICD-9-CM: 272.2     Acquired hypothyroidism     ICD-10-CM: E03.9  ICD-9-CM: 244.9     Controlled type 2 diabetes mellitus with complication, without long-term current use of insulin (CMS/[facility])     ICD-10-CM: E11.8  ICD-9-CM: 250.90     Tinea pedis of right foot     ICD-10-CM: B35.3  ICD-9-CM: 110.4      Intertrigo     ICD-10-CM: L30.4  ICD-9-CM: 695.89            An After Visit Summary and PPPS were given to the patient.         I wore protective equipment throughout this patient encounter to include mask. Hand hygiene was performed before donning protective equipment and after removal when leaving the room.

## 2021-07-29 ENCOUNTER — TELEPHONE (OUTPATIENT)
Dept: FAMILY MEDICINE CLINIC | Facility: CLINIC | Age: 51
End: 2021-07-29

## 2021-07-29 RX ORDER — FLUCONAZOLE 150 MG/1
150 TABLET ORAL ONCE
COMMUNITY
Start: 2021-07-26 | End: 2021-10-19

## 2021-07-29 NOTE — TELEPHONE ENCOUNTER
Caller: Riddhi Cid    Relationship to patient: Self    Best call back number: [phone]    Patient is needing: PATIENT STATED THAT THE MEDICATION NYSTATIN  PRESCRIBED BY MD GRACIA IS NEEDING A PRE AUTHORIZATION FOR IT TO BE FILLED. PHARMACY TOLD PATIENT THAT THE OFFICE KNEW WHAT TO DO AND THAT SHE JUST HAD TO CALL TO INFORM OFFICE. PATIENT WOULD LIKE TO BE CONTACTED WHEN FORM SENT OVER

## 2021-07-31 LAB
ALBUMIN SERPL-MCNC: 4.3 G/DL (ref 3.8–4.9)
ALBUMIN/GLOB SERPL: 1.5 {RATIO} (ref 1.2–2.2)
ALP SERPL-CCNC: 108 IU/L (ref 48–121)
ALT SERPL-CCNC: 35 IU/L (ref 0–32)
AST SERPL-CCNC: 21 IU/L (ref 0–40)
BILIRUB SERPL-MCNC: 0.3 MG/DL (ref 0–1.2)
BUN SERPL-MCNC: 12 MG/DL (ref 6–24)
BUN/CREAT SERPL: 12 (ref 9–23)
CALCIUM SERPL-MCNC: 9.5 MG/DL (ref 8.7–10.2)
CHLORIDE SERPL-SCNC: 106 MMOL/L (ref 96–106)
CHOLEST SERPL-MCNC: 189 MG/DL (ref 100–199)
CHOLEST/HDLC SERPL: 4.8 RATIO (ref 0–4.4)
CO2 SERPL-SCNC: 25 MMOL/L (ref 20–29)
CREAT SERPL-MCNC: 0.99 MG/DL (ref 0.57–1)
GLOBULIN SER CALC-MCNC: 2.9 G/DL (ref 1.5–4.5)
GLUCOSE SERPL-MCNC: 120 MG/DL (ref 65–99)
HDLC SERPL-MCNC: 39 MG/DL
LDLC SERPL CALC-MCNC: 115 MG/DL (ref 0–99)
POTASSIUM SERPL-SCNC: 4.5 MMOL/L (ref 3.5–5.2)
PROT SERPL-MCNC: 7.2 G/DL (ref 6–8.5)
SODIUM SERPL-SCNC: 145 MMOL/L (ref 134–144)
TRIGL SERPL-MCNC: 200 MG/DL (ref 0–149)
TSH SERPL DL<=0.005 MIU/L-ACNC: 1.6 UIU/ML (ref 0.45–4.5)
VLDLC SERPL CALC-MCNC: 35 MG/DL (ref 5–40)

## 2021-08-30 DIAGNOSIS — F33.1 MAJOR DEPRESSIVE DISORDER, RECURRENT, MODERATE (HCC): ICD-10-CM

## 2021-08-30 RX ORDER — CITALOPRAM 20 MG/1
20 TABLET ORAL DAILY
Qty: 30 TABLET | Refills: 12 | Status: SHIPPED | OUTPATIENT
Start: 2021-08-30 | End: 2023-01-16 | Stop reason: SDUPTHER

## 2021-09-09 ENCOUNTER — HOSPITAL ENCOUNTER (OUTPATIENT)
Dept: GENERAL RADIOLOGY | Facility: HOSPITAL | Age: 51
Discharge: HOME | End: 2021-09-09
Admitting: INTERNAL MEDICINE

## 2021-09-09 ENCOUNTER — OFFICE (AMBULATORY)
Dept: URBAN - METROPOLITAN AREA CLINIC 64 | Facility: CLINIC | Age: 51
End: 2021-09-09

## 2021-09-09 VITALS
WEIGHT: 250 LBS | HEIGHT: 63 IN | SYSTOLIC BLOOD PRESSURE: 131 MMHG | HEART RATE: 81 BPM | DIASTOLIC BLOOD PRESSURE: 83 MMHG

## 2021-09-09 DIAGNOSIS — K75.81 NONALCOHOLIC STEATOHEPATITIS (NASH): ICD-10-CM

## 2021-09-09 DIAGNOSIS — R10.84 ABDOMINAL PAIN, GENERALIZED: ICD-10-CM

## 2021-09-09 DIAGNOSIS — K59.00 CONSTIPATION: ICD-10-CM

## 2021-09-09 DIAGNOSIS — K30 FUNCTIONAL DYSPEPSIA: ICD-10-CM

## 2021-09-09 DIAGNOSIS — R10.13 EPIGASTRIC PAIN: ICD-10-CM

## 2021-09-09 DIAGNOSIS — K59.00 CONSTIPATION, UNSPECIFIED: ICD-10-CM

## 2021-09-09 DIAGNOSIS — R53.83 OTHER FATIGUE: ICD-10-CM

## 2021-09-09 DIAGNOSIS — E11.9 TYPE 2 DIABETES MELLITUS WITHOUT COMPLICATIONS: ICD-10-CM

## 2021-09-09 DIAGNOSIS — R14.0 ABDOMINAL DISTENSION (GASEOUS): ICD-10-CM

## 2021-09-09 DIAGNOSIS — R11.0 NAUSEA: ICD-10-CM

## 2021-09-09 PROCEDURE — 74018 RADEX ABDOMEN 1 VIEW: CPT

## 2021-09-09 PROCEDURE — 99214 OFFICE O/P EST MOD 30 MIN: CPT | Performed by: INTERNAL MEDICINE

## 2021-09-09 RX ORDER — DICYCLOMINE HYDROCHLORIDE 20 MG/1
80 TABLET ORAL
Qty: 120 | Refills: 2 | Status: COMPLETED
Start: 2021-09-09 | End: 2023-11-01

## 2021-09-13 ENCOUNTER — TELEPHONE (OUTPATIENT)
Dept: FAMILY MEDICINE CLINIC | Facility: CLINIC | Age: 51
End: 2021-09-13

## 2021-09-13 DIAGNOSIS — R35.0 URINARY FREQUENCY: Primary | ICD-10-CM

## 2021-09-13 RX ORDER — CEPHALEXIN 500 MG/1
500 CAPSULE ORAL 3 TIMES DAILY
Qty: 30 CAPSULE | Refills: 0 | Status: SHIPPED | OUTPATIENT
Start: 2021-09-13 | End: 2021-10-19

## 2021-09-13 NOTE — TELEPHONE ENCOUNTER
Patient called stating she was having urgency and frequency. She is not having any pain but does not feel like she can empty her bladder. Please advise

## 2021-09-17 ENCOUNTER — TELEPHONE (OUTPATIENT)
Dept: PAIN MEDICINE | Facility: CLINIC | Age: 51
End: 2021-09-17

## 2021-09-17 NOTE — TELEPHONE ENCOUNTER
Called patient and left a message on answering machine that Dr. Fernandez was out of the office today.  I instructed her to try again tomorrow and that we sent a phone note to Dr. Fernandez.               Sue Mckeon RN

## 2021-09-17 NOTE — TELEPHONE ENCOUNTER
Caller: TRINOEMMA KAMINSKI    Relationship: SELF    Best call back number: [phone]    REASON FOR CALL:  UNABLE TO WT - RICARDO WAS IN MIDDLE OF PATIENT. REQUESTED TO SEND TE. PATIENT'S PHARMACY ON FILE, [facility] IS CLOS[facility] AND [facility]'S WHERE HER RX FOR HYDROCODONE  WAS SENT AND THEY ARE ONLY TAKING CASH AND SHE ONLY HAS CARD -   [facility] IN [place], [place] SAID THEY COULD FILL IT, BUT WOULD NEED A TOTALLY NEW RX, AS THEY COULDN'T PULL THE ONE SENT TO [facility].  PLEASE NOTIFY PATIENT IF CAN BE SENT.    SHE RUNS OUT TODAY.

## 2021-09-23 RX ORDER — CHOLECALCIFEROL (VITAMIN D3) 25 MCG
TABLET,CHEWABLE ORAL
Qty: 90 TABLET | Refills: 3 | Status: SHIPPED | OUTPATIENT
Start: 2021-09-23

## 2021-09-28 RX ORDER — ATORVASTATIN CALCIUM 40 MG/1
TABLET, FILM COATED ORAL
Qty: 90 TABLET | Refills: 3 | Status: SHIPPED | OUTPATIENT
Start: 2021-09-28 | End: 2022-04-22

## 2021-10-07 ENCOUNTER — OFFICE (AMBULATORY)
Dept: URBAN - METROPOLITAN AREA CLINIC 64 | Facility: CLINIC | Age: 51
End: 2021-10-07

## 2021-10-07 VITALS
HEART RATE: 83 BPM | WEIGHT: 253 LBS | DIASTOLIC BLOOD PRESSURE: 57 MMHG | HEIGHT: 63 IN | SYSTOLIC BLOOD PRESSURE: 106 MMHG

## 2021-10-07 DIAGNOSIS — R10.84 GENERALIZED ABDOMINAL PAIN: ICD-10-CM

## 2021-10-07 DIAGNOSIS — R14.0 ABDOMINAL DISTENSION (GASEOUS): ICD-10-CM

## 2021-10-07 DIAGNOSIS — K59.00 CONSTIPATION, UNSPECIFIED: ICD-10-CM

## 2021-10-07 DIAGNOSIS — K58.1 IRRITABLE BOWEL SYNDROME WITH CONSTIPATION: ICD-10-CM

## 2021-10-07 PROCEDURE — 99214 OFFICE O/P EST MOD 30 MIN: CPT | Performed by: INTERNAL MEDICINE

## 2021-10-07 RX ORDER — LEVOTHYROXINE SODIUM 0.05 MG/1
TABLET ORAL
Qty: 90 TABLET | Refills: 4 | Status: SHIPPED | OUTPATIENT
Start: 2021-10-07 | End: 2023-03-27

## 2021-10-07 RX ORDER — LINACLOTIDE 290 UG/1
CAPSULE, GELATIN COATED ORAL
Qty: 90 | Refills: 3 | Status: ACTIVE

## 2021-10-11 ENCOUNTER — OFFICE VISIT (OUTPATIENT)
Dept: PAIN MEDICINE | Facility: CLINIC | Age: 51
End: 2021-10-11

## 2021-10-11 VITALS
SYSTOLIC BLOOD PRESSURE: 131 MMHG | DIASTOLIC BLOOD PRESSURE: 84 MMHG | TEMPERATURE: 97.1 F | WEIGHT: 255 LBS | RESPIRATION RATE: 16 BRPM | OXYGEN SATURATION: 95 % | BODY MASS INDEX: 45.18 KG/M2 | HEART RATE: 77 BPM | HEIGHT: 63 IN

## 2021-10-11 DIAGNOSIS — M54.41 CHRONIC MIDLINE LOW BACK PAIN WITH RIGHT-SIDED SCIATICA: Primary | ICD-10-CM

## 2021-10-11 DIAGNOSIS — G89.29 CHRONIC MIDLINE LOW BACK PAIN WITH RIGHT-SIDED SCIATICA: Primary | ICD-10-CM

## 2021-10-11 DIAGNOSIS — M54.2 CERVICALGIA: ICD-10-CM

## 2021-10-11 DIAGNOSIS — M50.30 DEGENERATION OF INTERVERTEBRAL DISC OF CERVICAL REGION: ICD-10-CM

## 2021-10-11 DIAGNOSIS — M48.02 CERVICAL STENOSIS OF SPINE: ICD-10-CM

## 2021-10-11 DIAGNOSIS — M51.36 DEGENERATION OF INTERVERTEBRAL DISC OF LUMBAR REGION: ICD-10-CM

## 2021-10-11 DIAGNOSIS — M54.50 CHRONIC BILATERAL LOW BACK PAIN WITHOUT SCIATICA: ICD-10-CM

## 2021-10-11 DIAGNOSIS — G89.29 CHRONIC BILATERAL LOW BACK PAIN WITHOUT SCIATICA: ICD-10-CM

## 2021-10-11 PROCEDURE — G0463 HOSPITAL OUTPT CLINIC VISIT: HCPCS | Performed by: PHYSICAL MEDICINE & REHABILITATION

## 2021-10-11 PROCEDURE — 99214 OFFICE O/P EST MOD 30 MIN: CPT | Performed by: PHYSICAL MEDICINE & REHABILITATION

## 2021-10-11 RX ORDER — HYDROCODONE BITARTRATE AND ACETAMINOPHEN 10; 325 MG/1; MG/1
1 TABLET ORAL EVERY 6 HOURS PRN
Qty: 120 TABLET | Refills: 0 | Status: SHIPPED | OUTPATIENT
Start: 2021-10-11 | End: 2021-10-19

## 2021-10-11 RX ORDER — HYDROCODONE BITARTRATE AND ACETAMINOPHEN 10; 325 MG/1; MG/1
1 TABLET ORAL EVERY 6 HOURS PRN
Qty: 120 TABLET | Refills: 0 | Status: SHIPPED | OUTPATIENT
Start: 2021-10-11 | End: 2021-10-19 | Stop reason: SDUPTHER

## 2021-10-11 RX ORDER — LINACLOTIDE 290 UG/1
CAPSULE, GELATIN COATED ORAL
COMMUNITY
Start: 2021-10-07

## 2021-10-11 RX ORDER — HYDROCODONE BITARTRATE AND ACETAMINOPHEN 10; 325 MG/1; MG/1
1 TABLET ORAL EVERY 6 HOURS PRN
Qty: 120 TABLET | Refills: 0 | Status: SHIPPED | OUTPATIENT
Start: 2021-10-11 | End: 2022-01-10 | Stop reason: SDUPTHER

## 2021-10-11 RX ORDER — TIZANIDINE 4 MG/1
4 TABLET ORAL NIGHTLY PRN
Qty: 30 TABLET | Refills: 2 | Status: SHIPPED | OUTPATIENT
Start: 2021-10-11 | End: 2022-05-13

## 2021-10-11 RX ORDER — GABAPENTIN 300 MG/1
300 CAPSULE ORAL 3 TIMES DAILY
Qty: 90 CAPSULE | Refills: 5 | Status: SHIPPED | OUTPATIENT
Start: 2021-10-11 | End: 2022-01-10 | Stop reason: SDUPTHER

## 2021-10-11 RX ORDER — DICYCLOMINE HCL 20 MG
20 TABLET ORAL EVERY 6 HOURS PRN
COMMUNITY
Start: 2021-09-09

## 2021-10-11 NOTE — PROGRESS NOTES
Subjective   Riddhi Cid is a 51 y.o. female.     Neck pain radiates to b/l shoulders, and left arm pain. ACDF C5/7 (4/11/2016), also h/o fibromyalgia,  also pain in lower back and b/l legs and feet. 10/10 at worst, 6/10 at best, 6/10 today, worse with activity, improves with rest and meds, always present, varies, aching, radiates in BLE. Imaging reviewed, satisfactory ACDF. Referred for pain management. Neck pain improved with ACDF, tapered off Rusk with worsening pain, still somewhat severe. Taking Cymbalta which helps, tried Lyrica with weight gain, trying to lose weight. Restarted Rusk at BID - qdaily prn but worsening pain with exercise to lose weight, increased to BID-TID #75, then TID, then QID prn with adequate relief, reduced to #105, tolerating. Gastric sleeve surgery planned for March-April 2017, had to miss cardiac clearance and EGD due to illness and social issues, has decided against surgery, trying to lose weight with diet and exercise. Worsening b/l CTS symptoms, R>L, known h/o of CTS. Saw Juan Franz for worsening neck pain to LUE, ACDF is intact, started Diclofenac for DJD, Elavil. Could not tolerate even low-dose Gabapentin with drowsiness. Started Celebrex but did not feel like doing anything, stopped, stopped Mobic. Stopping numerous meds due to side effects, started Gabapentin with Dr. Pisano with benefit. Pain worsening, DDD on MRI, started PT which is helping. Back pain worsening, especially at night.       The following portions of the patient's history were reviewed and updated as appropriate: allergies, current medications, past family history, past medical history, past social history, past surgical history and problem list.    Review of Systems   Constitutional: Negative for chills, fatigue and fever.   HENT: Negative for hearing loss and trouble swallowing.    Eyes: Negative for visual disturbance.   Respiratory: Negative for shortness of breath.    Cardiovascular: Negative for  chest pain.   Gastrointestinal: Positive for constipation and nausea. Negative for abdominal pain, diarrhea and vomiting.   Genitourinary: Negative for urinary incontinence.   Musculoskeletal: Positive for arthralgias, back pain and neck pain. Negative for joint swelling and myalgias.   Neurological: Positive for headache. Negative for dizziness, weakness and numbness.       Objective   Physical Exam   Constitutional: She is oriented to person, place, and time. She appears well-developed and well-nourished.   HENT:   Head: Normocephalic and atraumatic.   Eyes: Pupils are equal, round, and reactive to light.   Cardiovascular: Normal rate, regular rhythm and normal heart sounds.   Pulmonary/Chest: Breath sounds normal.   Abdominal: Soft. Bowel sounds are normal. She exhibits no distension. There is no abdominal tenderness.   Neurological: She is alert and oriented to person, place, and time. She has normal reflexes. She displays normal reflexes. No sensory deficit.   Psychiatric: Her behavior is normal. Thought content normal.         Assessment/Plan   Diagnoses and all orders for this visit:    1. Chronic midline low back pain with right-sided sciatica (Primary)    2. Cervicalgia    3. Degeneration of intervertebral disc of cervical region    4. Degeneration of intervertebral disc of lumbar region    5. Cervical stenosis of spine        INspect reviewed, in order. Low risk. Repeat UDS 4/19/21 in order.  Reduced to Norco 10/325mg q6-8h #105, too painful, restarted q6h prn #120, then reduced to #105 successfully but pain worsening. Cont at #120.  Afraid to start Lyrica due to possible weight gain. Restarted Gabapentin, takes 300-900mg/day as tolerated, helping a great deal.  Severe GERD, IBS, family h/o CV dz. Stopped Diclofenac, could not tolerate Celebrex 200mg qdaily. Taking Naproxen with PCP but hard on her stomach, no personal CV issues. Began Mobic 7.5mg qdaily prn.  Begin Tizanidine 4mg qHS prn.  Cont other meds  as prescribed.  Cont TENS, unit provided here, patient reports it helps her pain significantly.  Cont b/l CTS braces at night only.  Patient reports she cannot start PT at this time as her daughter currently works 12h shifts, may be able to begin in future if her daughter can change to an 8h shift schedule.  Will begin neuropathic comp cream if her current cream does not work.  Ordered LSO for truncal stability. Cont PT.  Letter to return to work. Does not drive or operate heavy machinery while using pain medication.  RTC 3 months for f/u.

## 2021-10-14 NOTE — PROGRESS NOTES
"Chief Complaint  Sleep Apnea    Riddhi Cid presents to [facility]  History of Present Illness  Patient is here for follow up on SIMONE,   Pt quit using cpap last year due to problems,   Has had bronchitis and coughing   Machine was recalled so did not resume use      Pt is tired all the time. And snores with sleep apnea with out cpap use.   The patient's hypersomnia has worsened     Dillard Sleepiness Scale:  Sitting and reading 3 WatchingTV 2  Sitting, inactive, in a public place 0  As a passenger in a car for 1 hour w/o a break  3  Lying down to rest in the afternoon  3  Sitting and talking to someone  0  Sitting quietly after a lunch  2  In a car, while stopped for traffic or a light  0  Total 13    Review of Systems   Constitutional: Negative for chills and fever.   HENT: Negative for facial swelling and hearing loss.    Eyes: Negative for discharge and itching.   Respiratory: Positive for cough. Negative for shortness of breath.    Cardiovascular: Positive for leg swelling. Negative for chest pain.   Gastrointestinal: Negative for abdominal pain and blood in stool.   Endocrine: Negative for cold intolerance and heat intolerance.   Genitourinary: Negative for difficulty urinating and frequency.   Musculoskeletal: Negative for back pain and joint swelling.   Skin: Negative for rash and wound.   Allergic/Immunologic: Positive for environmental allergies. Negative for food allergies.   Neurological: Positive for headaches. Negative for light-headedness.   Hematological: Bruises/bleeds easily.   Psychiatric/Behavioral: Positive for confusion. Negative for hallucinations.       Objective   Vital Signs:   /73   Pulse 83   Temp 98.9 °F (37.2 °C)   Resp 15   Ht 160 cm (63\")   Wt 114 kg (252 lb)   SpO2 96%   BMI 44.64 kg/m²     Physical Exam  Vitals reviewed.   Constitutional:       Appearance: Normal appearance.   HENT:      Head: Normocephalic.      Nose: Nose normal.      " Mouth/Throat:      Mouth: Mucous membranes are moist.   Eyes:      Pupils: Pupils are equal, round, and reactive to light.   Cardiovascular:      Rate and Rhythm: Normal rate.   Pulmonary:      Effort: Pulmonary effort is normal. No respiratory distress.      Breath sounds: Wheezing present.      Comments: Few wheezes  Musculoskeletal:      Right lower leg: No edema.      Left lower leg: No edema.   Neurological:      General: No focal deficit present.      Mental Status: She is alert and oriented to person, place, and time.   Psychiatric:         Mood and Affect: Mood normal.                 Assessment and Plan    Diagnoses and all orders for this visit:    1. Obstructive sleep apnea (Primary)  -     Polysomnography 4 or More Parameters; Future  -     COVID PRE-OP / PRE-PROCEDURE SCREENING ORDER (NO ISOLATION) - Swab, Nasopharynx; Future    2. Simple chronic bronchitis (HCC)        History of lexie not currently treated, has gained weight, with continue symptoms of lexie  Will re evaluate with split night study and treat with pap as indicated    Pt encouraged to lose weight, and quit smoking      Follow Up   Return in about 3 months (around 1/19/2022).    Patient was given instructions and counseling regarding her condition or for health maintenance advice. Please see specific information pulled into the AVS if appropriate.       This document has been electronically signed by Joseph Seipel, MD on October 21, 2021 18:05 EDT

## 2021-10-19 ENCOUNTER — OFFICE VISIT (OUTPATIENT)
Dept: NEUROLOGY | Facility: CLINIC | Age: 51
End: 2021-10-19

## 2021-10-19 VITALS
HEIGHT: 63 IN | OXYGEN SATURATION: 96 % | DIASTOLIC BLOOD PRESSURE: 73 MMHG | RESPIRATION RATE: 15 BRPM | SYSTOLIC BLOOD PRESSURE: 123 MMHG | HEART RATE: 83 BPM | TEMPERATURE: 98.9 F | WEIGHT: 252 LBS | BODY MASS INDEX: 44.65 KG/M2

## 2021-10-19 DIAGNOSIS — G47.33 OBSTRUCTIVE SLEEP APNEA: Primary | ICD-10-CM

## 2021-10-19 DIAGNOSIS — J41.0 SIMPLE CHRONIC BRONCHITIS (HCC): ICD-10-CM

## 2021-10-19 PROBLEM — J30.9 ALLERGIC RHINITIS: Status: ACTIVE | Noted: 2021-09-30

## 2021-10-19 PROBLEM — M62.9 DISORDER OF SKELETAL MUSCLE: Status: ACTIVE | Noted: 2021-09-30

## 2021-10-19 PROBLEM — M79.7 FIBROMYOSITIS: Status: ACTIVE | Noted: 2021-09-30

## 2021-10-19 PROBLEM — G64 DISORDER OF PERIPHERAL NERVOUS SYSTEM: Status: ACTIVE | Noted: 2021-09-30

## 2021-10-19 PROBLEM — F17.200 TOBACCO DEPENDENCE SYNDROME: Status: ACTIVE | Noted: 2021-09-30

## 2021-10-19 PROCEDURE — 99204 OFFICE O/P NEW MOD 45 MIN: CPT | Performed by: PSYCHIATRY & NEUROLOGY

## 2021-11-08 ENCOUNTER — LAB (OUTPATIENT)
Dept: LAB | Facility: HOSPITAL | Age: 51
End: 2021-11-08

## 2021-11-08 DIAGNOSIS — G47.33 OBSTRUCTIVE SLEEP APNEA: ICD-10-CM

## 2021-11-08 PROCEDURE — U0005 INFEC AGEN DETEC AMPLI PROBE: HCPCS

## 2021-11-08 PROCEDURE — C9803 HOPD COVID-19 SPEC COLLECT: HCPCS

## 2021-11-08 PROCEDURE — U0004 COV-19 TEST NON-CDC HGH THRU: HCPCS

## 2021-11-09 LAB — SARS-COV-2 ORF1AB RESP QL NAA+PROBE: NOT DETECTED

## 2021-11-10 ENCOUNTER — HOSPITAL ENCOUNTER (OUTPATIENT)
Dept: SLEEP MEDICINE | Facility: HOSPITAL | Age: 51
Discharge: HOME | End: 2021-11-10
Admitting: PSYCHIATRY & NEUROLOGY

## 2021-11-10 DIAGNOSIS — G47.33 OBSTRUCTIVE SLEEP APNEA: ICD-10-CM

## 2021-11-10 PROCEDURE — 95810 POLYSOM 6/> YRS 4/> PARAM: CPT | Performed by: PSYCHIATRY & NEUROLOGY

## 2021-11-10 PROCEDURE — 95810 POLYSOM 6/> YRS 4/> PARAM: CPT

## 2021-11-29 ENCOUNTER — TELEPHONE (OUTPATIENT)
Dept: NEUROLOGY | Facility: CLINIC | Age: 51
End: 2021-11-29

## 2021-12-02 ENCOUNTER — TELEPHONE (OUTPATIENT)
Dept: NEUROLOGY | Facility: CLINIC | Age: 51
End: 2021-12-02

## 2021-12-02 DIAGNOSIS — G47.33 OBSTRUCTIVE SLEEP APNEA: Primary | ICD-10-CM

## 2021-12-09 ENCOUNTER — TELEPHONE (OUTPATIENT)
Dept: NEUROLOGY | Facility: CLINIC | Age: 51
End: 2021-12-09

## 2021-12-09 NOTE — TELEPHONE ENCOUNTER
Caller: DONAVON WITH [employer] [facility]  Best call back number: [phone]    What form or medical record are you requesting: NEEDING A SIGNED SLEEP STUDY FOR 11-10-21 AND OV NOTE FOR 10-19-21 NEEDS ADDENDUM IT NEEDS TO HAVE ANY SYMPTOMS OF THE FOLLOWING: SNORING,DAY TIME SLEEPNESS, OBSERVE APNEA CHOKING OR GASPING DURING SLEEP MORNING HEADACHES. SHE HAD ALREADY SENT A FAX ON 12-3-21 FOR THIS INFORMATION BUT HAS RECEIVED ANY THING.    Who is requesting this form or medical record from you: [employer] WATSON    How would you like to receive the form or medical records (pick-up, mail, fax): FAX  If fax, what is the fax number: [phone]    NEED ASAP

## 2021-12-17 ENCOUNTER — TELEPHONE (OUTPATIENT)
Dept: FAMILY MEDICINE CLINIC | Facility: CLINIC | Age: 51
End: 2021-12-17

## 2021-12-17 RX ORDER — FLUCONAZOLE 150 MG/1
150 TABLET ORAL ONCE
Qty: 1 TABLET | Refills: 0 | Status: SHIPPED | OUTPATIENT
Start: 2021-12-17 | End: 2021-12-17

## 2021-12-17 NOTE — TELEPHONE ENCOUNTER
Patient called seeking same day. Has congestion, cough, slight fever. Advised patient that first available appt was 12/22. Patient stated she was going to try urgent care.    Patient also stated that she has a yeast infection. Asked if there was anything that could be sent into pharmacy for her? She also stated she will ask urgent care, if she did not hear back from us before she went.

## 2021-12-28 RX ORDER — ALBUTEROL SULFATE 90 UG/1
AEROSOL, METERED RESPIRATORY (INHALATION)
Qty: 18 G | Refills: 12 | OUTPATIENT
Start: 2021-12-28

## 2021-12-30 RX ORDER — ALBUTEROL SULFATE 90 UG/1
2 AEROSOL, METERED RESPIRATORY (INHALATION) EVERY 4 HOURS PRN
Qty: 18 G | Refills: 0 | Status: SHIPPED | OUTPATIENT
Start: 2021-12-30 | End: 2022-02-14

## 2022-01-01 NOTE — TELEPHONE ENCOUNTER
Dr. Harrison - Message sent    LACTATION PROGRESS NOTE    DELIVERY INFORMATION:   Date and time of birth: 2023 10:51 AM   Delivery method: , Low Transverse [251]   Gestational age:37w0d   Birth weight: 7 lb 14.5 oz (3585 g)     MATERNAL INFORMATION:   Age: 37 year old   /Para:    Maternal History:  Past Medical History:   Diagnosis Date   • Cholestasis of pregnancy    • Fatty liver    • GERD (gastroesophageal reflux disease)    • Hx of gallstones       Social History     Tobacco Use   • Smoking status: Former Smoker     Packs/day: 0.00     Quit date: 2015     Years since quittin.7   • Smokeless tobacco: Never Used   Vaping Use   • Vaping Use: never used   Substance Use Topics   • Alcohol use: Not Currently     Comment: socially   • Drug use: Never      Prior to Admission medications    Medication Sig Start Date End Date Taking? Authorizing Provider   ursodiol (ACTIGALL) 300 MG capsule Take 1 capsule by mouth in the morning and 1 capsule before bedtime. 22  Yes Mariola Morris MD   Prenatal MV-Min-Fe Fum-FA-DHA (PRENATAL 1 PO)    Yes Outside Provider   omeprazole (PriLOSEC) 40 MG capsule Take 40 mg by mouth. 21  Yes Outside Provider   loratadine (CLARITIN) 10 MG tablet Take 1 tablet by mouth daily. 22   Mariola Morris MD   diphenhydrAMINE (BENADRYL) 25 MG tablet Take 1 tablet by mouth every 6 hours as needed for Itching. 22   Mariola Morris MD   FOLIC ACID PO     Outside Provider   ondansetron (ZOFRAN) 4 MG tablet Take 1 tablet by mouth every 8 hours as needed for Nausea. 22   Leonard Reaves, DO   Calcium Carbonate Antacid (TUMS PO)     Outside Provider      History of feedings with prior children:  Mother groggy reports unsuccessful with prior breastfeeding  Current feeding goal:  Breastfeed    Feeding history:  First Feed  Last fed at donor milk in recovery room     INFORMATION:             LACTATION CONSULTATION:   Reason for visit:   Initial      Assessment/Intervention:  Upon arrival to room, Mother in bed and very groggy, baby swaddled in bassinet  Support person at bedside  Mother very sleepy, attempted to assist with latch and feeding baby. Undressed to diaper. Mother expressed she is having pain and does not want to put the baby to breast right now. Discussed would recommend pumping at this point. Mother set up with [facility]- was able to pump 0.5 ml of colostrum which was given to the baby via syringe/finger feed. Encourage feeding q 3 hrs or pumping if baby is getting a bottle. Mother dozing in and out.   Education provided:   Normal  behavior  Breastfeeding- General Information  importance skin to skin and when to call an LC  Breastfeeding- Establishing Milk supply/maintaining  Breastmilk production, colostrum and frequency and duration of breastfeeding    Resources   Lactation Warm-Line  Alternative feeding methods  bottle feeding   Supplementing and Formula  Term Donor Milk         Readiness: Acceptance  Method:  Explain  Response:  Needs reinforcement         Supplies Given:  Breast pump parts  Breast pump- [facility] grade pump    Handouts Given:  None at this time    Plan:  Pay attention to early hunger cues (rooting, smacking lips, sticking out his tongue)  Continue feeding baby when any feeding cues demonstrated  Increased skin to skin, especially during the day between feeds to allow  to be more wakeful and show increased feeding cues  Undress baby to waken  Hold baby skin-to-skin during feedings  Offer feeding by placing baby skin to skin every 2-3 hours if baby does not wake  Minimum of 8 feedings/day  Call lactation warmline for continued support

## 2022-01-10 ENCOUNTER — OFFICE VISIT (OUTPATIENT)
Dept: PAIN MEDICINE | Facility: CLINIC | Age: 52
End: 2022-01-10

## 2022-01-10 VITALS
RESPIRATION RATE: 16 BRPM | BODY MASS INDEX: 43.59 KG/M2 | HEIGHT: 63 IN | HEART RATE: 80 BPM | DIASTOLIC BLOOD PRESSURE: 88 MMHG | TEMPERATURE: 96.9 F | SYSTOLIC BLOOD PRESSURE: 144 MMHG | WEIGHT: 246 LBS | OXYGEN SATURATION: 95 %

## 2022-01-10 DIAGNOSIS — M48.02 CERVICAL STENOSIS OF SPINE: ICD-10-CM

## 2022-01-10 DIAGNOSIS — M54.41 CHRONIC MIDLINE LOW BACK PAIN WITH RIGHT-SIDED SCIATICA: Primary | ICD-10-CM

## 2022-01-10 DIAGNOSIS — M54.50 CHRONIC BILATERAL LOW BACK PAIN WITHOUT SCIATICA: ICD-10-CM

## 2022-01-10 DIAGNOSIS — G89.29 CHRONIC BILATERAL LOW BACK PAIN WITHOUT SCIATICA: ICD-10-CM

## 2022-01-10 DIAGNOSIS — M54.2 CERVICALGIA: ICD-10-CM

## 2022-01-10 DIAGNOSIS — M79.7 FIBROMYOSITIS: ICD-10-CM

## 2022-01-10 DIAGNOSIS — M50.30 DEGENERATION OF INTERVERTEBRAL DISC OF CERVICAL REGION: ICD-10-CM

## 2022-01-10 DIAGNOSIS — G89.29 CHRONIC MIDLINE LOW BACK PAIN WITH RIGHT-SIDED SCIATICA: Primary | ICD-10-CM

## 2022-01-10 PROCEDURE — G0463 HOSPITAL OUTPT CLINIC VISIT: HCPCS | Performed by: PHYSICAL MEDICINE & REHABILITATION

## 2022-01-10 PROCEDURE — 99213 OFFICE O/P EST LOW 20 MIN: CPT | Performed by: PHYSICAL MEDICINE & REHABILITATION

## 2022-01-10 RX ORDER — ASPIRIN 81 MG/1
81 TABLET, CHEWABLE ORAL DAILY
COMMUNITY

## 2022-01-10 RX ORDER — HYDROCODONE BITARTRATE AND ACETAMINOPHEN 10; 325 MG/1; MG/1
1 TABLET ORAL EVERY 6 HOURS PRN
Qty: 120 TABLET | Refills: 0 | Status: SHIPPED | OUTPATIENT
Start: 2022-01-10 | End: 2022-01-14 | Stop reason: SDUPTHER

## 2022-01-10 RX ORDER — GABAPENTIN 300 MG/1
300 CAPSULE ORAL 3 TIMES DAILY
Qty: 90 CAPSULE | Refills: 5 | Status: SHIPPED | OUTPATIENT
Start: 2022-01-10

## 2022-01-10 RX ORDER — HYDROCODONE BITARTRATE AND ACETAMINOPHEN 10; 325 MG/1; MG/1
1 TABLET ORAL EVERY 6 HOURS PRN
Qty: 120 TABLET | Refills: 0 | Status: SHIPPED | OUTPATIENT
Start: 2022-01-10 | End: 2022-04-11 | Stop reason: SDUPTHER

## 2022-01-10 NOTE — PROGRESS NOTES
Subjective   Riddhi Cid is a 51 y.o. female.     Neck pain radiates to b/l shoulders, and left arm pain. ACDF C5/7 (4/11/2016), also h/o fibromyalgia,  also pain in lower back and b/l legs and feet. 10/10 at worst, 6/10 at best, 5/10 today, worse with activity, improves with rest and meds, always present, varies, aching, radiates in BLE. Imaging reviewed, satisfactory ACDF. Referred for pain management. Neck pain improved with ACDF, tapered off [place] with worsening pain, still somewhat severe. Taking Cymbalta which helps, tried Lyrica with weight gain, trying to lose weight. Restarted [place] at BID - qdaily prn but worsening pain with exercise to lose weight, increased to BID-TID #75, then TID, then QID prn with adequate relief, reduced to #105, tolerating. Gastric sleeve surgery planned for March-April 2017, had to miss cardiac clearance and EGD due to illness and social issues, has decided against surgery, trying to lose weight with diet and exercise. Worsening b/l CTS symptoms, R>L, known h/o of CTS. Saw Juan Franz for worsening neck pain to LUE, ACDF is intact, started Diclofenac for DJD, Elavil. Could not tolerate even low-dose Gabapentin with drowsiness. Started Celebrex but did not feel like doing anything, stopped, stopped Mobic. Stopping numerous meds due to side effects, started Gabapentin with Dr. Pisano with benefit. Pain worsening, DDD on MRI, started PT which is helping. Back pain worsening, especially at night. Had COVID-19, doing better, plans to get vaccinated.       The following portions of the patient's history were reviewed and updated as appropriate: allergies, current medications, past family history, past medical history, past social history, past surgical history and problem list.    Review of Systems   Constitutional: Negative for chills, fatigue and fever.   HENT: Negative for hearing loss and trouble swallowing.    Eyes: Negative for visual disturbance.   Respiratory: Negative for  shortness of breath.    Cardiovascular: Negative for chest pain.   Gastrointestinal: Positive for constipation and nausea. Negative for abdominal pain, diarrhea and vomiting.   Genitourinary: Negative for urinary incontinence.   Musculoskeletal: Positive for arthralgias, back pain and neck pain. Negative for joint swelling and myalgias.   Neurological: Positive for headache. Negative for dizziness, weakness and numbness.       Objective   Physical Exam   Constitutional: She is oriented to person, place, and time. She appears well-developed and well-nourished.   HENT:   Head: Normocephalic and atraumatic.   Eyes: Pupils are equal, round, and reactive to light.   Cardiovascular: Normal rate, regular rhythm and normal heart sounds.   Pulmonary/Chest: Breath sounds normal.   Abdominal: Soft. Bowel sounds are normal. She exhibits no distension. There is no abdominal tenderness.   Neurological: She is alert and oriented to person, place, and time. She has normal reflexes. She displays normal reflexes. No sensory deficit.   Psychiatric: Her behavior is normal. Thought content normal.         Assessment/Plan   Diagnoses and all orders for this visit:    1. Chronic midline low back pain with right-sided sciatica (Primary)    2. Cervicalgia    3. Cervical stenosis of spine    4. Degeneration of intervertebral disc of cervical region    5. Fibromyositis        INspect reviewed, in order. Low risk. Repeat UDS 4/19/21 in order.  Reduced to Norco 10/325mg q6-8h #105, too painful, restarted q6h prn #120, then reduced to #105 successfully but pain worsening. Cont at #120.  Afraid to start Lyrica due to possible weight gain. Restarted Gabapentin, takes 300-900mg/day as tolerated, helping a great deal.  Severe GERD, IBS, family h/o CV dz. Stopped Diclofenac, could not tolerate Celebrex 200mg qdaily. Taking Naproxen with PCP but hard on her stomach, no personal CV issues. Began Mobic 7.5mg qdaily prn.  Begin Tizanidine 4mg qHS  prn.  Cont other meds as prescribed.  Cont TENS, unit provided here, patient reports it helps her pain significantly.  Cont b/l CTS braces at night only.  Patient reports she cannot start PT at this time as her daughter currently works 12h shifts, may be able to begin in future if her daughter can change to an 8h shift schedule.  Will begin neuropathic comp cream if her current cream does not work.  Ordered LSO for truncal stability. Cont PT.  Letter to return to work. Does not drive or operate heavy machinery while using pain medication.  RTC 3 months for f/u.                 Physical Exam  Constitutional:       Appearance: She is well-developed and well-nourished.   HENT:      Head: Normocephalic and atraumatic.   Eyes:      Pupils: Pupils are equal, round, and reactive to light.   Cardiovascular:      Rate and Rhythm: Normal rate and regular rhythm.      Heart sounds: Normal heart sounds.   Pulmonary:      Breath sounds: Normal breath sounds.   Abdominal:      General: Bowel sounds are normal. There is no distension.      Palpations: Abdomen is soft.      Tenderness: There is no abdominal tenderness.   Neurological:      Mental Status: She is alert and oriented to person, place, and time.      Sensory: No sensory deficit.      Deep Tendon Reflexes: Reflexes are normal and symmetric. Reflexes normal.   Psychiatric:         Behavior: Behavior normal.         Thought Content: Thought content normal.

## 2022-01-13 NOTE — PROGRESS NOTES
Subsequent Medicare Wellness Visit    Chief Complaint   Patient presents with   • Medicare Wellness-subsequent   • Diabetes   • Hyperlipidemia   • Hypothyroidism       Subjective   History of Present Illness:  Riddhi Cid is a 51 y.o. female who presents for a Subsequent Medicare Wellness Visit. The patient is here: for coordination of medical care to discuss health maintenance and disease prevention. Overall has: minimal activity with work/home activities, good appetite, feels well with minor complaints, good energy level and is sleeping well. Nutrition: eating a variety of foods. Last tetanus shot was 2020.    Diabetes  She presents for her follow-up diabetic visit. She has type 2 diabetes mellitus. Pertinent negatives for hypoglycemia include no headaches, nervousness/anxiousness or sweats. Pertinent negatives for diabetes include no chest pain and no weakness. There are no hypoglycemic complications. There are no diabetic complications. Risk factors for coronary artery disease include diabetes mellitus, dyslipidemia and obesity. Current diabetic treatment includes oral agent (monotherapy). She is following a generally healthy diet. Meal planning includes avoidance of concentrated sweets. She rarely participates in exercise. (Does not check regularly at home.) An ACE inhibitor/angiotensin II receptor blocker is not being taken. She sees a podiatrist.Eye exam is not current.   Hyperlipidemia  This is a chronic problem. The current episode started more than 1 year ago. The problem is controlled. Exacerbating diseases include diabetes. She has no history of hypothyroidism. Pertinent negatives include no chest pain or shortness of breath. Current antihyperlipidemic treatment includes statins (fenofibrate). The current treatment provides moderate improvement of lipids. There are no compliance problems.  Risk factors for coronary artery disease include dyslipidemia.       HEALTH RISK ASSESSMENT    Recent  Hospitalizations:  No hospitalization(s) within the last year.    Current Medical Providers:  Patient Care Team:  June Harrison MD as PCP - General (Family Medicine)  Izaiah Fernandez MD as Consulting Physician (Pain Medicine)    Smoking Status:  Social History     Tobacco Use   Smoking Status Current Every Day Smoker   • Packs/day: 0.50   • Types: Cigarettes   Smokeless Tobacco Never Used       Alcohol Consumption:  Social History     Substance and Sexual Activity   Alcohol Use No       Depression Screen:   PHQ-2/PHQ-9 Depression Screening 1/14/2022   Little interest or pleasure in doing things 0   Feeling down, depressed, or hopeless 0   Trouble falling or staying asleep, or sleeping too much 0   Feeling tired or having little energy 0   Poor appetite or overeating 0   Feeling bad about yourself - or that you are a failure or have let yourself or your family down 0   Trouble concentrating on things, such as reading the newspaper or watching television 0   Moving or speaking so slowly that other people could have noticed. Or the opposite - being so fidgety or restless that you have been moving around a lot more than usual 0   Thoughts that you would be better off dead, or of hurting yourself in some way 0   Total Score 0   If you checked off any problems, how difficult have these problems made it for you to do your work, take care of things at home, or get along with other people? Not difficult at all     I spent more than 16 minutes asking patient questions, counseling and documenting in the chart    Fall Risk Screen:  RADHAADI Fall Risk Assessment was completed, and patient is at LOW risk for falls.Assessment completed on:1/14/2022    Health Habits and Functional and Cognitive Screening:  Functional & Cognitive Status 1/14/2022   Do you have difficulty preparing food and eating? No   Do you have difficulty bathing yourself, getting dressed or grooming yourself? No   Do you have difficulty using the toilet?  No   Do you have difficulty moving around from place to place? No   Do you have trouble with steps or getting out of a bed or a chair? No   Current Diet Unhealthy Diet   Dental Exam Not up to date   Eye Exam Not up to date   Exercise (times per week) 0 times per week   Current Exercises Include No Regular Exercise   Do you need help using the phone?  No   Are you deaf or do you have serious difficulty hearing?  No   Do you need help with transportation? No   Do you need help shopping? No   Do you need help preparing meals?  No   Do you need help with housework?  No   Do you need help with laundry? No   Do you need help taking your medications? No   Do you need help managing money? No   Do you ever drive or ride in a car without wearing a seat belt? No   Have you felt unusual stress, anger or loneliness in the last month? No   Who do you live with? Alone   If you need help, do you have trouble finding someone available to you? No   Have you been bothered in the last four weeks by sexual problems? No   Do you have difficulty concentrating, remembering or making decisions? No       Does the patient have evidence of cognitive impairment? No    Asprin use counseling:Taking ASA appropriately as indicated    Recent Lab Results:  Lab Results   Component Value Date    HGBA1C 6.9 01/14/2022        Most Recent A1C    HGBA1C Most Recent 1/14/22   Hemoglobin A1C 6.9             Age-appropriate Screening Schedule:  Refer to the list below for future screening recommendations based on patient's age, sex and/or medical conditions. Orders for these recommended tests are listed in the plan section. The patient has been provided with a written plan.    Health Maintenance   Topic Date Due   • INFLUENZA VACCINE  08/01/2021   • ZOSTER VACCINE (1 of 2) 01/14/2023 (Originally 10/4/2019)   • HEMOGLOBIN A1C  07/14/2022   • LIPID PANEL  07/30/2022   • MAMMOGRAM  12/15/2022   • PAP SMEAR  01/14/2025   • TDAP/TD VACCINES (2 - Td or Tdap)  07/21/2030   • DIABETIC FOOT EXAM  Discontinued   • DIABETIC EYE EXAM  Discontinued   • URINE MICROALBUMIN  Discontinued          The following portions of the patient's history were reviewed and updated as appropriate: allergies, current medications, past family history, past medical history, past social history, past surgical history and problem list.      Outpatient Medications Prior to Visit   Medication Sig Dispense Refill   • albuterol (PROVENTIL) (2.5 MG/3ML) 0.083% nebulizer solution USE 1 VIAL PER NEBULIZER EVERY 4 HOURS AS NEEDED 180 mL 0   • albuterol sulfate  (90 Base) MCG/ACT inhaler Inhale 2 puffs Every 4 (Four) Hours As Needed for Wheezing. 18 g 0   • aspirin 81 MG chewable tablet Chew 81 mg Daily.     • atorvastatin (LIPITOR) 40 MG tablet TAKE 1 TABLET BY MOUTH DAILY 90 tablet 3   • azelastine (OPTIVAR) 0.05 % ophthalmic solution      • bumetanide (BUMEX) 1 MG tablet TAKE 1 TO 2 TABLETS BY MOUTH EVERY DAY AS NEEDED 180 tablet 12   • citalopram (CeleXA) 20 MG tablet TAKE 1 TABLET BY MOUTH DAILY 30 tablet 12   • Cyanocobalamin (B-12) 1000 MCG tablet controlled-release TAKE 1 TABLET BY MOUTH EVERY DAY 90 tablet 3   • DEXILANT 60 MG capsule TAKE 1 CAPSULE BY MOUTH EVERY DAY 30 capsule 0   • dicyclomine (BENTYL) 20 MG tablet Take 20 mg by mouth Every 6 (Six) Hours As Needed. for pain     • famotidine (PEPCID) 40 MG tablet TK 1 T PO HS UTD     • fenofibrate (TRICOR) 145 MG tablet Daily. Dx: E78.2, mixed hyperlipidema     • fluticasone (FLONASE) 50 MCG/ACT nasal spray SHAKE LIQUID AND USE 2 SPRAYS IN EACH NOSTRIL EVERY DAY 48 g 3   • gabapentin (NEURONTIN) 300 MG capsule Take 1 capsule by mouth 3 (Three) Times a Day. 90 capsule 5   • Glucose Blood (BLOOD GLUCOSE TEST) strip by In Vitro route. Dx: E11.9, DM type 2, controlled     • HYDROcodone-acetaminophen (NORCO)  MG per tablet Take 1 tablet by mouth Every 6 (Six) Hours As Needed for Moderate Pain . *PAIN MANAGEMENT RX'S 120 tablet 0   •  Lancets Misc. (ACCU-CHEK FASTCLIX LANCET) kit Dx: E11.9, DM type 2, controlled     • levothyroxine (SYNTHROID, LEVOTHROID) 50 MCG tablet TAKE 1 TABLET BY MOUTH DAILY 90 tablet 4   • Linzess 290 MCG capsule capsule TAKE 1 CAPSULE BY MOUTH EVERY DAY 30 MINUTES BEFORE A MEAL     • metFORMIN ER (GLUCOPHAGE-XR) 500 MG 24 hr tablet TAKE 1 TABLET BY MOUTH DAILY 90 tablet 3   • metoclopramide (REGLAN) 10 MG tablet TK 1 T PO HS     • Omega-3 Fatty Acids (FISH OIL) 500 MG capsule capsule 2 capsules Daily. Dx: E78.2, mixed hyperlipidema     • ondansetron ODT (ZOFRAN-ODT) 4 MG disintegrating tablet DIS 1 T UNT Q 6 H PRN  0   • potassium chloride ER (K-Tab) 20 MEQ tablet controlled-release ER tablet Take 1 tablet by mouth Daily. 1 tab to be taken only if taking 2 bumetanide. Dx: R60.0, lower extremity edema 60 tablet 12   • tiZANidine (ZANAFLEX) 4 MG tablet Take 1 tablet by mouth At Night As Needed for Muscle Spasms. 30 tablet 2   • vitamin D3 125 MCG (5000 UT) capsule capsule TAKE 1 CAPSULE BY MOUTH DAILY 90 capsule 3   • Wixela Inhub 250-50 MCG/DOSE DISKUS INHALE 1 PUFF BY MOUTH TWICE DAILY 60 each 12   • diclofenac (VOLTAREN) 50 MG EC tablet      • folic acid (FOLVITE) 1 MG tablet Take 1 tablet by mouth Daily. 90 tablet 3   • HYDROcodone-acetaminophen (Norco)  MG per tablet Take 1 tablet by mouth Every 6 (Six) Hours As Needed for Moderate Pain . 120 tablet 0   • HYDROcodone-acetaminophen (Norco)  MG per tablet Take 1 tablet by mouth Every 6 (Six) Hours As Needed for Moderate Pain . 120 tablet 0   • loratadine (CLARITIN) 10 MG tablet Take 1 tablet by mouth Daily. 30 tablet 12   • montelukast (SINGULAIR) 10 MG tablet TAKE 1 TABLET BY MOUTH EVERY NIGHT 90 tablet 0   • polyethylene glycol (MIRALAX) powder   11     No facility-administered medications prior to visit.       Patient Active Problem List   Diagnosis   • Degeneration of intervertebral disc of cervical region   • Degeneration of intervertebral disc of lumbar  region   • Acquired hypothyroidism   • Mixed hyperlipidemia   • Major depressive disorder, recurrent, moderate (HCC)   • Obstructive sleep apnea   • Vitamin B12 deficiency   • Vitamin D deficiency   • COPD (chronic obstructive pulmonary disease) ([facility])   • Solitary thyroid nodule   • Chronic midline low back pain with right-sided sciatica   • Cervicalgia   • Cervical stenosis of spine   • Arthritis   • Moderate persistent asthma without complication   • Gastroparalysis   • History of chicken pox   • IBS (irritable bowel syndrome)   • GERD (gastroesophageal reflux disease)   • Dependent edema   • Optic disc hemorrhage   • Controlled diabetes mellitus type 2 with complications ([facility])   • Tension headache   • Folliculitis   • Encounter to establish care   • Edema   • Tinea pedis of right foot   • Intertrigo   • Allergic rhinitis   • Disorder of peripheral nervous system   • Fibromyositis   • Tobacco dependence syndrome   • Pap smear, low-risk       Advanced Care Planning:  ACP discussion was held with the patient during this visit. Patient does not have an advance directive, information provided.    A face-to-face visit was completed today with patient.  Counseling explanation, and discussion of advanced directives was performed.   The last advanced care visit was performed in 2020.  In a near life ending situation, from which she is not expected to recover functionally, and she is not able to speak for her, she does not want life sustaining measures. We discussed feeding tubes, ventilators and cardiac support as well as dialysis.    I spent less than 16 minutes discussing Advanced Care Planning information and documenting in the chart.    Review of Systems   Constitutional: Negative for activity change and fever.   HENT: Negative for ear pain, rhinorrhea, sinus pressure and voice change.    Eyes: Negative for visual disturbance.   Respiratory: Negative for cough and shortness of breath.    Cardiovascular: Negative for  "chest pain.   Gastrointestinal: Negative for abdominal pain, diarrhea, nausea and vomiting.   Endocrine: Negative for cold intolerance and heat intolerance.   Genitourinary: Negative for frequency and urgency.   Musculoskeletal: Negative for arthralgias.   Skin: Negative for rash.   Neurological: Negative for syncope and weakness.   Hematological: Does not bruise/bleed easily.   Psychiatric/Behavioral: Negative for depressed mood. The patient is not nervous/anxious.        I have reviewed and confirmed the accuracy of the HPI and ROS as documented by the MA/LPN/RN June Harrison MD    Compared to one year ago, the patient feels her physical health is better.  Compared to one year ago, the patient feels her mental health is better.    Reviewed chart for potential of high risk medication in the elderly: not applicable  Reviewed chart for potential of harmful drug interactions in the elderly:not applicable    Objective      Vitals:    01/14/22 1116   BP: 132/82   BP Location: Right arm   Patient Position: Sitting   Cuff Size: Adult   Pulse: 101   Resp: 18   Temp: 97.7 °F (36.5 °C)   TempSrc: Temporal   SpO2: 98%   Weight: 112 kg (247 lb)   Height: 160 cm (63\")   PainSc: 0-No pain       Body mass index is 43.75 kg/m².  Discussed the patient's BMI with her. The BMI is above average; BMI management plan is completed.    Physical Exam  Vitals and nursing note reviewed.   Constitutional:       General: She is not in acute distress.     Appearance: She is well-developed. She is not diaphoretic.   HENT:      Head: Normocephalic and atraumatic.      Right Ear: External ear normal.      Left Ear: External ear normal.      Nose: Nose normal.      Mouth/Throat:      Pharynx: No oropharyngeal exudate.   Eyes:      General: No scleral icterus.        Right eye: No discharge.         Left eye: No discharge.      Conjunctiva/sclera: Conjunctivae normal.      Pupils: Pupils are equal, round, and reactive to light.   Neck:      " Thyroid: No thyromegaly.      Trachea: No tracheal deviation.   Cardiovascular:      Rate and Rhythm: Normal rate and regular rhythm.      Heart sounds: Normal heart sounds. No murmur heard.  No friction rub. No gallop.    Pulmonary:      Effort: Pulmonary effort is normal. No respiratory distress.      Breath sounds: Normal breath sounds. No stridor. No wheezing or rales.   Chest:   Breasts:      Right: No inverted nipple, mass, nipple discharge, skin change or tenderness.      Left: No inverted nipple, mass, nipple discharge, skin change or tenderness.       Abdominal:      General: Bowel sounds are normal. There is no distension.      Palpations: Abdomen is soft. There is no mass.      Tenderness: There is no abdominal tenderness. There is no guarding or rebound.   Genitourinary:     Labia:         Right: No rash, tenderness or lesion.         Left: No rash, tenderness or lesion.       Vagina: No vaginal discharge, erythema, bleeding or lesions.      Cervix: No discharge or lesion.      Adnexa:         Right: No mass or tenderness.          Left: No mass or tenderness.     Musculoskeletal:         General: No tenderness or deformity. Normal range of motion.      Cervical back: Normal range of motion and neck supple.   Lymphadenopathy:      Cervical: No cervical adenopathy.   Skin:     General: Skin is warm and dry.      Capillary Refill: Capillary refill takes less than 2 seconds.      Coloration: Skin is not pale.      Findings: No erythema or rash.   Neurological:      Mental Status: She is alert and oriented to person, place, and time.      Cranial Nerves: No cranial nerve deficit.      Sensory: No sensory deficit.      Motor: No tremor, atrophy or abnormal muscle tone.      Coordination: Coordination normal.      Gait: Gait normal.      Deep Tendon Reflexes: Reflexes are normal and symmetric. Reflexes normal.   Psychiatric:         Behavior: Behavior normal.         Thought Content: Thought content normal.          Cognition and Memory: Memory is not impaired. She does not exhibit impaired recent memory or impaired remote memory.         Judgment: Judgment normal.       Diabetic Foot Exam Performed and Monofilament Test Performed      Assessment    Medicare Risks and Personalized Health Plan  CMS Preventative Services Quick Reference  Advance Directive Discussion  Breast Cancer/Mammogram Screening    The above risks/problems have been discussed with the patient.  Pertinent information has been shared with the patient in the After Visit Summary.  Follow up plans and orders are seen below in the Assessment/Plan Section.    Medications were reviewed and deemed appropriate to continue. Refills sent in. Labs ordered for surveillance or reviewed in note.       Visit Diagnoses:    Problems Addressed this Visit        Cardiac and Vasculature    Mixed hyperlipidemia     Will get labs         Relevant Orders    Comprehensive Metabolic Panel    Lipid Panel With / Chol / HDL Ratio       Endocrine and Metabolic    Acquired hypothyroidism    Relevant Orders    TSH    Controlled diabetes mellitus type 2 with complications (HCC)     Diabetes is improving with treatment.   Continue current treatment regimen.  Diabetes will be reassessed in 3 months.         Relevant Orders    POC Glycosylated Hemoglobin (Hb A1C) (Completed)    POC Glucose (Completed)    POC Urinalysis Dipstick, Automated (Completed)       Genitourinary and Reproductive     Pap smear, low-risk    Relevant Orders    IGP,CtNg,AptimaHPV,rfx16 / 18,45 (Completed)       Mental Health    Major depressive disorder, recurrent, moderate (HCC)     Patient's depression is single episode and is moderate without psychosis. Their depression is currently active and the condition is improving with treatment. This will be reassessed at the next regular appointment. F/U as described:patient will continue current medication therapy.            Pulmonary and Pneumonias    COPD (chronic  obstructive pulmonary disease) (HCC)     COPD is unchanged.  Continue current medications.             Relevant Medications    montelukast (SINGULAIR) 10 MG tablet       Tobacco    Tobacco dependence syndrome      Other Visit Diagnoses     Medicare annual wellness visit, subsequent    -  Primary    Need for hepatitis C screening test        Relevant Orders    Hepatitis C RNA, Quantitative, PCR (graph)    Malaise and fatigue        Relevant Orders    CBC & Differential    Influenza vaccine needed          Diagnoses       Codes Comments    Medicare annual wellness visit, subsequent    -  Primary ICD-10-CM: Z00.00  ICD-9-CM: V70.0     Controlled diabetes mellitus type 2 with complications, unspecified whether long term insulin use (HCC)     ICD-10-CM: E11.8  ICD-9-CM: 250.90     Major depressive disorder, recurrent, moderate (HCC)     ICD-10-CM: F33.1  ICD-9-CM: 296.32     Mixed hyperlipidemia     ICD-10-CM: E78.2  ICD-9-CM: 272.2     Acquired hypothyroidism     ICD-10-CM: E03.9  ICD-9-CM: 244.9     Need for hepatitis C screening test     ICD-10-CM: Z11.59  ICD-9-CM: V73.89     Malaise and fatigue     ICD-10-CM: R53.81, R53.83  ICD-9-CM: 780.79     Pap smear, low-risk     ICD-10-CM: Z01.419  ICD-9-CM: V76.2     Influenza vaccine needed     ICD-10-CM: Z23  ICD-9-CM: V04.81     Simple chronic bronchitis (HCC)     ICD-10-CM: J41.0  ICD-9-CM: 491.0     Tobacco dependence syndrome     ICD-10-CM: F17.200  ICD-9-CM: 305.1                Follow Up:  No follow-ups on file.     An After Visit Summary and PPPS were given to the patient.    I wore protective equipment throughout this patient encounter to include respirator. Hand hygiene was performed before donning protective equipment and after removal when leaving the room.    Discussed injury prevention, diet and exercise, safe sexual practices, and screening for common diseases. Encouraged use of sunscreen and seatbelts. Discussed timing of  screenings. Avoidance of tobacco  encouraged. Limitation or avoidance of alcohol encouraged. Recommend yearly dental and eye exams. Also discussed monitoring of blood pressure, lipids.

## 2022-01-14 ENCOUNTER — OFFICE VISIT (OUTPATIENT)
Dept: FAMILY MEDICINE CLINIC | Facility: CLINIC | Age: 52
End: 2022-01-14

## 2022-01-14 VITALS
TEMPERATURE: 97.7 F | BODY MASS INDEX: 43.77 KG/M2 | OXYGEN SATURATION: 98 % | RESPIRATION RATE: 18 BRPM | DIASTOLIC BLOOD PRESSURE: 82 MMHG | SYSTOLIC BLOOD PRESSURE: 132 MMHG | HEART RATE: 101 BPM | WEIGHT: 247 LBS | HEIGHT: 63 IN

## 2022-01-14 DIAGNOSIS — R53.83 MALAISE AND FATIGUE: ICD-10-CM

## 2022-01-14 DIAGNOSIS — J41.0 SIMPLE CHRONIC BRONCHITIS: ICD-10-CM

## 2022-01-14 DIAGNOSIS — E78.2 MIXED HYPERLIPIDEMIA: ICD-10-CM

## 2022-01-14 DIAGNOSIS — E03.9 ACQUIRED HYPOTHYROIDISM: ICD-10-CM

## 2022-01-14 DIAGNOSIS — F17.200 TOBACCO DEPENDENCE SYNDROME: ICD-10-CM

## 2022-01-14 DIAGNOSIS — Z00.00 MEDICARE ANNUAL WELLNESS VISIT, SUBSEQUENT: Primary | ICD-10-CM

## 2022-01-14 DIAGNOSIS — Z23 INFLUENZA VACCINE NEEDED: ICD-10-CM

## 2022-01-14 DIAGNOSIS — E11.8 CONTROLLED DIABETES MELLITUS TYPE 2 WITH COMPLICATIONS, UNSPECIFIED WHETHER LONG TERM INSULIN USE: ICD-10-CM

## 2022-01-14 DIAGNOSIS — F33.1 MAJOR DEPRESSIVE DISORDER, RECURRENT, MODERATE: ICD-10-CM

## 2022-01-14 DIAGNOSIS — Z01.419 PAP SMEAR, LOW-RISK: ICD-10-CM

## 2022-01-14 DIAGNOSIS — R53.81 MALAISE AND FATIGUE: ICD-10-CM

## 2022-01-14 DIAGNOSIS — Z11.59 NEED FOR HEPATITIS C SCREENING TEST: ICD-10-CM

## 2022-01-14 PROBLEM — M62.9 DISORDER OF SKELETAL MUSCLE: Status: RESOLVED | Noted: 2021-09-30 | Resolved: 2022-01-14

## 2022-01-14 LAB
BILIRUB BLD-MCNC: NEGATIVE MG/DL
CLARITY, POC: CLEAR
COLOR UR: YELLOW
EXPIRATION DATE: NORMAL
EXPIRATION DATE: NORMAL
GLUCOSE BLDC GLUCOMTR-MCNC: 137 MG/DL (ref 70–130)
GLUCOSE UR STRIP-MCNC: NEGATIVE MG/DL
HBA1C MFR BLD: 6.9 %
KETONES UR QL: NEGATIVE
LEUKOCYTE EST, POC: NEGATIVE
Lab: NORMAL
Lab: NORMAL
NITRITE UR-MCNC: NEGATIVE MG/ML
PH UR: 5 [PH] (ref 5–8)
PROT UR STRIP-MCNC: NEGATIVE MG/DL
RBC # UR STRIP: NEGATIVE /UL
SP GR UR: 1.01 (ref 1–1.03)
UROBILINOGEN UR QL: NORMAL

## 2022-01-14 PROCEDURE — 99214 OFFICE O/P EST MOD 30 MIN: CPT | Performed by: FAMILY MEDICINE

## 2022-01-14 PROCEDURE — 82962 GLUCOSE BLOOD TEST: CPT | Performed by: FAMILY MEDICINE

## 2022-01-14 PROCEDURE — 81003 URINALYSIS AUTO W/O SCOPE: CPT | Performed by: FAMILY MEDICINE

## 2022-01-14 PROCEDURE — 1126F AMNT PAIN NOTED NONE PRSNT: CPT | Performed by: FAMILY MEDICINE

## 2022-01-14 PROCEDURE — 1159F MED LIST DOCD IN RCRD: CPT | Performed by: FAMILY MEDICINE

## 2022-01-14 PROCEDURE — 1170F FXNL STATUS ASSESSED: CPT | Performed by: FAMILY MEDICINE

## 2022-01-14 PROCEDURE — G0439 PPPS, SUBSEQ VISIT: HCPCS | Performed by: FAMILY MEDICINE

## 2022-01-14 PROCEDURE — 83036 HEMOGLOBIN GLYCOSYLATED A1C: CPT | Performed by: FAMILY MEDICINE

## 2022-01-14 PROCEDURE — 3044F HG A1C LEVEL LT 7.0%: CPT | Performed by: FAMILY MEDICINE

## 2022-01-14 RX ORDER — MONTELUKAST SODIUM 10 MG/1
10 TABLET ORAL NIGHTLY
Qty: 90 TABLET | Refills: 3 | Status: SHIPPED | OUTPATIENT
Start: 2022-01-14 | End: 2023-02-24 | Stop reason: SDUPTHER

## 2022-01-19 LAB
C TRACH RRNA CVX QL NAA+PROBE: NEGATIVE
CYTOLOGIST CVX/VAG CYTO: NORMAL
CYTOLOGY CVX/VAG DOC CYTO: NORMAL
CYTOLOGY CVX/VAG DOC THIN PREP: NORMAL
DX ICD CODE: NORMAL
HIV 1 & 2 AB SER-IMP: NORMAL
HPV I/H RISK 4 DNA CVX QL PROBE+SIG AMP: NEGATIVE
N GONORRHOEA RRNA CVX QL NAA+PROBE: NEGATIVE
OTHER STN SPEC: NORMAL
STAT OF ADQ CVX/VAG CYTO-IMP: NORMAL

## 2022-01-19 RX ORDER — FOLIC ACID 1 MG/1
1 TABLET ORAL EVERY 24 HOURS
Qty: 90 TABLET | Refills: 3 | Status: SHIPPED | OUTPATIENT
Start: 2022-01-19

## 2022-02-12 DIAGNOSIS — J41.0 SIMPLE CHRONIC BRONCHITIS: Primary | ICD-10-CM

## 2022-02-14 RX ORDER — ALBUTEROL SULFATE 2.5 MG/3ML
SOLUTION RESPIRATORY (INHALATION)
Qty: 180 ML | Refills: 3 | Status: SHIPPED | OUTPATIENT
Start: 2022-02-14 | End: 2023-01-16 | Stop reason: SDUPTHER

## 2022-02-14 RX ORDER — ALBUTEROL SULFATE 90 UG/1
AEROSOL, METERED RESPIRATORY (INHALATION)
Qty: 18 G | Refills: 3 | Status: SHIPPED | OUTPATIENT
Start: 2022-02-14 | End: 2023-01-16 | Stop reason: SDUPTHER

## 2022-03-25 DIAGNOSIS — E87.6 HYPOKALEMIA: ICD-10-CM

## 2022-03-25 RX ORDER — POTASSIUM CHLORIDE 1500 MG/1
TABLET, FILM COATED, EXTENDED RELEASE ORAL
Qty: 60 TABLET | Refills: 12 | Status: SHIPPED | OUTPATIENT
Start: 2022-03-25

## 2022-04-11 ENCOUNTER — OFFICE VISIT (OUTPATIENT)
Dept: PAIN MEDICINE | Facility: CLINIC | Age: 52
End: 2022-04-11

## 2022-04-11 VITALS
RESPIRATION RATE: 16 BRPM | DIASTOLIC BLOOD PRESSURE: 84 MMHG | SYSTOLIC BLOOD PRESSURE: 155 MMHG | OXYGEN SATURATION: 94 % | TEMPERATURE: 97.1 F | HEART RATE: 96 BPM

## 2022-04-11 DIAGNOSIS — G89.29 CHRONIC MIDLINE LOW BACK PAIN WITH RIGHT-SIDED SCIATICA: ICD-10-CM

## 2022-04-11 DIAGNOSIS — M54.41 CHRONIC MIDLINE LOW BACK PAIN WITH RIGHT-SIDED SCIATICA: ICD-10-CM

## 2022-04-11 DIAGNOSIS — M51.36 DEGENERATION OF INTERVERTEBRAL DISC OF LUMBAR REGION: ICD-10-CM

## 2022-04-11 DIAGNOSIS — M50.30 DEGENERATION OF INTERVERTEBRAL DISC OF CERVICAL REGION: ICD-10-CM

## 2022-04-11 DIAGNOSIS — M48.02 CERVICAL STENOSIS OF SPINE: ICD-10-CM

## 2022-04-11 DIAGNOSIS — M79.7 FIBROMYOSITIS: ICD-10-CM

## 2022-04-11 DIAGNOSIS — Z79.899 HIGH RISK MEDICATION USE: Primary | ICD-10-CM

## 2022-04-11 DIAGNOSIS — M54.2 CERVICALGIA: ICD-10-CM

## 2022-04-11 PROCEDURE — 99213 OFFICE O/P EST LOW 20 MIN: CPT | Performed by: PHYSICAL MEDICINE & REHABILITATION

## 2022-04-11 PROCEDURE — G0463 HOSPITAL OUTPT CLINIC VISIT: HCPCS | Performed by: PHYSICAL MEDICINE & REHABILITATION

## 2022-04-11 RX ORDER — HYDROCODONE BITARTRATE AND ACETAMINOPHEN 10; 325 MG/1; MG/1
1 TABLET ORAL EVERY 6 HOURS PRN
Qty: 120 TABLET | Refills: 0 | Status: SHIPPED | OUTPATIENT
Start: 2022-04-11 | End: 2022-04-22

## 2022-04-11 RX ORDER — HYDROCODONE BITARTRATE AND ACETAMINOPHEN 10; 325 MG/1; MG/1
1 TABLET ORAL EVERY 6 HOURS PRN
Qty: 120 TABLET | Refills: 0 | Status: SHIPPED | OUTPATIENT
Start: 2022-04-11 | End: 2022-07-11 | Stop reason: SDUPTHER

## 2022-04-11 NOTE — PROGRESS NOTES
Subjective   Riddhi Cid is a 51 y.o. female.     Neck pain radiates to b/l shoulders, and left arm pain. ACDF C5/7 (4/11/2016), also h/o fibromyalgia,  also pain in lower back and b/l legs and feet. 10/10 at worst, 6/10 at best, 6/10 today, worse with activity, improves with rest and meds, always present, varies, aching, radiates in BLE. Imaging reviewed, satisfactory ACDF. Referred for pain management. Neck pain improved with ACDF, tapered off [place] with worsening pain, still somewhat severe. Taking Cymbalta which helps, tried Lyrica with weight gain, trying to lose weight. Restarted [place] at BID - qdaily prn but worsening pain with exercise to lose weight, increased to BID-TID #75, then TID, then QID prn with adequate relief, reduced to #105, tolerating. Gastric sleeve surgery planned for March-April 2017, had to miss cardiac clearance and EGD due to illness and social issues, has decided against surgery, trying to lose weight with diet and exercise. Worsening b/l CTS symptoms, R>L, known h/o of CTS. Saw Juan Franz for worsening neck pain to LUE, ACDF is intact, started Diclofenac for DJD, Elavil. Could not tolerate even low-dose Gabapentin with drowsiness. Started Celebrex but did not feel like doing anything, stopped, stopped Mobic. Stopping numerous meds due to side effects, started Gabapentin with Dr. Pisano with benefit. Pain worsening, DDD on MRI, started PT which is helping. Back pain worsening, especially at night. Had COVID-19, doing better, plans to get vaccinated.       The following portions of the patient's history were reviewed and updated as appropriate: allergies, current medications, past family history, past medical history, past social history, past surgical history and problem list.    Review of Systems   Constitutional: Negative for chills, fatigue and fever.   HENT: Negative for hearing loss and trouble swallowing.    Eyes: Negative for visual disturbance.   Respiratory: Negative for  shortness of breath.    Cardiovascular: Negative for chest pain.   Gastrointestinal: Positive for constipation and nausea. Negative for abdominal pain, diarrhea and vomiting.   Genitourinary: Negative for urinary incontinence.   Musculoskeletal: Positive for arthralgias, back pain and neck pain. Negative for joint swelling and myalgias.   Neurological: Positive for headache. Negative for dizziness, weakness and numbness.       Objective   Physical Exam   Constitutional: She is oriented to person, place, and time. She appears well-developed and well-nourished.   HENT:   Head: Normocephalic and atraumatic.   Eyes: Pupils are equal, round, and reactive to light.   Cardiovascular: Normal rate, regular rhythm and normal heart sounds.   Pulmonary/Chest: Breath sounds normal.   Abdominal: Soft. Bowel sounds are normal. She exhibits no distension. There is no abdominal tenderness.   Neurological: She is alert and oriented to person, place, and time. She has normal reflexes. She displays normal reflexes. No sensory deficit.   Psychiatric: Her behavior is normal. Thought content normal.         Assessment/Plan   Diagnoses and all orders for this visit:    1. Chronic midline low back pain with right-sided sciatica (Primary)    2. Cervicalgia    3. Cervical stenosis of spine    4. Degeneration of intervertebral disc of cervical region    5. Degeneration of intervertebral disc of lumbar region    6. Fibromyositis        [place] reviewed, in order. Low risk. Repeat UDS 4/19/21 in order.  Reduced to Norco 10/325mg q6-8h #105, too painful, restarted q6h prn #120, then reduced to #105 successfully but pain worsening. Cont at #120.  Afraid to start Lyrica due to possible weight gain. Restarted Gabapentin, takes 300-900mg/day as tolerated, helping a great deal.  Severe GERD, IBS, family h/o CV dz. Stopped Diclofenac, could not tolerate Celebrex 200mg qdaily. Taking Naproxen with PCP but hard on her stomach, no personal CV issues.  Began Mobic 7.5mg qdaily prn.  Begin Tizanidine 4mg qHS prn.  Cont other meds as prescribed.  Cont TENS, unit provided here, patient reports it helps her pain significantly.  Cont b/l CTS braces at night only.  Patient reports she cannot start PT at this time as her daughter currently works 12h shifts, may be able to begin in future if her daughter can change to an 8h shift schedule.  Will begin neuropathic comp cream if her current cream does not work.  Ordered LSO for truncal stability. Cont PT.  Letter to return to work. Does not drive or operate heavy machinery while using pain medication.  RTC 3 months for f/u.

## 2022-04-14 ENCOUNTER — TELEPHONE (OUTPATIENT)
Dept: FAMILY MEDICINE CLINIC | Facility: CLINIC | Age: 52
End: 2022-04-14

## 2022-04-14 NOTE — TELEPHONE ENCOUNTER
Pt didn't get labs done in January I told her that she can get those done and it will cover everything for next visit

## 2022-04-14 NOTE — TELEPHONE ENCOUNTER
Caller: Riddhi Cid    Relationship: Self    Best call back number: [phone]    What orders are you requesting (i.e. lab or imaging): LABS     In what timeframe would the patient need to come in: Monday, 04/18/2022    Where will you receive your lab/imaging services: [facility]    Additional notes: PATIENT WOULD LIKE A CALL BACK TO LET HER KNOW WHEN THE ORDERS ARE IN. PLEASE ADVISE.

## 2022-04-20 LAB
ALBUMIN SERPL-MCNC: 4.3 G/DL (ref 3.8–4.9)
ALBUMIN/GLOB SERPL: 1.5 {RATIO} (ref 1.2–2.2)
ALP SERPL-CCNC: 92 IU/L (ref 44–121)
ALT SERPL-CCNC: 49 IU/L (ref 0–32)
AST SERPL-CCNC: 37 IU/L (ref 0–40)
BASOPHILS # BLD AUTO: 0.1 X10E3/UL (ref 0–0.2)
BASOPHILS NFR BLD AUTO: 1 %
BILIRUB SERPL-MCNC: 0.5 MG/DL (ref 0–1.2)
BUN SERPL-MCNC: 11 MG/DL (ref 6–24)
BUN/CREAT SERPL: 10 (ref 9–23)
CALCIUM SERPL-MCNC: 9.6 MG/DL (ref 8.7–10.2)
CHLORIDE SERPL-SCNC: 105 MMOL/L (ref 96–106)
CHOLEST SERPL-MCNC: 198 MG/DL (ref 100–199)
CHOLEST/HDLC SERPL: 5.4 RATIO (ref 0–4.4)
CO2 SERPL-SCNC: 26 MMOL/L (ref 20–29)
CREAT SERPL-MCNC: 1.1 MG/DL (ref 0.57–1)
EGFRCR SERPLBLD CKD-EPI 2021: 60 ML/MIN/1.73
EOSINOPHIL # BLD AUTO: 0.2 X10E3/UL (ref 0–0.4)
EOSINOPHIL NFR BLD AUTO: 3 %
ERYTHROCYTE [DISTWIDTH] IN BLOOD BY AUTOMATED COUNT: 12.3 % (ref 11.7–15.4)
GLOBULIN SER CALC-MCNC: 2.8 G/DL (ref 1.5–4.5)
GLUCOSE SERPL-MCNC: 147 MG/DL (ref 65–99)
HCT VFR BLD AUTO: 43.7 % (ref 34–46.6)
HDLC SERPL-MCNC: 37 MG/DL
HGB BLD-MCNC: 14.5 G/DL (ref 11.1–15.9)
IMM GRANULOCYTES # BLD AUTO: 0 X10E3/UL (ref 0–0.1)
IMM GRANULOCYTES NFR BLD AUTO: 0 %
LDLC SERPL CALC-MCNC: 125 MG/DL (ref 0–99)
LYMPHOCYTES # BLD AUTO: 2.1 X10E3/UL (ref 0.7–3.1)
LYMPHOCYTES NFR BLD AUTO: 29 %
MCH RBC QN AUTO: 30.6 PG (ref 26.6–33)
MCHC RBC AUTO-ENTMCNC: 33.2 G/DL (ref 31.5–35.7)
MCV RBC AUTO: 92 FL (ref 79–97)
MONOCYTES # BLD AUTO: 0.3 X10E3/UL (ref 0.1–0.9)
MONOCYTES NFR BLD AUTO: 5 %
NEUTROPHILS # BLD AUTO: 4.3 X10E3/UL (ref 1.4–7)
NEUTROPHILS NFR BLD AUTO: 62 %
PLATELET # BLD AUTO: 230 X10E3/UL (ref 150–450)
POTASSIUM SERPL-SCNC: 4.7 MMOL/L (ref 3.5–5.2)
PROT SERPL-MCNC: 7.1 G/DL (ref 6–8.5)
RBC # BLD AUTO: 4.74 X10E6/UL (ref 3.77–5.28)
SODIUM SERPL-SCNC: 146 MMOL/L (ref 134–144)
TRIGL SERPL-MCNC: 203 MG/DL (ref 0–149)
TSH SERPL DL<=0.005 MIU/L-ACNC: 1.66 UIU/ML (ref 0.45–4.5)
VLDLC SERPL CALC-MCNC: 36 MG/DL (ref 5–40)
WBC # BLD AUTO: 7 X10E3/UL (ref 3.4–10.8)

## 2022-04-21 LAB
HCV RNA SERPL NAA+PROBE-ACNC: NORMAL IU/ML
TEST INFORMATION: NORMAL

## 2022-04-22 ENCOUNTER — OFFICE VISIT (OUTPATIENT)
Dept: FAMILY MEDICINE CLINIC | Facility: CLINIC | Age: 52
End: 2022-04-22

## 2022-04-22 VITALS
DIASTOLIC BLOOD PRESSURE: 82 MMHG | OXYGEN SATURATION: 96 % | HEIGHT: 63 IN | SYSTOLIC BLOOD PRESSURE: 122 MMHG | TEMPERATURE: 97.7 F | BODY MASS INDEX: 44.47 KG/M2 | WEIGHT: 251 LBS | HEART RATE: 90 BPM | RESPIRATION RATE: 18 BRPM

## 2022-04-22 DIAGNOSIS — E03.9 ACQUIRED HYPOTHYROIDISM: ICD-10-CM

## 2022-04-22 DIAGNOSIS — E11.8 CONTROLLED DIABETES MELLITUS TYPE 2 WITH COMPLICATIONS, UNSPECIFIED WHETHER LONG TERM INSULIN USE: Primary | ICD-10-CM

## 2022-04-22 DIAGNOSIS — E78.2 MIXED HYPERLIPIDEMIA: ICD-10-CM

## 2022-04-22 DIAGNOSIS — J01.00 ACUTE NON-RECURRENT MAXILLARY SINUSITIS: ICD-10-CM

## 2022-04-22 DIAGNOSIS — R11.0 NAUSEA: ICD-10-CM

## 2022-04-22 LAB
BILIRUB BLD-MCNC: NEGATIVE MG/DL
CLARITY, POC: CLEAR
COLOR UR: YELLOW
EXPIRATION DATE: NORMAL
GLUCOSE BLDC GLUCOMTR-MCNC: 186 MG/DL (ref 70–130)
GLUCOSE UR STRIP-MCNC: NEGATIVE MG/DL
HBA1C MFR BLD: 7 %
KETONES UR QL: ABNORMAL
LEUKOCYTE EST, POC: NEGATIVE
Lab: NORMAL
NITRITE UR-MCNC: NEGATIVE MG/ML
PH UR: 5 [PH] (ref 5–8)
PROT UR STRIP-MCNC: ABNORMAL MG/DL
RBC # UR STRIP: NEGATIVE /UL
SP GR UR: 1.01 (ref 1–1.03)
UROBILINOGEN UR QL: NORMAL

## 2022-04-22 PROCEDURE — 82962 GLUCOSE BLOOD TEST: CPT | Performed by: FAMILY MEDICINE

## 2022-04-22 PROCEDURE — 81003 URINALYSIS AUTO W/O SCOPE: CPT | Performed by: FAMILY MEDICINE

## 2022-04-22 PROCEDURE — 99214 OFFICE O/P EST MOD 30 MIN: CPT | Performed by: FAMILY MEDICINE

## 2022-04-22 PROCEDURE — 3051F HG A1C>EQUAL 7.0%<8.0%: CPT | Performed by: FAMILY MEDICINE

## 2022-04-22 PROCEDURE — 83036 HEMOGLOBIN GLYCOSYLATED A1C: CPT | Performed by: FAMILY MEDICINE

## 2022-04-22 RX ORDER — METHYLPREDNISOLONE 4 MG/1
TABLET ORAL
Qty: 21 TABLET | Refills: 0 | Status: SHIPPED | OUTPATIENT
Start: 2022-04-22 | End: 2022-05-13

## 2022-04-22 RX ORDER — ATORVASTATIN CALCIUM 80 MG/1
80 TABLET, FILM COATED ORAL DAILY
Qty: 30 TABLET | Refills: 12 | Status: SHIPPED | OUTPATIENT
Start: 2022-04-22 | End: 2022-06-24 | Stop reason: SDUPTHER

## 2022-04-22 RX ORDER — CEPHALEXIN 500 MG/1
500 CAPSULE ORAL 3 TIMES DAILY
Qty: 30 CAPSULE | Refills: 0 | Status: SHIPPED | OUTPATIENT
Start: 2022-04-22 | End: 2022-05-13

## 2022-04-22 NOTE — PROGRESS NOTES
Subjective   Riddhi Cid is a 51 y.o. female. Presents to [facility]    Chief Complaint   Patient presents with   • Diabetes   • Hyperlipidemia   • Nausea       Hyperlipidemia  This is a chronic problem. The current episode started more than 1 year ago. The problem is controlled. There are no known factors aggravating her hyperlipidemia. Associated symptoms include leg pain. Pertinent negatives include no chest pain or shortness of breath. Current antihyperlipidemic treatment includes statins. The current treatment provides moderate improvement of lipids. There are no compliance problems.  Risk factors for coronary artery disease include dyslipidemia, diabetes mellitus, hypertension and obesity.   Diabetes  She presents for her follow-up diabetic visit. She has type 2 diabetes mellitus. Hypoglycemia symptoms include headaches. Pertinent negatives for hypoglycemia include no nervousness/anxiousness. Associated symptoms include blurred vision and fatigue. Pertinent negatives for diabetes include no chest pain and no weakness. There are no hypoglycemic complications. There are no diabetic complications. Risk factors for coronary artery disease include obesity, dyslipidemia and post-menopausal. Current diabetic treatment includes oral agent (monotherapy). She is compliant with treatment some of the time. Her weight is fluctuating minimally. She is following a generally healthy diet. Meal planning includes avoidance of concentrated sweets and carbohydrate counting. She has not had a previous visit with a dietitian. She participates in exercise intermittently. Home blood sugar record trend: patient has not been checking at home. An ACE inhibitor/angiotensin II receptor blocker is being taken. She sees a podiatrist.Eye exam is current.   Nausea  This is a new problem. The current episode started 1 to 4 weeks ago (1 week). The problem has been gradually worsening. Associated symptoms include abdominal  pain (discomfort), fatigue, headaches and nausea. Pertinent negatives include no arthralgias, chest pain, chills, congestion, coughing, fever, numbness, rash, vomiting or weakness. Associated symptoms comments: Patient did start CPAP machine again  Felt dizzy/ light headed  . Nothing aggravates the symptoms.      No diarrhea. Started her cpap machine and then nausea started. Her stomach is just uncomfortable not painful. Eating makes it better.     I personally reviewed and updated the patient's allergies, medications, problem list, and past medical, surgical, social, and family history. I have reviewed and confirmed the accuracy of the History of Present Illness and Review of Symptoms as documented by the MA/LPN/RN. June Harrison MD    Allergies:  Allergies   Allergen Reactions   • Sulfa Antibiotics Rash   • Meloxicam GI Intolerance       Social History:  Social History     Socioeconomic History   • Marital status:    Tobacco Use   • Smoking status: Current Every Day Smoker     Packs/day: 0.50     Types: Cigarettes   • Smokeless tobacco: Never Used   Vaping Use   • Vaping Use: Never used   Substance and Sexual Activity   • Alcohol use: No   • Drug use: No   • Sexual activity: Defer       Family History:  Family History   Problem Relation Age of Onset   • Stroke Mother    • Hypertension Mother    • Hyperlipidemia Mother    • Hypothyroidism Mother    • Prostate cancer Father    • Other Father    • Diabetes Father    • Hypertension Father    • Hyperlipidemia Father    • Breast cancer Paternal Grandmother    • Hypothyroidism Grandchild    • Ovarian cancer Neg Hx        Past Medical History :  Patient Active Problem List   Diagnosis   • Degeneration of intervertebral disc of cervical region   • Degeneration of intervertebral disc of lumbar region   • Acquired hypothyroidism   • Mixed hyperlipidemia   • Major depressive disorder, recurrent, moderate (HCC)   • Obstructive sleep apnea   • Acute non-recurrent  maxillary sinusitis   • Vitamin B12 deficiency   • Vitamin D deficiency   • COPD (chronic obstructive pulmonary disease) ([facility])   • Solitary thyroid nodule   • Chronic midline low back pain with right-sided sciatica   • Cervicalgia   • Cervical stenosis of spine   • Arthritis   • Moderate persistent asthma without complication   • Gastroparalysis   • History of chicken pox   • IBS (irritable bowel syndrome)   • GERD (gastroesophageal reflux disease)   • Dependent edema   • Optic disc hemorrhage   • Controlled diabetes mellitus type 2 with complications ([facility])   • Tension headache   • Folliculitis   • Encounter to establish care   • Edema   • Tinea pedis of right foot   • Intertrigo   • Allergic rhinitis   • Disorder of peripheral nervous system   • Fibromyositis   • Tobacco dependence syndrome   • Pap smear, low-risk   • Nausea       Medication List:    Current Outpatient Medications:   •  albuterol (PROVENTIL) (2.5 MG/3ML) 0.083% nebulizer solution, USE 1 VIAL PER NEBULIZER EVERY 4 HOURS AS NEEDED, Disp: 180 mL, Rfl: 3  •  albuterol sulfate  (90 Base) MCG/ACT inhaler, TAKE 2 PUFFS BY MOUTH EVERY 4-6 HOURS AS NEEDED. MAX OF 12 PUFFS DAILY., Disp: 18 g, Rfl: 3  •  aspirin 81 MG chewable tablet, Chew 81 mg Daily., Disp: , Rfl:   •  azelastine (OPTIVAR) 0.05 % ophthalmic solution, , Disp: , Rfl:   •  bumetanide (BUMEX) 1 MG tablet, TAKE 1 TO 2 TABLETS BY MOUTH EVERY DAY AS NEEDED, Disp: 180 tablet, Rfl: 12  •  citalopram (CeleXA) 20 MG tablet, TAKE 1 TABLET BY MOUTH DAILY, Disp: 30 tablet, Rfl: 12  •  Cyanocobalamin (B-12) 1000 MCG tablet controlled-release, TAKE 1 TABLET BY MOUTH EVERY DAY, Disp: 90 tablet, Rfl: 3  •  DEXILANT 60 MG capsule, TAKE 1 CAPSULE BY MOUTH EVERY DAY, Disp: 30 capsule, Rfl: 0  •  dicyclomine (BENTYL) 20 MG tablet, Take 20 mg by mouth Every 6 (Six) Hours As Needed. for pain, Disp: , Rfl:   •  famotidine (PEPCID) 40 MG tablet, TK 1 T PO HS UTD, Disp: , Rfl:   •  fenofibrate (TRICOR) 145 MG  tablet, Daily. Dx: E78.2, mixed hyperlipidema, Disp: , Rfl:   •  fluticasone (FLONASE) 50 MCG/ACT nasal spray, SHAKE LIQUID AND USE 2 SPRAYS IN EACH NOSTRIL EVERY DAY, Disp: 48 g, Rfl: 3  •  folic acid (FOLVITE) 1 MG tablet, TAKE 1 TABLET BY MOUTH DAILY, Disp: 90 tablet, Rfl: 3  •  gabapentin (NEURONTIN) 300 MG capsule, Take 1 capsule by mouth 3 (Three) Times a Day., Disp: 90 capsule, Rfl: 5  •  Glucose Blood (BLOOD GLUCOSE TEST) strip, by In Vitro route. Dx: E11.9, DM type 2, controlled, Disp: , Rfl:   •  HYDROcodone-acetaminophen (NORCO)  MG per tablet, Take 1 tablet by mouth Every 6 (Six) Hours As Needed for Moderate Pain . *PAIN MANAGEMENT RX'S, Disp: 120 tablet, Rfl: 0  •  Lancets Misc. (ACCU-CHEK FASTCLIX LANCET) kit, Dx: E11.9, DM type 2, controlled, Disp: , Rfl:   •  levothyroxine (SYNTHROID, LEVOTHROID) 50 MCG tablet, TAKE 1 TABLET BY MOUTH DAILY, Disp: 90 tablet, Rfl: 4  •  Linzess 290 MCG capsule capsule, TAKE 1 CAPSULE BY MOUTH EVERY DAY 30 MINUTES BEFORE A MEAL, Disp: , Rfl:   •  metFORMIN ER (GLUCOPHAGE-XR) 500 MG 24 hr tablet, TAKE 1 TABLET BY MOUTH DAILY, Disp: 90 tablet, Rfl: 3  •  metoclopramide (REGLAN) 10 MG tablet, TK 1 T PO HS, Disp: , Rfl:   •  montelukast (SINGULAIR) 10 MG tablet, Take 1 tablet by mouth Every Night., Disp: 90 tablet, Rfl: 3  •  Omega-3 Fatty Acids (FISH OIL) 500 MG capsule capsule, 2 capsules Daily. Dx: E78.2, mixed hyperlipidema, Disp: , Rfl:   •  ondansetron ODT (ZOFRAN-ODT) 4 MG disintegrating tablet, DIS 1 T UNT Q 6 H PRN, Disp: , Rfl: 0  •  potassium chloride ER (K-TAB) 20 MEQ tablet controlled-release ER tablet, TAKE 1 TABLET BY MOUTH EVERY DAY, ONLY IF TAKING 2 BUMETANIDE, Disp: 60 tablet, Rfl: 12  •  tiZANidine (ZANAFLEX) 4 MG tablet, Take 1 tablet by mouth At Night As Needed for Muscle Spasms., Disp: 30 tablet, Rfl: 2  •  vitamin D3 125 MCG (5000 UT) capsule capsule, TAKE 1 CAPSULE BY MOUTH DAILY, Disp: 90 capsule, Rfl: 3  •  Wixela Inhub 250-50 MCG/DOSE  "DISKUS, INHALE 1 PUFF BY MOUTH TWICE DAILY, Disp: 60 each, Rfl: 12  •  atorvastatin (LIPITOR) 80 MG tablet, Take 1 tablet by mouth Daily., Disp: 30 tablet, Rfl: 12  •  cephalexin (KEFLEX) 500 MG capsule, Take 1 capsule by mouth 3 (Three) Times a Day., Disp: 30 capsule, Rfl: 0  •  methylPREDNISolone (MEDROL) 4 MG dose pack, 6 tablets on day one, 5 tablets on day two, 4 tablets on day three, 3 tablets on day four, 2 tablets on day five, 1 tablet on day 6., Disp: 21 tablet, Rfl: 0    Past Surgical History:  Past Surgical History:   Procedure Laterality Date   • BREAST BIOPSY Right    • CARPAL TUNNEL RELEASE     • CERVICAL DISCECTOMY ANTERIOR     • CHOLECYSTECTOMY     • ESSURE TUBAL LIGATION         Review of Systems:  Review of Systems   Constitutional: Positive for fatigue. Negative for activity change, chills and fever.   HENT: Negative for congestion, ear pain, rhinorrhea, sinus pressure and voice change.    Eyes: Positive for blurred vision. Negative for visual disturbance.   Respiratory: Negative for cough and shortness of breath.    Cardiovascular: Negative for chest pain.   Gastrointestinal: Positive for abdominal pain (discomfort) and nausea. Negative for diarrhea and vomiting.   Endocrine: Negative for cold intolerance and heat intolerance.   Genitourinary: Negative for frequency and urgency.   Musculoskeletal: Negative for arthralgias.   Skin: Negative for rash.   Neurological: Negative for syncope, weakness and numbness.   Hematological: Does not bruise/bleed easily.   Psychiatric/Behavioral: Negative for depressed mood. The patient is not nervous/anxious.        Physical Exam:  Vital Signs:  Vital Signs:   /82   Pulse 90   Temp 97.7 °F (36.5 °C)   Resp 18   Ht 160 cm (63\")   Wt 114 kg (251 lb)   SpO2 96%   BMI 44.46 kg/m²     Result Review :                Physical Exam  Vitals reviewed.   Constitutional:       Appearance: Normal appearance. She is well-developed.   HENT:      Head: " Normocephalic and atraumatic.      Right Ear: Tympanic membrane and external ear normal. No decreased hearing noted. No tenderness. No middle ear effusion. Tympanic membrane is not injected, erythematous, retracted or bulging.      Left Ear: Tympanic membrane and external ear normal. No decreased hearing noted. No tenderness.  No middle ear effusion. Tympanic membrane is not injected, erythematous, retracted or bulging.      Nose: Nose normal. No rhinorrhea.      Mouth/Throat:      Pharynx: No oropharyngeal exudate or posterior oropharyngeal erythema.   Eyes:      General:         Right eye: No discharge.         Left eye: No discharge.   Cardiovascular:      Rate and Rhythm: Normal rate and regular rhythm.      Heart sounds: Normal heart sounds. No murmur heard.    No friction rub. No gallop.   Pulmonary:      Effort: Pulmonary effort is normal. No respiratory distress.      Breath sounds: Normal breath sounds. No wheezing or rales.   Abdominal:      General: Abdomen is flat. Bowel sounds are normal. There is no distension.      Palpations: Abdomen is soft. There is no mass.      Tenderness: There is no abdominal tenderness. There is no guarding or rebound.      Hernia: No hernia is present.   Lymphadenopathy:      Cervical: No cervical adenopathy.   Skin:     General: Skin is warm and dry.      Findings: No rash.   Neurological:      Mental Status: She is alert and oriented to person, place, and time.      Coordination: Coordination normal.      Gait: Gait normal.   Psychiatric:         Behavior: Behavior is cooperative.         Assessment and Plan:  Problems Addressed this Visit        Cardiac and Vasculature    Mixed hyperlipidemia    Relevant Medications    atorvastatin (LIPITOR) 80 MG tablet       ENT    Acute non-recurrent maxillary sinusitis     increase fluids, tylenol for fever, motrin for pain. Humidifier to help with congestion and to sleep at night. Dicussed OTC meds, gargle with warm salt water. If  there is recurrent fever, shortness of breath, lethargy, advised to come in to the office or go to the ER.             Relevant Medications    cephalexin (KEFLEX) 500 MG capsule    methylPREDNISolone (MEDROL) 4 MG dose pack       Endocrine and Metabolic    Acquired hypothyroidism    Relevant Medications    methylPREDNISolone (MEDROL) 4 MG dose pack    Controlled diabetes mellitus type 2 with complications (HCC) - Primary    Relevant Orders    POC Glycosylated Hemoglobin (Hb A1C) (Completed)    POC Glucose (Completed)    POC Urinalysis Dipstick, Multipro (Completed)       Gastrointestinal Abdominal     Nausea     From drainage. Will treat sinusitis               Diagnoses       Codes Comments    Controlled diabetes mellitus type 2 with complications, unspecified whether long term insulin use (HCC)    -  Primary ICD-10-CM: E11.8  ICD-9-CM: 250.90     Mixed hyperlipidemia     ICD-10-CM: E78.2  ICD-9-CM: 272.2     Acquired hypothyroidism     ICD-10-CM: E03.9  ICD-9-CM: 244.9     Nausea     ICD-10-CM: R11.0  ICD-9-CM: 787.02     Acute non-recurrent maxillary sinusitis     ICD-10-CM: J01.00  ICD-9-CM: 461.0            An After Visit Summary and PPPS were given to the patient.       I wore protective equipment throughout this patient encounter to include mask. Hand hygiene was performed before donning protective equipment and after removal when leaving the room.

## 2022-05-02 ENCOUNTER — TELEPHONE (OUTPATIENT)
Dept: FAMILY MEDICINE CLINIC | Facility: CLINIC | Age: 52
End: 2022-05-02

## 2022-05-02 RX ORDER — FLUTICASONE PROPIONATE AND SALMETEROL 250; 50 UG/1; UG/1
1 POWDER RESPIRATORY (INHALATION) 2 TIMES DAILY
Qty: 60 EACH | Refills: 12 | Status: SHIPPED | OUTPATIENT
Start: 2022-05-02 | End: 2022-10-03

## 2022-05-02 NOTE — PROGRESS NOTES
Subjective   Riddhi Cid is a 51 y.o. female. Presents to [facility]    Riddhi was seen at [facility]. She was admitted on 05/02/2022  for right thigh to groin pain. She was discharged on 05/02/2022 . Discharge diagnosis was leg pain. Labs that were performed during the encounter included: see attached. Diagnostic studies that were performed included: ultrasound. Currently Riddhi receives care at home. Complications from the hospital stay include none. The patient stated that they do not need help with their daily life and activities. The patient stated that they do have emotional support at home.  Current outpatient and discharge medications have been reconciled for the patient.  Reviewed by: June Harrison MD      Chief Complaint   Patient presents with   • URI   • Hospital Follow Up Visit       URI   This is a new problem. The current episode started 1 to 4 weeks ago. The problem has been resolved. There has been no fever. Associated symptoms include chest pain. Pertinent negatives include no abdominal pain, congestion, coughing, diarrhea, ear pain, headaches, nausea, plugged ear sensation, rash, rhinorrhea, vomiting or wheezing. Treatments tried: antibiotics and steriods. The treatment provided moderate relief.   Leg Pain   There was no injury mechanism. The pain is present in the right thigh. The pain is moderate. Pain course: occured once. Pertinent negatives include no inability to bear weight, loss of motion, loss of sensation or numbness.   Chest Pain   This is a new problem. The current episode started in the past 7 days. The onset quality is sudden. The problem occurs rarely. The problem has been resolved. The pain is present in the substernal region. The pain is moderate. The quality of the pain is described as sharp. Associated symptoms include leg pain. Pertinent negatives include no abdominal pain, cough, exertional chest pressure, fever, headaches, nausea, numbness,  orthopnea, palpitations, shortness of breath, sputum production or vomiting. The pain is aggravated by nothing. She has tried nothing for the symptoms.   Her past medical history is significant for diabetes.      She did not tell the ER about her chest pain    I personally reviewed and updated the patient's allergies, medications, problem list, and past medical, surgical, social, and family history. I have reviewed and confirmed the accuracy of the History of Present Illness and Review of Symptoms as documented by the MA/LPN/RN. June Harrison MD    Allergies:  Allergies   Allergen Reactions   • Sulfa Antibiotics Rash   • Meloxicam GI Intolerance       Social History:  Social History     Socioeconomic History   • Marital status:    Tobacco Use   • Smoking status: Current Every Day Smoker     Packs/day: 0.50     Types: Cigarettes   • Smokeless tobacco: Never Used   Vaping Use   • Vaping Use: Never used   Substance and Sexual Activity   • Alcohol use: No   • Drug use: No   • Sexual activity: Defer       Family History:  Family History   Problem Relation Age of Onset   • Stroke Mother    • Hypertension Mother    • Hyperlipidemia Mother    • Hypothyroidism Mother    • Prostate cancer Father    • Other Father    • Diabetes Father    • Hypertension Father    • Hyperlipidemia Father    • Coronary artery disease Father    • Breast cancer Paternal Grandmother    • Hypothyroidism Grandchild    • Ovarian cancer Neg Hx        Past Medical History :  Patient Active Problem List   Diagnosis   • Degeneration of intervertebral disc of cervical region   • Degeneration of intervertebral disc of lumbar region   • Acquired hypothyroidism   • Mixed hyperlipidemia   • Major depressive disorder, recurrent, moderate (HCC)   • Obstructive sleep apnea   • Vitamin B12 deficiency   • Vitamin D deficiency   • COPD (chronic obstructive pulmonary disease) (HCC)   • Solitary thyroid nodule   • Chronic midline low back pain with  right-sided sciatica   • Cervicalgia   • Cervical stenosis of spine   • Arthritis   • Moderate persistent asthma without complication   • Gastroparalysis   • History of chicken pox   • IBS (irritable bowel syndrome)   • GERD (gastroesophageal reflux disease)   • Dependent edema   • Optic disc hemorrhage   • Controlled diabetes mellitus type 2 with complications (HCC)   • Tension headache   • Folliculitis   • Encounter to establish care   • Edema   • Other chest pain   • Tinea pedis of right foot   • Intertrigo   • Allergic rhinitis   • Disorder of peripheral nervous system   • Fibromyositis   • Tobacco dependence syndrome   • Pap smear, low-risk   • Nausea   • Right leg pain       Medication List:    Current Outpatient Medications:   •  albuterol (PROVENTIL) (2.5 MG/3ML) 0.083% nebulizer solution, USE 1 VIAL PER NEBULIZER EVERY 4 HOURS AS NEEDED, Disp: 180 mL, Rfl: 3  •  albuterol sulfate  (90 Base) MCG/ACT inhaler, TAKE 2 PUFFS BY MOUTH EVERY 4-6 HOURS AS NEEDED. MAX OF 12 PUFFS DAILY., Disp: 18 g, Rfl: 3  •  aspirin 81 MG chewable tablet, Chew 81 mg Daily., Disp: , Rfl:   •  atorvastatin (LIPITOR) 80 MG tablet, Take 1 tablet by mouth Daily., Disp: 30 tablet, Rfl: 12  •  azelastine (OPTIVAR) 0.05 % ophthalmic solution, , Disp: , Rfl:   •  bumetanide (BUMEX) 1 MG tablet, TAKE 1 TO 2 TABLETS BY MOUTH EVERY DAY AS NEEDED, Disp: 180 tablet, Rfl: 12  •  citalopram (CeleXA) 20 MG tablet, TAKE 1 TABLET BY MOUTH DAILY, Disp: 30 tablet, Rfl: 12  •  Cyanocobalamin (B-12) 1000 MCG tablet controlled-release, TAKE 1 TABLET BY MOUTH EVERY DAY, Disp: 90 tablet, Rfl: 3  •  DEXILANT 60 MG capsule, TAKE 1 CAPSULE BY MOUTH EVERY DAY, Disp: 30 capsule, Rfl: 0  •  dicyclomine (BENTYL) 20 MG tablet, Take 20 mg by mouth Every 6 (Six) Hours As Needed. for pain, Disp: , Rfl:   •  famotidine (PEPCID) 40 MG tablet, TK 1 T PO HS UTD, Disp: , Rfl:   •  fenofibrate (TRICOR) 145 MG tablet, Daily. Dx: E78.2, mixed hyperlipidema, Disp: ,  Rfl:   •  fluticasone (FLONASE) 50 MCG/ACT nasal spray, SHAKE LIQUID AND USE 2 SPRAYS IN EACH NOSTRIL EVERY DAY, Disp: 48 g, Rfl: 3  •  Fluticasone-Salmeterol (Wixela Inhub) 250-50 MCG/ACT DISKUS, Inhale 1 puff 2 (Two) Times a Day., Disp: 60 each, Rfl: 12  •  folic acid (FOLVITE) 1 MG tablet, TAKE 1 TABLET BY MOUTH DAILY, Disp: 90 tablet, Rfl: 3  •  gabapentin (NEURONTIN) 300 MG capsule, Take 1 capsule by mouth 3 (Three) Times a Day., Disp: 90 capsule, Rfl: 5  •  Glucose Blood (BLOOD GLUCOSE TEST) strip, by In Vitro route. Dx: E11.9, DM type 2, controlled, Disp: , Rfl:   •  HYDROcodone-acetaminophen (NORCO)  MG per tablet, Take 1 tablet by mouth Every 6 (Six) Hours As Needed for Moderate Pain . *PAIN MANAGEMENT RX'S, Disp: 120 tablet, Rfl: 0  •  Lancets Misc. (ACCU-CHEK FASTCLIX LANCET) kit, Dx: E11.9, DM type 2, controlled, Disp: , Rfl:   •  levothyroxine (SYNTHROID, LEVOTHROID) 50 MCG tablet, TAKE 1 TABLET BY MOUTH DAILY, Disp: 90 tablet, Rfl: 4  •  Linzess 290 MCG capsule capsule, TAKE 1 CAPSULE BY MOUTH EVERY DAY 30 MINUTES BEFORE A MEAL, Disp: , Rfl:   •  metFORMIN ER (GLUCOPHAGE-XR) 500 MG 24 hr tablet, TAKE 1 TABLET BY MOUTH DAILY, Disp: 90 tablet, Rfl: 3  •  metoclopramide (REGLAN) 10 MG tablet, TK 1 T PO HS, Disp: , Rfl:   •  montelukast (SINGULAIR) 10 MG tablet, Take 1 tablet by mouth Every Night., Disp: 90 tablet, Rfl: 3  •  Omega-3 Fatty Acids (FISH OIL) 500 MG capsule capsule, 2 capsules Daily. Dx: E78.2, mixed hyperlipidema, Disp: , Rfl:   •  ondansetron ODT (ZOFRAN-ODT) 4 MG disintegrating tablet, DIS 1 T UNT Q 6 H PRN, Disp: , Rfl: 0  •  potassium chloride ER (K-TAB) 20 MEQ tablet controlled-release ER tablet, TAKE 1 TABLET BY MOUTH EVERY DAY, ONLY IF TAKING 2 BUMETANIDE, Disp: 60 tablet, Rfl: 12  •  tiZANidine (ZANAFLEX) 4 MG tablet, Take 1 tablet by mouth At Night As Needed for Muscle Spasms., Disp: 30 tablet, Rfl: 2  •  vitamin D3 125 MCG (5000 UT) capsule capsule, TAKE 1 CAPSULE BY MOUTH  "DAILY, Disp: 90 capsule, Rfl: 3  •  cephalexin (KEFLEX) 500 MG capsule, Take 1 capsule by mouth 3 (Three) Times a Day., Disp: 30 capsule, Rfl: 0  •  methylPREDNISolone (MEDROL) 4 MG dose pack, 6 tablets on day one, 5 tablets on day two, 4 tablets on day three, 3 tablets on day four, 2 tablets on day five, 1 tablet on day 6., Disp: 21 tablet, Rfl: 0    Past Surgical History:  Past Surgical History:   Procedure Laterality Date   • BREAST BIOPSY Right    • CARPAL TUNNEL RELEASE     • CERVICAL DISCECTOMY ANTERIOR     • CHOLECYSTECTOMY     • ESSURE TUBAL LIGATION         Review of Systems:  Review of Systems   Constitutional: Negative for activity change and fever.   HENT: Negative for congestion, ear pain, rhinorrhea, sinus pressure and voice change.    Eyes: Negative for visual disturbance.   Respiratory: Negative for cough, sputum production, shortness of breath and wheezing.    Cardiovascular: Positive for chest pain. Negative for palpitations and orthopnea.   Gastrointestinal: Negative for abdominal pain, diarrhea, nausea and vomiting.   Endocrine: Negative for cold intolerance and heat intolerance.   Genitourinary: Negative for frequency and urgency.   Musculoskeletal: Negative for arthralgias.   Skin: Negative for rash.   Neurological: Negative for syncope and numbness.   Hematological: Does not bruise/bleed easily.   Psychiatric/Behavioral: Negative for depressed mood. The patient is not nervous/anxious.        Physical Exam:  Vital Signs:  Vital Signs:   /66   Pulse 112   Temp 97.4 °F (36.3 °C)   Resp 18   Ht 160 cm (63\")   Wt 112 kg (247 lb 9.6 oz)   SpO2 96%   BMI 43.86 kg/m²     Result Review :   The following data was reviewed by: June Harrison MD on 05/03/2022:    Data reviewed: Radiologic studies u/s negative for dvt and Recent hospitalization notes [facility] ER     Cardiac labs: CKMB 1.3 Troponin 0.01 CK 83.0    XR Chest PA & Lateral    Result Date: 5/3/2022  No active disease. No significant " interval change.  Electronically Signed By-Kyle Slade MD On:5/3/2022 9:26 AM This report was finalized on [id number] by  Kyle Slade MD.          ECG 12 Lead    Date/Time: 5/3/2022 10:32 AM  Performed by: June Harrison MD  Authorized by: June Harrison MD   Comparison: not compared with previous ECG   Rhythm: sinus rhythm  Rate: normal  Conduction: conduction normal  ST Segments: ST segments normal  T Waves: T waves normal  Other: no other findings    Clinical impression: normal ECG             Physical Exam  Vitals reviewed.   Constitutional:       Appearance: Normal appearance. She is well-developed.   HENT:      Head: Normocephalic and atraumatic.   Eyes:      General:         Right eye: No discharge.         Left eye: No discharge.   Cardiovascular:      Rate and Rhythm: Normal rate and regular rhythm.      Heart sounds: Normal heart sounds. No murmur heard.    No friction rub. No gallop.   Pulmonary:      Effort: Pulmonary effort is normal. No respiratory distress.      Breath sounds: Normal breath sounds. No wheezing or rales.   Musculoskeletal:      Comments: Negative exam, non tender thigh and lower leg on right   Skin:     General: Skin is warm and dry.      Findings: No rash.   Neurological:      Mental Status: She is alert and oriented to person, place, and time.      Coordination: Coordination normal.      Gait: Gait normal.   Psychiatric:         Behavior: Behavior is cooperative.         Assessment and Plan:  Problems Addressed this Visit        Cardiac and Vasculature    Other chest pain - Primary     Labs and EKG are negative. Referral made to Dr Lynn.            Relevant Orders    CK Total & CKMB    Troponin T    XR Chest PA & Lateral (Completed)    ECG 12 Lead    Ambulatory Referral to Cardiology       Musculoskeletal and Injuries    Right leg pain     Negative exam             Diagnoses       Codes Comments    Other chest pain    -  Primary ICD-10-CM: R07.89  ICD-9-CM: 786.59      Right leg pain     ICD-10-CM: M79.604  ICD-9-CM: 729.5            An After Visit Summary and PPPS were given to the patient.       I wore protective equipment throughout this patient encounter to include mask. Hand hygiene was performed before donning protective equipment and after removal when leaving the room.

## 2022-05-03 ENCOUNTER — HOSPITAL ENCOUNTER (OUTPATIENT)
Dept: GENERAL RADIOLOGY | Facility: HOSPITAL | Age: 52
Discharge: HOME | End: 2022-05-03
Admitting: FAMILY MEDICINE

## 2022-05-03 ENCOUNTER — OFFICE VISIT (OUTPATIENT)
Dept: FAMILY MEDICINE CLINIC | Facility: CLINIC | Age: 52
End: 2022-05-03

## 2022-05-03 VITALS
TEMPERATURE: 97.4 F | WEIGHT: 247.6 LBS | HEART RATE: 112 BPM | OXYGEN SATURATION: 96 % | BODY MASS INDEX: 43.87 KG/M2 | HEIGHT: 63 IN | SYSTOLIC BLOOD PRESSURE: 122 MMHG | RESPIRATION RATE: 18 BRPM | DIASTOLIC BLOOD PRESSURE: 66 MMHG

## 2022-05-03 DIAGNOSIS — M79.604 RIGHT LEG PAIN: ICD-10-CM

## 2022-05-03 DIAGNOSIS — R07.89 OTHER CHEST PAIN: Primary | ICD-10-CM

## 2022-05-03 PROBLEM — M79.605 LEFT LEG PAIN: Status: ACTIVE | Noted: 2022-05-03

## 2022-05-03 PROCEDURE — 93000 ELECTROCARDIOGRAM COMPLETE: CPT | Performed by: FAMILY MEDICINE

## 2022-05-03 PROCEDURE — 71046 X-RAY EXAM CHEST 2 VIEWS: CPT

## 2022-05-03 PROCEDURE — 99213 OFFICE O/P EST LOW 20 MIN: CPT | Performed by: FAMILY MEDICINE

## 2022-05-10 PROBLEM — M79.604 RIGHT LEG PAIN: Status: ACTIVE | Noted: 2022-05-03

## 2022-05-12 NOTE — PROGRESS NOTES
Date of Office Visit: 2022  Encounter Provider: Dr. Indra Lynn  Place of Service: [facility] CARDIOLOGY [place]  Patient Name: Riddhi Cid  :1970  June Harrison MD    Chief Complaint   Patient presents with   • Chest Pain   • Hyperlipidemia   • Diabetes   • Consult     History of Present Illness:    I am pleased to see Mrs. Cid in my office today as a new consultation.    As you know, patient is 51 years old white female whose past medical history is significant for hypertension, hyperlipidemia, diabetes mellitus, COPD, obesity, who is referred to me for symptom of chest pain and shortness of breath.    In 2022, patient had episode of chest pain.  Patient described as as a dull ache on the left side of the chest with radiation across the chest and the left shoulder.  It was associated with shortness of breath.  Patient had few episodes after that.  Patient denies any orthopnea or PND.  She has exertional component of shortness of breath.  Patient denies any syncope or presyncope.  Dizziness reported.  Trace leg edema present.    Patient does not have previous history of CAD, PCI or MI.  She smokes a pack of cigarettes per day.  She does not abuse alcohol.    I would recommend that patient should proceed with stress test with Cardiolite imaging.  Echocardiogram would be done.  Blood pressure is controlled currently.  LDL is 135.  I would like to see below 100        Past Medical History:   Diagnosis Date   • Allergic    • Anemia    • Anxiety    • Arthritis    • Asthma    • COPD (chronic obstructive pulmonary disease) (HCC)    • Costochondritis    • Degenerative disc disease, lumbar    • Depression    • Diabetes mellitus (HCC)    • GERD (gastroesophageal reflux disease)    • History of chicken pox    • Hyperlipidemia    • IBS (irritable bowel syndrome)    • Injury of back    • Irritable colon    • Knee pain, left    • Obstructive sleep apnea    • Thyroid nodule    • Tired  07/01/2019   • Vitamin D deficiency          Past Surgical History:   Procedure Laterality Date   • BREAST BIOPSY Right    • CARPAL TUNNEL RELEASE     • CERVICAL DISCECTOMY ANTERIOR     • CHOLECYSTECTOMY     • ESSURE TUBAL LIGATION             Current Outpatient Medications:   •  albuterol (PROVENTIL) (2.5 MG/3ML) 0.083% nebulizer solution, USE 1 VIAL PER NEBULIZER EVERY 4 HOURS AS NEEDED, Disp: 180 mL, Rfl: 3  •  albuterol sulfate  (90 Base) MCG/ACT inhaler, TAKE 2 PUFFS BY MOUTH EVERY 4-6 HOURS AS NEEDED. MAX OF 12 PUFFS DAILY., Disp: 18 g, Rfl: 3  •  aspirin 81 MG chewable tablet, Chew 81 mg Daily., Disp: , Rfl:   •  atorvastatin (LIPITOR) 80 MG tablet, Take 1 tablet by mouth Daily., Disp: 30 tablet, Rfl: 12  •  azelastine (OPTIVAR) 0.05 % ophthalmic solution, , Disp: , Rfl:   •  bumetanide (BUMEX) 1 MG tablet, TAKE 1 TO 2 TABLETS BY MOUTH EVERY DAY AS NEEDED, Disp: 180 tablet, Rfl: 12  •  citalopram (CeleXA) 20 MG tablet, TAKE 1 TABLET BY MOUTH DAILY, Disp: 30 tablet, Rfl: 12  •  Cyanocobalamin (B-12) 1000 MCG tablet controlled-release, TAKE 1 TABLET BY MOUTH EVERY DAY, Disp: 90 tablet, Rfl: 3  •  DEXILANT 60 MG capsule, TAKE 1 CAPSULE BY MOUTH EVERY DAY, Disp: 30 capsule, Rfl: 0  •  dicyclomine (BENTYL) 20 MG tablet, Take 20 mg by mouth Every 6 (Six) Hours As Needed. for pain, Disp: , Rfl:   •  famotidine (PEPCID) 40 MG tablet, TK 1 T PO HS UTD, Disp: , Rfl:   •  fenofibrate (TRICOR) 145 MG tablet, Daily. Dx: E78.2, mixed hyperlipidema, Disp: , Rfl:   •  fluticasone (FLONASE) 50 MCG/ACT nasal spray, SHAKE LIQUID AND USE 2 SPRAYS IN EACH NOSTRIL EVERY DAY, Disp: 48 g, Rfl: 3  •  Fluticasone-Salmeterol (Wixela Inhub) 250-50 MCG/ACT DISKUS, Inhale 1 puff 2 (Two) Times a Day., Disp: 60 each, Rfl: 12  •  folic acid (FOLVITE) 1 MG tablet, TAKE 1 TABLET BY MOUTH DAILY, Disp: 90 tablet, Rfl: 3  •  gabapentin (NEURONTIN) 300 MG capsule, Take 1 capsule by mouth 3 (Three) Times a Day., Disp: 90 capsule, Rfl: 5  •   Glucose Blood (BLOOD GLUCOSE TEST) strip, by In Vitro route. Dx: E11.9, DM type 2, controlled, Disp: , Rfl:   •  HYDROcodone-acetaminophen (NORCO)  MG per tablet, Take 1 tablet by mouth Every 6 (Six) Hours As Needed for Moderate Pain . *PAIN MANAGEMENT RX'S, Disp: 120 tablet, Rfl: 0  •  Lancets Misc. (ACCU-CHEK FASTCLIX LANCET) kit, Dx: E11.9, DM type 2, controlled, Disp: , Rfl:   •  levothyroxine (SYNTHROID, LEVOTHROID) 50 MCG tablet, TAKE 1 TABLET BY MOUTH DAILY, Disp: 90 tablet, Rfl: 4  •  Linzess 290 MCG capsule capsule, TAKE 1 CAPSULE BY MOUTH EVERY DAY 30 MINUTES BEFORE A MEAL, Disp: , Rfl:   •  metFORMIN ER (GLUCOPHAGE-XR) 500 MG 24 hr tablet, TAKE 1 TABLET BY MOUTH DAILY, Disp: 90 tablet, Rfl: 3  •  metoclopramide (REGLAN) 10 MG tablet, TK 1 T PO HS, Disp: , Rfl:   •  montelukast (SINGULAIR) 10 MG tablet, Take 1 tablet by mouth Every Night., Disp: 90 tablet, Rfl: 3  •  Omega-3 Fatty Acids (FISH OIL) 500 MG capsule capsule, 2 capsules Daily. Dx: E78.2, mixed hyperlipidema, Disp: , Rfl:   •  potassium chloride ER (K-TAB) 20 MEQ tablet controlled-release ER tablet, TAKE 1 TABLET BY MOUTH EVERY DAY, ONLY IF TAKING 2 BUMETANIDE, Disp: 60 tablet, Rfl: 12  •  vitamin D3 125 MCG (5000 UT) capsule capsule, TAKE 1 CAPSULE BY MOUTH DAILY, Disp: 90 capsule, Rfl: 3      Social History     Socioeconomic History   • Marital status:    Tobacco Use   • Smoking status: Current Every Day Smoker     Packs/day: 1.00     Types: Cigarettes   • Smokeless tobacco: Never Used   Vaping Use   • Vaping Use: Never used   Substance and Sexual Activity   • Alcohol use: No   • Drug use: No   • Sexual activity: Defer         Review of Systems   Constitutional: Negative for chills and fever.   HENT: Negative for ear discharge and nosebleeds.    Eyes: Negative for discharge and redness.   Cardiovascular: Positive for chest pain and leg swelling. Negative for orthopnea, palpitations, paroxysmal nocturnal dyspnea and syncope.  "  Respiratory: Positive for shortness of breath. Negative for cough and wheezing.    Endocrine: Negative for heat intolerance.   Skin: Negative for rash.   Musculoskeletal: Positive for arthritis and joint pain. Negative for myalgias.   Gastrointestinal: Negative for abdominal pain, melena, nausea and vomiting.   Genitourinary: Negative for dysuria and hematuria.   Neurological: Negative for dizziness, light-headedness, numbness and tremors.   Psychiatric/Behavioral: Negative for depression. The patient is not nervous/anxious.        Procedures    Procedures    No orders to display           Objective:    /81 (BP Location: Right arm, Cuff Size: Large Adult)   Pulse 85   Ht 160 cm (62.99\")   Wt 112 kg (247 lb)   BMI 43.77 kg/m²         Constitutional:       Appearance: Well-developed.   Eyes:      General: No scleral icterus.        Right eye: No discharge.   HENT:      Head: Normocephalic and atraumatic.   Neck:      Thyroid: No thyromegaly.      Lymphadenopathy: No cervical adenopathy.   Pulmonary:      Effort: Pulmonary effort is normal. No respiratory distress.      Breath sounds: Normal breath sounds. No wheezing. No rales.   Cardiovascular:      Normal rate. Regular rhythm.      No gallop.   Abdominal:      Tenderness: There is no abdominal tenderness.   Skin:     Findings: No erythema or rash.   Neurological:      Mental Status: Alert and oriented to person, place, and time.             Assessment:       Diagnosis Plan   1. Mixed hyperlipidemia  Adult Transthoracic Echo Complete W/ Cont if Necessary Per Protocol    Stress Test With Myocardial Perfusion One Day   2. Other chest pain  Adult Transthoracic Echo Complete W/ Cont if Necessary Per Protocol    Stress Test With Myocardial Perfusion One Day   3. Shortness of breath  Adult Transthoracic Echo Complete W/ Cont if Necessary Per Protocol    Stress Test With Myocardial Perfusion One Day   4. Essential hypertension  Adult Transthoracic Echo Complete " W/ Cont if Necessary Per Protocol    Stress Test With Myocardial Perfusion One Day   5. Type 2 diabetes mellitus without complication, without long-term current use of insulin (HCC)  Adult Transthoracic Echo Complete W/ Cont if Necessary Per Protocol    Stress Test With Myocardial Perfusion One Day            Plan:       MDM:    1.  Chest pain:    Patient is advised to proceed with stress test with Cardiolite imagin.  Shortness of breath:    Echocardiogram would be done    3.  Hypertension:    Blood pressure is very well controlled    4.  Hyperlipidemia:    Patient is on Lipitor.  Recent LDL is 125.  I would like to see below 100.  Repeat lipid panel in future    5.  Type 2 diabetes mellitus:    Diet modification and weight loss emphasized

## 2022-05-13 ENCOUNTER — OFFICE VISIT (OUTPATIENT)
Dept: CARDIOLOGY | Facility: CLINIC | Age: 52
End: 2022-05-13

## 2022-05-13 VITALS
BODY MASS INDEX: 43.77 KG/M2 | WEIGHT: 247 LBS | DIASTOLIC BLOOD PRESSURE: 81 MMHG | SYSTOLIC BLOOD PRESSURE: 123 MMHG | HEART RATE: 85 BPM | HEIGHT: 63 IN

## 2022-05-13 DIAGNOSIS — I10 ESSENTIAL HYPERTENSION: ICD-10-CM

## 2022-05-13 DIAGNOSIS — R06.02 SHORTNESS OF BREATH: ICD-10-CM

## 2022-05-13 DIAGNOSIS — E78.2 MIXED HYPERLIPIDEMIA: Primary | ICD-10-CM

## 2022-05-13 DIAGNOSIS — R07.89 OTHER CHEST PAIN: ICD-10-CM

## 2022-05-13 DIAGNOSIS — E11.9 TYPE 2 DIABETES MELLITUS WITHOUT COMPLICATION, WITHOUT LONG-TERM CURRENT USE OF INSULIN: ICD-10-CM

## 2022-05-13 PROCEDURE — 99204 OFFICE O/P NEW MOD 45 MIN: CPT | Performed by: INTERNAL MEDICINE

## 2022-06-06 RX ORDER — METFORMIN HYDROCHLORIDE 500 MG/1
500 TABLET, EXTENDED RELEASE ORAL DAILY
Qty: 90 TABLET | Refills: 2 | Status: SHIPPED | OUTPATIENT
Start: 2022-06-06

## 2022-06-14 ENCOUNTER — TELEPHONE (OUTPATIENT)
Dept: ENDOCRINOLOGY | Facility: CLINIC | Age: 52
End: 2022-06-14

## 2022-06-14 NOTE — TELEPHONE ENCOUNTER
Attempted to contact pt to reschedule her 7/5 appt. Will offer appt for the week of 6/20 when pt calls back. Lm on pts vm.

## 2022-06-24 DIAGNOSIS — E78.2 MIXED HYPERLIPIDEMIA: ICD-10-CM

## 2022-06-24 RX ORDER — ATORVASTATIN CALCIUM 80 MG/1
80 TABLET, FILM COATED ORAL DAILY
Qty: 30 TABLET | Refills: 12 | Status: SHIPPED | OUTPATIENT
Start: 2022-06-24 | End: 2022-11-03 | Stop reason: SDUPTHER

## 2022-07-11 ENCOUNTER — OFFICE VISIT (OUTPATIENT)
Dept: PAIN MEDICINE | Facility: CLINIC | Age: 52
End: 2022-07-11

## 2022-07-11 VITALS
TEMPERATURE: 97.1 F | HEART RATE: 80 BPM | SYSTOLIC BLOOD PRESSURE: 128 MMHG | WEIGHT: 247 LBS | OXYGEN SATURATION: 97 % | RESPIRATION RATE: 16 BRPM | DIASTOLIC BLOOD PRESSURE: 84 MMHG | BODY MASS INDEX: 43.77 KG/M2 | HEIGHT: 63 IN

## 2022-07-11 DIAGNOSIS — M51.36 DEGENERATION OF INTERVERTEBRAL DISC OF LUMBAR REGION: ICD-10-CM

## 2022-07-11 DIAGNOSIS — M79.7 FIBROMYOSITIS: ICD-10-CM

## 2022-07-11 DIAGNOSIS — G89.29 CHRONIC MIDLINE LOW BACK PAIN WITH RIGHT-SIDED SCIATICA: Primary | ICD-10-CM

## 2022-07-11 DIAGNOSIS — M54.41 CHRONIC MIDLINE LOW BACK PAIN WITH RIGHT-SIDED SCIATICA: Primary | ICD-10-CM

## 2022-07-11 DIAGNOSIS — M79.604 RIGHT LEG PAIN: ICD-10-CM

## 2022-07-11 DIAGNOSIS — M48.02 CERVICAL STENOSIS OF SPINE: ICD-10-CM

## 2022-07-11 DIAGNOSIS — M50.30 DEGENERATION OF INTERVERTEBRAL DISC OF CERVICAL REGION: ICD-10-CM

## 2022-07-11 DIAGNOSIS — M54.2 CERVICALGIA: ICD-10-CM

## 2022-07-11 PROCEDURE — G0463 HOSPITAL OUTPT CLINIC VISIT: HCPCS | Performed by: PHYSICAL MEDICINE & REHABILITATION

## 2022-07-11 PROCEDURE — 99213 OFFICE O/P EST LOW 20 MIN: CPT | Performed by: PHYSICAL MEDICINE & REHABILITATION

## 2022-07-11 RX ORDER — HYDROCODONE BITARTRATE AND ACETAMINOPHEN 10; 325 MG/1; MG/1
1 TABLET ORAL EVERY 6 HOURS PRN
Qty: 120 TABLET | Refills: 0 | Status: SHIPPED | OUTPATIENT
Start: 2022-07-11 | End: 2022-08-19 | Stop reason: SDUPTHER

## 2022-07-11 RX ORDER — HYDROCODONE BITARTRATE AND ACETAMINOPHEN 10; 325 MG/1; MG/1
1 TABLET ORAL EVERY 6 HOURS PRN
Qty: 120 TABLET | Refills: 0 | Status: SHIPPED | OUTPATIENT
Start: 2022-07-11 | End: 2022-10-06 | Stop reason: SDUPTHER

## 2022-07-11 NOTE — PROGRESS NOTES
Subjective   Riddhi Cid is a 51 y.o. female.     Neck pain radiates to b/l shoulders, and left arm pain. ACDF C5/7 (4/11/2016), also h/o fibromyalgia,  also pain in lower back and b/l legs and feet. 10/10 at worst, 6/10 at best, 6/10 today, worse with activity, improves with rest and meds, always present, varies, aching, radiates in BLE. Imaging reviewed, satisfactory ACDF. Referred for pain management. Neck pain improved with ACDF, tapered off [place] with worsening pain, still somewhat severe. Taking Cymbalta which helps, tried Lyrica with weight gain, trying to lose weight. Restarted [place] at BID - qdaily prn but worsening pain with exercise to lose weight, increased to BID-TID #75, then TID, then QID prn with adequate relief, reduced to #105, tolerating. Gastric sleeve surgery planned for March-April 2017, had to miss cardiac clearance and EGD due to illness and social issues, has decided against surgery, trying to lose weight with diet and exercise. Worsening b/l CTS symptoms, R>L, known h/o of CTS. Saw Juan Franz for worsening neck pain to LUE, ACDF is intact, started Diclofenac for DJD, Elavil. Could not tolerate even low-dose Gabapentin with drowsiness. Started Celebrex but did not feel like doing anything, stopped, stopped Mobic. Stopping numerous meds due to side effects, started Gabapentin with Dr. Pisano with benefit. Pain worsening, DDD on MRI, started PT which is helping. Back pain worsening, especially at night. Had COVID-19, doing better, plans to get vaccinated.       The following portions of the patient's history were reviewed and updated as appropriate: allergies, current medications, past family history, past medical history, past social history, past surgical history and problem list.    Review of Systems   Constitutional: Negative for chills, fatigue and fever.   HENT: Negative for hearing loss and trouble swallowing.    Eyes: Negative for visual disturbance.   Respiratory: Negative for  shortness of breath.    Cardiovascular: Negative for chest pain.   Gastrointestinal: Positive for constipation and nausea. Negative for abdominal pain, diarrhea and vomiting.   Genitourinary: Negative for urinary incontinence.   Musculoskeletal: Positive for arthralgias, back pain and neck pain. Negative for joint swelling and myalgias.   Neurological: Positive for headache. Negative for dizziness, weakness and numbness.       Objective   Physical Exam   Constitutional: She is oriented to person, place, and time. She appears well-developed and well-nourished.   HENT:   Head: Normocephalic and atraumatic.   Eyes: Pupils are equal, round, and reactive to light.   Cardiovascular: Normal rate, regular rhythm and normal heart sounds.   Pulmonary/Chest: Breath sounds normal.   Abdominal: Soft. Bowel sounds are normal. She exhibits no distension. There is no abdominal tenderness.   Neurological: She is alert and oriented to person, place, and time. She has normal reflexes. She displays normal reflexes. No sensory deficit.   Psychiatric: Her behavior is normal. Thought content normal.         Assessment & Plan   Diagnoses and all orders for this visit:    1. Chronic midline low back pain with right-sided sciatica (Primary)    2. Cervicalgia    3. Cervical stenosis of spine    4. Degeneration of intervertebral disc of cervical region    5. Degeneration of intervertebral disc of lumbar region    6. Fibromyositis    7. Right leg pain        INspect reviewed, in order. Low risk. Repeat UDS 4/11/22 in order.  Reduced to Norco 10/325mg q6-8h #105, too painful, restarted q6h prn #120, then reduced to #105 successfully but pain worsening. Cont at #120.  Afraid to start Lyrica due to possible weight gain. Restarted Gabapentin, takes 300-900mg/day as tolerated, helping a great deal.  Severe GERD, IBS, family h/o CV dz. Stopped Diclofenac, could not tolerate Celebrex 200mg qdaily. Taking Naproxen with PCP but hard on her stomach, no  personal CV issues. Began Mobic 7.5mg qdaily prn.  Begin Tizanidine 4mg qHS prn.  Cont other meds as prescribed.  Cont TENS, unit provided here, patient reports it helps her pain significantly.  Cont b/l CTS braces at night only.  Patient reports she cannot start PT at this time as her daughter currently works 12h shifts, may be able to begin in future if her daughter can change to an 8h shift schedule.  Will begin neuropathic comp cream if her current cream does not work.  Ordered LSO for truncal stability. Cont PT.  Letter to return to work. Does not drive or operate heavy machinery while using pain medication.  RTC 3 months for f/u.

## 2022-07-14 ENCOUNTER — APPOINTMENT (OUTPATIENT)
Dept: NUCLEAR MEDICINE | Facility: HOSPITAL | Age: 52
End: 2022-07-14

## 2022-07-14 ENCOUNTER — APPOINTMENT (OUTPATIENT)
Dept: CARDIOLOGY | Facility: HOSPITAL | Age: 52
End: 2022-07-14

## 2022-08-08 ENCOUNTER — APPOINTMENT (OUTPATIENT)
Dept: NUCLEAR MEDICINE | Facility: HOSPITAL | Age: 52
End: 2022-08-08

## 2022-08-17 NOTE — PROGRESS NOTES
Subjective   Riddhi Cid is a 51 y.o. female. Presents to [facility]    Chief Complaint   Patient presents with   • Diabetes   • Hyperlipidemia       Diabetes  She presents for her follow-up diabetic visit. She has type 2 diabetes mellitus. Pertinent negatives for hypoglycemia include no dizziness, headaches or sweats. Pertinent negatives for diabetes include no chest pain and no weakness. There are no hypoglycemic complications. There are no diabetic complications. Risk factors for coronary artery disease include diabetes mellitus, obesity, post-menopausal, dyslipidemia and family history. She is following a generally healthy diet. Meal planning includes avoidance of concentrated sweets. She participates in exercise intermittently. Her overall blood glucose range is 110-130 mg/dl. An ACE inhibitor/angiotensin II receptor blocker is not being taken. She sees a podiatrist.Eye exam is not current.   Hyperlipidemia  This is a chronic problem. The current episode started more than 1 year ago. Exacerbating diseases include diabetes, hypothyroidism and obesity. Pertinent negatives include no chest pain or shortness of breath. Current antihyperlipidemic treatment includes herbal therapy and statins. The current treatment provides moderate improvement of lipids. Risk factors for coronary artery disease include post-menopausal, obesity, dyslipidemia, diabetes mellitus and family history.        I personally reviewed and updated the patient's allergies, medications, problem list, and past medical, surgical, social, and family history. I have reviewed and confirmed the accuracy of the History of Present Illness and Review of Symptoms as documented by the MA/LPN/RN. June Harrison MD    Allergies:  Allergies   Allergen Reactions   • Sulfa Antibiotics Rash   • Meloxicam GI Intolerance       Social History:  Social History     Socioeconomic History   • Marital status:    Tobacco Use   • Smoking  status: Current Every Day Smoker     Packs/day: 1.00     Types: Cigarettes   • Smokeless tobacco: Never Used   Vaping Use   • Vaping Use: Never used   Substance and Sexual Activity   • Alcohol use: No   • Drug use: No   • Sexual activity: Defer       Family History:  Family History   Problem Relation Age of Onset   • Stroke Mother    • Hypertension Mother    • Hyperlipidemia Mother    • Hypothyroidism Mother    • Heart attack Father    • Heart disease Father    • Prostate cancer Father    • Other Father    • Diabetes Father    • Hypertension Father    • Hyperlipidemia Father    • Coronary artery disease Father    • Breast cancer Paternal Grandmother    • Hypothyroidism Grandchild    • Ovarian cancer Neg Hx        Past Medical History :  Patient Active Problem List   Diagnosis   • Body mass index (BMI) of 45.0-49.9 in adult ([facility])   • Degeneration of intervertebral disc of cervical region   • Degeneration of intervertebral disc of lumbar region   • Acquired hypothyroidism   • Mixed hyperlipidemia   • Major depressive disorder, recurrent, moderate ([facility])   • Obstructive sleep apnea   • Vitamin B12 deficiency   • Vitamin D deficiency   • COPD (chronic obstructive pulmonary disease) ([facility])   • Solitary thyroid nodule   • Chronic midline low back pain with right-sided sciatica   • Cervicalgia   • Cervical stenosis of spine   • Arthritis   • Moderate persistent asthma without complication   • Gastroparalysis   • History of chicken pox   • IBS (irritable bowel syndrome)   • GERD (gastroesophageal reflux disease)   • Dependent edema   • Optic disc hemorrhage   • Controlled diabetes mellitus type 2 with complications ([facility])   • Tension headache   • Folliculitis   • Encounter to establish care   • Edema   • Other chest pain   • Tinea pedis of right foot   • Intertrigo   • Allergic rhinitis   • Disorder of peripheral nervous system   • Fibromyositis   • Tobacco dependence syndrome   • Pap smear, low-risk   • Nausea   • Right leg  pain       Medication List:    Current Outpatient Medications:   •  albuterol (PROVENTIL) (2.5 MG/3ML) 0.083% nebulizer solution, USE 1 VIAL PER NEBULIZER EVERY 4 HOURS AS NEEDED, Disp: 180 mL, Rfl: 3  •  albuterol sulfate  (90 Base) MCG/ACT inhaler, TAKE 2 PUFFS BY MOUTH EVERY 4-6 HOURS AS NEEDED. MAX OF 12 PUFFS DAILY., Disp: 18 g, Rfl: 3  •  aspirin 81 MG chewable tablet, Chew 81 mg Daily., Disp: , Rfl:   •  atorvastatin (LIPITOR) 80 MG tablet, Take 1 tablet by mouth Daily., Disp: 30 tablet, Rfl: 12  •  azelastine (OPTIVAR) 0.05 % ophthalmic solution, , Disp: , Rfl:   •  bumetanide (BUMEX) 1 MG tablet, TAKE 1 TO 2 TABLETS BY MOUTH EVERY DAY AS NEEDED, Disp: 180 tablet, Rfl: 12  •  citalopram (CeleXA) 20 MG tablet, TAKE 1 TABLET BY MOUTH DAILY, Disp: 30 tablet, Rfl: 12  •  Cyanocobalamin (B-12) 1000 MCG tablet controlled-release, TAKE 1 TABLET BY MOUTH EVERY DAY, Disp: 90 tablet, Rfl: 3  •  DEXILANT 60 MG capsule, TAKE 1 CAPSULE BY MOUTH EVERY DAY, Disp: 30 capsule, Rfl: 0  •  dicyclomine (BENTYL) 20 MG tablet, Take 20 mg by mouth Every 6 (Six) Hours As Needed. for pain, Disp: , Rfl:   •  famotidine (PEPCID) 40 MG tablet, TK 1 T PO HS UTD, Disp: , Rfl:   •  fenofibrate (TRICOR) 145 MG tablet, Daily. Dx: E78.2, mixed hyperlipidema, Disp: , Rfl:   •  fluticasone (FLONASE) 50 MCG/ACT nasal spray, SHAKE LIQUID AND USE 2 SPRAYS IN EACH NOSTRIL EVERY DAY, Disp: 48 g, Rfl: 3  •  Fluticasone-Salmeterol (Wixela Inhub) 250-50 MCG/ACT DISKUS, Inhale 1 puff 2 (Two) Times a Day., Disp: 60 each, Rfl: 12  •  folic acid (FOLVITE) 1 MG tablet, TAKE 1 TABLET BY MOUTH DAILY, Disp: 90 tablet, Rfl: 3  •  gabapentin (NEURONTIN) 300 MG capsule, Take 1 capsule by mouth 3 (Three) Times a Day., Disp: 90 capsule, Rfl: 5  •  HYDROcodone-acetaminophen (NORCO)  MG per tablet, Take 1 tablet by mouth Every 6 (Six) Hours As Needed for Moderate Pain . *PAIN MANAGEMENT RX'S, Disp: 120 tablet, Rfl: 0  •  Lancets Misc. (ACCU-CHEK  FASTCLIX LANCET) kit, Dx: E11.9, DM type 2, controlled, Disp: , Rfl:   •  levothyroxine (SYNTHROID, LEVOTHROID) 50 MCG tablet, TAKE 1 TABLET BY MOUTH DAILY, Disp: 90 tablet, Rfl: 4  •  Linzess 290 MCG capsule capsule, TAKE 1 CAPSULE BY MOUTH EVERY DAY 30 MINUTES BEFORE A MEAL, Disp: , Rfl:   •  metFORMIN ER (GLUCOPHAGE-XR) 500 MG 24 hr tablet, TAKE 1 TABLET BY MOUTH DAILY, Disp: 90 tablet, Rfl: 2  •  metoclopramide (REGLAN) 10 MG tablet, TK 1 T PO HS, Disp: , Rfl:   •  montelukast (SINGULAIR) 10 MG tablet, Take 1 tablet by mouth Every Night., Disp: 90 tablet, Rfl: 3  •  Omega-3 Fatty Acids (FISH OIL) 500 MG capsule capsule, 2 capsules Daily. Dx: E78.2, mixed hyperlipidema, Disp: , Rfl:   •  potassium chloride ER (K-TAB) 20 MEQ tablet controlled-release ER tablet, TAKE 1 TABLET BY MOUTH EVERY DAY, ONLY IF TAKING 2 BUMETANIDE, Disp: 60 tablet, Rfl: 12  •  vitamin D3 125 MCG (5000 UT) capsule capsule, TAKE 1 CAPSULE BY MOUTH DAILY, Disp: 90 capsule, Rfl: 3  •  Accu-Chek FastClix Lancets misc, 1 each by Other route Daily., Disp: 100 each, Rfl: 3  •  Blood Glucose Monitoring Suppl (Accu-Chek Guide) w/Device kit, 1 each Daily., Disp: 1 kit, Rfl: 0  •  glucose blood (Accu-Chek Guide) test strip, 1 each by Other route Daily. Use as instructed, Disp: 100 each, Rfl: 3    Past Surgical History:  Past Surgical History:   Procedure Laterality Date   • BREAST BIOPSY Right    • CARPAL TUNNEL RELEASE     • CERVICAL DISCECTOMY ANTERIOR     • CHOLECYSTECTOMY     • ESSURE TUBAL LIGATION           Physical Exam:    Vital Signs:    Vitals:    08/19/22 1310   BP: 130/86   Pulse: 90   Resp: 18   Temp: 97.7 °F (36.5 °C)   SpO2: 98%        Wt Readings from Last 3 Encounters:   08/19/22 115 kg (253 lb 6.4 oz)   07/11/22 112 kg (247 lb)   05/13/22 112 kg (247 lb)       Result Review :   The following data was reviewed by: June Harrison MD on 08/19/2022:  Most Recent A1C    HGBA1C Most Recent 8/19/22   Hemoglobin A1C 6.4           Brief  Urine Lab Results  (Last result in the past 365 days)      Color   Clarity   Blood   Leuk Est   Nitrite   Protein   CREAT   Urine HCG        08/19/22 1320 Yellow   Clear   Negative   Negative   Negative   Negative                            Physical Exam  Vitals reviewed.   Constitutional:       Appearance: Normal appearance. She is well-developed.   HENT:      Head: Normocephalic and atraumatic.   Eyes:      General:         Right eye: No discharge.         Left eye: No discharge.   Cardiovascular:      Rate and Rhythm: Normal rate and regular rhythm.      Heart sounds: Normal heart sounds. No murmur heard.    No friction rub. No gallop.   Pulmonary:      Effort: Pulmonary effort is normal. No respiratory distress.      Breath sounds: Normal breath sounds. No wheezing or rales.   Skin:     General: Skin is warm and dry.      Findings: No rash.   Neurological:      Mental Status: She is alert and oriented to person, place, and time.      Coordination: Coordination normal.      Gait: Gait normal.   Psychiatric:         Behavior: Behavior is cooperative.         Assessment and Plan:  Problems Addressed this Visit        Cardiac and Vasculature    Mixed hyperlipidemia    Relevant Orders    Comprehensive Metabolic Panel (Completed)    Lipid Panel With / Chol / HDL Ratio (Completed)       Endocrine and Metabolic    Body mass index (BMI) of 45.0-49.9 in adult (HCC)                Controlled diabetes mellitus type 2 with complications ([facility]) - Primary     Diabetes is improving with treatment.   Continue current treatment regimen.  Reminded to bring in blood sugar diary at next visit.  Diabetes will be reassessed in 3 months.         Relevant Medications    Blood Glucose Monitoring Suppl (Accu-Chek Guide) w/Device kit    glucose blood (Accu-Chek Guide) test strip    Accu-Chek FastClix Lancets misc    Other Relevant Orders    POC Glucose (Completed)    POC Glycosylated Hemoglobin (Hb A1C) (Completed)    POC Urinalysis  Dipstick, Automated (Completed)       Tobacco    Tobacco dependence syndrome      Diagnoses       Codes Comments    Controlled diabetes mellitus type 2 with complications, unspecified whether long term insulin use (HCC)    -  Primary ICD-10-CM: E11.8  ICD-9-CM: 250.90     Mixed hyperlipidemia     ICD-10-CM: E78.2  ICD-9-CM: 272.2     Tobacco dependence syndrome     ICD-10-CM: F17.200  ICD-9-CM: 305.1     Body mass index (BMI) of 45.0-49.9 in adult (HCC)     ICD-10-CM: Z68.42  ICD-9-CM: V85.42            Class 3 Severe Obesity (BMI >=40). Obesity-related health conditions include the following: diabetes mellitus and dyslipidemias. Obesity is worsening. BMI is is above average; BMI management plan is completed. We discussed low calorie, low carb based diet program, portion control and increasing exercise.      An After Visit Summary and PPPS were given to the patient.       I wore protective equipment throughout this patient encounter to include mask and eyewear. Hand hygiene was performed before donning protective equipment and after removal when leaving the room.

## 2022-08-19 ENCOUNTER — OFFICE VISIT (OUTPATIENT)
Dept: FAMILY MEDICINE CLINIC | Facility: CLINIC | Age: 52
End: 2022-08-19

## 2022-08-19 VITALS
BODY MASS INDEX: 44.9 KG/M2 | OXYGEN SATURATION: 98 % | HEART RATE: 90 BPM | WEIGHT: 253.4 LBS | SYSTOLIC BLOOD PRESSURE: 130 MMHG | DIASTOLIC BLOOD PRESSURE: 86 MMHG | RESPIRATION RATE: 18 BRPM | TEMPERATURE: 97.7 F | HEIGHT: 63 IN

## 2022-08-19 DIAGNOSIS — E11.8 CONTROLLED DIABETES MELLITUS TYPE 2 WITH COMPLICATIONS, UNSPECIFIED WHETHER LONG TERM INSULIN USE: Primary | ICD-10-CM

## 2022-08-19 DIAGNOSIS — F17.200 TOBACCO DEPENDENCE SYNDROME: ICD-10-CM

## 2022-08-19 DIAGNOSIS — E78.2 MIXED HYPERLIPIDEMIA: ICD-10-CM

## 2022-08-19 LAB
BILIRUB BLD-MCNC: NEGATIVE MG/DL
CLARITY, POC: CLEAR
COLOR UR: YELLOW
EXPIRATION DATE: NORMAL
EXPIRATION DATE: NORMAL
GLUCOSE BLDC GLUCOMTR-MCNC: 107 MG/DL (ref 70–130)
GLUCOSE UR STRIP-MCNC: NEGATIVE MG/DL
HBA1C MFR BLD: 6.4 %
KETONES UR QL: NEGATIVE
LEUKOCYTE EST, POC: NEGATIVE
Lab: NORMAL
Lab: NORMAL
NITRITE UR-MCNC: NEGATIVE MG/ML
PH UR: 5 [PH] (ref 5–8)
PROT UR STRIP-MCNC: NEGATIVE MG/DL
RBC # UR STRIP: NEGATIVE /UL
SP GR UR: 1.01 (ref 1–1.03)
UROBILINOGEN UR QL: NORMAL

## 2022-08-19 PROCEDURE — 3044F HG A1C LEVEL LT 7.0%: CPT | Performed by: FAMILY MEDICINE

## 2022-08-19 PROCEDURE — 83036 HEMOGLOBIN GLYCOSYLATED A1C: CPT | Performed by: FAMILY MEDICINE

## 2022-08-19 PROCEDURE — 81003 URINALYSIS AUTO W/O SCOPE: CPT | Performed by: FAMILY MEDICINE

## 2022-08-19 PROCEDURE — 99213 OFFICE O/P EST LOW 20 MIN: CPT | Performed by: FAMILY MEDICINE

## 2022-08-19 PROCEDURE — 82962 GLUCOSE BLOOD TEST: CPT | Performed by: FAMILY MEDICINE

## 2022-08-19 RX ORDER — LANCETS
1 EACH MISCELLANEOUS DAILY
Qty: 100 EACH | Refills: 3 | Status: SHIPPED | OUTPATIENT
Start: 2022-08-19

## 2022-08-19 RX ORDER — BLOOD-GLUCOSE METER
1 EACH MISCELLANEOUS DAILY
Qty: 1 KIT | Refills: 0 | Status: SHIPPED | OUTPATIENT
Start: 2022-08-19

## 2022-08-20 LAB
ALBUMIN SERPL-MCNC: 4.7 G/DL (ref 3.8–4.9)
ALBUMIN/GLOB SERPL: 1.8 {RATIO} (ref 1.2–2.2)
ALP SERPL-CCNC: 91 IU/L (ref 44–121)
ALT SERPL-CCNC: 56 IU/L (ref 0–32)
AST SERPL-CCNC: 41 IU/L (ref 0–40)
BILIRUB SERPL-MCNC: 0.3 MG/DL (ref 0–1.2)
BUN SERPL-MCNC: 11 MG/DL (ref 6–24)
BUN/CREAT SERPL: 10 (ref 9–23)
CALCIUM SERPL-MCNC: 9.7 MG/DL (ref 8.7–10.2)
CHLORIDE SERPL-SCNC: 106 MMOL/L (ref 96–106)
CHOLEST SERPL-MCNC: 174 MG/DL (ref 100–199)
CHOLEST/HDLC SERPL: 4.5 RATIO (ref 0–4.4)
CO2 SERPL-SCNC: 25 MMOL/L (ref 20–29)
CREAT SERPL-MCNC: 1.05 MG/DL (ref 0.57–1)
EGFRCR-CYS SERPLBLD CKD-EPI 2021: 64 ML/MIN/1.73
GLOBULIN SER CALC-MCNC: 2.6 G/DL (ref 1.5–4.5)
GLUCOSE SERPL-MCNC: 82 MG/DL (ref 65–99)
HDLC SERPL-MCNC: 39 MG/DL
LDLC SERPL CALC-MCNC: 99 MG/DL (ref 0–99)
POTASSIUM SERPL-SCNC: 4.1 MMOL/L (ref 3.5–5.2)
PROT SERPL-MCNC: 7.3 G/DL (ref 6–8.5)
SODIUM SERPL-SCNC: 148 MMOL/L (ref 134–144)
TRIGL SERPL-MCNC: 212 MG/DL (ref 0–149)
VLDLC SERPL CALC-MCNC: 36 MG/DL (ref 5–40)

## 2022-08-24 DIAGNOSIS — R74.8 ELEVATED LIVER ENZYMES: Primary | ICD-10-CM

## 2022-08-28 NOTE — ASSESSMENT & PLAN NOTE
Diabetes is improving with treatment.   Continue current treatment regimen.  Reminded to bring in blood sugar diary at next visit.  Diabetes will be reassessed in 3 months.

## 2022-08-29 ENCOUNTER — OFFICE (AMBULATORY)
Dept: URBAN - METROPOLITAN AREA CLINIC 64 | Facility: CLINIC | Age: 52
End: 2022-08-29

## 2022-08-29 VITALS — HEART RATE: 75 BPM | DIASTOLIC BLOOD PRESSURE: 68 MMHG | SYSTOLIC BLOOD PRESSURE: 119 MMHG | HEIGHT: 63 IN

## 2022-08-29 DIAGNOSIS — R94.5 ABNORMAL RESULTS OF LIVER FUNCTION STUDIES: ICD-10-CM

## 2022-08-29 PROCEDURE — 99214 OFFICE O/P EST MOD 30 MIN: CPT | Performed by: INTERNAL MEDICINE

## 2022-08-29 RX ORDER — METOCLOPRAMIDE 10 MG/1
TABLET ORAL
Qty: 0 | Refills: 0 | Status: COMPLETED
End: 2022-08-29

## 2022-08-29 RX ORDER — METOCLOPRAMIDE 5 MG/1
TABLET ORAL
Qty: 90 | Refills: 3 | Status: COMPLETED
End: 2022-08-29

## 2022-08-29 RX ORDER — ONDANSETRON 4 MG/1
TABLET, ORALLY DISINTEGRATING ORAL
Qty: 45 | Refills: 0 | Status: ACTIVE

## 2022-09-12 RX ORDER — BUMETANIDE 1 MG/1
TABLET ORAL
Qty: 180 TABLET | Refills: 3 | Status: SHIPPED | OUTPATIENT
Start: 2022-09-12

## 2022-09-13 ENCOUNTER — APPOINTMENT (OUTPATIENT)
Dept: NUCLEAR MEDICINE | Facility: HOSPITAL | Age: 52
End: 2022-09-13

## 2022-09-30 ENCOUNTER — OFFICE (AMBULATORY)
Dept: URBAN - METROPOLITAN AREA CLINIC 64 | Facility: CLINIC | Age: 52
End: 2022-09-30
Payer: MEDICARE

## 2022-09-30 DIAGNOSIS — K21.9 GASTRO-ESOPHAGEAL REFLUX DISEASE WITHOUT ESOPHAGITIS: ICD-10-CM

## 2022-09-30 DIAGNOSIS — E66.9 OBESITY, UNSPECIFIED: ICD-10-CM

## 2022-09-30 DIAGNOSIS — K76.0 FATTY (CHANGE OF) LIVER, NOT ELSEWHERE CLASSIFIED: ICD-10-CM

## 2022-09-30 DIAGNOSIS — K58.9 IRRITABLE BOWEL SYNDROME WITHOUT DIARRHEA: ICD-10-CM

## 2022-09-30 DIAGNOSIS — G47.30 SLEEP APNEA, UNSPECIFIED: ICD-10-CM

## 2022-09-30 DIAGNOSIS — K58.0 IRRITABLE BOWEL SYNDROME WITH DIARRHEA: ICD-10-CM

## 2022-09-30 DIAGNOSIS — D50.0 IRON DEFICIENCY ANEMIA SECONDARY TO BLOOD LOSS (CHRONIC): ICD-10-CM

## 2022-09-30 PROCEDURE — 99458 RPM TX MGMT EA ADDL 20 MIN: CPT | Performed by: INTERNAL MEDICINE

## 2022-09-30 PROCEDURE — 99489 CPLX CHRNC CARE EA ADDL 30: CPT | Performed by: INTERNAL MEDICINE

## 2022-09-30 PROCEDURE — 99487 CPLX CHRNC CARE 1ST 60 MIN: CPT | Performed by: INTERNAL MEDICINE

## 2022-09-30 PROCEDURE — 99453 REM MNTR PHYSIOL PARAM SETUP: CPT | Performed by: INTERNAL MEDICINE

## 2022-09-30 PROCEDURE — 99457 RPM TX MGMT 1ST 20 MIN: CPT | Performed by: INTERNAL MEDICINE

## 2022-10-03 RX ORDER — FLUTICASONE PROPIONATE AND SALMETEROL 50; 250 UG/1; UG/1
POWDER RESPIRATORY (INHALATION)
Qty: 60 EACH | Refills: 12 | Status: SHIPPED | OUTPATIENT
Start: 2022-10-03

## 2022-10-06 ENCOUNTER — TELEPHONE (OUTPATIENT)
Dept: PAIN MEDICINE | Facility: HOSPITAL | Age: 52
End: 2022-10-06

## 2022-10-06 DIAGNOSIS — G89.29 CHRONIC MIDLINE LOW BACK PAIN WITH RIGHT-SIDED SCIATICA: ICD-10-CM

## 2022-10-06 DIAGNOSIS — M54.41 CHRONIC MIDLINE LOW BACK PAIN WITH RIGHT-SIDED SCIATICA: ICD-10-CM

## 2022-10-06 RX ORDER — HYDROCODONE BITARTRATE AND ACETAMINOPHEN 10; 325 MG/1; MG/1
1 TABLET ORAL EVERY 6 HOURS PRN
Qty: 120 TABLET | Refills: 0 | Status: SHIPPED | OUTPATIENT
Start: 2022-10-06 | End: 2022-10-24 | Stop reason: SDUPTHER

## 2022-10-06 NOTE — TELEPHONE ENCOUNTER
Pt called and had to reschedule her appointment for the 10th due to being out of town and will be out of pain medication. She is asking for a refill until she can get her next appointment on the 24th.  Inspect in chart

## 2022-10-07 RX ORDER — FLUTICASONE PROPIONATE 50 MCG
SPRAY, SUSPENSION (ML) NASAL
Qty: 48 G | Refills: 3 | OUTPATIENT
Start: 2022-10-07

## 2022-10-18 RX ORDER — FLUTICASONE PROPIONATE 50 MCG
SPRAY, SUSPENSION (ML) NASAL
Qty: 48 G | Refills: 3 | OUTPATIENT
Start: 2022-10-18

## 2022-10-24 ENCOUNTER — OFFICE VISIT (OUTPATIENT)
Dept: PAIN MEDICINE | Facility: CLINIC | Age: 52
End: 2022-10-24

## 2022-10-24 VITALS
DIASTOLIC BLOOD PRESSURE: 65 MMHG | OXYGEN SATURATION: 97 % | RESPIRATION RATE: 16 BRPM | SYSTOLIC BLOOD PRESSURE: 103 MMHG | HEART RATE: 69 BPM

## 2022-10-24 DIAGNOSIS — M50.30 DEGENERATION OF INTERVERTEBRAL DISC OF CERVICAL REGION: ICD-10-CM

## 2022-10-24 DIAGNOSIS — G89.29 CHRONIC MIDLINE LOW BACK PAIN WITH RIGHT-SIDED SCIATICA: Primary | ICD-10-CM

## 2022-10-24 DIAGNOSIS — M51.36 DEGENERATION OF INTERVERTEBRAL DISC OF LUMBAR REGION: ICD-10-CM

## 2022-10-24 DIAGNOSIS — M79.604 RIGHT LEG PAIN: ICD-10-CM

## 2022-10-24 DIAGNOSIS — M54.41 CHRONIC MIDLINE LOW BACK PAIN WITH RIGHT-SIDED SCIATICA: Primary | ICD-10-CM

## 2022-10-24 DIAGNOSIS — M48.02 CERVICAL STENOSIS OF SPINE: ICD-10-CM

## 2022-10-24 DIAGNOSIS — M79.7 FIBROMYOSITIS: ICD-10-CM

## 2022-10-24 DIAGNOSIS — M54.2 CERVICALGIA: ICD-10-CM

## 2022-10-24 PROCEDURE — 99213 OFFICE O/P EST LOW 20 MIN: CPT | Performed by: PHYSICAL MEDICINE & REHABILITATION

## 2022-10-24 PROCEDURE — G0463 HOSPITAL OUTPT CLINIC VISIT: HCPCS | Performed by: PHYSICAL MEDICINE & REHABILITATION

## 2022-10-24 RX ORDER — HYDROCODONE BITARTRATE AND ACETAMINOPHEN 10; 325 MG/1; MG/1
1 TABLET ORAL EVERY 6 HOURS PRN
Qty: 120 TABLET | Refills: 0 | Status: SHIPPED | OUTPATIENT
Start: 2022-10-24 | End: 2023-02-06 | Stop reason: SDUPTHER

## 2022-10-24 RX ORDER — HYDROCODONE BITARTRATE AND ACETAMINOPHEN 10; 325 MG/1; MG/1
1 TABLET ORAL EVERY 6 HOURS PRN
Qty: 120 TABLET | Refills: 0 | Status: SHIPPED | OUTPATIENT
Start: 2022-10-24 | End: 2023-01-09 | Stop reason: SDUPTHER

## 2022-10-24 RX ORDER — HYDROCODONE BITARTRATE AND ACETAMINOPHEN 10; 325 MG/1; MG/1
1 TABLET ORAL EVERY 6 HOURS PRN
Qty: 120 TABLET | Refills: 0 | Status: SHIPPED | OUTPATIENT
Start: 2022-10-24 | End: 2023-01-16 | Stop reason: SDUPTHER

## 2022-10-24 NOTE — PROGRESS NOTES
Subjective   Riddhi Cid is a 52 y.o. female.     History of Present Illness  Neck pain radiates to b/l shoulders, and left arm pain. ACDF C5/7 (4/11/2016), also h/o fibromyalgia,  also pain in lower back and b/l legs and feet. 10/10 at worst, 6/10 at best, 6/10 today, worse with activity, improves with rest and meds, always present, varies, aching, radiates in BLE. Imaging reviewed, satisfactory ACDF. Referred for pain management. Neck pain improved with ACDF, tapered off [place] with worsening pain, still somewhat severe. Taking Cymbalta which helps, tried Lyrica with weight gain, trying to lose weight. Restarted [place] at BID - qdaily prn but worsening pain with exercise to lose weight, increased to BID-TID #75, then TID, then QID prn with adequate relief, reduced to #105, tolerating. Gastric sleeve surgery planned for March-April 2017, had to miss cardiac clearance and EGD due to illness and social issues, has decided against surgery, trying to lose weight with diet and exercise. Worsening b/l CTS symptoms, R>L, known h/o of CTS. Saw Juan Franz for worsening neck pain to LUE, ACDF is intact, started Diclofenac for DJD, Elavil. Could not tolerate even low-dose Gabapentin with drowsiness. Started Celebrex but did not feel like doing anything, stopped, stopped Mobic. Stopping numerous meds due to side effects, started Gabapentin with Dr. Pisano with benefit. Pain worsening, DDD on MRI, started PT which is helping. Back pain worsening, especially at night. Had COVID-19, doing better, plans to get vaccinated.       The following portions of the patient's history were reviewed and updated as appropriate: allergies, current medications, past family history, past medical history, past social history, past surgical history and problem list.    Review of Systems   Constitutional: Negative for chills, fatigue and fever.   HENT: Negative for hearing loss and trouble swallowing.    Eyes: Negative for visual disturbance.    Respiratory: Negative for shortness of breath.    Cardiovascular: Negative for chest pain.   Gastrointestinal: Positive for constipation and nausea. Negative for abdominal pain, diarrhea and vomiting.   Genitourinary: Negative for urinary incontinence.   Musculoskeletal: Positive for arthralgias, back pain and neck pain. Negative for joint swelling and myalgias.   Neurological: Positive for headache. Negative for dizziness, weakness and numbness.       Objective   Physical Exam   Constitutional: She is oriented to person, place, and time. She appears well-developed and well-nourished.   HENT:   Head: Normocephalic and atraumatic.   Eyes: Pupils are equal, round, and reactive to light.   Cardiovascular: Normal rate, regular rhythm and normal heart sounds.   Pulmonary/Chest: Breath sounds normal.   Abdominal: Soft. Bowel sounds are normal. She exhibits no distension. There is no abdominal tenderness.   Neurological: She is alert and oriented to person, place, and time. She has normal reflexes. She displays normal reflexes. No sensory deficit.   Psychiatric: Her behavior is normal. Thought content normal.         Assessment & Plan   Diagnoses and all orders for this visit:    1. Chronic midline low back pain with right-sided sciatica (Primary)    2. Cervicalgia    3. Cervical stenosis of spine    4. Degeneration of intervertebral disc of cervical region    5. Degeneration of intervertebral disc of lumbar region    6. Fibromyositis    7. Right leg pain        INspect reviewed, in order. Low risk. Repeat UDS 4/11/22 in order.  Reduced to Norco 10/325mg q6-8h #105, too painful, restarted q6h prn #120, then reduced to #105 successfully but pain worsening. Cont at #120.  Afraid to start Lyrica due to possible weight gain. Restarted Gabapentin, takes 300-900mg/day as tolerated, helping a great deal.  Severe GERD, IBS, family h/o CV dz. Stopped Diclofenac, could not tolerate Celebrex 200mg qdaily. Taking Naproxen with PCP  but hard on her stomach, no personal CV issues. Began Mobic 7.5mg qdaily prn.  Begin Tizanidine 4mg qHS prn.  Cont other meds as prescribed.  Cont TENS, unit provided here, patient reports it helps her pain significantly.  Cont b/l CTS braces at night only.  Patient reports she cannot start PT at this time as her daughter currently works 12h shifts, may be able to begin in future if her daughter can change to an 8h shift schedule.  Will begin neuropathic comp cream if her current cream does not work.  Ordered LSO for truncal stability. Cont PT.  Letter to return to work. Does not drive or operate heavy machinery while using pain medication.  RTC 3 months for f/u.

## 2022-10-31 ENCOUNTER — OFFICE (AMBULATORY)
Dept: URBAN - METROPOLITAN AREA CLINIC 64 | Facility: CLINIC | Age: 52
End: 2022-10-31
Payer: MEDICARE

## 2022-10-31 DIAGNOSIS — G47.30 SLEEP APNEA, UNSPECIFIED: ICD-10-CM

## 2022-10-31 DIAGNOSIS — K21.9 GASTRO-ESOPHAGEAL REFLUX DISEASE WITHOUT ESOPHAGITIS: ICD-10-CM

## 2022-10-31 DIAGNOSIS — K76.0 FATTY (CHANGE OF) LIVER, NOT ELSEWHERE CLASSIFIED: ICD-10-CM

## 2022-10-31 DIAGNOSIS — E66.9 OBESITY, UNSPECIFIED: ICD-10-CM

## 2022-10-31 DIAGNOSIS — K58.9 IRRITABLE BOWEL SYNDROME WITHOUT DIARRHEA: ICD-10-CM

## 2022-10-31 DIAGNOSIS — D50.0 IRON DEFICIENCY ANEMIA SECONDARY TO BLOOD LOSS (CHRONIC): ICD-10-CM

## 2022-10-31 DIAGNOSIS — K58.0 IRRITABLE BOWEL SYNDROME WITH DIARRHEA: ICD-10-CM

## 2022-10-31 PROCEDURE — 99457 RPM TX MGMT 1ST 20 MIN: CPT | Performed by: INTERNAL MEDICINE

## 2022-10-31 PROCEDURE — 99490 CHRNC CARE MGMT STAFF 1ST 20: CPT | Performed by: INTERNAL MEDICINE

## 2022-11-03 DIAGNOSIS — E78.2 MIXED HYPERLIPIDEMIA: ICD-10-CM

## 2022-11-03 RX ORDER — ATORVASTATIN CALCIUM 80 MG/1
80 TABLET, FILM COATED ORAL DAILY
Qty: 90 TABLET | Refills: 3 | Status: SHIPPED | OUTPATIENT
Start: 2022-11-03

## 2022-11-21 ENCOUNTER — TELEPHONE (OUTPATIENT)
Dept: FAMILY MEDICINE CLINIC | Facility: CLINIC | Age: 52
End: 2022-11-21

## 2022-11-21 NOTE — TELEPHONE ENCOUNTER
Caller: Riddhi Cid    Relationship to patient: Self    Best call back number:[phone]     Patient is needing:     RIDDHI WOULD LIKE TO KNOW IF SHE IS NEEDING LABS FOR MEDICARE WELLNESS,HOW TO PREPARE  PRIOR TO 1/16/2022 APPOINTMENT

## 2022-11-22 NOTE — TELEPHONE ENCOUNTER
Tried calling pt to let her know that we order labs around 01/09/2023 and call her to let her know

## 2022-11-28 RX ORDER — FLUTICASONE PROPIONATE 50 MCG
SPRAY, SUSPENSION (ML) NASAL
Qty: 48 G | Refills: 3 | Status: SHIPPED | OUTPATIENT
Start: 2022-11-28

## 2022-11-30 ENCOUNTER — OFFICE (AMBULATORY)
Dept: URBAN - METROPOLITAN AREA CLINIC 64 | Facility: CLINIC | Age: 52
End: 2022-11-30

## 2022-11-30 DIAGNOSIS — G47.30 SLEEP APNEA, UNSPECIFIED: ICD-10-CM

## 2022-11-30 DIAGNOSIS — E66.9 OBESITY, UNSPECIFIED: ICD-10-CM

## 2022-11-30 DIAGNOSIS — K76.0 FATTY (CHANGE OF) LIVER, NOT ELSEWHERE CLASSIFIED: ICD-10-CM

## 2022-11-30 DIAGNOSIS — K58.0 IRRITABLE BOWEL SYNDROME WITH DIARRHEA: ICD-10-CM

## 2022-11-30 DIAGNOSIS — K21.9 GASTRO-ESOPHAGEAL REFLUX DISEASE WITHOUT ESOPHAGITIS: ICD-10-CM

## 2022-11-30 DIAGNOSIS — D50.0 IRON DEFICIENCY ANEMIA SECONDARY TO BLOOD LOSS (CHRONIC): ICD-10-CM

## 2022-11-30 PROCEDURE — 99457 RPM TX MGMT 1ST 20 MIN: CPT | Performed by: INTERNAL MEDICINE

## 2023-01-04 PROBLEM — U07.1 DISEASE DUE TO SEVERE ACUTE RESPIRATORY SYNDROME CORONAVIRUS 2 (SARS-COV-2): Status: ACTIVE | Noted: 2021-12-20

## 2023-01-04 RX ORDER — PRUCALOPRIDE 2 MG/1
1 TABLET, FILM COATED ORAL DAILY
COMMUNITY
Start: 2022-10-26

## 2023-01-04 RX ORDER — ONDANSETRON 4 MG/1
TABLET, ORALLY DISINTEGRATING ORAL
COMMUNITY
Start: 2022-11-25 | End: 2023-02-13

## 2023-01-04 NOTE — PROGRESS NOTES
Subjective   Riddhi Cid is a 52 y.o. female. Presents to [facility]    Chief Complaint   Patient presents with   • Hospital Follow Up Visit   • Pneumonia   • Back Pain       History of Present Illness  Riddhi was seen at [facility] on 12/20/2022. She was seen for cough and back pain. Labs that were performed during the encounter included:covid test negative, flu negative and strep negative . Diagnostic studies that were performed included: Chest x-ray-ill-defined multifocal airspace infiltrates bilaterally with diffuse interstitial changes. Developing viral infection or pneumonia. Medication changes: doxyxcycline.  Pneumonia  She complains of cough, difficulty breathing and shortness of breath. There is no chest tightness or wheezing. This is a new problem. The current episode started 1 to 4 weeks ago. The problem occurs intermittently. The problem has been gradually worsening. The cough is non-productive. Associated symptoms include ear congestion (dizziness), headaches, malaise/fatigue and nasal congestion. Pertinent negatives include no chest pain, ear pain, fever, sore throat, sweats or trouble swallowing. Exacerbated by: breathing in. Her symptoms are alleviated by OTC inhaler (doxycycline). She reports minimal improvement on treatment.   Back Pain  This is a chronic problem. The current episode started 1 to 4 weeks ago. The problem occurs daily. The problem has been gradually worsening since onset. The pain is present in the lumbar spine (down spine on right side). The quality of the pain is described as aching and cramping. The pain does not radiate. The symptoms are aggravated by bending, position and twisting. Associated symptoms include headaches. Pertinent negatives include no abdominal pain, bladder incontinence, bowel incontinence, chest pain, dysuria, fever, leg pain, numbness, pelvic pain or tingling. She has tried NSAIDs for the symptoms.        I personally reviewed  and updated the patient's allergies, medications, problem list, and past medical, surgical, social, and family history. I have reviewed and confirmed the accuracy of the History of Present Illness and Review of Symptoms as documented by the MA/LPN/RN. June Harrison MD    Allergies:  Allergies   Allergen Reactions   • Sulfa Antibiotics Rash   • Meloxicam GI Intolerance       Social History:  Social History     Socioeconomic History   • Marital status:    Tobacco Use   • Smoking status: Every Day     Packs/day: 1.00     Types: Cigarettes   • Smokeless tobacco: Never   Vaping Use   • Vaping Use: Never used   Substance and Sexual Activity   • Alcohol use: No   • Drug use: No   • Sexual activity: Defer       Family History:  Family History   Problem Relation Age of Onset   • Stroke Mother    • Hypertension Mother    • Hyperlipidemia Mother    • Hypothyroidism Mother    • Heart attack Father    • Heart disease Father    • Prostate cancer Father    • Other Father    • Diabetes Father    • Hypertension Father    • Hyperlipidemia Father    • Coronary artery disease Father    • Breast cancer Paternal Grandmother    • Hypothyroidism Grandchild    • Ovarian cancer Neg Hx        Past Medical History :  Patient Active Problem List   Diagnosis   • Body mass index (BMI) of 45.0-49.9 in adult ([facility])   • Degeneration of intervertebral disc of cervical region   • Degeneration of intervertebral disc of lumbar region   • Acquired hypothyroidism   • Mixed hyperlipidemia   • Major depressive disorder, recurrent, moderate ([facility])   • Obstructive sleep apnea   • Vitamin B12 deficiency   • Vitamin D deficiency   • COPD (chronic obstructive pulmonary disease) ([facility])   • Solitary thyroid nodule   • Chronic midline low back pain with right-sided sciatica   • Cervicalgia   • Cervical stenosis of spine   • Arthritis   • Moderate persistent asthma without complication   • Gastroparalysis   • History of chicken pox   • IBS (irritable bowel  syndrome)   • GERD (gastroesophageal reflux disease)   • Dependent edema   • Optic disc hemorrhage   • Controlled diabetes mellitus type 2 with complications (HCC)   • Tension headache   • Folliculitis   • Encounter to establish care   • Edema   • Other chest pain   • Tinea pedis of right foot   • Intertrigo   • Allergic rhinitis   • Disorder of peripheral nervous system   • Fibromyositis   • Tobacco dependence syndrome   • Pap smear, low-risk   • Nausea   • Right leg pain   • Disease due to severe acute respiratory syndrome coronavirus 2 (SARS-CoV-2)   • Pneumonia due to infectious organism       Medication List:    Current Outpatient Medications:   •  Accu-Chek FastClix Lancets misc, 1 each by Other route Daily., Disp: 100 each, Rfl: 3  •  Advair Diskus 250-50 MCG/ACT DISKUS, INHALE 1 PUFF BY MOUTH TWICE DAILY, Disp: 60 each, Rfl: 12  •  albuterol (PROVENTIL) (2.5 MG/3ML) 0.083% nebulizer solution, USE 1 VIAL PER NEBULIZER EVERY 4 HOURS AS NEEDED, Disp: 180 mL, Rfl: 3  •  albuterol sulfate  (90 Base) MCG/ACT inhaler, TAKE 2 PUFFS BY MOUTH EVERY 4-6 HOURS AS NEEDED. MAX OF 12 PUFFS DAILY., Disp: 18 g, Rfl: 3  •  aspirin 81 MG chewable tablet, Chew 81 mg Daily., Disp: , Rfl:   •  atorvastatin (LIPITOR) 80 MG tablet, Take 1 tablet by mouth Daily., Disp: 90 tablet, Rfl: 3  •  Blood Glucose Monitoring Suppl (Accu-Chek Guide) w/Device kit, 1 each Daily., Disp: 1 kit, Rfl: 0  •  bumetanide (BUMEX) 1 MG tablet, TAKE 1 TO 2 TABLETS BY MOUTH EVERY DAY AS NEEDED, Disp: 180 tablet, Rfl: 3  •  citalopram (CeleXA) 20 MG tablet, TAKE 1 TABLET BY MOUTH DAILY, Disp: 30 tablet, Rfl: 12  •  Cyanocobalamin (B-12) 1000 MCG tablet controlled-release, TAKE 1 TABLET BY MOUTH EVERY DAY, Disp: 90 tablet, Rfl: 3  •  DEXILANT 60 MG capsule, TAKE 1 CAPSULE BY MOUTH EVERY DAY, Disp: 30 capsule, Rfl: 0  •  famotidine (PEPCID) 40 MG tablet, TK 1 T PO HS UTD, Disp: , Rfl:   •  fenofibrate (TRICOR) 145 MG tablet, Daily. Dx: E78.2, mixed  hyperlipidema, Disp: , Rfl:   •  fluticasone (FLONASE) 50 MCG/ACT nasal spray, SHAKE LIQUID AND USE 2 SPRAYS IN EACH NOSTRIL EVERY DAY, Disp: 48 g, Rfl: 3  •  folic acid (FOLVITE) 1 MG tablet, TAKE 1 TABLET BY MOUTH DAILY, Disp: 90 tablet, Rfl: 3  •  glucose blood (Accu-Chek Guide) test strip, 1 each by Other route Daily. Use as instructed, Disp: 100 each, Rfl: 3  •  HYDROcodone-acetaminophen (NORCO)  MG per tablet, Take 1 tablet by mouth Every 6 (Six) Hours As Needed for Moderate Pain. *PAIN MANAGEMENT RX'S, Disp: 120 tablet, Rfl: 0  •  Lancets Misc. (ACCU-CHEK FASTCLIX LANCET) kit, Dx: E11.9, DM type 2, controlled, Disp: , Rfl:   •  levothyroxine (SYNTHROID, LEVOTHROID) 50 MCG tablet, TAKE 1 TABLET BY MOUTH DAILY, Disp: 90 tablet, Rfl: 4  •  Linzess 290 MCG capsule capsule, TAKE 1 CAPSULE BY MOUTH EVERY DAY 30 MINUTES BEFORE A MEAL, Disp: , Rfl:   •  metFORMIN ER (GLUCOPHAGE-XR) 500 MG 24 hr tablet, TAKE 1 TABLET BY MOUTH DAILY, Disp: 90 tablet, Rfl: 2  •  Omega-3 Fatty Acids (FISH OIL) 500 MG capsule capsule, 2 capsules Daily. Dx: E78.2, mixed hyperlipidema, Disp: , Rfl:   •  ondansetron ODT (ZOFRAN-ODT) 4 MG disintegrating tablet, , Disp: , Rfl:   •  potassium chloride ER (K-TAB) 20 MEQ tablet controlled-release ER tablet, TAKE 1 TABLET BY MOUTH EVERY DAY, ONLY IF TAKING 2 BUMETANIDE, Disp: 60 tablet, Rfl: 12  •  vitamin D3 125 MCG (5000 UT) capsule capsule, TAKE 1 CAPSULE BY MOUTH DAILY, Disp: 90 capsule, Rfl: 3  •  azelastine (OPTIVAR) 0.05 % ophthalmic solution, , Disp: , Rfl:   •  dicyclomine (BENTYL) 20 MG tablet, Take 20 mg by mouth Every 6 (Six) Hours As Needed. for pain, Disp: , Rfl:   •  gabapentin (NEURONTIN) 300 MG capsule, Take 1 capsule by mouth 3 (Three) Times a Day., Disp: 90 capsule, Rfl: 5  •  guaiFENesin (MUCINEX) 600 MG 12 hr tablet, Take 2 tablets by mouth 2 (Two) Times a Day., Disp: 30 tablet, Rfl: 0  •  HYDROcodone-acetaminophen (Norco)  MG per tablet, Take 1 tablet by mouth  Every 6 (Six) Hours As Needed for Moderate Pain., Disp: 120 tablet, Rfl: 0  •  HYDROcodone-acetaminophen (Norco)  MG per tablet, Take 1 tablet by mouth Every 6 (Six) Hours As Needed for Moderate Pain., Disp: 120 tablet, Rfl: 0  •  montelukast (SINGULAIR) 10 MG tablet, Take 1 tablet by mouth Every Night., Disp: 90 tablet, Rfl: 3  •  Motegrity 2 MG tablet, Take 1 tablet by mouth Daily., Disp: , Rfl:     Past Surgical History:  Past Surgical History:   Procedure Laterality Date   • BREAST BIOPSY Right    • CARPAL TUNNEL RELEASE     • CERVICAL DISCECTOMY ANTERIOR     • CHOLECYSTECTOMY     • ESSURE TUBAL LIGATION           Physical Exam:      Vital Signs:    Vitals:    01/06/23 1051   BP: 126/88   Pulse: 104   Resp: 18   Temp: 97.5 °F (36.4 °C)   SpO2: 96%        Wt Readings from Last 3 Encounters:   01/06/23 111 kg (245 lb)   08/19/22 115 kg (253 lb 6.4 oz)   07/11/22 112 kg (247 lb)       Result Review :   The following data was reviewed by: June Harrison MD on 01/06/2023:    Data reviewed: Radiologic studies bilateral basal pneumonia, Recent hospitalization notes ER report 12/20/22 and Labs negative covid, flu, strep        Physical Exam  Vitals reviewed.   Constitutional:       Appearance: Normal appearance. She is well-developed.   HENT:      Head: Normocephalic and atraumatic.   Eyes:      General:         Right eye: No discharge.         Left eye: No discharge.   Cardiovascular:      Rate and Rhythm: Normal rate and regular rhythm.      Heart sounds: Normal heart sounds. No murmur heard.    No friction rub. No gallop.   Pulmonary:      Effort: Pulmonary effort is normal. No respiratory distress.      Breath sounds: Normal breath sounds. No wheezing or rales.   Skin:     General: Skin is warm and dry.      Findings: No rash.   Neurological:      Mental Status: She is alert and oriented to person, place, and time.      Coordination: Coordination normal.      Gait: Gait normal.   Psychiatric:          Behavior: Behavior is cooperative.         Assessment and Plan:  Problems Addressed this Visit        Musculoskeletal and Injuries    Chronic midline low back pain with right-sided sciatica     Acute and chronic  Slowly improving            Pulmonary and Pneumonias    Pneumonia due to infectious organism - Primary     Improving  Will get CXR to recheck and see if pneumonia is resolved.   She is feeling better.          Relevant Medications    guaiFENesin (MUCINEX) 600 MG 12 hr tablet    Other Relevant Orders    XR Chest 2 View (Completed)   Diagnoses       Codes Comments    Pneumonia of both lungs due to infectious organism, unspecified part of lung    -  Primary ICD-10-CM: J18.9  ICD-9-CM: 483.8     Chronic midline low back pain with right-sided sciatica     ICD-10-CM: M54.41, G89.29  ICD-9-CM: 724.2, 724.3, 338.29            Class 3 Severe Obesity (BMI >=40). Obesity-related health conditions include the following: dyslipidemias. Obesity is improving with lifestyle modifications. BMI is is above average; BMI management plan is completed. We discussed low calorie, low carb based diet program, portion control and increasing exercise.      An After Visit Summary and PPPS were given to the patient.       I wore protective equipment throughout this patient encounter to include mask and eyewear. Hand hygiene was performed before donning protective equipment and after removal when leaving the room.

## 2023-01-06 ENCOUNTER — OFFICE VISIT (OUTPATIENT)
Dept: FAMILY MEDICINE CLINIC | Facility: CLINIC | Age: 53
End: 2023-01-06
Payer: MEDICARE

## 2023-01-06 ENCOUNTER — HOSPITAL ENCOUNTER (OUTPATIENT)
Dept: GENERAL RADIOLOGY | Facility: HOSPITAL | Age: 53
Discharge: HOME | End: 2023-01-06
Admitting: FAMILY MEDICINE
Payer: MEDICARE

## 2023-01-06 VITALS
HEART RATE: 104 BPM | DIASTOLIC BLOOD PRESSURE: 88 MMHG | SYSTOLIC BLOOD PRESSURE: 126 MMHG | TEMPERATURE: 97.5 F | RESPIRATION RATE: 18 BRPM | OXYGEN SATURATION: 96 % | HEIGHT: 63 IN | WEIGHT: 245 LBS | BODY MASS INDEX: 43.41 KG/M2

## 2023-01-06 DIAGNOSIS — J18.9 PNEUMONIA OF BOTH LUNGS DUE TO INFECTIOUS ORGANISM, UNSPECIFIED PART OF LUNG: Primary | ICD-10-CM

## 2023-01-06 DIAGNOSIS — G89.29 CHRONIC MIDLINE LOW BACK PAIN WITH RIGHT-SIDED SCIATICA: ICD-10-CM

## 2023-01-06 DIAGNOSIS — M54.41 CHRONIC MIDLINE LOW BACK PAIN WITH RIGHT-SIDED SCIATICA: ICD-10-CM

## 2023-01-06 PROCEDURE — 99214 OFFICE O/P EST MOD 30 MIN: CPT | Performed by: FAMILY MEDICINE

## 2023-01-06 PROCEDURE — 71046 X-RAY EXAM CHEST 2 VIEWS: CPT

## 2023-01-06 RX ORDER — GUAIFENESIN 600 MG/1
1200 TABLET, EXTENDED RELEASE ORAL 2 TIMES DAILY
Qty: 30 TABLET | Refills: 0 | Status: SHIPPED | OUTPATIENT
Start: 2023-01-06 | End: 2023-01-16

## 2023-01-09 ENCOUNTER — TELEPHONE (OUTPATIENT)
Dept: FAMILY MEDICINE CLINIC | Facility: CLINIC | Age: 53
End: 2023-01-09
Payer: MEDICARE

## 2023-01-09 DIAGNOSIS — E78.2 MIXED HYPERLIPIDEMIA: Primary | ICD-10-CM

## 2023-01-09 DIAGNOSIS — M54.41 CHRONIC MIDLINE LOW BACK PAIN WITH RIGHT-SIDED SCIATICA: ICD-10-CM

## 2023-01-09 DIAGNOSIS — R53.81 MALAISE AND FATIGUE: ICD-10-CM

## 2023-01-09 DIAGNOSIS — R53.83 MALAISE AND FATIGUE: ICD-10-CM

## 2023-01-09 DIAGNOSIS — E03.9 ACQUIRED HYPOTHYROIDISM: ICD-10-CM

## 2023-01-09 DIAGNOSIS — G89.29 CHRONIC MIDLINE LOW BACK PAIN WITH RIGHT-SIDED SCIATICA: ICD-10-CM

## 2023-01-09 RX ORDER — HYDROCODONE BITARTRATE AND ACETAMINOPHEN 10; 325 MG/1; MG/1
1 TABLET ORAL EVERY 6 HOURS PRN
Qty: 120 TABLET | Refills: 0 | Status: SHIPPED | OUTPATIENT
Start: 2023-01-09 | End: 2023-01-16 | Stop reason: SDUPTHER

## 2023-01-09 NOTE — TELEPHONE ENCOUNTER
Caller: Riddhi Cid    Relationship to patient: Self    Best call back number: [phone]    Patient is needing: UNABLE TO WARM TRANSFER.  PATIENT NEEDS MEDICATION SWITCHED TO INFO BELOW

## 2023-01-09 NOTE — TELEPHONE ENCOUNTER
----- Message from Amina Walker MA sent at 2022 11:59 AM EST -----  [email or web address]  Order labs for physical

## 2023-01-09 NOTE — TELEPHONE ENCOUNTER
Caller:  TRINO HEAWAY    Relationship: SELF    Best call back number: [phone]    Requested Prescriptions:   HYDROCODONE  MG    Pharmacy where request should be sent:  [facility] [phone]    Additional details provided by patient: NO OTHER PHARMACY IN HER AREA HAVE THE MEDICATION    Does the patient have less than a 3 day supply:  [x] Yes  [] No    Would you like a call back once the refill request has been completed: [x] Yes [] No    If the office needs to give you a call back, can they leave a voicemail: [x] Yes [] No    Kerry Nieves Rep   01/09/23 11:34 EST

## 2023-01-11 NOTE — PROGRESS NOTES
Subsequent Medicare Wellness Visit    Subjective    History of Present Illness:  Riddhi Cid is a 52 y.o. female who presents for a Subsequent Medicare Wellness Visit.    The following portions of the patient's history were reviewed and   updated as appropriate: allergies, current medications, past family history, past medical history, past social history, past surgical history and problem list.  Family History   Problem Relation Age of Onset   • Stroke Mother    • Hypertension Mother    • Hyperlipidemia Mother    • Hypothyroidism Mother    • Heart attack Father    • Heart disease Father    • Prostate cancer Father    • Other Father    • Diabetes Father    • Hypertension Father    • Hyperlipidemia Father    • Coronary artery disease Father    • Breast cancer Paternal Grandmother    • Hypothyroidism Grandchild    • Ovarian cancer Neg Hx      Past Surgical History:   Procedure Laterality Date   • BREAST BIOPSY Right    • CARPAL TUNNEL RELEASE     • CERVICAL DISCECTOMY ANTERIOR     • CHOLECYSTECTOMY     • ESSURE TUBAL LIGATION          Compared to one year ago, the patient feels her physical   health is the same.    Compared to one year ago, the patient feels her mental   health is the same.    Recent Hospitalizations:  She was not admitted to the hospital during the last year.       Current Medical Providers:  Patient Care Team:  June Harrison MD as PCP - General (Family Medicine)  Izaiah Fernandez MD as Consulting Physician (Pain Medicine)    Outpatient Medications Prior to Visit   Medication Sig Dispense Refill   • Accu-Chek FastClix Lancets misc 1 each by Other route Daily. 100 each 3   • Advair Diskus 250-50 MCG/ACT DISKUS INHALE 1 PUFF BY MOUTH TWICE DAILY 60 each 12   • aspirin 81 MG chewable tablet Chew 81 mg Daily.     • atorvastatin (LIPITOR) 80 MG tablet Take 1 tablet by mouth Daily. 90 tablet 3   • azelastine (OPTIVAR) 0.05 % ophthalmic solution      • Blood Glucose Monitoring Suppl (Accu-Chek  Guide) w/Device kit 1 each Daily. 1 kit 0   • bumetanide (BUMEX) 1 MG tablet TAKE 1 TO 2 TABLETS BY MOUTH EVERY DAY AS NEEDED 180 tablet 3   • Cyanocobalamin (B-12) 1000 MCG tablet controlled-release TAKE 1 TABLET BY MOUTH EVERY DAY 90 tablet 3   • DEXILANT 60 MG capsule TAKE 1 CAPSULE BY MOUTH EVERY DAY 30 capsule 0   • dicyclomine (BENTYL) 20 MG tablet Take 20 mg by mouth Every 6 (Six) Hours As Needed. for pain     • famotidine (PEPCID) 40 MG tablet TK 1 T PO HS UTD     • fenofibrate (TRICOR) 145 MG tablet Daily. Dx: E78.2, mixed hyperlipidema     • fluticasone (FLONASE) 50 MCG/ACT nasal spray SHAKE LIQUID AND USE 2 SPRAYS IN EACH NOSTRIL EVERY DAY 48 g 3   • folic acid (FOLVITE) 1 MG tablet TAKE 1 TABLET BY MOUTH DAILY 90 tablet 3   • gabapentin (NEURONTIN) 300 MG capsule Take 1 capsule by mouth 3 (Three) Times a Day. 90 capsule 5   • glucose blood (Accu-Chek Guide) test strip 1 each by Other route Daily. Use as instructed 100 each 3   • HYDROcodone-acetaminophen (Norco)  MG per tablet Take 1 tablet by mouth Every 6 (Six) Hours As Needed for Moderate Pain. 120 tablet 0   • Lancets Misc. (ACCU-CHEK FASTCLIX LANCET) kit Dx: E11.9, DM type 2, controlled     • levothyroxine (SYNTHROID, LEVOTHROID) 50 MCG tablet TAKE 1 TABLET BY MOUTH DAILY 90 tablet 4   • Linzess 290 MCG capsule capsule TAKE 1 CAPSULE BY MOUTH EVERY DAY 30 MINUTES BEFORE A MEAL     • metFORMIN ER (GLUCOPHAGE-XR) 500 MG 24 hr tablet TAKE 1 TABLET BY MOUTH DAILY 90 tablet 2   • montelukast (SINGULAIR) 10 MG tablet Take 1 tablet by mouth Every Night. 90 tablet 3   • Motegrity 2 MG tablet Take 1 tablet by mouth Daily.     • Omega-3 Fatty Acids (FISH OIL) 500 MG capsule capsule 2 capsules Daily. Dx: E78.2, mixed hyperlipidema     • ondansetron ODT (ZOFRAN-ODT) 4 MG disintegrating tablet      • potassium chloride ER (K-TAB) 20 MEQ tablet controlled-release ER tablet TAKE 1 TABLET BY MOUTH EVERY DAY, ONLY IF TAKING 2 BUMETANIDE 60 tablet 12   •  vitamin D3 125 MCG (5000 UT) capsule capsule TAKE 1 CAPSULE BY MOUTH DAILY 90 capsule 3   • albuterol (PROVENTIL) (2.5 MG/3ML) 0.083% nebulizer solution USE 1 VIAL PER NEBULIZER EVERY 4 HOURS AS NEEDED 180 mL 3   • albuterol sulfate  (90 Base) MCG/ACT inhaler TAKE 2 PUFFS BY MOUTH EVERY 4-6 HOURS AS NEEDED. MAX OF 12 PUFFS DAILY. 18 g 3   • citalopram (CeleXA) 20 MG tablet TAKE 1 TABLET BY MOUTH DAILY 30 tablet 12   • guaiFENesin (MUCINEX) 600 MG 12 hr tablet Take 2 tablets by mouth 2 (Two) Times a Day. 30 tablet 0   • HYDROcodone-acetaminophen (NORCO)  MG per tablet Take 1 tablet by mouth Every 6 (Six) Hours As Needed for Moderate Pain. *PAIN MANAGEMENT RX'S 120 tablet 0   • HYDROcodone-acetaminophen (Norco)  MG per tablet Take 1 tablet by mouth Every 6 (Six) Hours As Needed for Moderate Pain. 120 tablet 0     No facility-administered medications prior to visit.     Pain Score    01/16/23 0949   PainSc: 0-No pain      Opioid medication/s are on active medication list.  and I have evaluated her active treatment plan and pain score trends (see table).  Vitals:    01/16/23 0949   PainSc: 0-No pain     I have reviewed the chart for potential of high risk medication and harmful drug interactions in the elderly.            Aspirin is on active medication list. Aspirin use is indicated based on review of current medical condition/s. Pros and cons of this therapy have been discussed today. Benefits of this medication outweigh potential harm.  Patient has been encouraged to continue taking this medication.  .      Patient Active Problem List   Diagnosis   • Class 3 drug-induced obesity with serious comorbidity and body mass index (BMI) of 40.0 to 44.9 in adult ([facility])   • Degeneration of intervertebral disc of cervical region   • Degeneration of intervertebral disc of lumbar region   • Acquired hypothyroidism   • Mixed hyperlipidemia   • Major depressive disorder, recurrent, moderate ([facility])   • Obstructive  "sleep apnea   • Vitamin B12 deficiency   • Vitamin D deficiency   • COPD (chronic obstructive pulmonary disease) ([facility])   • Solitary thyroid nodule   • Chronic midline low back pain with right-sided sciatica   • Cervicalgia   • Cervical stenosis of spine   • Arthritis   • Moderate persistent asthma without complication   • Gastroparalysis   • History of chicken pox   • IBS (irritable bowel syndrome)   • GERD (gastroesophageal reflux disease)   • Dependent edema   • Optic disc hemorrhage   • Controlled diabetes mellitus type 2 with complications ([facility])   • Tension headache   • Folliculitis   • Encounter to establish care   • Edema   • Tinea pedis of right foot   • Intertrigo   • Allergic rhinitis   • Disorder of peripheral nervous system   • Fibromyositis   • Tobacco dependence syndrome   • Pap smear, low-risk   • Nausea   • Disease due to severe acute respiratory syndrome coronavirus 2 (SARS-CoV-2)   • Pneumonia due to infectious organism     Advance Care Planning  Advance Directive is not on file.  ACP discussion was held with the patient during this visit. Patient does not have an advance directive, information provided.         Objective    Vitals:    01/16/23 0949   BP: 136/82   BP Location: Right arm   Patient Position: Sitting   Cuff Size: Large Adult   Pulse: 103   Resp: 18   Temp: 97.3 °F (36.3 °C)   TempSrc: Temporal   SpO2: 96%  Comment: room air   Weight: 110 kg (242 lb 3.2 oz)   Height: 160 cm (63\")   PainSc: 0-No pain       Does the patient have evidence of cognitive impairment? No           HEALTH RISK ASSESSMENT    Smoking Status:  Social History     Tobacco Use   Smoking Status Every Day   • Packs/day: 1.00   • Types: Cigarettes   Smokeless Tobacco Never     Alcohol Consumption:  Social History     Substance and Sexual Activity   Alcohol Use No     Fall Risk Screen:    SALOME Fall Risk Assessment was completed, and patient is at LOW risk for falls.Assessment completed on:1/16/2023    Depression " Screening:  PHQ-2/PHQ-9 Depression Screening 1/16/2023   Retired PHQ-9 Total Score -   Retired Total Score -   Little Interest or Pleasure in Doing Things 0-->not at all   Feeling Down, Depressed or Hopeless 0-->not at all   PHQ-9: Brief Depression Severity Measure Score 0       Health Habits and Functional and Cognitive Screening:  Functional & Cognitive Status 1/16/2023   Do you have difficulty preparing food and eating? No   Do you have difficulty bathing yourself, getting dressed or grooming yourself? No   Do you have difficulty using the toilet? No   Do you have difficulty moving around from place to place? No   Do you have trouble with steps or getting out of a bed or a chair? No   Current Diet Unhealthy Diet   Dental Exam Not up to date   Eye Exam Not up to date   Exercise (times per week) 0 times per week   Current Exercises Include No Regular Exercise   Do you need help using the phone?  No   Are you deaf or do you have serious difficulty hearing?  No   Do you need help with transportation? No   Do you need help shopping? No   Do you need help preparing meals?  No   Do you need help with housework?  No   Do you need help with laundry? No   Do you need help taking your medications? No   Do you need help managing money? No   Do you ever drive or ride in a car without wearing a seat belt? No   Have you felt unusual stress, anger or loneliness in the last month? No   Who do you live with? Child   If you need help, do you have trouble finding someone available to you? No   Have you been bothered in the last four weeks by sexual problems? No   Do you have difficulty concentrating, remembering or making decisions? No       Age-appropriate Screening Schedule:  Refer to the list below for future screening recommendations based on patient's age, sex and/or medical conditions. Orders for these recommended tests are listed in the plan section. The patient has been provided with a written plan.    Health Maintenance    Topic Date Due   • ZOSTER VACCINE (1 of 2) Never done   • MAMMOGRAM  12/15/2022   • HEMOGLOBIN A1C  2023   • LIPID PANEL  2024   • PAP SMEAR  2025   • TDAP/TD VACCINES (3 - Td or Tdap) 2030   • INFLUENZA VACCINE  Completed   • DIABETIC FOOT EXAM  Discontinued   • DIABETIC EYE EXAM  Discontinued   • URINE MICROALBUMIN  Discontinued              Assessment & Plan   Medically necessary, significant, and separately identifiable medical problems identified during this visit are addressed on a separate visit note.    CMS Preventative Services Quick Reference  Risk Factors Identified During Encounter  None Identified  The above risks/problems have been discussed with the patient.  Follow up actions/plans if indicated are seen below in the Assessment/Plan Section.  Pertinent information has been shared with the patient in the After Visit Summary.    Follow Up:   No follow-ups on file.     An After Visit Summary and PPPS were made available to the patient.    Medicare Wellness  Personal Prevention Plan of Service     Date of Office Visit:  2023  Encounter Provider:  June Harrison MD  Place of Service:  [facility]  Patient Name: Riddhi Cid  :  1970    As part of the Medicare Wellness portion of your visit today, we are providing you with this personalized preventive plan of services (PPPS). This plan is based upon recommendations of the [place] Preventive Services Task Force (USPSTF) and the Advisory Committee on Immunization Practices (ACIP).    This lists the preventive care services that should be considered, and provides dates of when you are due. Items listed as completed are up-to-date and do not require any further intervention.    Health Maintenance   Topic Date Due   • COVID-19 Vaccine (1) Never done   • ZOSTER VACCINE (1 of 2) Never done   • MAMMOGRAM  12/15/2022   • Hepatitis B (1 of 3 - 3-dose series) 2023 (Originally  1970)   • Pneumococcal Vaccine 0-64 (1 - PCV) 08/19/2023 (Originally 10/4/1976)   • HEMOGLOBIN A1C  07/16/2023   • ANNUAL PHYSICAL  01/16/2024   • LIPID PANEL  01/16/2024   • PAP SMEAR  01/14/2025   • COLORECTAL CANCER SCREENING  10/06/2025   • TDAP/TD VACCINES (3 - Td or Tdap) 07/21/2030   • HEPATITIS C SCREENING  Completed   • INFLUENZA VACCINE  Completed   • DIABETIC FOOT EXAM  Discontinued   • DIABETIC EYE EXAM  Discontinued   • URINE MICROALBUMIN  Discontinued       Orders Placed This Encounter   Procedures   • Mammo Screening Digital Tomosynthesis Bilateral With CAD     Standing Status:   Future     Standing Expiration Date:   1/11/2024     Order Specific Question:   Reason for Exam:     Answer:   screening   • FluLaval/Fluarix/Fluzone >6 Months   • POCT urinalysis dipstick, manual     Order Specific Question:   Release to patient     Answer:   Routine Release   • POCT Glucose     Order Specific Question:   Release to patient     Answer:   Routine Release   • POC Glycosylated Hemoglobin (Hb A1C)     Order Specific Question:   Release to patient     Answer:   Routine Release       No follow-ups on file.  Sit-to-Stand Exercise    The sit-to-stand exercise (also known as the chair stand or chair rise exercise) strengthens your lower body and helps you maintain or improve your mobility and independence. The goal is to do the sit-to-stand exercise without using your hands. This will be easier as you become stronger. You should always talk with your health care provider before starting any exercise program, especially if you have had recent surgery.  Do the exercise exactly as told by your health care provider and adjust it as directed. It is normal to feel mild stretching, pulling, tightness, or discomfort as you do this exercise, but you should stop right away if you feel sudden pain or your pain gets worse. Do not begin doing this exercise until told by your health care provider.  What the sit-to-stand  exercise does  The sit-to-stand exercise helps to strengthen the muscles in your thighs and the muscles in the center of your body that give you stability (core muscles). This exercise is especially helpful if:  · You have had knee or hip surgery.  · You have trouble getting up from a chair, out of a car, or off the toilet.  How to do the sit-to-stand exercise  1. Sit toward the front edge of a sturdy chair without armrests. Your knees should be bent and your feet should be flat on the floor and shoulder-width apart.  2. Place your hands lightly on each side of the seat. Keep your back and neck as straight as possible, with your chest slightly forward.  3. Breathe in slowly. Lean forward and slightly shift your weight to the front of your feet.  4. Breathe out as you slowly stand up. Use your hands as little as possible.  5. Stand and pause for a full breath in and out.  6. Breathe in as you sit down slowly. Tighten your core and abdominal muscles to control your lowering as much as possible.  7. Breathe out slowly.  8. Do this exercise 10-15 times. If needed, do it fewer times until you build up strength.  9. Rest for 1 minute, then do another set of 10-15 repetitions.  To change the difficulty of the sit-to-stand exercise  · If the exercise is too difficult, use a chair with sturdy armrests, and push off the armrests to help you come to the standing position. You can also use the armrests to help slowly lower yourself back to sitting. As this gets easier, try to use your arms less. You can also place a firm cushion or pillow on the chair to make the surface higher.  · If this exercise is too easy, do not use your arms to help raise or lower yourself. You can also wear a weighted vest, use hand weights, increase your repetitions, or try a lower chair.  General tips  · You may feel tired when starting an exercise routine. This is normal.  · You may have muscle soreness that lasts a few days. This is normal. As you  get stronger, you may not feel muscle soreness.  · Use smooth, steady movements.  · Do not  hold your breath during strength exercises. This can cause unsafe changes in your blood pressure.  · Breathe in slowly through your nose, and breathe out slowly through your mouth.  Summary  · Strengthening your lower body is an important step to help you move safely and independently.  · The sit-to-stand exercise helps strengthen the muscles in your thighs and core.  · You should always talk with your health care provider before starting any exercise program, especially if you have had recent surgery.  This information is not intended to replace advice given to you by your health care provider. Make sure you discuss any questions you have with your health care provider.  Document Revised: 10/16/2019 Document Reviewed: 02/08/2018  Elsevier Patient Education © 2021 [employer]    Advance Care Planning and Advance Directives     You make decisions on a daily basis - decisions about where you want to live, your career, your home, your life. Perhaps one of the most important decisions you face is your choice for future medical care. Take time to talk with your family and your healthcare team and start planning today.  Advance Care Planning is a process that can help you:  · Understand possible future healthcare decisions in light of your own experiences  · Reflect on those decision in light of your goals and values  · Discuss your decisions with those closest to you and the healthcare professionals that care for you  · Make a plan by creating a document that reflects your wishes    Surrogate Decision Maker  In the event of a medical emergency, which has left you unable to communicate or to make your own decisions, you would need someone to make decisions for you.  It is important to discuss your preferences for medical treatment with this person while you are in good health.     Qualities of a surrogate decision  maker:  • Willing to take on this role and responsibility  • Knows what you want for future medical care  • Willing to follow your wishes even if they don't agree with them  • Able to make difficult medical decisions under stressful circumstances    Advance Directives  These are legal documents you can create that will guide your healthcare team and decision maker(s) when needed. These documents can be stored in the electronic medical record.    · Living Will - a legal document to guide your care if you have a terminal condition or a serious illness and are unable to communicate. States vary by statute in document names/types, but most forms may include one or more of the following:        -  Directions regarding life-prolonging treatments        -  Directions regarding artificially provided nutrition/hydration        -  Choosing a healthcare decision maker        -  Direction regarding organ/tissue donation    · Durable Power of  for Healthcare - this document names an -in-fact to make medical decisions for you, but it may also allow this person to make personal and financial decisions for you. Please seek the advice of an  if you need this type of document.    **Advance Directives are not required and no one may discriminate against you if you do not sign one.    Medical Orders  Many states allow specific forms/orders signed by your physician to record your wishes for medical treatment in your current state of health. This form, signed in personal communication with your physician, addresses resuscitation and other medical interventions that you may or may not want.  For more information or to schedule a time with a [facility] Advance Care Planning Facilitator contact: [email or web address] or call [phone] and someone will contact you directly.    Fall Prevention in the Home, Adult  Falls can cause injuries and can happen to people of all ages. There are many things you can do to  make your home safe and to help prevent falls. Ask for help when making these changes.  What actions can I take to prevent falls?  General Instructions  1. Use good lighting in all rooms. Replace any light bulbs that burn out.  2. Turn on the lights in dark areas. Use night-lights.  3. Keep items that you use often in easy-to-reach places. Lower the shelves around your home if needed.  4. Set up your furniture so you have a clear path. Avoid moving your furniture around.  5. Do not have throw rugs or other things on the floor that can make you trip.  6. Avoid walking on wet floors.  7. If any of your floors are uneven, fix them.  8. Add color or contrast paint or tape to clearly nikko and help you see:  ? Grab bars or handrails.  ? First and last steps of staircases.  ? Where the edge of each step is.  9. If you use a stepladder:  ? Make sure that it is fully opened. Do not climb a closed stepladder.  ? Make sure the sides of the stepladder are locked in place.  ? Ask someone to hold the stepladder while you use it.  10. Know where your pets are when moving through your home.  What can I do in the bathroom?         · Keep the floor dry. Clean up any water on the floor right away.  · Remove soap buildup in the tub or shower.  · Use nonskid mats or decals on the floor of the tub or shower.  · Attach bath mats securely with double-sided, nonslip rug tape.  · If you need to sit down in the shower, use a plastic, nonslip stool.  · Install grab bars by the toilet and in the tub and shower. Do not use towel bars as grab bars.  What can I do in the bedroom?  · Make sure that you have a light by your bed that is easy to reach.  · Do not use any sheets or blankets for your bed that hang to the floor.  · Have a firm chair with side arms that you can use for support when you get dressed.  What can I do in the kitchen?  · Clean up any spills right away.  · If you need to reach something above you, use a step stool with a grab  bar.  · Keep electrical cords out of the way.  · Do not use floor polish or wax that makes floors slippery.  What can I do with my stairs?  · Do not leave any items on the stairs.  · Make sure that you have a light switch at the top and the bottom of the stairs.  · Make sure that there are handrails on both sides of the stairs. Fix handrails that are broken or loose.  · Install nonslip stair treads on all your stairs.  · Avoid having throw rugs at the top or bottom of the stairs.  · Choose a carpet that does not hide the edge of the steps on the stairs.  · Check carpeting to make sure that it is firmly attached to the stairs. Fix carpet that is loose or worn.  What can I do on the outside of my home?  · Use bright outdoor lighting.  · Fix the edges of walkways and driveways and fix any cracks.  · Remove anything that might make you trip as you walk through a door, such as a raised step or threshold.  · Trim any bushes or trees on paths to your home.  · Check to see if handrails are loose or broken and that both sides of all steps have handrails.  · Install guardrails along the edges of any raised decks and porches.  · Clear paths of anything that can make you trip, such as tools or rocks.  · Have leaves, snow, or ice cleared regularly.  · Use sand or salt on paths during winter.  · Clean up any spills in your garage right away. This includes grease or oil spills.  What other actions can I take?  1. Wear shoes that:  ? Have a low heel. Do not wear high heels.  ? Have rubber bottoms.  ? Feel good on your feet and fit well.  ? Are closed at the toe. Do not wear open-toe sandals.  2. Use tools that help you move around if needed. These include:  ? Canes.  ? Walkers.  ? Scooters.  ? Crutches.  3. Review your medicines with your doctor. Some medicines can make you feel dizzy. This can increase your chance of falling.  Ask your doctor what else you can do to help prevent falls.  Where to find more information  · Centers  for Disease Control and Prevention, [place]: www.cdc.gov  · National [place] on Aging: www.katty.nih.gov  Contact a doctor if:  · You are afraid of falling at home.  · You feel weak, drowsy, or dizzy at home.  · You fall at home.  Summary  · There are many simple things that you can do to make your home safe and to help prevent falls.  · Ways to make your home safe include removing things that can make you trip and installing grab bars in the bathroom.  · Ask for help when making these changes in your home.  This information is not intended to replace advice given to you by your health care provider. Make sure you discuss any questions you have with your health care provider.  Document Revised: 07/21/2021 Document Reviewed: 07/21/2021  [employer] Patient Education © 2021 [employer]         +++++E/M portion medically necessary secondary to new or uncontrolled chronic problem+++++++    Subjective   Riddhi Cid is here for:    Chief Complaint   Patient presents with   • Medicare Wellness-subsequent   • Diabetes   • Hyperlipidemia   • Hypothyroidism       Diabetes  She presents for her follow-up diabetic visit. She has type 2 diabetes mellitus. Pertinent negatives for hypoglycemia include no dizziness or sweats. Pertinent negatives for diabetes include no chest pain, no fatigue and no weakness. There are no hypoglycemic complications. There are no diabetic complications. Risk factors for coronary artery disease include diabetes mellitus, post-menopausal, dyslipidemia, obesity and family history. She is following a generally unhealthy diet. Meal planning includes avoidance of concentrated sweets. She never participates in exercise. (Does not check regularly at home. ) An ACE inhibitor/angiotensin II receptor blocker is not being taken. She does not see a podiatrist.Eye exam is not current.   Hyperlipidemia  This is a chronic problem. The current episode started more than 1 year ago. Exacerbating diseases include  diabetes, hypothyroidism and obesity. Pertinent negatives include no chest pain or shortness of breath. Current antihyperlipidemic treatment includes statins and herbal therapy. The current treatment provides moderate improvement of lipids. Risk factors for coronary artery disease include dyslipidemia, diabetes mellitus, post-menopausal, obesity and family history.   Hypothyroidism  This is a chronic problem. The current episode started more than 1 year ago. Pertinent negatives include no chest pain, fatigue or weakness. Treatments tried: levothyroxine 50 MCG  The treatment provided moderate relief.   Depression  Visit Type: follow-up  Patient presents with the following symptoms: decreased concentration, depressed mood and feelings of worthlessness.  Patient is not experiencing: feelings of hopelessness, shortness of breath, suicidal ideas, suicidal planning and thoughts of death.  Severity: moderate   Sleep quality: fair  Compliance with medications:  %              Physical Exam:  Review of Systems   Constitutional: Negative for fatigue.   Respiratory: Negative for shortness of breath.    Cardiovascular: Negative for chest pain.   Neurological: Negative for dizziness and weakness.   Psychiatric/Behavioral: Positive for decreased concentration and depressed mood. Negative for suicidal ideas.        Physical Exam  Vitals and nursing note reviewed.   Constitutional:       General: She is not in acute distress.     Appearance: She is well-developed. She is not diaphoretic.   HENT:      Head: Normocephalic and atraumatic.      Right Ear: Tympanic membrane and external ear normal.      Left Ear: Tympanic membrane and external ear normal.      Nose: Nose normal.      Mouth/Throat:      Pharynx: No oropharyngeal exudate.   Eyes:      General: No scleral icterus.        Right eye: No discharge.         Left eye: No discharge.      Conjunctiva/sclera: Conjunctivae normal.      Pupils: Pupils are equal, round, and  reactive to light.   Neck:      Thyroid: No thyromegaly.      Trachea: No tracheal deviation.   Cardiovascular:      Rate and Rhythm: Normal rate and regular rhythm.      Heart sounds: Normal heart sounds. No murmur heard.    No friction rub. No gallop.   Pulmonary:      Effort: Pulmonary effort is normal. No respiratory distress.      Breath sounds: Normal breath sounds. No stridor. No wheezing or rales.   Chest:   Breasts:     Right: Normal. No swelling, bleeding, inverted nipple, mass, nipple discharge, skin change or tenderness.      Left: Normal. No swelling, bleeding, inverted nipple, mass, nipple discharge, skin change or tenderness.   Abdominal:      General: Bowel sounds are normal. There is no distension.      Palpations: Abdomen is soft. There is no mass.      Tenderness: There is no abdominal tenderness. There is no guarding or rebound.   Musculoskeletal:         General: No tenderness or deformity. Normal range of motion.      Cervical back: Normal range of motion and neck supple.   Lymphadenopathy:      Cervical: No cervical adenopathy.      Upper Body:      Right upper body: No axillary adenopathy.      Left upper body: No axillary adenopathy.   Skin:     General: Skin is warm and dry.      Capillary Refill: Capillary refill takes less than 2 seconds.      Coloration: Skin is not pale.      Findings: No erythema or rash.   Neurological:      Mental Status: She is alert and oriented to person, place, and time.      Cranial Nerves: No cranial nerve deficit.      Sensory: No sensory deficit.      Motor: No tremor, atrophy or abnormal muscle tone.      Coordination: Coordination normal.      Gait: Gait normal.      Deep Tendon Reflexes: Reflexes are normal and symmetric. Reflexes normal.   Psychiatric:         Behavior: Behavior normal.         Thought Content: Thought content normal.         Cognition and Memory: Memory is not impaired. She does not exhibit impaired recent memory or impaired remote  memory.         Judgment: Judgment normal.         Result Review :   The following data was reviewed by: June Harrison MD on 01/16/2023:  CMP    CMP 4/19/22 8/19/22 1/16/23   Glucose 147 (A) 82 150 (A)   BUN 11 11 15   Creatinine 1.10 (A) 1.05 (A) 1.10 (A)   Sodium 146 (A) 148 (A) 148 (A)   Potassium 4.7 4.1 4.3   Chloride 105 106 107 (A)   Calcium 9.6 9.7 9.8   Total Protein 7.1 7.3 6.8   Albumin 4.3 4.7 4.4   Globulin 2.8 2.6 2.4   Total Bilirubin 0.5 0.3 0.4   Alkaline Phosphatase 92 91 92   AST (SGOT) 37 41 (A) 27   ALT (SGPT) 49 (A) 56 (A) 41 (A)   BUN/Creatinine Ratio 10 10 14   (A) Abnormal value            CBC w/diff    CBC w/Diff 4/19/22 1/16/23   WBC 7.0 6.7   RBC 4.74 4.76   Hemoglobin 14.5 14.4   Hematocrit 43.7 43.4   MCV 92 91   MCH 30.6 30.3   MCHC 33.2 33.2   RDW 12.3 12.3   Platelets 230 205   Neutrophil Rel % 62 63   Lymphocyte Rel % 29 27   Monocyte Rel % 5 6   Eosinophil Rel % 3 3   Basophil Rel % 1 1           Lipid Panel    Lipid Panel 4/19/22 8/19/22 1/16/23   Total Cholesterol 198 174 170   Triglycerides 203 (A) 212 (A) 158 (A)   HDL Cholesterol 37 (A) 39 (A) 37 (A)   VLDL Cholesterol 36 36 28   LDL Cholesterol  125 (A) 99 105 (A)   (A) Abnormal value            TSH    TSH 4/19/22 1/16/23   TSH 1.660 1.980              Lab Results   Component Value Date    HGBA1C 6.7 01/16/2023       Assessment and Plan:  Problem List Items Addressed This Visit        Cardiac and Vasculature    Mixed hyperlipidemia       Endocrine and Metabolic    Class 3 drug-induced obesity with serious comorbidity and body mass index (BMI) of 40.0 to 44.9 in adult (HCC)    Acquired hypothyroidism    Controlled diabetes mellitus type 2 with complications (HCC)    Overview     1/14/22  A1C 6.9         Current Assessment & Plan     Diabetes is improving with treatment.   Continue current treatment regimen.  Reminded to bring in blood sugar diary at next visit.  Dietary recommendations for ADA diet.  Diabetes will be  reassessed in 3 months.         Relevant Orders    POCT Glucose (Completed)    POC Glycosylated Hemoglobin (Hb A1C) (Completed)       Mental Health    Major depressive disorder, recurrent, moderate (HCC)    Overview     Stable with celexa           Current Assessment & Plan     Patient's depression is recurrent and is moderate without psychosis. Their depression is currently active and the condition is worsening. This will be reassessed in 4 weeks. F/U as described:patient was prescribed an antidepressant medicine.  Increase celexa         Relevant Medications    citalopram (CeleXA) 40 MG tablet       Pulmonary and Pneumonias    COPD (chronic obstructive pulmonary disease) (HCC)    Overview     Not exacerbated. Continue current medications         Relevant Medications    albuterol (PROVENTIL) (2.5 MG/3ML) 0.083% nebulizer solution    albuterol sulfate  (90 Base) MCG/ACT inhaler       Tobacco    Tobacco dependence syndrome   Other Visit Diagnoses     Medicare annual wellness visit, subsequent    -  Primary    Relevant Orders    POCT urinalysis dipstick, manual (Completed)    Need for influenza vaccination        Relevant Orders    FluLaval/Fluarix/Fluzone >6 Months (Completed)    Encounter for screening mammogram for malignant neoplasm of breast        Relevant Orders    Mammo Screening Digital Tomosynthesis Bilateral With CAD

## 2023-01-16 ENCOUNTER — OFFICE VISIT (OUTPATIENT)
Dept: FAMILY MEDICINE CLINIC | Facility: CLINIC | Age: 53
End: 2023-01-16
Payer: MEDICARE

## 2023-01-16 VITALS
DIASTOLIC BLOOD PRESSURE: 82 MMHG | WEIGHT: 242.2 LBS | HEIGHT: 63 IN | BODY MASS INDEX: 42.91 KG/M2 | SYSTOLIC BLOOD PRESSURE: 136 MMHG | OXYGEN SATURATION: 96 % | RESPIRATION RATE: 18 BRPM | HEART RATE: 103 BPM | TEMPERATURE: 97.3 F

## 2023-01-16 DIAGNOSIS — Z12.31 ENCOUNTER FOR SCREENING MAMMOGRAM FOR MALIGNANT NEOPLASM OF BREAST: ICD-10-CM

## 2023-01-16 DIAGNOSIS — J41.0 SIMPLE CHRONIC BRONCHITIS: ICD-10-CM

## 2023-01-16 DIAGNOSIS — Z00.00 MEDICARE ANNUAL WELLNESS VISIT, SUBSEQUENT: Primary | ICD-10-CM

## 2023-01-16 DIAGNOSIS — Z23 NEED FOR INFLUENZA VACCINATION: ICD-10-CM

## 2023-01-16 DIAGNOSIS — F33.1 MAJOR DEPRESSIVE DISORDER, RECURRENT, MODERATE: ICD-10-CM

## 2023-01-16 DIAGNOSIS — F17.200 TOBACCO DEPENDENCE SYNDROME: ICD-10-CM

## 2023-01-16 DIAGNOSIS — E03.9 ACQUIRED HYPOTHYROIDISM: ICD-10-CM

## 2023-01-16 DIAGNOSIS — E11.8 CONTROLLED DIABETES MELLITUS TYPE 2 WITH COMPLICATIONS, UNSPECIFIED WHETHER LONG TERM INSULIN USE: ICD-10-CM

## 2023-01-16 DIAGNOSIS — E78.2 MIXED HYPERLIPIDEMIA: ICD-10-CM

## 2023-01-16 DIAGNOSIS — E66.1 CLASS 3 DRUG-INDUCED OBESITY WITH SERIOUS COMORBIDITY AND BODY MASS INDEX (BMI) OF 40.0 TO 44.9 IN ADULT: ICD-10-CM

## 2023-01-16 PROBLEM — M79.604 RIGHT LEG PAIN: Status: RESOLVED | Noted: 2022-05-03 | Resolved: 2023-01-16

## 2023-01-16 LAB
BILIRUB BLD-MCNC: NEGATIVE MG/DL
CLARITY, POC: CLEAR
COLOR UR: YELLOW
EXPIRATION DATE: NORMAL
GLUCOSE BLDC GLUCOMTR-MCNC: 147 MG/DL (ref 70–130)
GLUCOSE UR STRIP-MCNC: NEGATIVE MG/DL
HBA1C MFR BLD: 6.7 %
KETONES UR QL: NEGATIVE
LEUKOCYTE EST, POC: NEGATIVE
Lab: NORMAL
NITRITE UR-MCNC: NEGATIVE MG/ML
PH UR: 6 [PH] (ref 5–8)
PROT UR STRIP-MCNC: NEGATIVE MG/DL
RBC # UR STRIP: NEGATIVE /UL
SP GR UR: 1.01 (ref 1–1.03)
UROBILINOGEN UR QL: NORMAL

## 2023-01-16 PROCEDURE — 90471 IMMUNIZATION ADMIN: CPT | Performed by: FAMILY MEDICINE

## 2023-01-16 PROCEDURE — 3044F HG A1C LEVEL LT 7.0%: CPT | Performed by: FAMILY MEDICINE

## 2023-01-16 PROCEDURE — 83036 HEMOGLOBIN GLYCOSYLATED A1C: CPT | Performed by: FAMILY MEDICINE

## 2023-01-16 PROCEDURE — 82962 GLUCOSE BLOOD TEST: CPT | Performed by: FAMILY MEDICINE

## 2023-01-16 PROCEDURE — 90686 IIV4 VACC NO PRSV 0.5 ML IM: CPT | Performed by: FAMILY MEDICINE

## 2023-01-16 PROCEDURE — 99214 OFFICE O/P EST MOD 30 MIN: CPT | Performed by: FAMILY MEDICINE

## 2023-01-16 RX ORDER — ALBUTEROL SULFATE 90 UG/1
2 AEROSOL, METERED RESPIRATORY (INHALATION) EVERY 4 HOURS PRN
Qty: 18 G | Refills: 3 | Status: SHIPPED | OUTPATIENT
Start: 2023-01-16

## 2023-01-16 RX ORDER — CITALOPRAM 40 MG/1
40 TABLET ORAL DAILY
Qty: 90 TABLET | Refills: 3 | Status: SHIPPED | OUTPATIENT
Start: 2023-01-16

## 2023-01-16 RX ORDER — ALBUTEROL SULFATE 2.5 MG/3ML
2.5 SOLUTION RESPIRATORY (INHALATION) EVERY 4 HOURS PRN
Qty: 180 ML | Refills: 3 | Status: SHIPPED | OUTPATIENT
Start: 2023-01-16

## 2023-01-16 NOTE — ASSESSMENT & PLAN NOTE
Patient's depression is recurrent and is moderate without psychosis. Their depression is currently active and the condition is worsening. This will be reassessed in 4 weeks. F/U as described:patient was prescribed an antidepressant medicine.  Increase celexa

## 2023-01-17 LAB
ALBUMIN SERPL-MCNC: 4.4 G/DL (ref 3.8–4.9)
ALBUMIN/GLOB SERPL: 1.8 {RATIO} (ref 1.2–2.2)
ALP SERPL-CCNC: 92 IU/L (ref 44–121)
ALT SERPL-CCNC: 41 IU/L (ref 0–32)
AST SERPL-CCNC: 27 IU/L (ref 0–40)
BASOPHILS # BLD AUTO: 0.1 X10E3/UL (ref 0–0.2)
BASOPHILS NFR BLD AUTO: 1 %
BILIRUB SERPL-MCNC: 0.4 MG/DL (ref 0–1.2)
BUN SERPL-MCNC: 15 MG/DL (ref 6–24)
BUN/CREAT SERPL: 14 (ref 9–23)
CALCIUM SERPL-MCNC: 9.8 MG/DL (ref 8.7–10.2)
CHLORIDE SERPL-SCNC: 107 MMOL/L (ref 96–106)
CHOLEST SERPL-MCNC: 170 MG/DL (ref 100–199)
CHOLEST/HDLC SERPL: 4.6 RATIO (ref 0–4.4)
CO2 SERPL-SCNC: 24 MMOL/L (ref 20–29)
CREAT SERPL-MCNC: 1.1 MG/DL (ref 0.57–1)
EGFRCR SERPLBLD CKD-EPI 2021: 60 ML/MIN/1.73
EOSINOPHIL # BLD AUTO: 0.2 X10E3/UL (ref 0–0.4)
EOSINOPHIL NFR BLD AUTO: 3 %
ERYTHROCYTE [DISTWIDTH] IN BLOOD BY AUTOMATED COUNT: 12.3 % (ref 11.7–15.4)
GLOBULIN SER CALC-MCNC: 2.4 G/DL (ref 1.5–4.5)
GLUCOSE SERPL-MCNC: 150 MG/DL (ref 70–99)
HCT VFR BLD AUTO: 43.4 % (ref 34–46.6)
HDLC SERPL-MCNC: 37 MG/DL
HGB BLD-MCNC: 14.4 G/DL (ref 11.1–15.9)
IMM GRANULOCYTES # BLD AUTO: 0 X10E3/UL (ref 0–0.1)
IMM GRANULOCYTES NFR BLD AUTO: 0 %
LDLC SERPL CALC-MCNC: 105 MG/DL (ref 0–99)
LYMPHOCYTES # BLD AUTO: 1.8 X10E3/UL (ref 0.7–3.1)
LYMPHOCYTES NFR BLD AUTO: 27 %
MCH RBC QN AUTO: 30.3 PG (ref 26.6–33)
MCHC RBC AUTO-ENTMCNC: 33.2 G/DL (ref 31.5–35.7)
MCV RBC AUTO: 91 FL (ref 79–97)
MONOCYTES # BLD AUTO: 0.4 X10E3/UL (ref 0.1–0.9)
MONOCYTES NFR BLD AUTO: 6 %
NEUTROPHILS # BLD AUTO: 4.2 X10E3/UL (ref 1.4–7)
NEUTROPHILS NFR BLD AUTO: 63 %
PLATELET # BLD AUTO: 205 X10E3/UL (ref 150–450)
POTASSIUM SERPL-SCNC: 4.3 MMOL/L (ref 3.5–5.2)
PROT SERPL-MCNC: 6.8 G/DL (ref 6–8.5)
RBC # BLD AUTO: 4.76 X10E6/UL (ref 3.77–5.28)
SODIUM SERPL-SCNC: 148 MMOL/L (ref 134–144)
TRIGL SERPL-MCNC: 158 MG/DL (ref 0–149)
TSH SERPL DL<=0.005 MIU/L-ACNC: 1.98 UIU/ML (ref 0.45–4.5)
VLDLC SERPL CALC-MCNC: 28 MG/DL (ref 5–40)
WBC # BLD AUTO: 6.7 X10E3/UL (ref 3.4–10.8)

## 2023-01-21 PROBLEM — E66.813 CLASS 3 DRUG-INDUCED OBESITY WITH SERIOUS COMORBIDITY AND BODY MASS INDEX (BMI) OF 40.0 TO 44.9 IN ADULT: Status: ACTIVE | Noted: 2018-03-22

## 2023-01-21 PROBLEM — E66.1 CLASS 3 DRUG-INDUCED OBESITY WITH SERIOUS COMORBIDITY AND BODY MASS INDEX (BMI) OF 40.0 TO 44.9 IN ADULT: Status: ACTIVE | Noted: 2018-03-22

## 2023-01-21 PROBLEM — R07.89 OTHER CHEST PAIN: Status: RESOLVED | Noted: 2021-05-21 | Resolved: 2023-01-21

## 2023-01-22 NOTE — ASSESSMENT & PLAN NOTE
Diabetes is improving with treatment.   Continue current treatment regimen.  Reminded to bring in blood sugar diary at next visit.  Dietary recommendations for ADA diet.  Diabetes will be reassessed in 3 months.

## 2023-02-06 ENCOUNTER — OFFICE VISIT (OUTPATIENT)
Dept: PAIN MEDICINE | Facility: CLINIC | Age: 53
End: 2023-02-06
Payer: MEDICARE

## 2023-02-06 VITALS
HEART RATE: 77 BPM | OXYGEN SATURATION: 96 % | RESPIRATION RATE: 16 BRPM | SYSTOLIC BLOOD PRESSURE: 130 MMHG | DIASTOLIC BLOOD PRESSURE: 79 MMHG

## 2023-02-06 DIAGNOSIS — G89.29 CHRONIC MIDLINE LOW BACK PAIN WITH RIGHT-SIDED SCIATICA: Primary | ICD-10-CM

## 2023-02-06 DIAGNOSIS — M79.7 FIBROMYOSITIS: ICD-10-CM

## 2023-02-06 DIAGNOSIS — M50.30 DEGENERATION OF INTERVERTEBRAL DISC OF CERVICAL REGION: ICD-10-CM

## 2023-02-06 DIAGNOSIS — M54.2 CERVICALGIA: ICD-10-CM

## 2023-02-06 DIAGNOSIS — Z79.899 HIGH RISK MEDICATION USE: ICD-10-CM

## 2023-02-06 DIAGNOSIS — M51.36 DEGENERATION OF INTERVERTEBRAL DISC OF LUMBAR REGION: ICD-10-CM

## 2023-02-06 DIAGNOSIS — M48.02 CERVICAL STENOSIS OF SPINE: ICD-10-CM

## 2023-02-06 DIAGNOSIS — M54.41 CHRONIC MIDLINE LOW BACK PAIN WITH RIGHT-SIDED SCIATICA: Primary | ICD-10-CM

## 2023-02-06 PROCEDURE — 99213 OFFICE O/P EST LOW 20 MIN: CPT | Performed by: PHYSICAL MEDICINE & REHABILITATION

## 2023-02-06 PROCEDURE — G0463 HOSPITAL OUTPT CLINIC VISIT: HCPCS | Performed by: PHYSICAL MEDICINE & REHABILITATION

## 2023-02-06 RX ORDER — HYDROCODONE BITARTRATE AND ACETAMINOPHEN 10; 325 MG/1; MG/1
1 TABLET ORAL EVERY 6 HOURS PRN
Qty: 120 TABLET | Refills: 0 | Status: SHIPPED | OUTPATIENT
Start: 2023-02-06 | End: 2023-02-13 | Stop reason: SDUPTHER

## 2023-02-06 RX ORDER — HYDROCODONE BITARTRATE AND ACETAMINOPHEN 10; 325 MG/1; MG/1
1 TABLET ORAL EVERY 6 HOURS PRN
Qty: 120 TABLET | Refills: 0 | Status: SHIPPED | OUTPATIENT
Start: 2023-02-06 | End: 2023-04-03 | Stop reason: SDUPTHER

## 2023-02-06 NOTE — PROGRESS NOTES
Subjective   Riddhi Cid is a 52 y.o. female.     History of Present Illness  Neck pain radiates to b/l shoulders, and left arm pain. ACDF C5/7 (4/11/2016), also h/o fibromyalgia,  also pain in lower back and b/l legs and feet. 10/10 at worst, 6/10 at best, 4/10 today, worse with activity, improves with rest and meds, always present, varies, aching, radiates in BLE. Imaging reviewed, satisfactory ACDF. Referred for pain management. Neck pain improved with ACDF, tapered off [place] with worsening pain, still somewhat severe. Taking Cymbalta which helps, tried Lyrica with weight gain, trying to lose weight. Restarted [place] at BID - qdaily prn but worsening pain with exercise to lose weight, increased to BID-TID #75, then TID, then QID prn with adequate relief, reduced to #105, tolerating. Gastric sleeve surgery planned for March-April 2017, had to miss cardiac clearance and EGD due to illness and social issues, has decided against surgery, trying to lose weight with diet and exercise. Worsening b/l CTS symptoms, R>L, known h/o of CTS. Saw Juan Franz for worsening neck pain to LUE, ACDF is intact, started Diclofenac for DJD, Elavil. Could not tolerate even low-dose Gabapentin with drowsiness. Started Celebrex but did not feel like doing anything, stopped, stopped Mobic. Stopping numerous meds due to side effects, started Gabapentin with Dr. Pisano with benefit. Pain worsening, DDD on MRI, started PT which is helping. Back pain worsening, especially at night. Had COVID-19, doing better, plans to get vaccinated.       The following portions of the patient's history were reviewed and updated as appropriate: allergies, current medications, past family history, past medical history, past social history, past surgical history and problem list.    Review of Systems   Constitutional: Negative for chills, fatigue and fever.   HENT: Negative for hearing loss and trouble swallowing.    Eyes: Negative for visual disturbance.    Respiratory: Negative for shortness of breath.    Cardiovascular: Negative for chest pain.   Gastrointestinal: Positive for constipation and nausea. Negative for abdominal pain, diarrhea and vomiting.   Genitourinary: Negative for urinary incontinence.   Musculoskeletal: Positive for arthralgias, back pain and neck pain. Negative for joint swelling and myalgias.   Neurological: Positive for headache. Negative for dizziness, weakness and numbness.       Objective   Physical Exam   Constitutional: She is oriented to person, place, and time. She appears well-developed and well-nourished.   HENT:   Head: Normocephalic and atraumatic.   Eyes: Pupils are equal, round, and reactive to light.   Cardiovascular: Normal rate, regular rhythm and normal heart sounds.   Pulmonary/Chest: Breath sounds normal.   Abdominal: Soft. Bowel sounds are normal. She exhibits no distension. There is no abdominal tenderness.   Neurological: She is alert and oriented to person, place, and time. She has normal reflexes. She displays normal reflexes. No sensory deficit.   Psychiatric: Her behavior is normal. Thought content normal.         Assessment & Plan   Diagnoses and all orders for this visit:    1. Chronic midline low back pain with right-sided sciatica (Primary)    2. Cervicalgia    3. Cervical stenosis of spine    4. Degeneration of intervertebral disc of cervical region    5. Degeneration of intervertebral disc of lumbar region    6. Fibromyositis        [place] reviewed, in order. Low risk. Repeat UDS 4/11/22 in order.  Reduced to Norco 10/325mg q6-8h #105, too painful, restarted q6h prn #120, then reduced to #105 successfully but pain worsening. Cont at #120.  Afraid to start Lyrica due to possible weight gain. Restarted Gabapentin, takes 300-900mg/day as tolerated, helping a great deal.  Severe GERD, IBS, family h/o CV dz. Stopped Diclofenac, could not tolerate Celebrex 200mg qdaily. Taking Naproxen with PCP but hard on her  stomach, no personal CV issues. Began Mobic 7.5mg qdaily prn.  Begin Tizanidine 4mg qHS prn.  Cont other meds as prescribed.  Cont TENS, unit provided here, patient reports it helps her pain significantly.  Cont b/l CTS braces at night only.  Patient reports she cannot start PT at this time as her daughter currently works 12h shifts, may be able to begin in future if her daughter can change to an 8h shift schedule.  Will begin neuropathic comp cream if her current cream does not work.  Ordered LSO for truncal stability. Cont PT.  Letter to return to work. Does not drive or operate heavy machinery while using pain medication.  RTC 3 months for f/u.                 Physical Exam  Constitutional:       Appearance: She is well-developed and well-nourished.   HENT:      Head: Normocephalic and atraumatic.   Eyes:      Pupils: Pupils are equal, round, and reactive to light.   Cardiovascular:      Rate and Rhythm: Normal rate and regular rhythm.      Heart sounds: Normal heart sounds.   Pulmonary:      Breath sounds: Normal breath sounds.   Abdominal:      General: Bowel sounds are normal. There is no distension.      Palpations: Abdomen is soft.      Tenderness: There is no abdominal tenderness.   Neurological:      Mental Status: She is alert and oriented to person, place, and time.      Sensory: No sensory deficit.      Deep Tendon Reflexes: Reflexes are normal and symmetric. Reflexes normal.   Psychiatric:         Behavior: Behavior normal.         Thought Content: Thought content normal.

## 2023-02-09 NOTE — PROGRESS NOTES
Subjective   Riddhi Cid is a 52 y.o. female. Presents to [facility]    Chief Complaint   Patient presents with   • Depression       Depression  Visit Type: follow-up  Patient presents with the following symptoms: depressed mood and fatigue.  Patient is not experiencing: decreased concentration, excessive worry, irritability, nervousness/anxiety, palpitations, panic and restlessness.  Frequency of symptoms: rarely   Severity: mild   Sleep quality: good  Nighttime awakenings: occasional         I personally reviewed and updated the patient's allergies, medications, problem list, and past medical, surgical, social, and family history. I have reviewed and confirmed the accuracy of the History of Present Illness and Review of Symptoms as documented by the MA/LPN/RN. June Harrison MD    Allergies:  Allergies   Allergen Reactions   • Sulfa Antibiotics Rash   • Meloxicam GI Intolerance       Social History:  Social History     Socioeconomic History   • Marital status:    Tobacco Use   • Smoking status: Every Day     Packs/day: 1.00     Types: Cigarettes   • Smokeless tobacco: Never   Vaping Use   • Vaping Use: Never used   Substance and Sexual Activity   • Alcohol use: No   • Drug use: No   • Sexual activity: Defer       Family History:  Family History   Problem Relation Age of Onset   • Stroke Mother    • Hypertension Mother    • Hyperlipidemia Mother    • Hypothyroidism Mother    • Heart attack Father    • Heart disease Father    • Prostate cancer Father    • Other Father    • Diabetes Father    • Hypertension Father    • Hyperlipidemia Father    • Coronary artery disease Father    • Breast cancer Paternal Grandmother    • Hypothyroidism Grandchild    • Ovarian cancer Neg Hx        Past Medical History :  Patient Active Problem List   Diagnosis   • Class 3 drug-induced obesity with serious comorbidity and body mass index (BMI) of 40.0 to 44.9 in adult (HCC)   • Degeneration of  intervertebral disc of cervical region   • Degeneration of intervertebral disc of lumbar region   • Acquired hypothyroidism   • Mixed hyperlipidemia   • Major depressive disorder, recurrent, moderate (Formerly [facility])   • Obstructive sleep apnea   • Vitamin B12 deficiency   • Vitamin D deficiency   • COPD (chronic obstructive pulmonary disease) (Formerly [facility])   • Solitary thyroid nodule   • Chronic midline low back pain with right-sided sciatica   • Cervicalgia   • Cervical stenosis of spine   • Arthritis   • Moderate persistent asthma without complication   • Gastroparalysis   • History of chicken pox   • IBS (irritable bowel syndrome)   • GERD (gastroesophageal reflux disease)   • Dependent edema   • Optic disc hemorrhage   • Controlled diabetes mellitus type 2 with complications ([facility])   • Tension headache   • Allergic rhinitis   • Disorder of peripheral nervous system   • Fibromyositis   • Tobacco dependence syndrome   • Pap smear, low-risk   • Nausea   • Disease due to severe acute respiratory syndrome coronavirus 2 (SARS-CoV-2)       Medication List:    Current Outpatient Medications:   •  Accu-Chek FastClix Lancets misc, 1 each by Other route Daily., Disp: 100 each, Rfl: 3  •  Advair Diskus 250-50 MCG/ACT DISKUS, INHALE 1 PUFF BY MOUTH TWICE DAILY, Disp: 60 each, Rfl: 12  •  albuterol (PROVENTIL) (2.5 MG/3ML) 0.083% nebulizer solution, Take 2.5 mg by nebulization Every 4 (Four) Hours As Needed for Wheezing., Disp: 180 mL, Rfl: 3  •  albuterol sulfate  (90 Base) MCG/ACT inhaler, Inhale 2 puffs Every 4 (Four) Hours As Needed for Wheezing., Disp: 18 g, Rfl: 3  •  aspirin 81 MG chewable tablet, Chew 81 mg Daily., Disp: , Rfl:   •  atorvastatin (LIPITOR) 80 MG tablet, Take 1 tablet by mouth Daily., Disp: 90 tablet, Rfl: 3  •  azelastine (OPTIVAR) 0.05 % ophthalmic solution, , Disp: , Rfl:   •  Blood Glucose Monitoring Suppl (Accu-Chek Guide) w/Device kit, 1 each Daily., Disp: 1 kit, Rfl: 0  •  bumetanide (BUMEX) 1 MG tablet,  TAKE 1 TO 2 TABLETS BY MOUTH EVERY DAY AS NEEDED, Disp: 180 tablet, Rfl: 3  •  citalopram (CeleXA) 40 MG tablet, Take 1 tablet by mouth Daily., Disp: 90 tablet, Rfl: 3  •  Cyanocobalamin (B-12) 1000 MCG tablet controlled-release, TAKE 1 TABLET BY MOUTH EVERY DAY, Disp: 90 tablet, Rfl: 3  •  dexlansoprazole (Dexilant) 60 MG capsule, Take 1 capsule by mouth Daily., Disp: 90 capsule, Rfl: 3  •  dicyclomine (BENTYL) 20 MG tablet, Take 20 mg by mouth Every 6 (Six) Hours As Needed. for pain, Disp: , Rfl:   •  fenofibrate (TRICOR) 145 MG tablet, Daily. Dx: E78.2, mixed hyperlipidema, Disp: , Rfl:   •  fluticasone (FLONASE) 50 MCG/ACT nasal spray, SHAKE LIQUID AND USE 2 SPRAYS IN EACH NOSTRIL EVERY DAY, Disp: 48 g, Rfl: 3  •  folic acid (FOLVITE) 1 MG tablet, TAKE 1 TABLET BY MOUTH DAILY, Disp: 90 tablet, Rfl: 3  •  gabapentin (NEURONTIN) 300 MG capsule, Take 1 capsule by mouth 3 (Three) Times a Day., Disp: 90 capsule, Rfl: 5  •  glucose blood (Accu-Chek Guide) test strip, 1 each by Other route Daily. Use as instructed, Disp: 100 each, Rfl: 3  •  HYDROcodone-acetaminophen (Norco)  MG per tablet, Take 1 tablet by mouth Every 6 (Six) Hours As Needed for Moderate Pain., Disp: 120 tablet, Rfl: 0  •  Lancets Misc. (ACCU-CHEK FASTCLIX LANCET) kit, Dx: E11.9, DM type 2, controlled, Disp: , Rfl:   •  levothyroxine (SYNTHROID, LEVOTHROID) 50 MCG tablet, TAKE 1 TABLET BY MOUTH DAILY, Disp: 90 tablet, Rfl: 4  •  Linzess 290 MCG capsule capsule, TAKE 1 CAPSULE BY MOUTH EVERY DAY 30 MINUTES BEFORE A MEAL, Disp: , Rfl:   •  metFORMIN ER (GLUCOPHAGE-XR) 500 MG 24 hr tablet, TAKE 1 TABLET BY MOUTH DAILY, Disp: 90 tablet, Rfl: 2  •  montelukast (SINGULAIR) 10 MG tablet, Take 1 tablet by mouth Every Night., Disp: 90 tablet, Rfl: 3  •  Motegrity 2 MG tablet, Take 1 tablet by mouth Daily., Disp: , Rfl:   •  Omega-3 Fatty Acids (FISH OIL) 500 MG capsule capsule, 2 capsules Daily. Dx: E78.2, mixed hyperlipidema, Disp: , Rfl:   •  potassium  chloride ER (K-TAB) 20 MEQ tablet controlled-release ER tablet, TAKE 1 TABLET BY MOUTH EVERY DAY, ONLY IF TAKING 2 BUMETANIDE, Disp: 60 tablet, Rfl: 12  •  vitamin D3 125 MCG (5000 UT) capsule capsule, TAKE 1 CAPSULE BY MOUTH DAILY, Disp: 90 capsule, Rfl: 3    Past Surgical History:  Past Surgical History:   Procedure Laterality Date   • BREAST BIOPSY Right    • CARPAL TUNNEL RELEASE     • CERVICAL DISCECTOMY ANTERIOR     • CHOLECYSTECTOMY     • ESSURE TUBAL LIGATION           Physical Exam:      Vital Signs:    Vitals:    02/13/23 1009   BP: 124/80   Pulse: 90   Resp: 18   Temp: 97.5 °F (36.4 °C)   SpO2: 96%        Wt Readings from Last 3 Encounters:   02/13/23 111 kg (245 lb)   01/16/23 110 kg (242 lb 3.2 oz)   01/06/23 111 kg (245 lb)       Result Review :                Physical Exam  Vitals reviewed.   Constitutional:       Appearance: Normal appearance. She is well-developed.   HENT:      Head: Normocephalic and atraumatic.   Eyes:      General:         Right eye: No discharge.         Left eye: No discharge.   Cardiovascular:      Rate and Rhythm: Normal rate and regular rhythm.      Heart sounds: Normal heart sounds. No murmur heard.    No friction rub. No gallop.   Pulmonary:      Effort: Pulmonary effort is normal. No respiratory distress.      Breath sounds: Normal breath sounds. No wheezing or rales.   Skin:     General: Skin is warm and dry.      Findings: No rash.   Neurological:      Mental Status: She is alert and oriented to person, place, and time.      Coordination: Coordination normal.      Gait: Gait normal.   Psychiatric:         Behavior: Behavior is cooperative.         Assessment and Plan:  Problems Addressed this Visit        Endocrine and Metabolic    Class 3 drug-induced obesity with serious comorbidity and body mass index (BMI) of 40.0 to 44.9 in adult (HCC)     Patient's (Body mass index is 43.4 kg/m².) indicates that they are obese (BMI >30) with health conditions that include  hypertension, diabetes mellitus and dyslipidemias . Weight is worsening. BMI  is above average; BMI management plan is completed. We discussed portion control and increasing exercise.          Solitary thyroid nodule     She missed her appt with Dr Menard.   Ordered u/s for her thyroid         Relevant Orders    US Thyroid       Gastrointestinal Abdominal     GERD (gastroesophageal reflux disease)     Unchanged  Refilled her medication         Relevant Medications    dexlansoprazole (Dexilant) 60 MG capsule       Mental Health    Major depressive disorder, recurrent, moderate (HCC) - Primary     Patient's depression is recurrent and is moderate without psychosis. Their depression is currently active and the condition is unchanged. This will be reassessed at the next regular appointment. F/U as described:patient will continue current medication therapy.  Celexa is better            Tobacco    Tobacco dependence syndrome   Other Visit Diagnoses     Encounter for screening mammogram for malignant neoplasm of breast        Hypokalemia          Diagnoses       Codes Comments    Major depressive disorder, recurrent, moderate (HCC)    -  Primary ICD-10-CM: F33.1  ICD-9-CM: 296.32     Tobacco dependence syndrome     ICD-10-CM: F17.200  ICD-9-CM: 305.1     Class 3 drug-induced obesity with serious comorbidity and body mass index (BMI) of 40.0 to 44.9 in adult (HCC)     ICD-10-CM: E66.1, Z68.41  ICD-9-CM: 278.01, V85.41     Solitary thyroid nodule     ICD-10-CM: E04.1  ICD-9-CM: 241.0     Encounter for screening mammogram for malignant neoplasm of breast     ICD-10-CM: Z12.31  ICD-9-CM: V76.12     Gastroesophageal reflux disease, unspecified whether esophagitis present     ICD-10-CM: K21.9  ICD-9-CM: 530.81     Hypokalemia     ICD-10-CM: E87.6  ICD-9-CM: 276.8            Class 3 Severe Obesity (BMI >=40). Obesity-related health conditions include the following: hypertension. Obesity is worsening. BMI is is above average; BMI  management plan is completed. We discussed low calorie, low carb based diet program, portion control and increasing exercise.      An After Visit Summary and PPPS were given to the patient.       I wore protective equipment throughout this patient encounter to include mask and eyewear. Hand hygiene was performed before donning protective equipment and after removal when leaving the room.

## 2023-02-13 ENCOUNTER — OFFICE VISIT (OUTPATIENT)
Dept: FAMILY MEDICINE CLINIC | Facility: CLINIC | Age: 53
End: 2023-02-13
Payer: MEDICARE

## 2023-02-13 VITALS
TEMPERATURE: 97.5 F | SYSTOLIC BLOOD PRESSURE: 124 MMHG | HEART RATE: 90 BPM | HEIGHT: 63 IN | WEIGHT: 245 LBS | DIASTOLIC BLOOD PRESSURE: 80 MMHG | BODY MASS INDEX: 43.41 KG/M2 | OXYGEN SATURATION: 96 % | RESPIRATION RATE: 18 BRPM

## 2023-02-13 DIAGNOSIS — F17.200 TOBACCO DEPENDENCE SYNDROME: ICD-10-CM

## 2023-02-13 DIAGNOSIS — Z12.31 ENCOUNTER FOR SCREENING MAMMOGRAM FOR MALIGNANT NEOPLASM OF BREAST: ICD-10-CM

## 2023-02-13 DIAGNOSIS — E87.6 HYPOKALEMIA: ICD-10-CM

## 2023-02-13 DIAGNOSIS — F33.1 MAJOR DEPRESSIVE DISORDER, RECURRENT, MODERATE: Primary | ICD-10-CM

## 2023-02-13 DIAGNOSIS — E66.1 CLASS 3 DRUG-INDUCED OBESITY WITH SERIOUS COMORBIDITY AND BODY MASS INDEX (BMI) OF 40.0 TO 44.9 IN ADULT: ICD-10-CM

## 2023-02-13 DIAGNOSIS — K21.9 GASTROESOPHAGEAL REFLUX DISEASE, UNSPECIFIED WHETHER ESOPHAGITIS PRESENT: ICD-10-CM

## 2023-02-13 DIAGNOSIS — E04.1 SOLITARY THYROID NODULE: ICD-10-CM

## 2023-02-13 PROBLEM — B35.3 TINEA PEDIS OF RIGHT FOOT: Status: RESOLVED | Noted: 2021-07-26 | Resolved: 2023-02-13

## 2023-02-13 PROBLEM — L30.4 INTERTRIGO: Status: RESOLVED | Noted: 2021-07-26 | Resolved: 2023-02-13

## 2023-02-13 PROBLEM — R60.9 EDEMA: Status: RESOLVED | Noted: 2021-05-21 | Resolved: 2023-02-13

## 2023-02-13 PROBLEM — J18.9 PNEUMONIA DUE TO INFECTIOUS ORGANISM: Status: RESOLVED | Noted: 2023-01-06 | Resolved: 2023-02-13

## 2023-02-13 PROBLEM — L73.9 FOLLICULITIS: Status: RESOLVED | Noted: 2021-04-06 | Resolved: 2023-02-13

## 2023-02-13 PROBLEM — Z76.89 ENCOUNTER TO ESTABLISH CARE: Status: RESOLVED | Noted: 2021-05-21 | Resolved: 2023-02-13

## 2023-02-13 PROCEDURE — 99214 OFFICE O/P EST MOD 30 MIN: CPT | Performed by: FAMILY MEDICINE

## 2023-02-13 PROCEDURE — 3044F HG A1C LEVEL LT 7.0%: CPT | Performed by: FAMILY MEDICINE

## 2023-02-13 RX ORDER — DEXLANSOPRAZOLE 60 MG/1
1 CAPSULE, DELAYED RELEASE ORAL DAILY
Qty: 90 CAPSULE | Refills: 3 | Status: SHIPPED | OUTPATIENT
Start: 2023-02-13

## 2023-02-13 NOTE — ASSESSMENT & PLAN NOTE
Patient's (Body mass index is 43.4 kg/m².) indicates that they are obese (BMI >30) with health conditions that include hypertension, diabetes mellitus and dyslipidemias . Weight is worsening. BMI  is above average; BMI management plan is completed. We discussed portion control and increasing exercise.

## 2023-02-22 NOTE — ASSESSMENT & PLAN NOTE
Patient's depression is recurrent and is moderate without psychosis. Their depression is currently active and the condition is unchanged. This will be reassessed at the next regular appointment. F/U as described:patient will continue current medication therapy.  Celexa is better

## 2023-02-24 DIAGNOSIS — J41.0 SIMPLE CHRONIC BRONCHITIS: ICD-10-CM

## 2023-02-24 RX ORDER — MONTELUKAST SODIUM 10 MG/1
10 TABLET ORAL NIGHTLY
Qty: 90 TABLET | Refills: 3 | Status: SHIPPED | OUTPATIENT
Start: 2023-02-24

## 2023-03-01 ENCOUNTER — HOSPITAL ENCOUNTER (OUTPATIENT)
Dept: MAMMOGRAPHY | Facility: HOSPITAL | Age: 53
Discharge: HOME | End: 2023-03-01
Payer: MEDICARE

## 2023-03-01 ENCOUNTER — HOSPITAL ENCOUNTER (OUTPATIENT)
Dept: ULTRASOUND IMAGING | Facility: HOSPITAL | Age: 53
Discharge: HOME | End: 2023-03-01
Payer: MEDICARE

## 2023-03-01 PROCEDURE — 77063 BREAST TOMOSYNTHESIS BI: CPT

## 2023-03-01 PROCEDURE — 77067 SCR MAMMO BI INCL CAD: CPT

## 2023-03-01 PROCEDURE — 76536 US EXAM OF HEAD AND NECK: CPT

## 2023-03-08 ENCOUNTER — TELEPHONE (OUTPATIENT)
Dept: FAMILY MEDICINE CLINIC | Facility: CLINIC | Age: 53
End: 2023-03-08
Payer: MEDICARE

## 2023-03-27 RX ORDER — LEVOTHYROXINE SODIUM 0.05 MG/1
TABLET ORAL
Qty: 90 TABLET | Refills: 4 | Status: SHIPPED | OUTPATIENT
Start: 2023-03-27

## 2023-04-03 ENCOUNTER — TELEPHONE (OUTPATIENT)
Dept: PAIN MEDICINE | Facility: CLINIC | Age: 53
End: 2023-04-03
Payer: MEDICARE

## 2023-04-03 DIAGNOSIS — M54.41 CHRONIC MIDLINE LOW BACK PAIN WITH RIGHT-SIDED SCIATICA: ICD-10-CM

## 2023-04-03 DIAGNOSIS — G89.29 CHRONIC MIDLINE LOW BACK PAIN WITH RIGHT-SIDED SCIATICA: ICD-10-CM

## 2023-04-03 RX ORDER — HYDROCODONE BITARTRATE AND ACETAMINOPHEN 10; 325 MG/1; MG/1
1 TABLET ORAL EVERY 6 HOURS PRN
Qty: 120 TABLET | Refills: 0 | Status: SHIPPED | OUTPATIENT
Start: 2023-04-03

## 2023-04-03 NOTE — TELEPHONE ENCOUNTER
Caller: Riddhi Cid    Relationship: Self    Best call back number:     Requested Prescriptions:   HYDROcodone-acetaminophen (Norco)  MG per tablet        Pharmacy where request should be sent:[facility]      Last office visit with prescribing clinician: 2/6/2023   Last telemedicine visit with prescribing clinician: 5/8/2023   Next office visit with prescribing clinician: 5/8/2023     Additional details provided by patient:HAS A FEW DAYS LEFT THINKS REFILL DUE AROUND 4/9/23    Does the patient have less than a 3 day supply:  [] Yes  [x] No    Would you like a call back once the refill request has been completed: [x] Yes [] No    If the office needs to give you a call back, can they leave a voicemail: [x] Yes [] No    Kerry Guy Rep   04/03/23 15:41 EDT         DELETE AFTER READING TO PATIENT: “Thank you for sharing this information with me. I will send a message to the clinical team. Please allow 48 hours for the clinical staff to follow up on this request.”

## 2023-04-03 NOTE — TELEPHONE ENCOUNTER
Inspect in chart    Interval History:   NAEON  For UGI today     Medications:  Continuous Infusions:   dextrose 5 % and 0.45 % NaCl with KCl 20 mEq 100 mL/hr at 04/03/23 0708     Scheduled Meds:   enoxaparin  40 mg Subcutaneous Daily    fluconazole (DIFLUCAN) IV (PEDS and ADULTS)  100 mg Intravenous Q24H    levothyroxine  150 mcg Oral Before breakfast    mupirocin   Nasal BID    nicotine  1 patch Transdermal Daily    pantoprazole  40 mg Intravenous BID    piperacillin-tazobactam (ZOSYN) IVPB  4.5 g Intravenous Q8H    potassium chloride  10 mEq Intravenous Q1H     PRN Meds:acetaminophen, albuterol sulfate, hydrALAZINE, HYDROmorphone, HYDROmorphone, LIDOcaine (PF) 10 mg/ml (1%), sodium chloride 0.9%     Review of patient's allergies indicates:   Allergen Reactions    Dye Rash     Objective:     Vital Signs (Most Recent):  Temp: 99.5 °F (37.5 °C) (04/03/23 0900)  Pulse: 75 (04/03/23 0900)  Resp: 14 (04/03/23 0900)  BP: (!) 187/93 (04/03/23 0900)  SpO2: (!) 91 % (04/03/23 0900) Vital Signs (24h Range):  Temp:  [98.2 °F (36.8 °C)-100.2 °F (37.9 °C)] 99.5 °F (37.5 °C)  Pulse:  [63-75] 75  Resp:  [14-20] 14  SpO2:  [91 %-97 %] 91 %  BP: (137-187)/(73-93) 187/93     Weight: 121.7 kg (268 lb 4.8 oz)  Body mass index is 42.02 kg/m².    Intake/Output - Last 3 Shifts         04/01 0700 04/02 0659 04/02 0700 04/03 0659 04/03 0700 04/04 0659    P.O. 0 0     I.V. (mL/kg)       IV Piggyback       Total Intake(mL/kg) 0 (0) 0 (0)     Net 0 0            Urine Occurrence 6 x 3 x     Stool Occurrence 0 x 3 x             Physical Exam  Vitals and nursing note reviewed.   Constitutional:       Appearance: Normal appearance.   HENT:      Head: Normocephalic and atraumatic.   Cardiovascular:      Rate and Rhythm: Normal rate and regular rhythm.   Pulmonary:      Effort: Pulmonary effort is normal. No respiratory distress.   Abdominal:      General: Abdomen is flat. There is no distension.      Palpations: Abdomen is soft. There is no mass.      Hernia: No  hernia is present.   Musculoskeletal:      Right lower leg: No edema.      Left lower leg: No edema.   Skin:     General: Skin is warm and dry.   Neurological:      General: No focal deficit present.      Mental Status: She is alert and oriented to person, place, and time.   Psychiatric:         Mood and Affect: Mood normal.         Behavior: Behavior normal.       Significant Labs:  I have reviewed all pertinent lab results within the past 24 hours.  CBC:   Recent Labs   Lab 04/03/23  0634   WBC 9.80   RBC 4.04   HGB 11.6*   HCT 34.8*      MCV 86   MCH 28.7   MCHC 33.3       CMP:   Recent Labs   Lab 03/31/23  0323 04/01/23  0831 04/03/23  0633   *   < > 110   CALCIUM 8.3*   < > 8.5*   ALBUMIN 2.4*  --   --    PROT 6.4  --   --       < > 143   K 3.3*  3.3*   < > 3.3*   CO2 23   < > 26      < > 108   BUN 7   < > 3*   CREATININE 0.7   < > 0.7   ALKPHOS 71  --   --    ALT 33  --   --    AST 22  --   --    BILITOT 0.7  --   --     < > = values in this interval not displayed.         Significant Diagnostics:  I have reviewed all pertinent imaging results/findings within the past 24 hours.

## 2023-04-10 ENCOUNTER — TELEPHONE (OUTPATIENT)
Dept: PAIN MEDICINE | Facility: CLINIC | Age: 53
End: 2023-04-10
Payer: MEDICARE

## 2023-04-10 NOTE — TELEPHONE ENCOUNTER
Caller: TRINO KAMINSKI    Relationship: SELF    Best call back number: [phone]    Requested Prescriptions:   HYDROCODONE COUNT 120    Pharmacy where request should be sent:  [facility] [address]    Last office visit with prescribing clinician: 2/6/2023   Last telemedicine visit with prescribing clinician: 5/8/2023   Next office visit with prescribing clinician: 5/8/2023     Additional details provided by patient: NA    Does the patient have less than a 3 day supply:  [x] Yes  [] No    Would you like a call back once the refill request has been completed: [x] Yes [] No    If the office needs to give you a call back, can they leave a voicemail: [x] Yes [] No    Kerry Nieves Rep   04/10/23 09:27 EDT

## 2023-04-17 ENCOUNTER — CLINICAL SUPPORT (OUTPATIENT)
Dept: FAMILY MEDICINE CLINIC | Facility: CLINIC | Age: 53
End: 2023-04-17
Payer: MEDICARE

## 2023-04-17 DIAGNOSIS — E78.2 MIXED HYPERLIPIDEMIA: Primary | ICD-10-CM

## 2023-04-17 PROCEDURE — 36415 COLL VENOUS BLD VENIPUNCTURE: CPT | Performed by: FAMILY MEDICINE

## 2023-04-17 NOTE — PROGRESS NOTES
Patient here for blood work today. She is fasting. Alexandro blood in left AC. Patient tolerated well.

## 2023-04-18 LAB
ALBUMIN SERPL-MCNC: 4.5 G/DL (ref 3.8–4.9)
ALBUMIN/GLOB SERPL: 1.8 {RATIO} (ref 1.2–2.2)
ALP SERPL-CCNC: 107 IU/L (ref 44–121)
ALT SERPL-CCNC: 44 IU/L (ref 0–32)
AST SERPL-CCNC: 39 IU/L (ref 0–40)
BILIRUB SERPL-MCNC: 0.4 MG/DL (ref 0–1.2)
BUN SERPL-MCNC: 14 MG/DL (ref 6–24)
BUN/CREAT SERPL: 14 (ref 9–23)
CALCIUM SERPL-MCNC: 9.8 MG/DL (ref 8.7–10.2)
CHLORIDE SERPL-SCNC: 109 MMOL/L (ref 96–106)
CHOLEST SERPL-MCNC: 168 MG/DL (ref 100–199)
CHOLEST/HDLC SERPL: 4.5 RATIO (ref 0–4.4)
CO2 SERPL-SCNC: 23 MMOL/L (ref 20–29)
CREAT SERPL-MCNC: 1.02 MG/DL (ref 0.57–1)
EGFRCR SERPLBLD CKD-EPI 2021: 66 ML/MIN/1.73
GLOBULIN SER CALC-MCNC: 2.5 G/DL (ref 1.5–4.5)
GLUCOSE SERPL-MCNC: 130 MG/DL (ref 70–99)
HDLC SERPL-MCNC: 37 MG/DL
LDLC SERPL CALC-MCNC: 100 MG/DL (ref 0–99)
POTASSIUM SERPL-SCNC: 4.7 MMOL/L (ref 3.5–5.2)
PROT SERPL-MCNC: 7 G/DL (ref 6–8.5)
SODIUM SERPL-SCNC: 147 MMOL/L (ref 134–144)
TRIGL SERPL-MCNC: 177 MG/DL (ref 0–149)
VLDLC SERPL CALC-MCNC: 31 MG/DL (ref 5–40)

## 2023-04-24 NOTE — PROGRESS NOTES
Subjective   Riddhi Cid is a 52 y.o. female. Presents to [facility]    Chief Complaint   Patient presents with   • Diabetes   • Hyperlipidemia   • Hypothyroidism       Diabetes  She presents for her follow-up diabetic visit. She has type 2 diabetes mellitus. Pertinent negatives for hypoglycemia include no dizziness or headaches. Pertinent negatives for diabetes include no chest pain and no fatigue. There are no hypoglycemic complications. There are no diabetic complications. Risk factors for coronary artery disease include diabetes mellitus, dyslipidemia, hypertension, obesity and stress. Current diabetic treatment includes oral agent (monotherapy). She is compliant with treatment most of the time. She is following a generally healthy diet. Meal planning includes avoidance of concentrated sweets. She participates in exercise intermittently. Her overall blood glucose range is  mg/dl. An ACE inhibitor/angiotensin II receptor blocker is not being taken. She sees a podiatrist.Eye exam is not current.   Hyperlipidemia  This is a chronic problem. The current episode started more than 1 year ago. The problem is controlled. Exacerbating diseases include hypothyroidism and obesity. There are no known factors aggravating her hyperlipidemia. Pertinent negatives include no chest pain. Current antihyperlipidemic treatment includes statins. The current treatment provides moderate improvement of lipids. There are no compliance problems.  Risk factors for coronary artery disease include dyslipidemia, diabetes mellitus, hypertension, obesity, stress and post-menopausal.   Hypothyroidism  This is a chronic problem. The current episode started more than 1 year ago. Pertinent negatives include no chest pain, fatigue or headaches. Treatments tried: levothyroxine 50 MCG  The treatment provided moderate relief.        I personally reviewed and updated the patient's allergies, medications, problem list, and past  medical, surgical, social, and family history. I have reviewed and confirmed the accuracy of the History of Present Illness and Review of Symptoms as documented by the MA/LPN/RN. June Harrison MD    Allergies:  Allergies   Allergen Reactions   • Sulfa Antibiotics Rash   • Meloxicam GI Intolerance       Social History:  Social History     Socioeconomic History   • Marital status:    Tobacco Use   • Smoking status: Every Day     Packs/day: 0.50     Years: 53.00     Pack years: 26.50     Types: Cigarettes   • Smokeless tobacco: Never   Vaping Use   • Vaping Use: Never used   Substance and Sexual Activity   • Alcohol use: No   • Drug use: No   • Sexual activity: Defer       Family History:  Family History   Problem Relation Age of Onset   • Stroke Mother    • Hypertension Mother    • Hyperlipidemia Mother    • Hypothyroidism Mother    • Heart attack Father    • Heart disease Father    • Prostate cancer Father    • Other Father    • Diabetes Father    • Hypertension Father    • Hyperlipidemia Father    • Coronary artery disease Father    • Breast cancer Paternal Grandmother    • Hypothyroidism Grandchild    • Ovarian cancer Neg Hx        Past Medical History :  Patient Active Problem List   Diagnosis   • Class 3 drug-induced obesity with serious comorbidity and body mass index (BMI) of 40.0 to 44.9 in adult   • Degeneration of intervertebral disc of cervical region   • Degeneration of intervertebral disc of lumbar region   • Acquired hypothyroidism   • Mixed hyperlipidemia   • Major depressive disorder, recurrent, moderate   • Obstructive sleep apnea   • Vitamin B12 deficiency   • Vitamin D deficiency   • COPD (chronic obstructive pulmonary disease)   • Solitary thyroid nodule   • Chronic midline low back pain with right-sided sciatica   • Cervicalgia   • Cervical stenosis of spine   • Arthritis   • Moderate persistent asthma without complication   • Gastroparalysis   • History of chicken pox   • IBS  (irritable bowel syndrome)   • GERD (gastroesophageal reflux disease)   • Dependent edema   • Optic disc hemorrhage   • Controlled diabetes mellitus type 2 with complications (HCC)   • Tension headache   • Allergic rhinitis   • Disorder of peripheral nervous system   • Fibromyositis   • Tobacco dependence syndrome   • Pap smear, low-risk   • Disease due to severe acute respiratory syndrome coronavirus 2 (SARS-CoV-2)   • Lung cancer screening declined by patient       Medication List:    Current Outpatient Medications:   •  Accu-Chek FastClix Lancets misc, 1 each by Other route Daily., Disp: 100 each, Rfl: 3  •  Advair Diskus 250-50 MCG/ACT DISKUS, INHALE 1 PUFF BY MOUTH TWICE DAILY, Disp: 60 each, Rfl: 12  •  albuterol (PROVENTIL) (2.5 MG/3ML) 0.083% nebulizer solution, Take 2.5 mg by nebulization Every 4 (Four) Hours As Needed for Wheezing., Disp: 180 mL, Rfl: 3  •  albuterol sulfate  (90 Base) MCG/ACT inhaler, Inhale 2 puffs Every 4 (Four) Hours As Needed for Wheezing., Disp: 18 g, Rfl: 3  •  amoxicillin-clavulanate (AUGMENTIN) 875-125 MG per tablet, Take 1 tablet by mouth Every 12 (Twelve) Hours., Disp: , Rfl:   •  aspirin 81 MG chewable tablet, Chew 1 tablet Daily., Disp: , Rfl:   •  azelastine (OPTIVAR) 0.05 % ophthalmic solution, , Disp: , Rfl:   •  Blood Glucose Monitoring Suppl (Accu-Chek Guide) w/Device kit, 1 each Daily., Disp: 1 kit, Rfl: 0  •  bumetanide (BUMEX) 1 MG tablet, TAKE 1 TO 2 TABLETS BY MOUTH EVERY DAY AS NEEDED, Disp: 180 tablet, Rfl: 3  •  citalopram (CeleXA) 40 MG tablet, Take 1 tablet by mouth Daily., Disp: 90 tablet, Rfl: 3  •  Cyanocobalamin (B-12) 1000 MCG tablet controlled-release, TAKE 1 TABLET BY MOUTH EVERY DAY, Disp: 90 tablet, Rfl: 3  •  dexlansoprazole (Dexilant) 60 MG capsule, Take 1 capsule by mouth Daily., Disp: 90 capsule, Rfl: 3  •  dicyclomine (BENTYL) 20 MG tablet, Take 1 tablet by mouth Every 6 (Six) Hours As Needed. for pain, Disp: , Rfl:   •  fenofibrate (TRICOR)  145 MG tablet, Daily. Dx: E78.2, mixed hyperlipidema, Disp: , Rfl:   •  fluticasone (FLONASE) 50 MCG/ACT nasal spray, SHAKE LIQUID AND USE 2 SPRAYS IN EACH NOSTRIL EVERY DAY, Disp: 48 g, Rfl: 3  •  folic acid (FOLVITE) 1 MG tablet, TAKE 1 TABLET BY MOUTH DAILY, Disp: 90 tablet, Rfl: 3  •  gabapentin (NEURONTIN) 300 MG capsule, Take 1 capsule by mouth 3 (Three) Times a Day., Disp: 90 capsule, Rfl: 5  •  glucose blood (Accu-Chek Guide) test strip, 1 each by Other route Daily. Use as instructed, Disp: 100 each, Rfl: 3  •  Lancets Misc. (ACCU-CHEK FASTCLIX LANCET) kit, Dx: E11.9, DM type 2, controlled, Disp: , Rfl:   •  levothyroxine (SYNTHROID, LEVOTHROID) 50 MCG tablet, TAKE 1 TABLET BY MOUTH DAILY, Disp: 90 tablet, Rfl: 4  •  Linzess 290 MCG capsule capsule, TAKE 1 CAPSULE BY MOUTH EVERY DAY 30 MINUTES BEFORE A MEAL, Disp: , Rfl:   •  metFORMIN ER (GLUCOPHAGE-XR) 500 MG 24 hr tablet, TAKE 1 TABLET BY MOUTH DAILY, Disp: 90 tablet, Rfl: 2  •  montelukast (SINGULAIR) 10 MG tablet, TAKE 1 TABLET BY MOUTH EVERY NIGHT, Disp: 90 tablet, Rfl: 3  •  Motegrity 2 MG tablet, Take 1 tablet by mouth Daily., Disp: , Rfl:   •  Omega-3 Fatty Acids (FISH OIL) 500 MG capsule capsule, 2 capsules Daily. Dx: E78.2, mixed hyperlipidema, Disp: , Rfl:   •  potassium chloride ER (K-TAB) 20 MEQ tablet controlled-release ER tablet, TAKE 1 TABLET BY MOUTH EVERY DAY, ONLY IF TAKING 2 BUMETANIDE, Disp: 60 tablet, Rfl: 12  •  vitamin D3 125 MCG (5000 UT) capsule capsule, TAKE 1 CAPSULE BY MOUTH DAILY, Disp: 90 capsule, Rfl: 3  •  Diclofenac Epolamine (Licart) 1.3 % patch 24 hour, Apply 1 patch topically Daily As Needed (back pain)., Disp: 30 patch, Rfl: 2  •  HYDROcodone-acetaminophen (Norco)  MG per tablet, Take 1 tablet by mouth Every 6 (Six) Hours As Needed for Moderate Pain., Disp: 120 tablet, Rfl: 0  •  HYDROcodone-acetaminophen (Norco)  MG per tablet, Take 1 tablet by mouth Every 6 (Six) Hours As Needed for Moderate Pain., Disp:  120 tablet, Rfl: 0  •  rosuvastatin (CRESTOR) 20 MG tablet, TAKE 1 TABLET BY MOUTH DAILY, Disp: 90 tablet, Rfl: 3    Past Surgical History:  Past Surgical History:   Procedure Laterality Date   • BREAST BIOPSY Right    • CARPAL TUNNEL RELEASE     • CERVICAL DISCECTOMY ANTERIOR     • CHOLECYSTECTOMY     • ESSURE TUBAL LIGATION           Physical Exam:      Vital Signs:    Vitals:    05/02/23 1002   BP: 118/84   Pulse: 94   Resp: 18   Temp: 97.1 °F (36.2 °C)   SpO2: 96%        Wt Readings from Last 3 Encounters:   05/02/23 110 kg (241 lb 12.8 oz)   02/13/23 111 kg (245 lb)   01/16/23 110 kg (242 lb 3.2 oz)       Result Review :   The following data was reviewed by: June Harrison MD on 05/02/2023:  Most Recent A1C        5/2/2023    10:15   HGBA1C Most Recent   Hemoglobin A1C 6.8              Brief Urine Lab Results  (Last result in the past 365 days)      Color   Clarity   Blood   Leuk Est   Nitrite   Protein   CREAT   Urine HCG        05/02/23 1016 Yellow   Clear   Negative   Negative   Negative   Negative               Lab Results   Component Value Date    GLUCOSE 130 (H) 04/17/2023    BUN 14 04/17/2023    CREATININE 1.02 (H) 04/17/2023    EGFRRESULT 66 04/17/2023    BCR 14 04/17/2023    K 4.7 04/17/2023    CO2 23 04/17/2023    CALCIUM 9.8 04/17/2023    PROTENTOTREF 7.0 04/17/2023    ALBUMIN 4.5 04/17/2023    BILITOT 0.4 04/17/2023    AST 39 04/17/2023    ALT 44 (H) 04/17/2023     Lab Results   Component Value Date    CHOL 148 07/08/2020    CHOL 159 08/13/2018    CHOL 173 01/30/2018     Lab Results   Component Value Date    TRIG 177 (H) 04/17/2023    TRIG 158 (H) 01/16/2023    TRIG 212 (H) 08/19/2022     Lab Results   Component Value Date    HDL 37 (L) 04/17/2023    HDL 37 (L) 01/16/2023    HDL 39 (L) 08/19/2022     Lab Results   Component Value Date     (H) 04/17/2023     (H) 01/16/2023    LDL 99 08/19/2022     Lab Results   Component Value Date    VLDL 31 04/17/2023    VLDL 28 01/16/2023    VLDL  36 08/19/2022     Lab Results   Component Value Date    LDLHDL 2.50 07/08/2020     Physical Exam  Vitals reviewed.   Constitutional:       Appearance: Normal appearance. She is well-developed.   HENT:      Head: Normocephalic and atraumatic.   Eyes:      General:         Right eye: No discharge.         Left eye: No discharge.   Cardiovascular:      Rate and Rhythm: Normal rate and regular rhythm.      Heart sounds: Normal heart sounds. No murmur heard.    No friction rub. No gallop.   Pulmonary:      Effort: Pulmonary effort is normal. No respiratory distress.      Breath sounds: Normal breath sounds. No wheezing or rales.   Skin:     General: Skin is warm and dry.      Findings: No rash.   Neurological:      Mental Status: She is alert and oriented to person, place, and time.      Coordination: Coordination normal.      Gait: Gait normal.   Psychiatric:         Behavior: Behavior is cooperative.         Assessment and Plan:  Problems Addressed this Visit        Cardiac and Vasculature    Mixed hyperlipidemia     Lipid abnormalities are worsening.  Nutritional counseling was provided.  Lipids will be reassessed in 3 months.            Endocrine and Metabolic    Class 3 drug-induced obesity with serious comorbidity and body mass index (BMI) of 40.0 to 44.9 in adult - Primary     Patient's (Body mass index is 42.83 kg/m².) indicates that they are obese (BMI >30) with health conditions that include diabetes mellitus and dyslipidemias . Weight is worsening. BMI  is above average; BMI management plan is completed. We discussed portion control and increasing exercise.          Acquired hypothyroidism    Vitamin B12 deficiency    Relevant Orders    Vitamin B12    Vitamin D deficiency    Relevant Orders    Vitamin D,25-Hydroxy    Controlled diabetes mellitus type 2 with complications (HCC)     Diabetes is unchanged.   Continue current treatment regimen.  Diabetes will be reassessed in 3 months.         Relevant Orders     POC Glycosylated Hemoglobin (Hb A1C) (Completed)    POC Glucose (Completed)    POC Urinalysis Dipstick, Automated (Completed)       Tobacco    Tobacco dependence syndrome   Diagnoses       Codes Comments    Class 3 drug-induced obesity with serious comorbidity and body mass index (BMI) of 40.0 to 44.9 in adult    -  Primary ICD-10-CM: E66.1, Z68.41  ICD-9-CM: 278.01, V85.41     Acquired hypothyroidism     ICD-10-CM: E03.9  ICD-9-CM: 244.9     Mixed hyperlipidemia     ICD-10-CM: E78.2  ICD-9-CM: 272.2     Vitamin B12 deficiency     ICD-10-CM: E53.8  ICD-9-CM: 266.2     Vitamin D deficiency     ICD-10-CM: E55.9  ICD-9-CM: 268.9     Controlled diabetes mellitus type 2 with complications, unspecified whether long term insulin use     ICD-10-CM: E11.8  ICD-9-CM: 250.90     Tobacco dependence syndrome     ICD-10-CM: F17.200  ICD-9-CM: 305.1            Class 3 Severe Obesity (BMI >=40). Obesity-related health conditions include the following: hypertension and diabetes mellitus. Obesity is improving with lifestyle modifications. BMI is is above average; BMI management plan is completed. We discussed low calorie, low carb based diet program, portion control and increasing exercise.      An After Visit Summary and PPPS were given to the patient.

## 2023-04-29 DIAGNOSIS — E78.2 MIXED HYPERLIPIDEMIA: ICD-10-CM

## 2023-04-30 RX ORDER — ATORVASTATIN CALCIUM 80 MG/1
80 TABLET, FILM COATED ORAL DAILY
Qty: 30 TABLET | Refills: 0 | Status: SHIPPED | OUTPATIENT
Start: 2023-04-30

## 2023-05-02 ENCOUNTER — OFFICE VISIT (OUTPATIENT)
Dept: FAMILY MEDICINE CLINIC | Facility: CLINIC | Age: 53
End: 2023-05-02
Payer: MEDICARE

## 2023-05-02 VITALS
TEMPERATURE: 97.1 F | OXYGEN SATURATION: 96 % | RESPIRATION RATE: 18 BRPM | HEART RATE: 94 BPM | SYSTOLIC BLOOD PRESSURE: 118 MMHG | WEIGHT: 241.8 LBS | BODY MASS INDEX: 42.84 KG/M2 | DIASTOLIC BLOOD PRESSURE: 84 MMHG | HEIGHT: 63 IN

## 2023-05-02 DIAGNOSIS — E78.2 MIXED HYPERLIPIDEMIA: ICD-10-CM

## 2023-05-02 DIAGNOSIS — E11.8 CONTROLLED DIABETES MELLITUS TYPE 2 WITH COMPLICATIONS, UNSPECIFIED WHETHER LONG TERM INSULIN USE: ICD-10-CM

## 2023-05-02 DIAGNOSIS — E55.9 VITAMIN D DEFICIENCY: ICD-10-CM

## 2023-05-02 DIAGNOSIS — F17.200 TOBACCO DEPENDENCE SYNDROME: ICD-10-CM

## 2023-05-02 DIAGNOSIS — E03.9 ACQUIRED HYPOTHYROIDISM: ICD-10-CM

## 2023-05-02 DIAGNOSIS — E53.8 VITAMIN B12 DEFICIENCY: ICD-10-CM

## 2023-05-02 DIAGNOSIS — E66.1 CLASS 3 DRUG-INDUCED OBESITY WITH SERIOUS COMORBIDITY AND BODY MASS INDEX (BMI) OF 40.0 TO 44.9 IN ADULT: Primary | ICD-10-CM

## 2023-05-02 LAB
BILIRUB BLD-MCNC: NEGATIVE MG/DL
CLARITY, POC: CLEAR
COLOR UR: YELLOW
EXPIRATION DATE: NORMAL
EXPIRATION DATE: NORMAL
GLUCOSE BLDC GLUCOMTR-MCNC: 198 MG/DL (ref 70–130)
GLUCOSE UR STRIP-MCNC: NEGATIVE MG/DL
HBA1C MFR BLD: 6.8 %
KETONES UR QL: NEGATIVE
LEUKOCYTE EST, POC: NEGATIVE
Lab: NORMAL
Lab: NORMAL
NITRITE UR-MCNC: NEGATIVE MG/ML
PH UR: 5 [PH] (ref 5–8)
PROT UR STRIP-MCNC: NEGATIVE MG/DL
RBC # UR STRIP: NEGATIVE /UL
SP GR UR: 1.01 (ref 1–1.03)
UROBILINOGEN UR QL: NORMAL

## 2023-05-02 RX ORDER — ROSUVASTATIN CALCIUM 20 MG/1
20 TABLET, COATED ORAL DAILY
Qty: 30 TABLET | Refills: 12 | Status: SHIPPED | OUTPATIENT
Start: 2023-05-02 | End: 2023-05-02

## 2023-05-02 RX ORDER — AMOXICILLIN AND CLAVULANATE POTASSIUM 875; 125 MG/1; MG/1
1 TABLET, FILM COATED ORAL EVERY 12 HOURS SCHEDULED
COMMUNITY
Start: 2023-04-28

## 2023-05-02 RX ORDER — ROSUVASTATIN CALCIUM 20 MG/1
20 TABLET, COATED ORAL DAILY
Qty: 90 TABLET | Refills: 3 | Status: SHIPPED | OUTPATIENT
Start: 2023-05-02

## 2023-05-02 NOTE — ASSESSMENT & PLAN NOTE
Patient's (Body mass index is 42.83 kg/m².) indicates that they are obese (BMI >30) with health conditions that include diabetes mellitus and dyslipidemias . Weight is worsening. BMI  is above average; BMI management plan is completed. We discussed portion control and increasing exercise.

## 2023-05-08 ENCOUNTER — OFFICE VISIT (OUTPATIENT)
Dept: PAIN MEDICINE | Facility: CLINIC | Age: 53
End: 2023-05-08
Payer: MEDICARE

## 2023-05-08 VITALS
OXYGEN SATURATION: 95 % | RESPIRATION RATE: 16 BRPM | HEART RATE: 86 BPM | DIASTOLIC BLOOD PRESSURE: 84 MMHG | SYSTOLIC BLOOD PRESSURE: 127 MMHG

## 2023-05-08 DIAGNOSIS — M54.2 CERVICALGIA: ICD-10-CM

## 2023-05-08 DIAGNOSIS — M51.36 DEGENERATION OF INTERVERTEBRAL DISC OF LUMBAR REGION: ICD-10-CM

## 2023-05-08 DIAGNOSIS — M48.02 CERVICAL STENOSIS OF SPINE: ICD-10-CM

## 2023-05-08 DIAGNOSIS — M79.7 FIBROMYOSITIS: ICD-10-CM

## 2023-05-08 DIAGNOSIS — M54.41 CHRONIC MIDLINE LOW BACK PAIN WITH RIGHT-SIDED SCIATICA: Primary | ICD-10-CM

## 2023-05-08 DIAGNOSIS — G89.29 CHRONIC MIDLINE LOW BACK PAIN WITH RIGHT-SIDED SCIATICA: Primary | ICD-10-CM

## 2023-05-08 DIAGNOSIS — Z79.899 HIGH RISK MEDICATION USE: ICD-10-CM

## 2023-05-08 DIAGNOSIS — M50.30 DEGENERATION OF INTERVERTEBRAL DISC OF CERVICAL REGION: ICD-10-CM

## 2023-05-08 PROCEDURE — G0463 HOSPITAL OUTPT CLINIC VISIT: HCPCS | Performed by: PHYSICAL MEDICINE & REHABILITATION

## 2023-05-08 RX ORDER — HYDROCODONE BITARTRATE AND ACETAMINOPHEN 10; 325 MG/1; MG/1
1 TABLET ORAL EVERY 6 HOURS PRN
Qty: 120 TABLET | Refills: 0 | Status: SHIPPED | OUTPATIENT
Start: 2023-05-08

## 2023-05-08 RX ORDER — DICLOFENAC EPOLAMINE 0.01 G/1
1 SYSTEM TOPICAL DAILY PRN
Qty: 30 PATCH | Refills: 2 | Status: SHIPPED | OUTPATIENT
Start: 2023-05-08

## 2023-05-08 NOTE — PROGRESS NOTES
Subjective   Riddhi Cid is a 52 y.o. female.     History of Present Illness  Neck pain radiates to b/l shoulders, and left arm pain. ACDF C5/7 (4/11/2016), also h/o fibromyalgia,  also pain in lower back and b/l legs and feet. 10/10 at worst, 6/10 at best, worse with activity, improves with rest and meds, always present, varies, aching, radiates in BLE. Imaging reviewed, satisfactory ACDF. Referred for pain management. Neck pain improved with ACDF, tapered off [place] with worsening pain, still somewhat severe. Taking Cymbalta which helps, tried Lyrica with weight gain, trying to lose weight. Restarted [place] at BID - qdaily prn but worsening pain with exercise to lose weight, increased to BID-TID #75, then TID, then QID prn with adequate relief, reduced to #105, tolerating. Gastric sleeve surgery planned for March-April 2017, had to miss cardiac clearance and EGD due to illness and social issues, has decided against surgery, trying to lose weight with diet and exercise. Worsening b/l CTS symptoms, R>L, known h/o of CTS. Saw Juan Franz for worsening neck pain to LUE, ACDF is intact, started Diclofenac for DJD, Elavil. Could not tolerate even low-dose Gabapentin with drowsiness. Started Celebrex but did not feel like doing anything, stopped, stopped Mobic. Stopping numerous meds due to side effects, started Gabapentin with Dr. Pisano with benefit. Pain worsening, DDD on MRI, started PT which is helping. Back pain worsening, especially at night. Had COVID-19, doing better, plans to get vaccinated.       The following portions of the patient's history were reviewed and updated as appropriate: allergies, current medications, past family history, past medical history, past social history, past surgical history and problem list.    Review of Systems   Constitutional: Negative for chills, fatigue and fever.   HENT: Negative for hearing loss and trouble swallowing.    Eyes: Negative for visual disturbance.   Respiratory:  Negative for shortness of breath.    Cardiovascular: Negative for chest pain.   Gastrointestinal: Positive for constipation and nausea. Negative for abdominal pain, diarrhea and vomiting.   Genitourinary: Negative for urinary incontinence.   Musculoskeletal: Positive for arthralgias, back pain and neck pain. Negative for joint swelling and myalgias.   Neurological: Positive for headache. Negative for dizziness, weakness and numbness.       Objective   Physical Exam   Constitutional: She is oriented to person, place, and time. She appears well-developed and well-nourished.   HENT:   Head: Normocephalic and atraumatic.   Eyes: Pupils are equal, round, and reactive to light.   Cardiovascular: Normal rate, regular rhythm and normal heart sounds.   Pulmonary/Chest: Breath sounds normal.   Abdominal: Soft. Bowel sounds are normal. She exhibits no distension. There is no abdominal tenderness.   Neurological: She is alert and oriented to person, place, and time. She has normal reflexes. She displays normal reflexes. No sensory deficit.   Psychiatric: Her behavior is normal. Thought content normal.         Assessment & Plan   Diagnoses and all orders for this visit:    1. Chronic midline low back pain with right-sided sciatica (Primary)    2. Cervicalgia    3. Cervical stenosis of spine    4. Degeneration of intervertebral disc of cervical region    5. Degeneration of intervertebral disc of lumbar region    6. Fibromyositis        INspect reviewed, in order. Low risk. Repeat UDS 4/11/22 in order.  Reduced to Norco 10/325mg q6-8h #105, too painful, restarted q6h prn #120, then reduced to #105 successfully but pain worsening. Cont at #120.  Afraid to start Lyrica due to possible weight gain. Restarted Gabapentin, takes 300-900mg/day as tolerated, helping a great deal.  Severe GERD, IBS, family h/o CV dz. Stopped Diclofenac, could not tolerate Celebrex 200mg qdaily. Taking Naproxen with PCP but hard on her stomach, no personal  CV issues. Began Mobic 7.5mg qdaily prn.  Begin Tizanidine 4mg qHS prn.  Begin Licart prn, can take NSAIDs per patient.  Cont other meds as prescribed.  Cont TENS, unit provided here, patient reports it helps her pain significantly.  Cont b/l CTS braces at night only.  Patient reports she cannot start PT at this time as her daughter currently works 12h shifts, may be able to begin in future if her daughter can change to an 8h shift schedule.  Will begin neuropathic comp cream if her current cream does not work.  Ordered LSO for truncal stability. Cont PT.  Letter to return to work. Does not drive or operate heavy machinery while using pain medication.  RTC 3 months for f/u. Fills in [place] currently

## 2023-05-10 PROBLEM — R11.0 NAUSEA: Status: RESOLVED | Noted: 2022-04-22 | Resolved: 2023-05-10

## 2023-05-10 PROBLEM — Z53.20 LUNG CANCER SCREENING DECLINED BY PATIENT: Status: ACTIVE | Noted: 2023-05-10

## 2023-06-02 DIAGNOSIS — J41.0 SIMPLE CHRONIC BRONCHITIS: ICD-10-CM

## 2023-06-02 RX ORDER — ALBUTEROL SULFATE 90 UG/1
AEROSOL, METERED RESPIRATORY (INHALATION)
Qty: 18 G | Refills: 3 | Status: SHIPPED | OUTPATIENT
Start: 2023-06-02

## 2023-07-24 RX ORDER — METFORMIN HYDROCHLORIDE 500 MG/1
500 TABLET, EXTENDED RELEASE ORAL DAILY
Qty: 90 TABLET | Refills: 1 | OUTPATIENT
Start: 2023-07-24

## 2023-07-24 NOTE — TELEPHONE ENCOUNTER
Last appt 7/2021. Next visit 11/7/23. Per [employer] policy, when refills come in if there are any red check marks or if it was a paper/verbal request it has to go to the provider to approve. Forwarding Script to provider.

## 2023-08-07 ENCOUNTER — OFFICE VISIT (OUTPATIENT)
Dept: PAIN MEDICINE | Facility: CLINIC | Age: 53
End: 2023-08-07
Payer: MEDICARE

## 2023-08-07 VITALS
OXYGEN SATURATION: 95 % | SYSTOLIC BLOOD PRESSURE: 141 MMHG | HEART RATE: 68 BPM | RESPIRATION RATE: 16 BRPM | DIASTOLIC BLOOD PRESSURE: 84 MMHG

## 2023-08-07 DIAGNOSIS — M54.2 CERVICALGIA: ICD-10-CM

## 2023-08-07 DIAGNOSIS — M79.7 FIBROMYOSITIS: ICD-10-CM

## 2023-08-07 DIAGNOSIS — M54.41 CHRONIC MIDLINE LOW BACK PAIN WITH RIGHT-SIDED SCIATICA: Primary | ICD-10-CM

## 2023-08-07 DIAGNOSIS — M51.36 DEGENERATION OF INTERVERTEBRAL DISC OF LUMBAR REGION: ICD-10-CM

## 2023-08-07 DIAGNOSIS — M50.30 DEGENERATION OF INTERVERTEBRAL DISC OF CERVICAL REGION: ICD-10-CM

## 2023-08-07 DIAGNOSIS — G89.29 CHRONIC MIDLINE LOW BACK PAIN WITH RIGHT-SIDED SCIATICA: Primary | ICD-10-CM

## 2023-08-07 DIAGNOSIS — M48.02 CERVICAL STENOSIS OF SPINE: ICD-10-CM

## 2023-08-07 PROCEDURE — 99213 OFFICE O/P EST LOW 20 MIN: CPT | Performed by: PHYSICAL MEDICINE & REHABILITATION

## 2023-08-07 PROCEDURE — G0463 HOSPITAL OUTPT CLINIC VISIT: HCPCS | Performed by: PHYSICAL MEDICINE & REHABILITATION

## 2023-08-07 PROCEDURE — 1125F AMNT PAIN NOTED PAIN PRSNT: CPT | Performed by: PHYSICAL MEDICINE & REHABILITATION

## 2023-08-07 PROCEDURE — 1160F RVW MEDS BY RX/DR IN RCRD: CPT | Performed by: PHYSICAL MEDICINE & REHABILITATION

## 2023-08-07 PROCEDURE — 1159F MED LIST DOCD IN RCRD: CPT | Performed by: PHYSICAL MEDICINE & REHABILITATION

## 2023-08-07 RX ORDER — ONDANSETRON 4 MG/1
TABLET, ORALLY DISINTEGRATING ORAL
COMMUNITY
Start: 2023-07-17

## 2023-08-07 RX ORDER — HYDROCODONE BITARTRATE AND ACETAMINOPHEN 10; 325 MG/1; MG/1
1 TABLET ORAL EVERY 6 HOURS PRN
Qty: 120 TABLET | Refills: 0 | Status: SHIPPED | OUTPATIENT
Start: 2023-08-07

## 2023-08-07 RX ORDER — DICLOFENAC EPOLAMINE 0.01 G/1
1 SYSTEM TOPICAL DAILY PRN
Qty: 30 PATCH | Refills: 11 | Status: SHIPPED | OUTPATIENT
Start: 2023-08-07

## 2023-08-07 RX ORDER — DOXYCYCLINE 100 MG/1
CAPSULE ORAL
COMMUNITY
Start: 2023-08-06

## 2023-08-07 NOTE — PROGRESS NOTES
Subjective   Riddhi Cid is a 52 y.o. female.     History of Present Illness  Neck pain radiates to b/l shoulders, and left arm pain. ACDF C5/7 (4/11/2016), also h/o fibromyalgia,  also pain in lower back and b/l legs and feet. 10/10 at worst, 6/10 at best, worse with activity, improves with rest and meds, always present, varies, aching, radiates in BLE. Imaging reviewed, satisfactory ACDF. Referred for pain management. Neck pain improved with ACDF, tapered off [place] with worsening pain, still somewhat severe. Taking Cymbalta which helps, tried Lyrica with weight gain, trying to lose weight. Restarted [place] at BID - qdaily prn but worsening pain with exercise to lose weight, increased to BID-TID #75, then TID, then QID prn with adequate relief, reduced to #105, tolerating. Gastric sleeve surgery planned for March-April 2017, had to miss cardiac clearance and EGD due to illness and social issues, has decided against surgery, trying to lose weight with diet and exercise. Worsening b/l CTS symptoms, R>L, known h/o of CTS. Saw Juan Franz for worsening neck pain to LUE, ACDF is intact, started Diclofenac for DJD, Elavil. Could not tolerate even low-dose Gabapentin with drowsiness. Started Celebrex but did not feel like doing anything, stopped, stopped Mobic. Stopping numerous meds due to side effects, started Gabapentin with Dr. Pisano with benefit. Pain worsening, DDD on MRI, started PT which is helping. Back pain worsening, especially at night. Had COVID-19, doing better, plans to get vaccinated.     The following portions of the patient's history were reviewed and updated as appropriate: allergies, current medications, past family history, past medical history, past social history, past surgical history and problem list.    Review of Systems   Constitutional:  Negative for chills, fatigue and fever.   HENT:  Negative for hearing loss and trouble swallowing.    Eyes:  Negative for visual disturbance.   Respiratory:   Negative for shortness of breath.    Cardiovascular:  Negative for chest pain.   Gastrointestinal:  Positive for constipation and nausea. Negative for abdominal pain, diarrhea and vomiting.   Genitourinary:  Negative for urinary incontinence.   Musculoskeletal:  Positive for arthralgias, back pain and neck pain. Negative for joint swelling and myalgias.   Neurological:  Positive for headache. Negative for dizziness, weakness and numbness.     Objective   Physical Exam   Constitutional: She is oriented to person, place, and time. She appears well-developed and well-nourished.   HENT:   Head: Normocephalic and atraumatic.   Eyes: Pupils are equal, round, and reactive to light.   Cardiovascular: Normal rate, regular rhythm and normal heart sounds.   Pulmonary/Chest: Breath sounds normal.   Abdominal: Soft. Bowel sounds are normal. She exhibits no distension. There is no abdominal tenderness.   Neurological: She is alert and oriented to person, place, and time. She has normal reflexes. She displays normal reflexes. No sensory deficit.   Psychiatric: Her behavior is normal. Thought content normal.       Assessment & Plan   Diagnoses and all orders for this visit:    1. Chronic midline low back pain with right-sided sciatica (Primary)    2. Cervicalgia    3. Cervical stenosis of spine    4. Degeneration of intervertebral disc of cervical region    5. Degeneration of intervertebral disc of lumbar region    6. Fibromyositis        INspect reviewed, in order. Low risk. Repeat UDS 5/8/23 in order.  Reduced to Norco 10/325mg q6-8h #105, too painful, restarted q6h prn #120, then reduced to #105 successfully but pain worsening. Cont at #120.  Afraid to start Lyrica due to possible weight gain. Restarted Gabapentin, takes 300-900mg/day as tolerated, helping a great deal.  Severe GERD, IBS, family h/o CV dz. Stopped Diclofenac, could not tolerate Celebrex 200mg qdaily. Taking Naproxen with PCP but hard on her stomach, no personal  CV issues. Began Mobic 7.5mg qdaily prn.  Begin Tizanidine 4mg qHS prn.  Begin Licart prn, can take NSAIDs per patient.  Cont other meds as prescribed.  Cont TENS, unit provided here, patient reports it helps her pain significantly.  Cont b/l CTS braces at night only.  Patient reports she cannot start PT at this time as her daughter currently works 12h shifts, may be able to begin in future if her daughter can change to an 8h shift schedule.  Will begin neuropathic comp cream if her current cream does not work.  Ordered LSO for truncal stability. Cont PT.  Letter to return to work. Does not drive or operate heavy machinery while using pain medication.  RTC 3 months for f/u. Fills in [place] currently

## 2023-08-15 NOTE — PROGRESS NOTES
Subjective   Riddhi Cid is a 52 y.o. female. Presents to [facility]    Chief Complaint   Patient presents with    Diabetes    Hyperlipidemia    Hypothyroidism       Diabetes  She presents for her follow-up diabetic visit. She has type 2 diabetes mellitus. Pertinent negatives for hypoglycemia include no dizziness or headaches. Pertinent negatives for diabetes include no chest pain and no fatigue. There are no hypoglycemic complications. There are no diabetic complications. Risk factors for coronary artery disease include diabetes mellitus, dyslipidemia, hypertension, obesity and stress. Current diabetic treatment includes oral agent (monotherapy). She is compliant with treatment most of the time. She is following a generally healthy diet. Meal planning includes avoidance of concentrated sweets. She participates in exercise intermittently. Her overall blood glucose range is 110-130 mg/dl. An ACE inhibitor/angiotensin II receptor blocker is not being taken. She sees a podiatrist.Eye exam is not current.   Hyperlipidemia  This is a chronic problem. The current episode started more than 1 year ago. The problem is controlled. Exacerbating diseases include hypothyroidism and obesity. There are no known factors aggravating her hyperlipidemia. Pertinent negatives include no chest pain or shortness of breath. Current antihyperlipidemic treatment includes statins. The current treatment provides moderate improvement of lipids. There are no compliance problems.  Risk factors for coronary artery disease include dyslipidemia, diabetes mellitus, hypertension, obesity, stress and post-menopausal.   Hypothyroidism  This is a chronic problem. The current episode started more than 1 year ago. Pertinent negatives include no chest pain, fatigue or headaches. Treatments tried: levothyroxine 50 MCG  The treatment provided moderate relief.      I personally reviewed and updated the patient's allergies, medications,  problem list, and past medical, surgical, social, and family history. I have reviewed and confirmed the accuracy of the History of Present Illness and Review of Symptoms as documented by the MA/LPN/RN. June Harrison MD    Allergies:  Allergies   Allergen Reactions    Sulfa Antibiotics Rash    Meloxicam GI Intolerance       Social History:  Social History     Socioeconomic History    Marital status:    Tobacco Use    Smoking status: Every Day     Packs/day: 0.50     Years: 53.00     Pack years: 26.50     Types: Cigarettes    Smokeless tobacco: Never   Vaping Use    Vaping Use: Never used   Substance and Sexual Activity    Alcohol use: No    Drug use: No    Sexual activity: Defer       Family History:  Family History   Problem Relation Age of Onset    Stroke Mother     Hypertension Mother     Hyperlipidemia Mother     Hypothyroidism Mother     Heart attack Father     Heart disease Father     Prostate cancer Father     Other Father     Diabetes Father     Hypertension Father     Hyperlipidemia Father     Coronary artery disease Father     Breast cancer Paternal Grandmother     Hypothyroidism Grandchild     Ovarian cancer Neg Hx        Past Medical History :  Patient Active Problem List   Diagnosis    Class 3 drug-induced obesity with serious comorbidity and body mass index (BMI) of 40.0 to 44.9 in adult    Degeneration of intervertebral disc of cervical region    Degeneration of intervertebral disc of lumbar region    Acquired hypothyroidism    Mixed hyperlipidemia    Major depressive disorder, recurrent, moderate    Obstructive sleep apnea    Vitamin B12 deficiency    Vitamin D deficiency    COPD (chronic obstructive pulmonary disease)    Solitary thyroid nodule    Chronic midline low back pain with right-sided sciatica    Cervicalgia    Cervical stenosis of spine    Arthritis    Moderate persistent asthma without complication    Gastroparalysis    History of chicken pox    IBS (irritable bowel syndrome)     GERD (gastroesophageal reflux disease)    Dependent edema    Optic disc hemorrhage    Controlled diabetes mellitus type 2 with complications    Tension headache    Allergic rhinitis    Disorder of peripheral nervous system    Fibromyositis    Tobacco dependence syndrome    Pap smear, low-risk    Disease due to severe acute respiratory syndrome coronavirus 2 (SARS-CoV-2)    Lung cancer screening declined by patient    Acute cough       Medication List:    Current Outpatient Medications:     Accu-Chek FastClix Lancets misc, 1 each by Other route Daily., Disp: 100 each, Rfl: 3    Advair Diskus 250-50 MCG/ACT DISKUS, INHALE 1 PUFF BY MOUTH TWICE DAILY, Disp: 60 each, Rfl: 12    albuterol (PROVENTIL) (2.5 MG/3ML) 0.083% nebulizer solution, Take 2.5 mg by nebulization Every 4 (Four) Hours As Needed for Wheezing., Disp: 180 mL, Rfl: 3    albuterol sulfate  (90 Base) MCG/ACT inhaler, INHALE 2 PUFFS BY MOUTH EVERY 4 HOURS AS NEEDED FOR WHEEZING, Disp: 18 g, Rfl: 3    aspirin 81 MG chewable tablet, Chew 1 tablet Daily., Disp: , Rfl:     azelastine (OPTIVAR) 0.05 % ophthalmic solution, , Disp: , Rfl:     benzonatate (TESSALON) 100 MG capsule, Take 1 capsule by mouth 3 (Three) Times a Day., Disp: , Rfl:     Blood Glucose Monitoring Suppl (Accu-Chek Guide) w/Device kit, 1 each Daily., Disp: 1 kit, Rfl: 0    bumetanide (BUMEX) 1 MG tablet, TAKE 1 TO 2 TABLETS BY MOUTH EVERY DAY AS NEEDED, Disp: 180 tablet, Rfl: 3    citalopram (CeleXA) 40 MG tablet, Take 1 tablet by mouth Daily., Disp: 90 tablet, Rfl: 3    Cyanocobalamin (B-12) 1000 MCG tablet controlled-release, TAKE 1 TABLET BY MOUTH EVERY DAY, Disp: 90 tablet, Rfl: 3    dexlansoprazole (Dexilant) 60 MG capsule, Take 1 capsule by mouth Daily., Disp: 90 capsule, Rfl: 3    doxycycline (MONODOX) 100 MG capsule, , Disp: , Rfl:     famotidine (PEPCID) 40 MG tablet, TAKE 1 TABLET BY MOUTH AT BEDTIME AS DIRECTED, Disp: , Rfl:     fenofibrate (TRICOR) 145 MG tablet, Daily. Dx:  E78.2, mixed hyperlipidema, Disp: , Rfl:     fluticasone (FLONASE) 50 MCG/ACT nasal spray, SHAKE LIQUID AND USE 2 SPRAYS IN EACH NOSTRIL EVERY DAY, Disp: 48 g, Rfl: 3    folic acid (FOLVITE) 1 MG tablet, TAKE 1 TABLET BY MOUTH DAILY, Disp: 90 tablet, Rfl: 3    gabapentin (NEURONTIN) 300 MG capsule, Take 1 capsule by mouth 3 (Three) Times a Day., Disp: 90 capsule, Rfl: 5    glucose blood (Accu-Chek Guide) test strip, 1 each by Other route Daily. Use as instructed, Disp: 100 each, Rfl: 3    HYDROcodone-acetaminophen (Norco)  MG per tablet, Take 1 tablet by mouth Every 6 (Six) Hours As Needed for Moderate Pain., Disp: 120 tablet, Rfl: 0    Lancets Misc. (ACCU-CHEK FASTCLIX LANCET) kit, Dx: E11.9, DM type 2, controlled, Disp: , Rfl:     levothyroxine (SYNTHROID, LEVOTHROID) 50 MCG tablet, TAKE 1 TABLET BY MOUTH DAILY, Disp: 90 tablet, Rfl: 4    Linzess 290 MCG capsule capsule, TAKE 1 CAPSULE BY MOUTH EVERY DAY 30 MINUTES BEFORE A MEAL, Disp: , Rfl:     metFORMIN ER (GLUCOPHAGE-XR) 500 MG 24 hr tablet, TAKE 1 TABLET BY MOUTH DAILY, Disp: 90 tablet, Rfl: 2    montelukast (SINGULAIR) 10 MG tablet, TAKE 1 TABLET BY MOUTH EVERY NIGHT, Disp: 90 tablet, Rfl: 3    Omega-3 Fatty Acids (FISH OIL) 500 MG capsule capsule, 2 capsules Daily. Dx: E78.2, mixed hyperlipidema, Disp: , Rfl:     ondansetron ODT (ZOFRAN-ODT) 4 MG disintegrating tablet, DISSOLVE 1 TABLET ON THE TONGUE THREE TIMES DAILY AS NEEDED, Disp: , Rfl:     potassium chloride ER (K-TAB) 20 MEQ tablet controlled-release ER tablet, TAKE 1 TABLET BY MOUTH EVERY DAY, ONLY IF TAKING 2 BUMETANIDE, Disp: 60 tablet, Rfl: 12    rosuvastatin (CRESTOR) 20 MG tablet, TAKE 1 TABLET BY MOUTH DAILY, Disp: 90 tablet, Rfl: 3    vitamin D3 125 MCG (5000 UT) capsule capsule, TAKE 1 CAPSULE BY MOUTH DAILY, Disp: 90 capsule, Rfl: 3    Past Surgical History:  Past Surgical History:   Procedure Laterality Date    BREAST BIOPSY Right     CARPAL TUNNEL RELEASE      CERVICAL DISCECTOMY  "ANTERIOR      CHOLECYSTECTOMY      ESSURE TUBAL LIGATION           Physical Exam:      Vital Signs:    Vitals:    08/17/23 0822   BP: 122/82   Pulse: 80   Resp: 18   Temp: 97.1 øF (36.2 øC)   SpO2: 96%        /82 (BP Location: Right arm, Patient Position: Sitting, Cuff Size: Adult)   Pulse 80   Temp 97.1 øF (36.2 øC) (Temporal)   Resp 18   Ht 160 cm (63\")   Wt 111 kg (244 lb 9.6 oz)   SpO2 96% Comment: room air  BMI 43.33 kg/mý     Wt Readings from Last 3 Encounters:   08/17/23 111 kg (244 lb 9.6 oz)   05/02/23 110 kg (241 lb 12.8 oz)   02/13/23 111 kg (245 lb)       Result Review :   The following data was reviewed by: June Harrison MD on 08/17/2023:            Latest Reference Range & Units 08/17/23 08:40 08/17/23 08:42   Glucose 70 - 130 mg/dL  124   Hemoglobin A1C % 6.9        Physical Exam  Vitals reviewed.   Constitutional:       Appearance: Normal appearance. She is well-developed.   HENT:      Head: Normocephalic and atraumatic.   Eyes:      General:         Right eye: No discharge.         Left eye: No discharge.   Cardiovascular:      Rate and Rhythm: Normal rate and regular rhythm.      Heart sounds: Normal heart sounds. No murmur heard.    No friction rub. No gallop.   Pulmonary:      Effort: Pulmonary effort is normal. No respiratory distress.      Breath sounds: Normal breath sounds. No wheezing or rales.   Skin:     General: Skin is warm and dry.      Findings: No rash.   Neurological:      Mental Status: She is alert and oriented to person, place, and time.      Coordination: Coordination normal.      Gait: Gait normal.   Psychiatric:         Behavior: Behavior is cooperative.       Assessment and Plan:  Problems Addressed this Visit          Cardiac and Vasculature    Mixed hyperlipidemia    Relevant Orders    Comprehensive Metabolic Panel (Completed)    Lipid Panel With / Chol / HDL Ratio (Completed)       Endocrine and Metabolic    Class 3 drug-induced obesity with serious " comorbidity and body mass index (BMI) of 40.0 to 44.9 in adult     Patient's (Body mass index is 43.33 kg/mý.) indicates that they are obese (BMI >30) with health conditions that include diabetes mellitus and dyslipidemias . Weight is worsening. BMI  is above average; BMI management plan is completed. We discussed portion control and increasing exercise.          Acquired hypothyroidism    Controlled diabetes mellitus type 2 with complications - Primary     Diabetes is unchanged.   Continue current treatment regimen.  Reminded to bring in blood sugar diary at next visit.  Dietary recommendations for ADA diet.  Diabetes will be reassessed in 3 months.         Relevant Orders    POCT urinalysis dipstick, manual (Completed)    POC Glycosylated Hemoglobin (Hb A1C) (Completed)    POCT Glucose (Completed)       Pulmonary and Pneumonias    COPD (chronic obstructive pulmonary disease)     COPD is unchanged.  Continue current medications.      Refill albuterol           Relevant Medications    benzonatate (TESSALON) 100 MG capsule    albuterol (PROVENTIL) (2.5 MG/3ML) 0.083% nebulizer solution       Other    Acute cough     better         Relevant Orders    XR Chest 2 View (Completed)     Other Visit Diagnoses       Personal history of tobacco use, presenting hazards to health        Nicotine dependence, cigarettes, uncomplicated        Relevant Orders    CT chest low dose wo          Diagnoses         Codes Comments    Controlled diabetes mellitus type 2 with complications, unspecified whether long term insulin use    -  Primary ICD-10-CM: E11.8  ICD-9-CM: 250.90     Mixed hyperlipidemia     ICD-10-CM: E78.2  ICD-9-CM: 272.2     Acquired hypothyroidism     ICD-10-CM: E03.9  ICD-9-CM: 244.9     Class 3 drug-induced obesity with serious comorbidity and body mass index (BMI) of 40.0 to 44.9 in adult     ICD-10-CM: E66.1, Z68.41  ICD-9-CM: 278.01, V85.41     Simple chronic bronchitis     ICD-10-CM: J41.0  ICD-9-CM: 491.0      Personal history of tobacco use, presenting hazards to health     ICD-10-CM: Z87.891  ICD-9-CM: V15.82     Nicotine dependence, cigarettes, uncomplicated     ICD-10-CM: F17.210  ICD-9-CM: 305.1     Acute cough     ICD-10-CM: R05.1  ICD-9-CM: 786.2                     An After Visit Summary and PPPS were given to the patient.

## 2023-08-17 ENCOUNTER — OFFICE VISIT (OUTPATIENT)
Dept: FAMILY MEDICINE CLINIC | Facility: CLINIC | Age: 53
End: 2023-08-17
Payer: MEDICARE

## 2023-08-17 VITALS
DIASTOLIC BLOOD PRESSURE: 82 MMHG | SYSTOLIC BLOOD PRESSURE: 122 MMHG | HEIGHT: 63 IN | BODY MASS INDEX: 43.34 KG/M2 | TEMPERATURE: 97.1 F | WEIGHT: 244.6 LBS | RESPIRATION RATE: 18 BRPM | HEART RATE: 80 BPM | OXYGEN SATURATION: 96 %

## 2023-08-17 DIAGNOSIS — F17.210 NICOTINE DEPENDENCE, CIGARETTES, UNCOMPLICATED: ICD-10-CM

## 2023-08-17 DIAGNOSIS — E11.8 CONTROLLED DIABETES MELLITUS TYPE 2 WITH COMPLICATIONS, UNSPECIFIED WHETHER LONG TERM INSULIN USE: Primary | ICD-10-CM

## 2023-08-17 DIAGNOSIS — E66.1 CLASS 3 DRUG-INDUCED OBESITY WITH SERIOUS COMORBIDITY AND BODY MASS INDEX (BMI) OF 40.0 TO 44.9 IN ADULT: ICD-10-CM

## 2023-08-17 DIAGNOSIS — E78.2 MIXED HYPERLIPIDEMIA: ICD-10-CM

## 2023-08-17 DIAGNOSIS — R05.1 ACUTE COUGH: ICD-10-CM

## 2023-08-17 DIAGNOSIS — E03.9 ACQUIRED HYPOTHYROIDISM: ICD-10-CM

## 2023-08-17 DIAGNOSIS — J41.0 SIMPLE CHRONIC BRONCHITIS: ICD-10-CM

## 2023-08-17 DIAGNOSIS — Z87.891 PERSONAL HISTORY OF TOBACCO USE, PRESENTING HAZARDS TO HEALTH: ICD-10-CM

## 2023-08-17 LAB
BILIRUB BLD-MCNC: NEGATIVE MG/DL
CLARITY, POC: CLEAR
COLOR UR: YELLOW
EXPIRATION DATE: NORMAL
GLUCOSE BLDC GLUCOMTR-MCNC: 124 MG/DL (ref 70–130)
GLUCOSE UR STRIP-MCNC: NEGATIVE MG/DL
HBA1C MFR BLD: 6.9 %
KETONES UR QL: NEGATIVE
LEUKOCYTE EST, POC: NEGATIVE
Lab: NORMAL
NITRITE UR-MCNC: NEGATIVE MG/ML
PH UR: 5 [PH] (ref 5–8)
PROT UR STRIP-MCNC: NEGATIVE MG/DL
RBC # UR STRIP: NEGATIVE /UL
SP GR UR: 1.01 (ref 1–1.03)
UROBILINOGEN UR QL: NORMAL

## 2023-08-17 RX ORDER — FAMOTIDINE 40 MG/1
TABLET, FILM COATED ORAL
COMMUNITY
Start: 2023-08-11

## 2023-08-17 RX ORDER — ALBUTEROL SULFATE 2.5 MG/3ML
2.5 SOLUTION RESPIRATORY (INHALATION) EVERY 4 HOURS PRN
Qty: 180 ML | Refills: 3 | Status: SHIPPED | OUTPATIENT
Start: 2023-08-17

## 2023-08-17 RX ORDER — BENZONATATE 100 MG/1
1 CAPSULE ORAL 3 TIMES DAILY
COMMUNITY
Start: 2023-08-06

## 2023-08-17 NOTE — ASSESSMENT & PLAN NOTE
Patient's (Body mass index is 43.33 kg/mý.) indicates that they are obese (BMI >30) with health conditions that include diabetes mellitus and dyslipidemias . Weight is worsening. BMI  is above average; BMI management plan is completed. We discussed portion control and increasing exercise.

## 2023-08-18 ENCOUNTER — HOSPITAL ENCOUNTER (OUTPATIENT)
Dept: GENERAL RADIOLOGY | Facility: HOSPITAL | Age: 53
Discharge: HOME | End: 2023-08-18
Admitting: FAMILY MEDICINE
Payer: MEDICARE

## 2023-08-18 PROCEDURE — 71046 X-RAY EXAM CHEST 2 VIEWS: CPT

## 2023-08-22 LAB
ALBUMIN SERPL-MCNC: 4.7 G/DL (ref 3.8–4.9)
ALBUMIN/GLOB SERPL: 2 {RATIO} (ref 1.2–2.2)
ALP SERPL-CCNC: 97 IU/L (ref 44–121)
ALT SERPL-CCNC: 29 IU/L (ref 0–32)
AST SERPL-CCNC: 28 IU/L (ref 0–40)
BILIRUB SERPL-MCNC: 0.4 MG/DL (ref 0–1.2)
BUN SERPL-MCNC: 11 MG/DL (ref 6–24)
BUN/CREAT SERPL: 10 (ref 9–23)
CALCIUM SERPL-MCNC: 9.9 MG/DL (ref 8.7–10.2)
CHLORIDE SERPL-SCNC: 103 MMOL/L (ref 96–106)
CHOLEST SERPL-MCNC: 143 MG/DL (ref 100–199)
CHOLEST/HDLC SERPL: 3.7 RATIO (ref 0–4.4)
CO2 SERPL-SCNC: 26 MMOL/L (ref 20–29)
CREAT SERPL-MCNC: 1.07 MG/DL (ref 0.57–1)
EGFRCR SERPLBLD CKD-EPI 2021: 62 ML/MIN/1.73
GLOBULIN SER CALC-MCNC: 2.4 G/DL (ref 1.5–4.5)
GLUCOSE SERPL-MCNC: 135 MG/DL (ref 70–99)
HDLC SERPL-MCNC: 39 MG/DL
LDLC SERPL CALC-MCNC: 74 MG/DL (ref 0–99)
POTASSIUM SERPL-SCNC: 4.5 MMOL/L (ref 3.5–5.2)
PROT SERPL-MCNC: 7.1 G/DL (ref 6–8.5)
SODIUM SERPL-SCNC: 144 MMOL/L (ref 134–144)
TRIGL SERPL-MCNC: 178 MG/DL (ref 0–149)
VLDLC SERPL CALC-MCNC: 30 MG/DL (ref 5–40)

## 2023-08-31 ENCOUNTER — APPOINTMENT (OUTPATIENT)
Dept: CT IMAGING | Facility: HOSPITAL | Age: 53
End: 2023-08-31
Payer: MEDICARE

## 2023-08-31 ENCOUNTER — HOSPITAL ENCOUNTER (OUTPATIENT)
Facility: HOSPITAL | Age: 53
Setting detail: OBSERVATION
Discharge: HOME | End: 2023-09-01
Attending: EMERGENCY MEDICINE | Admitting: EMERGENCY MEDICINE
Payer: MEDICARE

## 2023-08-31 ENCOUNTER — APPOINTMENT (OUTPATIENT)
Dept: GENERAL RADIOLOGY | Facility: HOSPITAL | Age: 53
End: 2023-08-31
Payer: MEDICARE

## 2023-08-31 DIAGNOSIS — R10.9 ABDOMINAL PAIN, UNSPECIFIED ABDOMINAL LOCATION: ICD-10-CM

## 2023-08-31 DIAGNOSIS — J41.0 SIMPLE CHRONIC BRONCHITIS: ICD-10-CM

## 2023-08-31 DIAGNOSIS — R53.1 GENERALIZED WEAKNESS: ICD-10-CM

## 2023-08-31 DIAGNOSIS — J44.1 COPD EXACERBATION: Primary | ICD-10-CM

## 2023-08-31 LAB
ALBUMIN SERPL-MCNC: 4.2 G/DL (ref 3.5–5.2)
ALBUMIN/GLOB SERPL: 1.4 G/DL
ALP SERPL-CCNC: 115 U/L (ref 39–117)
ALT SERPL W P-5'-P-CCNC: 34 U/L (ref 1–33)
ANION GAP SERPL CALCULATED.3IONS-SCNC: 12 MMOL/L (ref 5–15)
AST SERPL-CCNC: 48 U/L (ref 1–32)
B PARAPERT DNA SPEC QL NAA+PROBE: NOT DETECTED
B PERT DNA SPEC QL NAA+PROBE: NOT DETECTED
BASOPHILS # BLD AUTO: 0.1 10*3/MM3 (ref 0–0.2)
BASOPHILS NFR BLD AUTO: 1.1 % (ref 0–1.5)
BILIRUB SERPL-MCNC: 0.2 MG/DL (ref 0–1.2)
BILIRUB UR QL STRIP: NEGATIVE
BUN SERPL-MCNC: 7 MG/DL (ref 6–20)
BUN/CREAT SERPL: 8.1 (ref 7–25)
C PNEUM DNA NPH QL NAA+NON-PROBE: NOT DETECTED
CALCIUM SPEC-SCNC: 10 MG/DL (ref 8.6–10.5)
CHLORIDE SERPL-SCNC: 104 MMOL/L (ref 98–107)
CK SERPL-CCNC: 129 U/L (ref 20–180)
CLARITY UR: CLEAR
CO2 SERPL-SCNC: 28 MMOL/L (ref 22–29)
COLOR UR: YELLOW
CREAT SERPL-MCNC: 0.86 MG/DL (ref 0.57–1)
DEPRECATED RDW RBC AUTO: 42 FL (ref 37–54)
EGFRCR SERPLBLD CKD-EPI 2021: 80.9 ML/MIN/1.73
EOSINOPHIL # BLD AUTO: 0.3 10*3/MM3 (ref 0–0.4)
EOSINOPHIL NFR BLD AUTO: 4.7 % (ref 0.3–6.2)
ERYTHROCYTE [DISTWIDTH] IN BLOOD BY AUTOMATED COUNT: 13.1 % (ref 12.3–15.4)
FLUAV SUBTYP SPEC NAA+PROBE: NOT DETECTED
FLUBV RNA ISLT QL NAA+PROBE: NOT DETECTED
GLOBULIN UR ELPH-MCNC: 2.9 GM/DL
GLUCOSE BLDC GLUCOMTR-MCNC: 214 MG/DL (ref 70–105)
GLUCOSE BLDC GLUCOMTR-MCNC: 231 MG/DL (ref 70–105)
GLUCOSE BLDC GLUCOMTR-MCNC: 265 MG/DL (ref 70–105)
GLUCOSE BLDC GLUCOMTR-MCNC: 304 MG/DL (ref 70–105)
GLUCOSE SERPL-MCNC: 183 MG/DL (ref 65–99)
GLUCOSE UR STRIP-MCNC: NEGATIVE MG/DL
HADV DNA SPEC NAA+PROBE: NOT DETECTED
HCOV 229E RNA SPEC QL NAA+PROBE: NOT DETECTED
HCOV HKU1 RNA SPEC QL NAA+PROBE: NOT DETECTED
HCOV NL63 RNA SPEC QL NAA+PROBE: NOT DETECTED
HCOV OC43 RNA SPEC QL NAA+PROBE: NOT DETECTED
HCT VFR BLD AUTO: 41.9 % (ref 34–46.6)
HGB BLD-MCNC: 13.8 G/DL (ref 12–15.9)
HGB UR QL STRIP.AUTO: NEGATIVE
HMPV RNA NPH QL NAA+NON-PROBE: NOT DETECTED
HOLD SPECIMEN: NORMAL
HOLD SPECIMEN: NORMAL
HPIV1 RNA ISLT QL NAA+PROBE: NOT DETECTED
HPIV2 RNA SPEC QL NAA+PROBE: NOT DETECTED
HPIV3 RNA NPH QL NAA+PROBE: NOT DETECTED
HPIV4 P GENE NPH QL NAA+PROBE: NOT DETECTED
KETONES UR QL STRIP: NEGATIVE
LEUKOCYTE ESTERASE UR QL STRIP.AUTO: NEGATIVE
LIPASE SERPL-CCNC: 30 U/L (ref 13–60)
LYMPHOCYTES # BLD AUTO: 1.5 10*3/MM3 (ref 0.7–3.1)
LYMPHOCYTES NFR BLD AUTO: 22.9 % (ref 19.6–45.3)
M PNEUMO IGG SER IA-ACNC: NOT DETECTED
MAGNESIUM SERPL-MCNC: 1.8 MG/DL (ref 1.6–2.6)
MCH RBC QN AUTO: 30.4 PG (ref 26.6–33)
MCHC RBC AUTO-ENTMCNC: 32.8 G/DL (ref 31.5–35.7)
MCV RBC AUTO: 92.5 FL (ref 79–97)
MONOCYTES # BLD AUTO: 0.4 10*3/MM3 (ref 0.1–0.9)
MONOCYTES NFR BLD AUTO: 6.7 % (ref 5–12)
NEUTROPHILS NFR BLD AUTO: 4.1 10*3/MM3 (ref 1.7–7)
NEUTROPHILS NFR BLD AUTO: 64.6 % (ref 42.7–76)
NITRITE UR QL STRIP: NEGATIVE
NRBC BLD AUTO-RTO: 0 /100 WBC (ref 0–0.2)
NT-PROBNP SERPL-MCNC: <36 PG/ML (ref 0–900)
PH UR STRIP.AUTO: <=5 [PH] (ref 5–8)
PLATELET # BLD AUTO: 208 10*3/MM3 (ref 140–450)
PMV BLD AUTO: 9.1 FL (ref 6–12)
POTASSIUM SERPL-SCNC: 4 MMOL/L (ref 3.5–5.2)
PROT SERPL-MCNC: 7.1 G/DL (ref 6–8.5)
PROT UR QL STRIP: NEGATIVE
QT INTERVAL: 422 MS
QTC INTERVAL: 443 MS
RBC # BLD AUTO: 4.54 10*6/MM3 (ref 3.77–5.28)
RHINOVIRUS RNA SPEC NAA+PROBE: DETECTED
RSV RNA NPH QL NAA+NON-PROBE: NOT DETECTED
SARS-COV-2 RNA NPH QL NAA+NON-PROBE: NOT DETECTED
SODIUM SERPL-SCNC: 144 MMOL/L (ref 136–145)
SP GR UR STRIP: 1.01 (ref 1–1.03)
TROPONIN T SERPL HS-MCNC: 10 NG/L
UROBILINOGEN UR QL STRIP: NORMAL
WBC NRBC COR # BLD: 6.3 10*3/MM3 (ref 3.4–10.8)
WHOLE BLOOD HOLD COAG: NORMAL
WHOLE BLOOD HOLD SPECIMEN: NORMAL

## 2023-08-31 PROCEDURE — 82550 ASSAY OF CK (CPK): CPT | Performed by: PHYSICIAN ASSISTANT

## 2023-08-31 PROCEDURE — 96375 TX/PRO/DX INJ NEW DRUG ADDON: CPT

## 2023-08-31 PROCEDURE — 71045 X-RAY EXAM CHEST 1 VIEW: CPT

## 2023-08-31 PROCEDURE — 25010000002 ONDANSETRON PER 1 MG: Performed by: PHYSICIAN ASSISTANT

## 2023-08-31 PROCEDURE — 82948 REAGENT STRIP/BLOOD GLUCOSE: CPT

## 2023-08-31 PROCEDURE — 83735 ASSAY OF MAGNESIUM: CPT | Performed by: PHYSICIAN ASSISTANT

## 2023-08-31 PROCEDURE — 85025 COMPLETE CBC W/AUTO DIFF WBC: CPT | Performed by: EMERGENCY MEDICINE

## 2023-08-31 PROCEDURE — 25010000002 ENOXAPARIN PER 10 MG: Performed by: PHYSICIAN ASSISTANT

## 2023-08-31 PROCEDURE — G0378 HOSPITAL OBSERVATION PER HR: HCPCS

## 2023-08-31 PROCEDURE — 96376 TX/PRO/DX INJ SAME DRUG ADON: CPT

## 2023-08-31 PROCEDURE — 94640 AIRWAY INHALATION TREATMENT: CPT

## 2023-08-31 PROCEDURE — 83880 ASSAY OF NATRIURETIC PEPTIDE: CPT | Performed by: PHYSICIAN ASSISTANT

## 2023-08-31 PROCEDURE — 94799 UNLISTED PULMONARY SVC/PX: CPT

## 2023-08-31 PROCEDURE — 83690 ASSAY OF LIPASE: CPT | Performed by: EMERGENCY MEDICINE

## 2023-08-31 PROCEDURE — 63710000001 INSULIN LISPRO (HUMAN) PER 5 UNITS: Performed by: PHYSICIAN ASSISTANT

## 2023-08-31 PROCEDURE — 25010000002 METHYLPREDNISOLONE PER 125 MG: Performed by: PHYSICIAN ASSISTANT

## 2023-08-31 PROCEDURE — 74177 CT ABD & PELVIS W/CONTRAST: CPT

## 2023-08-31 PROCEDURE — 84484 ASSAY OF TROPONIN QUANT: CPT | Performed by: PHYSICIAN ASSISTANT

## 2023-08-31 PROCEDURE — 96372 THER/PROPH/DIAG INJ SC/IM: CPT

## 2023-08-31 PROCEDURE — 70450 CT HEAD/BRAIN W/O DYE: CPT

## 2023-08-31 PROCEDURE — 25010000002 METHYLPREDNISOLONE PER 40 MG: Performed by: PHYSICIAN ASSISTANT

## 2023-08-31 PROCEDURE — 25510000001 IOPAMIDOL PER 1 ML: Performed by: EMERGENCY MEDICINE

## 2023-08-31 PROCEDURE — 93005 ELECTROCARDIOGRAM TRACING: CPT | Performed by: EMERGENCY MEDICINE

## 2023-08-31 PROCEDURE — 80053 COMPREHEN METABOLIC PANEL: CPT | Performed by: EMERGENCY MEDICINE

## 2023-08-31 PROCEDURE — 99285 EMERGENCY DEPT VISIT HI MDM: CPT

## 2023-08-31 PROCEDURE — 0202U NFCT DS 22 TRGT SARS-COV-2: CPT | Performed by: PHYSICIAN ASSISTANT

## 2023-08-31 PROCEDURE — 81003 URINALYSIS AUTO W/O SCOPE: CPT | Performed by: EMERGENCY MEDICINE

## 2023-08-31 PROCEDURE — 96374 THER/PROPH/DIAG INJ IV PUSH: CPT

## 2023-08-31 RX ORDER — IPRATROPIUM BROMIDE AND ALBUTEROL SULFATE 2.5; .5 MG/3ML; MG/3ML
3 SOLUTION RESPIRATORY (INHALATION) EVERY 4 HOURS PRN
Status: DISCONTINUED | OUTPATIENT
Start: 2023-08-31 | End: 2023-09-01 | Stop reason: HOSPADM

## 2023-08-31 RX ORDER — METHYLPREDNISOLONE SODIUM SUCCINATE 40 MG/ML
40 INJECTION, POWDER, LYOPHILIZED, FOR SOLUTION INTRAMUSCULAR; INTRAVENOUS EVERY 8 HOURS
Status: DISCONTINUED | OUTPATIENT
Start: 2023-08-31 | End: 2023-09-01

## 2023-08-31 RX ORDER — MONTELUKAST SODIUM 10 MG/1
10 TABLET ORAL NIGHTLY
Status: DISCONTINUED | OUTPATIENT
Start: 2023-08-31 | End: 2023-09-01 | Stop reason: HOSPADM

## 2023-08-31 RX ORDER — SODIUM CHLORIDE 0.9 % (FLUSH) 0.9 %
10 SYRINGE (ML) INJECTION EVERY 12 HOURS SCHEDULED
Status: DISCONTINUED | OUTPATIENT
Start: 2023-08-31 | End: 2023-09-01 | Stop reason: HOSPADM

## 2023-08-31 RX ORDER — ROSUVASTATIN CALCIUM 20 MG/1
20 TABLET, COATED ORAL DAILY
Status: ON HOLD | COMMUNITY

## 2023-08-31 RX ORDER — BENZONATATE 100 MG/1
200 CAPSULE ORAL 3 TIMES DAILY PRN
Status: DISCONTINUED | OUTPATIENT
Start: 2023-08-31 | End: 2023-09-01 | Stop reason: HOSPADM

## 2023-08-31 RX ORDER — CHOLECALCIFEROL (VITAMIN D3) 125 MCG
5 CAPSULE ORAL NIGHTLY PRN
Status: DISCONTINUED | OUTPATIENT
Start: 2023-08-31 | End: 2023-09-01 | Stop reason: HOSPADM

## 2023-08-31 RX ORDER — ALBUTEROL SULFATE 90 UG/1
2 AEROSOL, METERED RESPIRATORY (INHALATION)
Status: ON HOLD | COMMUNITY

## 2023-08-31 RX ORDER — LEVOTHYROXINE SODIUM 0.05 MG/1
50 TABLET ORAL DAILY
Status: ON HOLD | COMMUNITY

## 2023-08-31 RX ORDER — BUMETANIDE 1 MG/1
1 TABLET ORAL DAILY
Status: ON HOLD | COMMUNITY

## 2023-08-31 RX ORDER — AMOXICILLIN AND CLAVULANATE POTASSIUM 500; 125 MG/1; MG/1
1 TABLET, FILM COATED ORAL 2 TIMES DAILY
Status: DISCONTINUED | OUTPATIENT
Start: 2023-08-31 | End: 2023-09-01 | Stop reason: HOSPADM

## 2023-08-31 RX ORDER — NICOTINE POLACRILEX 4 MG
15 LOZENGE BUCCAL
Status: DISCONTINUED | OUTPATIENT
Start: 2023-08-31 | End: 2023-09-01 | Stop reason: HOSPADM

## 2023-08-31 RX ORDER — AMOXICILLIN AND CLAVULANATE POTASSIUM 500; 125 MG/1; MG/1
1 TABLET, FILM COATED ORAL 2 TIMES DAILY
Status: ON HOLD | COMMUNITY
Start: 2023-08-30 | End: 2023-09-13

## 2023-08-31 RX ORDER — DEXTROSE MONOHYDRATE 25 G/50ML
25 INJECTION, SOLUTION INTRAVENOUS
Status: DISCONTINUED | OUTPATIENT
Start: 2023-08-31 | End: 2023-09-01 | Stop reason: HOSPADM

## 2023-08-31 RX ORDER — ENOXAPARIN SODIUM 100 MG/ML
40 INJECTION SUBCUTANEOUS EVERY 24 HOURS
Status: DISCONTINUED | OUTPATIENT
Start: 2023-08-31 | End: 2023-09-01

## 2023-08-31 RX ORDER — FLUTICASONE PROPIONATE AND SALMETEROL 250; 50 UG/1; UG/1
1 POWDER RESPIRATORY (INHALATION)
Status: ON HOLD | COMMUNITY

## 2023-08-31 RX ORDER — CITALOPRAM 20 MG/1
40 TABLET ORAL DAILY
Status: DISCONTINUED | OUTPATIENT
Start: 2023-08-31 | End: 2023-09-01 | Stop reason: HOSPADM

## 2023-08-31 RX ORDER — METFORMIN HYDROCHLORIDE 500 MG/1
500 TABLET, EXTENDED RELEASE ORAL
Status: ON HOLD | COMMUNITY

## 2023-08-31 RX ORDER — BUMETANIDE 1 MG/1
1 TABLET ORAL DAILY
Status: DISCONTINUED | OUTPATIENT
Start: 2023-08-31 | End: 2023-09-01 | Stop reason: HOSPADM

## 2023-08-31 RX ORDER — BUDESONIDE AND FORMOTEROL FUMARATE DIHYDRATE 160; 4.5 UG/1; UG/1
2 AEROSOL RESPIRATORY (INHALATION)
Status: DISCONTINUED | OUTPATIENT
Start: 2023-08-31 | End: 2023-09-01 | Stop reason: HOSPADM

## 2023-08-31 RX ORDER — ONDANSETRON 4 MG/1
4 TABLET, FILM COATED ORAL EVERY 6 HOURS PRN
Status: DISCONTINUED | OUTPATIENT
Start: 2023-08-31 | End: 2023-09-01 | Stop reason: HOSPADM

## 2023-08-31 RX ORDER — LANOLIN ALCOHOL/MO/W.PET/CERES
1000 CREAM (GRAM) TOPICAL DAILY
Status: ON HOLD | COMMUNITY

## 2023-08-31 RX ORDER — POLYETHYLENE GLYCOL 3350 17 G/17G
17 POWDER, FOR SOLUTION ORAL DAILY PRN
Status: DISCONTINUED | OUTPATIENT
Start: 2023-08-31 | End: 2023-09-01 | Stop reason: HOSPADM

## 2023-08-31 RX ORDER — POTASSIUM CHLORIDE 20 MEQ/1
20 TABLET, EXTENDED RELEASE ORAL AS NEEDED
Status: ON HOLD | COMMUNITY

## 2023-08-31 RX ORDER — NITROGLYCERIN 0.4 MG/1
0.4 TABLET SUBLINGUAL
Status: DISCONTINUED | OUTPATIENT
Start: 2023-08-31 | End: 2023-09-01 | Stop reason: HOSPADM

## 2023-08-31 RX ORDER — GUAIFENESIN 600 MG/1
1200 TABLET, EXTENDED RELEASE ORAL 2 TIMES DAILY
Status: DISCONTINUED | OUTPATIENT
Start: 2023-08-31 | End: 2023-09-01 | Stop reason: HOSPADM

## 2023-08-31 RX ORDER — PANTOPRAZOLE SODIUM 40 MG/1
40 TABLET, DELAYED RELEASE ORAL
Status: DISCONTINUED | OUTPATIENT
Start: 2023-08-31 | End: 2023-09-01 | Stop reason: HOSPADM

## 2023-08-31 RX ORDER — FLUTICASONE PROPIONATE 50 MCG
2 SPRAY, SUSPENSION (ML) NASAL DAILY
Status: ON HOLD | COMMUNITY

## 2023-08-31 RX ORDER — BISACODYL 5 MG/1
5 TABLET, DELAYED RELEASE ORAL DAILY PRN
Status: DISCONTINUED | OUTPATIENT
Start: 2023-08-31 | End: 2023-09-01 | Stop reason: HOSPADM

## 2023-08-31 RX ORDER — SODIUM CHLORIDE 0.9 % (FLUSH) 0.9 %
10 SYRINGE (ML) INJECTION AS NEEDED
Status: DISCONTINUED | OUTPATIENT
Start: 2023-08-31 | End: 2023-09-01 | Stop reason: HOSPADM

## 2023-08-31 RX ORDER — FENOFIBRATE 145 MG/1
145 TABLET, COATED ORAL DAILY
Status: DISCONTINUED | OUTPATIENT
Start: 2023-08-31 | End: 2023-09-01 | Stop reason: HOSPADM

## 2023-08-31 RX ORDER — FAMOTIDINE 10 MG/ML
20 INJECTION, SOLUTION INTRAVENOUS ONCE
Status: COMPLETED | OUTPATIENT
Start: 2023-08-31 | End: 2023-08-31

## 2023-08-31 RX ORDER — AMOXICILLIN 250 MG
2 CAPSULE ORAL 2 TIMES DAILY
Status: DISCONTINUED | OUTPATIENT
Start: 2023-08-31 | End: 2023-09-01 | Stop reason: HOSPADM

## 2023-08-31 RX ORDER — MONTELUKAST SODIUM 10 MG/1
10 TABLET ORAL NIGHTLY
Status: ON HOLD | COMMUNITY

## 2023-08-31 RX ORDER — GUAIFENESIN 600 MG/1
600 TABLET, EXTENDED RELEASE ORAL 2 TIMES DAILY
Status: ON HOLD | COMMUNITY
Start: 2023-08-27 | End: 2023-09-03

## 2023-08-31 RX ORDER — LANOLIN ALCOHOL/MO/W.PET/CERES
1000 CREAM (GRAM) TOPICAL DAILY
Status: DISCONTINUED | OUTPATIENT
Start: 2023-08-31 | End: 2023-09-01 | Stop reason: HOSPADM

## 2023-08-31 RX ORDER — ASPIRIN 81 MG/1
81 TABLET, CHEWABLE ORAL DAILY
Status: DISCONTINUED | OUTPATIENT
Start: 2023-08-31 | End: 2023-09-01 | Stop reason: HOSPADM

## 2023-08-31 RX ORDER — FOLIC ACID 1 MG/1
1 TABLET ORAL DAILY
Status: ON HOLD | COMMUNITY

## 2023-08-31 RX ORDER — ONDANSETRON 2 MG/ML
4 INJECTION INTRAMUSCULAR; INTRAVENOUS ONCE
Status: COMPLETED | OUTPATIENT
Start: 2023-08-31 | End: 2023-08-31

## 2023-08-31 RX ORDER — SODIUM CHLORIDE 9 MG/ML
40 INJECTION, SOLUTION INTRAVENOUS AS NEEDED
Status: DISCONTINUED | OUTPATIENT
Start: 2023-08-31 | End: 2023-09-01 | Stop reason: HOSPADM

## 2023-08-31 RX ORDER — ONDANSETRON 2 MG/ML
4 INJECTION INTRAMUSCULAR; INTRAVENOUS EVERY 6 HOURS PRN
Status: DISCONTINUED | OUTPATIENT
Start: 2023-08-31 | End: 2023-09-01 | Stop reason: HOSPADM

## 2023-08-31 RX ORDER — ROSUVASTATIN CALCIUM 10 MG/1
20 TABLET, COATED ORAL DAILY
Status: DISCONTINUED | OUTPATIENT
Start: 2023-08-31 | End: 2023-09-01 | Stop reason: HOSPADM

## 2023-08-31 RX ORDER — IBUPROFEN 600 MG/1
1 TABLET ORAL
Status: DISCONTINUED | OUTPATIENT
Start: 2023-08-31 | End: 2023-09-01 | Stop reason: HOSPADM

## 2023-08-31 RX ORDER — LEVOTHYROXINE SODIUM 0.05 MG/1
50 TABLET ORAL
Status: DISCONTINUED | OUTPATIENT
Start: 2023-09-01 | End: 2023-09-01 | Stop reason: HOSPADM

## 2023-08-31 RX ORDER — HYDROCODONE BITARTRATE AND ACETAMINOPHEN 10; 325 MG/1; MG/1
1 TABLET ORAL EVERY 6 HOURS PRN
Status: DISCONTINUED | OUTPATIENT
Start: 2023-08-31 | End: 2023-09-01 | Stop reason: HOSPADM

## 2023-08-31 RX ORDER — ALBUTEROL SULFATE 90 UG/1
2 AEROSOL, METERED RESPIRATORY (INHALATION) ONCE
Status: COMPLETED | OUTPATIENT
Start: 2023-08-31 | End: 2023-08-31

## 2023-08-31 RX ORDER — BISACODYL 10 MG
10 SUPPOSITORY, RECTAL RECTAL DAILY PRN
Status: DISCONTINUED | OUTPATIENT
Start: 2023-08-31 | End: 2023-09-01 | Stop reason: HOSPADM

## 2023-08-31 RX ORDER — IPRATROPIUM BROMIDE AND ALBUTEROL SULFATE 2.5; .5 MG/3ML; MG/3ML
3 SOLUTION RESPIRATORY (INHALATION) ONCE
Status: COMPLETED | OUTPATIENT
Start: 2023-08-31 | End: 2023-08-31

## 2023-08-31 RX ORDER — INSULIN LISPRO 100 [IU]/ML
2-9 INJECTION, SOLUTION INTRAVENOUS; SUBCUTANEOUS
Status: DISCONTINUED | OUTPATIENT
Start: 2023-08-31 | End: 2023-09-01 | Stop reason: HOSPADM

## 2023-08-31 RX ORDER — METHYLPREDNISOLONE SODIUM SUCCINATE 125 MG/2ML
125 INJECTION, POWDER, LYOPHILIZED, FOR SOLUTION INTRAMUSCULAR; INTRAVENOUS ONCE
Status: COMPLETED | OUTPATIENT
Start: 2023-08-31 | End: 2023-08-31

## 2023-08-31 RX ADMIN — BUMETANIDE 1 MG: 1 TABLET ORAL at 14:21

## 2023-08-31 RX ADMIN — PANTOPRAZOLE SODIUM 40 MG: 40 TABLET, DELAYED RELEASE ORAL at 16:42

## 2023-08-31 RX ADMIN — Medication 10 ML: at 22:55

## 2023-08-31 RX ADMIN — INSULIN LISPRO 7 UNITS: 100 INJECTION, SOLUTION INTRAVENOUS; SUBCUTANEOUS at 16:42

## 2023-08-31 RX ADMIN — AMOXICILLIN AND CLAVULANATE POTASSIUM 500 MG: 500; 125 TABLET, FILM COATED ORAL at 22:57

## 2023-08-31 RX ADMIN — BUDESONIDE AND FORMOTEROL FUMARATE DIHYDRATE 2 PUFF: 160; 4.5 AEROSOL RESPIRATORY (INHALATION) at 18:54

## 2023-08-31 RX ADMIN — GUAIFENESIN 1200 MG: 600 TABLET, EXTENDED RELEASE ORAL at 14:21

## 2023-08-31 RX ADMIN — FENOFIBRATE 145 MG: 145 TABLET ORAL at 14:21

## 2023-08-31 RX ADMIN — IPRATROPIUM BROMIDE AND ALBUTEROL SULFATE 3 ML: .5; 3 SOLUTION RESPIRATORY (INHALATION) at 18:47

## 2023-08-31 RX ADMIN — SENNOSIDES AND DOCUSATE SODIUM 2 TABLET: 50; 8.6 TABLET ORAL at 23:00

## 2023-08-31 RX ADMIN — Medication 10 ML: at 14:22

## 2023-08-31 RX ADMIN — ROSUVASTATIN 20 MG: 10 TABLET, FILM COATED ORAL at 14:21

## 2023-08-31 RX ADMIN — ALBUTEROL SULFATE 2 PUFF: 108 AEROSOL, METERED RESPIRATORY (INHALATION) at 07:10

## 2023-08-31 RX ADMIN — Medication 10 ML: at 14:25

## 2023-08-31 RX ADMIN — METHYLPREDNISOLONE SODIUM SUCCINATE 125 MG: 125 INJECTION, POWDER, FOR SOLUTION INTRAMUSCULAR; INTRAVENOUS at 07:07

## 2023-08-31 RX ADMIN — IPRATROPIUM BROMIDE AND ALBUTEROL SULFATE 3 ML: .5; 3 SOLUTION RESPIRATORY (INHALATION) at 23:39

## 2023-08-31 RX ADMIN — MONTELUKAST 10 MG: 10 TABLET, FILM COATED ORAL at 22:57

## 2023-08-31 RX ADMIN — CYANOCOBALAMIN TAB 1000 MCG 1000 MCG: 1000 TAB at 14:21

## 2023-08-31 RX ADMIN — CITALOPRAM HYDROBROMIDE 40 MG: 20 TABLET, FILM COATED ORAL at 14:21

## 2023-08-31 RX ADMIN — ASPIRIN 81 MG CHEWABLE TABLET 81 MG: 81 TABLET CHEWABLE at 14:21

## 2023-08-31 RX ADMIN — METHYLPREDNISOLONE SODIUM SUCCINATE 40 MG: 40 INJECTION, POWDER, FOR SOLUTION INTRAMUSCULAR; INTRAVENOUS at 22:55

## 2023-08-31 RX ADMIN — FAMOTIDINE 20 MG: 10 INJECTION INTRAVENOUS at 09:35

## 2023-08-31 RX ADMIN — IOPAMIDOL 100 ML: 755 INJECTION, SOLUTION INTRAVENOUS at 08:46

## 2023-08-31 RX ADMIN — ENOXAPARIN SODIUM 40 MG: 100 INJECTION SUBCUTANEOUS at 16:42

## 2023-08-31 RX ADMIN — IPRATROPIUM BROMIDE AND ALBUTEROL SULFATE 3 ML: .5; 3 SOLUTION RESPIRATORY (INHALATION) at 11:49

## 2023-08-31 RX ADMIN — ONDANSETRON 4 MG: 2 INJECTION INTRAMUSCULAR; INTRAVENOUS at 07:11

## 2023-08-31 RX ADMIN — HYDROCODONE BITARTRATE AND ACETAMINOPHEN 1 TABLET: 10; 325 TABLET ORAL at 23:20

## 2023-08-31 RX ADMIN — INSULIN LISPRO 4 UNITS: 100 INJECTION, SOLUTION INTRAVENOUS; SUBCUTANEOUS at 22:55

## 2023-08-31 RX ADMIN — GUAIFENESIN 1200 MG: 600 TABLET, EXTENDED RELEASE ORAL at 23:20

## 2023-08-31 RX ADMIN — METHYLPREDNISOLONE SODIUM SUCCINATE 40 MG: 40 INJECTION, POWDER, FOR SOLUTION INTRAMUSCULAR; INTRAVENOUS at 14:22

## 2023-08-31 NOTE — ED NOTES
Pt was disharged from hospital yesterday with upper respiratory illness. Pt has COPD and is having shortness of breath currently. Pt was put on 2L nasal cannula. Pt is wheezing on inspiration. Pt stated she also had bright red blood in stool last night. Pt having RUQ abdominal pain that has also been going on since last night. Pt states she has also had dizziness since yesterday.

## 2023-08-31 NOTE — NURSING NOTE
Educated patient and daughter at bedside on + results of Human Rhinovirus rhonovirus and isolation precautions along with written materials given. Both verbalized understanding

## 2023-08-31 NOTE — NURSING NOTE
NP Simba at bedside. Patient informed this nurse and NP at this time last BM 8/30/23 and saw a little red blood also stated was having cramping to bilat feet

## 2023-08-31 NOTE — H&P
Formerly [facility] H&P    Patient Name: Riddhi Cid  : 1970  MRN: [id number]  Primary Care Physician: June Harrison MD  Date of admission: 2023     Patient Care Team:  June Harrison MD as PCP - General (Family Medicine)  Izaiah Fernandez MD as Consulting Physician (Pain Medicine)          Subjective   History Present Illness     Chief Complaint:   Chief Complaint   Patient presents with    Dizziness     Cough and dyspnea    History of Present Illness  Obtained from ED provider HPI on 2023:  Patient is a 52-year-old  female with history of COPD, diabetes, IBS presents to the ER with complaints of epigastric abdominal pain nausea and some shortness of breath for the last 2 days. Patient states that she was seen at [facility] within the last week and diagnosed with a URI and placed on antibiotics and steroids. Patient states she has not started taking the steroids. She complains of new epigastric abdominal pain that she describes an aching cramping with nausea and 1 episode of vomiting. She also states that she may have had some bright red blood in her stool last night but she is not sure. No melena. She denies dysuria or hematuria. She denies headache lightheadedness or dizziness. Patient does not normally wear oxygen at home and is currently requiring 2 L and states that her O2 has been dropping at home to 87%. She denies any lower extremity swelling but does report increased cramping. Patient has been off of her metformin because she recently had CT with contrast and thinks it may be due to that. Abdominal surgical history significant for cholecystectomy.    23 (postobservation admission):  Patient emergency HPI noted above including approximately 2-day history of worsening dyspnea with a cough productive of some clear yellow sputum and occasional nausea without vomiting.  She denies any pain including chest pain, fever, peripheral edema or  palpitations.  She has noted some leg cramping which is improved with her respiratory status.  Patient does continue to smoke approximately 1 pack of cigarettes per day and does not drink alcohol.  Patient was seen at an outlying facility and had previous imaging and was started on antibiotics and steroids with patient reporting that she had not begun steroid therapy yet.      Review of Systems   Constitutional: Negative.   HENT: Negative.     Eyes: Negative.    Cardiovascular: Negative.    Respiratory:  Positive for cough, shortness of breath, sputum production and wheezing.    Skin: Negative.    Musculoskeletal:  Positive for muscle cramps.   Gastrointestinal: Negative.    Genitourinary: Negative.    Neurological: Negative.    Psychiatric/Behavioral: Negative.           Personal History     Past Medical History:   Past Medical History:   Diagnosis Date    Allergic     Anemia     Anxiety     Arthritis     Asthma     COPD (chronic obstructive pulmonary disease)     Costochondritis     Degenerative disc disease, lumbar     Depression     Diabetes mellitus     GERD (gastroesophageal reflux disease)     History of chicken pox     Hyperlipidemia     IBS (irritable bowel syndrome)     Injury of back     Irritable colon     Knee pain, left     Obstructive sleep apnea     Thyroid nodule     Tired 07/01/2019    Vitamin D deficiency        Surgical History:      Past Surgical History:   Procedure Laterality Date    BREAST BIOPSY Right     CARPAL TUNNEL RELEASE      CERVICAL DISCECTOMY ANTERIOR      CHOLECYSTECTOMY      ESSURE TUBAL LIGATION             Family History: family history includes Breast cancer in her paternal grandmother; Coronary artery disease in her father; Diabetes in her father; Heart attack in her father; Heart disease in her father; Hyperlipidemia in her father and mother; Hypertension in her father and mother; Hypothyroidism in her grandchild and mother; Other in her father; Prostate cancer in her father;  Stroke in her mother. Otherwise pertinent FHx was reviewed and unremarkable.     Social History:  reports that she has been smoking cigarettes. She has a 53.00 pack-year smoking history. She has never used smokeless tobacco. She reports that she does not drink alcohol and does not use drugs.      Medications:  Prior to Admission medications    Medication Sig Start Date End Date Taking? Authorizing Provider   albuterol (PROVENTIL) (2.5 MG/3ML) 0.083% nebulizer solution Take 2.5 mg by nebulization Every 4 (Four) Hours As Needed for Wheezing. 8/17/23  Yes June Harrison MD   albuterol sulfate  (90 Base) MCG/ACT inhaler Inhale 2 puffs 4 (Four) Times a Day.   Yes Keaton Montoya MD   amoxicillin-clavulanate (AUGMENTIN) 500-125 MG per tablet Take 1 tablet by mouth 2 (Two) Times a Day. 8/30/23 9/13/23 Yes Keaton Montoya MD   aspirin 81 MG chewable tablet Chew 1 tablet Daily.   Yes Keaton Montoya MD   bumetanide (BUMEX) 1 MG tablet Take 1 tablet by mouth Daily.   Yes Keaton Montoya MD   citalopram (CeleXA) 40 MG tablet Take 1 tablet by mouth Daily. 1/16/23  Yes June Harrison MD   dexlansoprazole (Dexilant) 60 MG capsule Take 1 capsule by mouth Daily. 2/13/23  Yes uJne Harrison MD   famotidine (PEPCID) 40 MG tablet Take 1 tablet by mouth Every Night. 8/11/23  Yes Keaton Montoya MD   fenofibrate (TRICOR) 145 MG tablet Take 1 tablet by mouth Daily. 1/28/19  Yes Keaton Montoya MD   fluticasone (FLONASE) 50 MCG/ACT nasal spray 2 sprays into the nostril(s) as directed by provider Daily.   Yes Keaton Montoya MD   Fluticasone-Salmeterol (Advair Diskus) 250-50 MCG/ACT DISKUS Inhale 1 puff 2 (Two) Times a Day.   Yes Keaton Montoya MD   folic acid (FOLVITE) 1 MG tablet Take 1 tablet by mouth Daily.   Yes Keaton Montoya MD   guaiFENesin (MUCINEX) 600 MG 12 hr tablet Take 1 tablet by mouth 2 (Two) Times a Day. 8/27/23 9/3/23 Yes Provider, Historical, MD    HYDROcodone-acetaminophen (Norco)  MG per tablet Take 1 tablet by mouth Every 6 (Six) Hours As Needed for Moderate Pain. 8/7/23  Yes Izaiah Fernandez MD   levothyroxine (SYNTHROID, LEVOTHROID) 50 MCG tablet Take 1 tablet by mouth Daily.   Yes Keaton Montoya MD   Linzess 290 MCG capsule capsule Take 1 capsule by mouth Every Other Day. 10/7/21  Yes Keaton Montoya MD   metFORMIN ER (GLUCOPHAGE-XR) 500 MG 24 hr tablet Take 1 tablet by mouth Daily With Breakfast.   Yes Keaton Montoya MD   montelukast (SINGULAIR) 10 MG tablet Take 1 tablet by mouth Every Night.   Yes Keaton Montoya MD   Omega-3 Fatty Acids (fish oil) 1000 MG capsule capsule 2 capsules Daily With Breakfast. 1/28/19  Yes Keaton Montoya MD   ondansetron ODT (ZOFRAN-ODT) 4 MG disintegrating tablet Place 1 tablet on the tongue Every 8 (Eight) Hours As Needed. 7/17/23  Yes Keaton Montoya MD   potassium chloride (K-DUR,KLOR-CON) 20 MEQ CR tablet Take 1 tablet by mouth As Needed. Only if taking 2 bumetanide   Yes Keaton Montoya MD   rosuvastatin (CRESTOR) 20 MG tablet Take 1 tablet by mouth Daily.   Yes Keaton Montoya MD   vitamin B-12 (CYANOCOBALAMIN) 1000 MCG tablet Take 1 tablet by mouth Daily.   Yes Keaton Montoya MD   vitamin d (D3 5000) 125 MCG (5000 UT) capsule Take 1 capsule by mouth Daily.   Yes Keaton Montoya MD   Accu-Chek FastClix Lancets misc 1 each by Other route Daily. 8/19/22   June Harrison MD   Blood Glucose Monitoring Suppl (Accu-Chek Guide) w/Device kit 1 each Daily. 8/19/22   June Harrison MD   glucose blood (Accu-Chek Guide) test strip 1 each by Other route Daily. Use as instructed 8/19/22   June Harrison MD   Lancets Misc. (ACCU-CHEK FASTCLIX LANCET) kit Dx: E11.9, DM type 2, controlled    Keaton Montoya MD   Advair Diskus 250-50 MCG/ACT DISKUS INHALE 1 PUFF BY MOUTH TWICE DAILY 10/3/22 8/31/23  June Harrison MD   albuterol sulfate HFA  108 (90 Base) MCG/ACT inhaler INHALE 2 PUFFS BY MOUTH EVERY 4 HOURS AS NEEDED FOR WHEEZING 6/2/23 8/31/23  June Harrison MD   azelastine (OPTIVAR) 0.05 % ophthalmic solution  9/14/20 8/31/23  Keaton Montoya MD   benzonatate (TESSALON) 100 MG capsule Take 1 capsule by mouth 3 (Three) Times a Day. 8/6/23 8/31/23  Keaton Montoya MD   bumetanide (BUMEX) 1 MG tablet TAKE 1 TO 2 TABLETS BY MOUTH EVERY DAY AS NEEDED 9/12/22 8/31/23  June Harrison MD   Cyanocobalamin (B-12) 1000 MCG tablet controlled-release TAKE 1 TABLET BY MOUTH EVERY DAY 9/23/21 8/31/23  Megan Menard MD   doxycycline (MONODOX) 100 MG capsule  8/6/23 8/31/23  Keaton Montoya MD   fluticasone (FLONASE) 50 MCG/ACT nasal spray SHAKE LIQUID AND USE 2 SPRAYS IN EACH NOSTRIL EVERY DAY 11/28/22 8/31/23  June Harrison MD   folic acid (FOLVITE) 1 MG tablet TAKE 1 TABLET BY MOUTH DAILY 1/19/22 8/31/23  Megan Menard MD   gabapentin (NEURONTIN) 300 MG capsule Take 1 capsule by mouth 3 (Three) Times a Day. 1/10/22 8/31/23  Izaiah Fernandez MD   levothyroxine (SYNTHROID, LEVOTHROID) 50 MCG tablet TAKE 1 TABLET BY MOUTH DAILY 3/27/23 8/31/23  Megan Menard MD   metFORMIN ER (GLUCOPHAGE-XR) 500 MG 24 hr tablet TAKE 1 TABLET BY MOUTH DAILY 6/6/22 8/31/23  Megan Menard MD   montelukast (SINGULAIR) 10 MG tablet TAKE 1 TABLET BY MOUTH EVERY NIGHT 2/24/23 8/31/23  Juen Harrison MD   potassium chloride ER (K-TAB) 20 MEQ tablet controlled-release ER tablet TAKE 1 TABLET BY MOUTH EVERY DAY, ONLY IF TAKING 2 BUMETANIDE 3/25/22 8/31/23  June Harrison MD   rosuvastatin (CRESTOR) 20 MG tablet TAKE 1 TABLET BY MOUTH DAILY 5/2/23 8/31/23  June Harrison MD   vitamin D3 125 MCG (5000 UT) capsule capsule TAKE 1 CAPSULE BY MOUTH DAILY 9/22/21 8/31/23  Megan Menard MD       Allergies:    Allergies   Allergen Reactions    Sulfa Antibiotics Rash    Meloxicam GI Intolerance       Objective   Objective     Vital Signs  Temp:  [98.1  °F (36.7 °C)] 98.1 °F (36.7 °C)  Heart Rate:  [71-78] 71  Resp:  [20-28] 20  BP: (112-135)/(64-86) 112/70  SpO2:  [91 %-96 %] 93 %  on  Flow (L/min):  [2-3] 3;   Device (Oxygen Therapy): nasal cannula  Body mass index is 42.66 kg/m².    Physical Exam  Constitutional:       General: She is not in acute distress.     Appearance: Normal appearance. She is not ill-appearing, toxic-appearing or diaphoretic.   HENT:      Head: Normocephalic.      Right Ear: External ear normal.      Left Ear: External ear normal.      Nose: Nose normal.      Mouth/Throat:      Mouth: Mucous membranes are moist.   Eyes:      Extraocular Movements: Extraocular movements intact.   Cardiovascular:      Rate and Rhythm: Normal rate and regular rhythm.      Pulses: Normal pulses.   Pulmonary:      Effort: Pulmonary effort is normal.      Breath sounds: Wheezing present.   Abdominal:      General: Bowel sounds are normal.      Palpations: Abdomen is soft.   Musculoskeletal:      Cervical back: Normal range of motion.      Right lower leg: No edema.      Left lower leg: No edema.   Skin:     General: Skin is warm and dry.      Capillary Refill: Capillary refill takes less than 2 seconds.   Neurological:      General: No focal deficit present.      Mental Status: She is alert.   Psychiatric:         Mood and Affect: Mood normal.         Behavior: Behavior normal.         Thought Content: Thought content normal.         Judgment: Judgment normal.         Results Review:  I have personally reviewed most recent cardiac tracings, lab results, and radiology images and interpretations and agree with findings, most notably: Troponin, CK, proBNP, CMP, CBC, chest x-ray, CT of abdomen and pelvis, CT of head and EKG.    Results from last 7 days   Lab Units 08/31/23  0629   WBC 10*3/mm3 6.30   HEMOGLOBIN g/dL 13.8   HEMATOCRIT % 41.9   PLATELETS 10*3/mm3 208     Results from last 7 days   Lab Units 08/31/23  0629   SODIUM mmol/L 144   POTASSIUM mmol/L 4.0    CHLORIDE mmol/L 104   CO2 mmol/L 28.0   BUN mg/dL 7   CREATININE mg/dL 0.86   GLUCOSE mg/dL 183*   CALCIUM mg/dL 10.0   ALT (SGPT) U/L 34*   AST (SGOT) U/L 48*   HSTROP T ng/L 10*   PROBNP pg/mL <36.0     Estimated Creatinine Clearance: 89.6 mL/min (by C-G formula based on SCr of 0.86 mg/dL).  Brief Urine Lab Results  (Last result in the past 365 days)        Color   Clarity   Blood   Leuk Est   Nitrite   Protein   CREAT   Urine HCG        08/31/23 0650 Yellow   Clear   Negative   Negative   Negative   Negative                   Microbiology Results (last 10 days)       Procedure Component Value - Date/Time    Respiratory Panel PCR w/COVID-19(SARS-CoV-2) STACY/MAYCOL/APRIL/PAD/COR/MAD/RAHUL In-House, NP Swab in UTM/VTM, 3-4 HR TAT - Swab, Nasopharynx [[id number]]  (Abnormal) Collected: 08/31/23 1325    Lab Status: Final result Specimen: Swab from Nasopharynx Updated: 08/31/23 1427     ADENOVIRUS, PCR Not Detected     Coronavirus 229E Not Detected     Coronavirus HKU1 Not Detected     Coronavirus NL63 Not Detected     Coronavirus OC43 Not Detected     COVID19 Not Detected     Human Metapneumovirus Not Detected     Human Rhinovirus/Enterovirus Detected     Influenza A PCR Not Detected     Influenza B PCR Not Detected     Parainfluenza Virus 1 Not Detected     Parainfluenza Virus 2 Not Detected     Parainfluenza Virus 3 Not Detected     Parainfluenza Virus 4 Not Detected     RSV, PCR Not Detected     Bordetella pertussis pcr Not Detected     Bordetella parapertussis PCR Not Detected     Chlamydophila pneumoniae PCR Not Detected     Mycoplasma pneumo by PCR Not Detected    Narrative:      In the setting of a positive respiratory panel with a viral infection PLUS a negative procalcitonin without other underlying concern for bacterial infection, consider observing off antibiotics or discontinuation of antibiotics and continue supportive care. If the respiratory panel is positive for atypical bacterial infection (Bordetella  pertussis, Chlamydophila pneumoniae, or Mycoplasma pneumoniae), consider antibiotic de-escalation to target atypical bacterial infection.            ECG/EMG Results (most recent)       Procedure Component Value Units Date/Time    ECG 12 Lead Other; dizzy [[id number]] Collected: 08/31/23 0558     Updated: 08/31/23 0600     QT Interval 422 ms      QTC Interval 443 ms     Narrative:      HEART RATE= 66  bpm  RR Interval= 908  ms  CO Interval= 175  ms  P Horizontal Axis= -2  deg  P Front Axis= 72  deg  QRSD Interval= 98  ms  QT Interval= 422  ms  QTcB= 443  ms  QRS Axis= 66  deg  T Wave Axis= 77  deg  - ABNORMAL ECG -  Sinus rhythm  Nonspecific T abnormalities, anterior leads  Electronically Signed By:   Date and Time of Study: 2023-08-31 05:58:03                    CT Head Without Contrast    Result Date: 8/31/2023  Impression: No acute intracranial abnormality Electronically Signed: Paul Moreno MD  8/31/2023 8:53 AM EDT  Workstation ID: [id number]    CT Abdomen Pelvis With Contrast    Result Date: 8/31/2023  Impression: 1. Fatty liver and hepatomegaly 2. Sigmoid diverticulosis 3. Multiple small nonspecific pulmonary nodules. CT chest correlation recommended Electronically Signed: Paul Moreno MD  8/31/2023 9:12 AM EDT  Workstation ID: [id number]    XR Chest 1 View    Result Date: 8/31/2023  Impression: No active cardiopulmonary disease Electronically Signed: Carlos Alberto Barker  8/31/2023 11:05 AM EDT  Workstation ID: [id number]       Estimated Creatinine Clearance: 89.6 mL/min (by C-G formula based on SCr of 0.86 mg/dL).    Assessment & Plan   Assessment/Plan       Active Hospital Problems    Diagnosis  POA    **COPD exacerbation [J44.1]  Yes      Resolved Hospital Problems   No resolved problems to display.     AECOPD  Lab Results   Component Value Date    WBC 6.30 08/31/2023    EOSABS 0.30 08/31/2023   -Additional labs notable for troponin: 10, proBNP less than 36.0, CK: 129  -Chest x-ray: Reported with no active  cardiopulmonary process  -ED provider reports patient had CT PE protocol at outlying facility recently which was negative  -Respiratory panel ordered  -EKG: Sinus rhythm at 66 without obvious acute changes or ectopy with a QTc of 443 ms  -In the ED albuterol, Solu-Medrol, Zofran and Pepcid  -Continue Solu-Medrol, DuoNeb, Symbicort and add Tessalon, Mucinex and incentive spirometry  -Telemetry and pulse oximetry    Abdominal pain with possible recent rectal bleeding  Lab Results   Component Value Date    WBC 6.30 08/31/2023    AST 48 (H) 08/31/2023    ALT 34 (H) 08/31/2023    ALKPHOS 115 08/31/2023    BILITOT 0.2 08/31/2023    LIPASE 30 08/31/2023   -Hemoglobin: 13.8, monitor  -CT of abdomen and pelvis: Showed a fatty liver with sigmoid diverticulosis and multiple small nonspecific pulmonary nodules  -In the ED patient given famotidine and Zofran  -Start PPI  -As needed GI cocktail ordered  -Diabetic diet  -Monitor CBC while admitted    Diabetes mellitus  -Moderately controlled   Lab Results   Component Value Date    GLUCOSE 183 (H) 08/31/2023    GLUCOSE 135 (H) 08/21/2023    GLUCOSE 130 (H) 04/17/2023    GLUCOSE 150 (H) 01/16/2023   -Hold metformin  -Correctional insulin ordered  -Diabetic diet  -Monitor AC and HS    Hypothyroidism  -Levothyroxine    Depression/anxiety  -Celexa    Morbid obesity (BMI: 42.66)  -Encourage diet lifestyle modifications          VTE Prophylaxis -   Mechanical Order History:       None          Pharmalogical Order History:       None            CODE STATUS:    Code Status and Medical Interventions:   Ordered at: 08/31/23 1254     Code Status (Patient has no pulse and is not breathing):    CPR (Attempt to Resuscitate)     Medical Interventions (Patient has pulse or is breathing):    Full Support       This patient has been examined wearing personal protective equipment.     I discussed the patient's findings and my recommendations with patient and nursing  staff.      Signature:Electronically signed by Rogelio Garcia PA-C, 08/31/23, 2:43 PM EDT.

## 2023-08-31 NOTE — ED PROVIDER NOTES
Subjective   History of Present Illness  Chief Complaint: Abdominal pain, shortness of breath    Patient is a 52-year-old  female with history of COPD, diabetes, IBS presents to the ER with complaints of epigastric abdominal pain nausea and some shortness of breath for the last 2 days.  Patient states that she was seen at [facility] within the last week and diagnosed with a URI and placed on antibiotics and steroids.  Patient states she has not started taking the steroids.  She complains of new epigastric abdominal pain that she describes an aching cramping with nausea and 1 episode of vomiting.  She also states that she may have had some bright red blood in her stool last night but she is not sure.  No melena.  She denies dysuria or hematuria.  She denies headache lightheadedness or dizziness.  Patient does not normally wear oxygen at home and is currently requiring 2 L and states that her O2 has been dropping at home to 87%.  She denies any lower extremity swelling but does report increased cramping.  Patient has been off of her metformin because she recently had CT with contrast and thinks it may be due to that.  Abdominal surgical history significant for cholecystectomy.    PCP: June Harrison    History provided by:  Patient    Review of Systems   Constitutional:  Positive for fatigue. Negative for chills and fever.   HENT:  Negative for sore throat and trouble swallowing.    Eyes: Negative.    Respiratory:  Positive for shortness of breath and wheezing. Negative for chest tightness.    Cardiovascular:  Negative for chest pain.   Gastrointestinal:  Positive for abdominal pain and nausea.   Endocrine: Negative.    Genitourinary:  Negative for dysuria.   Musculoskeletal:  Positive for myalgias.   Skin:  Negative for rash.   Allergic/Immunologic: Negative.    Neurological:  Negative for weakness, light-headedness and headaches.   Psychiatric/Behavioral:  Negative for behavioral problems.    All  other systems reviewed and are negative.    Past Medical History:   Diagnosis Date    Allergic     Anemia     Anxiety     Arthritis     Asthma     COPD (chronic obstructive pulmonary disease)     Costochondritis     Degenerative disc disease, lumbar     Depression     Diabetes mellitus     GERD (gastroesophageal reflux disease)     History of chicken pox     Hyperlipidemia     IBS (irritable bowel syndrome)     Injury of back     Irritable colon     Knee pain, left     Obstructive sleep apnea     Thyroid nodule     Tired 07/01/2019    Vitamin D deficiency        Allergies   Allergen Reactions    Sulfa Antibiotics Rash    Meloxicam GI Intolerance       Past Surgical History:   Procedure Laterality Date    BREAST BIOPSY Right     CARPAL TUNNEL RELEASE      CERVICAL DISCECTOMY ANTERIOR      CHOLECYSTECTOMY      ESSURE TUBAL LIGATION         Family History   Problem Relation Age of Onset    Stroke Mother     Hypertension Mother     Hyperlipidemia Mother     Hypothyroidism Mother     Heart attack Father     Heart disease Father     Prostate cancer Father     Other Father     Diabetes Father     Hypertension Father     Hyperlipidemia Father     Coronary artery disease Father     Breast cancer Paternal Grandmother     Hypothyroidism Grandchild     Ovarian cancer Neg Hx        Social History     Socioeconomic History    Marital status:    Tobacco Use    Smoking status: Every Day     Packs/day: 1.00     Years: 53.00     Pack years: 53.00     Types: Cigarettes    Smokeless tobacco: Never   Vaping Use    Vaping Use: Never used   Substance and Sexual Activity    Alcohol use: No    Drug use: No    Sexual activity: Defer           Objective   Physical Exam  Vitals and nursing note reviewed.   Constitutional:       Appearance: Normal appearance. She is well-developed and normal weight. She is not ill-appearing or toxic-appearing.   HENT:      Head: Normocephalic and atraumatic.      Nose: Nose normal.      Mouth/Throat:       Mouth: Mucous membranes are moist.   Eyes:      Pupils: Pupils are equal, round, and reactive to light.   Cardiovascular:      Rate and Rhythm: Normal rate and regular rhythm.      Pulses: Normal pulses.      Heart sounds: Normal heart sounds. No murmur heard.  Pulmonary:      Effort: Pulmonary effort is normal. No respiratory distress.      Breath sounds: Wheezing present. No rales.   Chest:      Chest wall: No tenderness.   Abdominal:      General: Bowel sounds are normal. There is no distension.      Palpations: Abdomen is soft.      Tenderness: There is abdominal tenderness. There is no right CVA tenderness, left CVA tenderness or guarding.   Musculoskeletal:         General: Normal range of motion.      Cervical back: Normal range of motion.   Skin:     General: Skin is warm and dry.      Capillary Refill: Capillary refill takes less than 2 seconds.      Findings: No rash.   Neurological:      General: No focal deficit present.      Mental Status: She is alert and oriented to person, place, and time.   Psychiatric:         Mood and Affect: Mood normal.         Behavior: Behavior normal.       ECG 12 Lead      Date/Time: 8/31/2023 11:16 AM  Performed by: Tiffanie Carter PA  Authorized by: Mayo Camarena MD   Interpreted by physician  Previous ECG: no previous ECG available  Rhythm: sinus rhythm  Rate: normal  BPM: 66  QRS axis: normal  Conduction: conduction normal  Clinical impression: non-specific ECG             ED Course  ED Course as of 08/31/23 1811   Thu Aug 31, 2023   0640 Due to significant overcrowding in the emergency department patient was evaluated by myself in a hallway bed.  This exam may be limited by privacy, noise and the patient not wearing hospital gown.  Explained to the patient our limitations and are overcrowding.  They were in agreement to continue the exam and treatment at this time. []   1120 I spoke with YULY Cottrell who agreed to accept patient for admission to the observation unit  "[]      ED Course User Index  [] LiveTiffanie mccurdy YULY Meng    /73 (BP Location: Right arm, Patient Position: Lying)   Pulse 80   Temp 97.5 °F (36.4 °C) (Oral)   Resp 18   Ht 160 cm (63\")   Wt 109 kg (240 lb 12.8 oz)   SpO2 91%   BMI 42.66 kg/m²   Labs Reviewed   RESPIRATORY PANEL PCR W/ COVID-19 (SARS-COV-2) STACY/MAYCOL/APRIL/PAD/COR/MAD/RAHUL IN-HOUSE, NP SWAB IN [place], 3-4 HR TAT - Abnormal; Notable for the following components:       Result Value    Human Rhinovirus/Enterovirus Detected (*)     All other components within normal limits    Narrative:     In the setting of a positive respiratory panel with a viral infection PLUS a negative procalcitonin without other underlying concern for bacterial infection, consider observing off antibiotics or discontinuation of antibiotics and continue supportive care. If the respiratory panel is positive for atypical bacterial infection (Bordetella pertussis, Chlamydophila pneumoniae, or Mycoplasma pneumoniae), consider antibiotic de-escalation to target atypical bacterial infection.   COMPREHENSIVE METABOLIC PANEL - Abnormal; Notable for the following components:    Glucose 183 (*)     ALT (SGPT) 34 (*)     AST (SGOT) 48 (*)     All other components within normal limits    Narrative:     GFR Normal >60  Chronic Kidney Disease <60  Kidney Failure <15     SINGLE HSTROPONIN T - Abnormal; Notable for the following components:    HS Troponin T 10 (*)     All other components within normal limits    Narrative:     High Sensitive Troponin T Reference Range:  <10.0 ng/L- Negative Female for AMI  <15.0 ng/L- Negative Male for AMI  >=10 - Abnormal Female indicating possible myocardial injury.  >=15 - Abnormal Male indicating possible myocardial injury.   Clinicians would have to utilize clinical acumen, EKG, Troponin, and serial changes to determine if it is an Acute Myocardial Infarction or myocardial injury due to an underlying chronic condition.        POCT GLUCOSE FINGERSTICK " - Abnormal; Notable for the following components:    Glucose 214 (*)     All other components within normal limits   POCT GLUCOSE FINGERSTICK - Abnormal; Notable for the following components:    Glucose 304 (*)     All other components within normal limits   LIPASE - Normal   URINALYSIS W/ CULTURE IF INDICATED - Normal    Narrative:     In absence of clinical symptoms, the presence of pyuria, bacteria, and/or nitrites on the urinalysis result does not correlate with infection.  Urine microscopic not indicated.   CBC WITH AUTO DIFFERENTIAL - Normal   BNP (IN-HOUSE) - Normal    Narrative:     Among patients with dyspnea, NT-proBNP is highly sensitive for the detection of acute congestive heart failure. In addition NT-proBNP of <300 pg/ml effectively rules out acute congestive heart failure with 99% negative predictive value.     CK - Normal   MAGNESIUM - Normal   RAINBOW DRAW    Narrative:     The following orders were created for panel order [place] Draw.  Procedure                               Abnormality         Status                     ---------                               -----------         ------                     Green Top (Gel)[[id number]]                                  Final result               Lavender Top[[id number]]                                     Final result               Gold Top - SST[[id number]]                                   Final result               Light Blue Top[[id number]]                                   Final result                 Please view results for these tests on the individual orders.   POCT GLUCOSE FINGERSTICK   POCT GLUCOSE FINGERSTICK   POCT GLUCOSE FINGERSTICK   POCT GLUCOSE FINGERSTICK   POCT GLUCOSE FINGERSTICK   GREEN TOP   LAVENDER TOP   GOLD TOP - SST   LIGHT BLUE TOP   CBC AND DIFFERENTIAL    Narrative:     The following orders were created for panel order CBC & Differential.  Procedure                               Abnormality         Status                      ---------                               -----------         ------                     CBC Auto Differential[[id number]]        Normal              Final result                 Please view results for these tests on the individual orders.     Medications   sodium chloride 0.9 % flush 10 mL (has no administration in time range)   amoxicillin-clavulanate (AUGMENTIN) 500-125 MG per tablet 500 mg (has no administration in time range)   aspirin chewable tablet 81 mg (81 mg Oral Given 8/31/23 1421)   bumetanide (BUMEX) tablet 1 mg (1 mg Oral Given 8/31/23 1421)   citalopram (CeleXA) tablet 40 mg (40 mg Oral Given 8/31/23 1421)   pantoprazole (PROTONIX) EC tablet 40 mg (40 mg Oral Given 8/31/23 1642)   fenofibrate (TRICOR) tablet 145 mg (145 mg Oral Given 8/31/23 1421)   budesonide-formoterol (SYMBICORT) 160-4.5 MCG/ACT inhaler 2 puff (2 puffs Inhalation Not Given 8/31/23 1344)   HYDROcodone-acetaminophen (NORCO)  MG per tablet 1 tablet (has no administration in time range)   levothyroxine (SYNTHROID, LEVOTHROID) tablet 50 mcg (has no administration in time range)   montelukast (SINGULAIR) tablet 10 mg (has no administration in time range)   rosuvastatin (CRESTOR) tablet 20 mg (20 mg Oral Given 8/31/23 1421)   vitamin B-12 (CYANOCOBALAMIN) tablet 1,000 mcg (1,000 mcg Oral Given 8/31/23 1421)   methylPREDNISolone sodium succinate (SOLU-Medrol) injection 40 mg (40 mg Intravenous Given 8/31/23 1422)   benzonatate (TESSALON) capsule 200 mg (has no administration in time range)   guaiFENesin (MUCINEX) 12 hr tablet 1,200 mg (1,200 mg Oral Given 8/31/23 1421)   ipratropium-albuterol (DUO-NEB) nebulizer solution 3 mL (has no administration in time range)   sodium chloride 0.9 % flush 10 mL (10 mL Intravenous Given 8/31/23 1425)   sodium chloride 0.9 % flush 10 mL (10 mL Intravenous Given 8/31/23 7279)   sodium chloride 0.9 % infusion 40 mL (has no administration in time range)   ondansetron (ZOFRAN) tablet 4 mg (has no  administration in time range)     Or   ondansetron (ZOFRAN) injection 4 mg (has no administration in time range)   melatonin tablet 5 mg (has no administration in time range)   Enoxaparin Sodium (LOVENOX) syringe 40 mg (40 mg Subcutaneous Given 8/31/23 1642)   dextrose (GLUTOSE) oral gel 15 g (has no administration in time range)   dextrose (D50W) (25 g/50 mL) IV injection 25 g (has no administration in time range)   glucagon (GLUCAGEN) injection 1 mg (has no administration in time range)   insulin lispro (HUMALOG/ADMELOG) injection 2-9 Units (7 Units Subcutaneous Given 8/31/23 1642)   nitroglycerin (NITROSTAT) SL tablet 0.4 mg (has no administration in time range)   Potassium Replacement - Follow Nurse / BPA Driven Protocol (has no administration in time range)   Magnesium Standard Dose Replacement - Follow Nurse / BPA Driven Protocol (has no administration in time range)   Phosphorus Replacement - Follow Nurse / BPA Driven Protocol (has no administration in time range)   Calcium Replacement - Follow Nurse / BPA Driven Protocol (has no administration in time range)   sennosides-docusate (PERICOLACE) 8.6-50 MG per tablet 2 tablet (has no administration in time range)     And   polyethylene glycol (MIRALAX) packet 17 g (has no administration in time range)     And   bisacodyl (DULCOLAX) EC tablet 5 mg (has no administration in time range)     And   bisacodyl (DULCOLAX) suppository 10 mg (has no administration in time range)   methylPREDNISolone sodium succinate (SOLU-Medrol) injection 125 mg (125 mg Intravenous Given 8/31/23 0707)   albuterol sulfate HFA (PROVENTIL HFA;VENTOLIN HFA;PROAIR HFA) inhaler 2 puff (2 puffs Inhalation Given 8/31/23 0710)   ondansetron (ZOFRAN) injection 4 mg (4 mg Intravenous Given 8/31/23 0711)   famotidine (PEPCID) injection 20 mg (20 mg Intravenous Given 8/31/23 0935)   iopamidol (ISOVUE-370) 76 % injection 100 mL (100 mL Intravenous Given 8/31/23 0846)   ipratropium-albuterol (DUO-NEB)  nebulizer solution 3 mL (3 mL Nebulization Given 8/31/23 1149)     CT Head Without Contrast    Result Date: 8/31/2023  Impression: No acute intracranial abnormality Electronically Signed: Paul Moreno MD  8/31/2023 8:53 AM EDT  Workstation ID: [id number]    CT Abdomen Pelvis With Contrast    Result Date: 8/31/2023  Impression: 1. Fatty liver and hepatomegaly 2. Sigmoid diverticulosis 3. Multiple small nonspecific pulmonary nodules. CT chest correlation recommended Electronically Signed: Paul Moreno MD  8/31/2023 9:12 AM EDT  Workstation ID: [id number]    XR Chest 1 View    Result Date: 8/31/2023  Impression: No active cardiopulmonary disease Electronically Signed: Carlos Alberto Barker  8/31/2023 11:05 AM EDT  Workstation ID: [id number]                                          Medical Decision Making  Differential Dx (Includes but not limited to): COPD exacerbation, pneumonia, viral syndrome, pancreatitis, NSTEMI, rhabdo, electrolyte abnormality  Medical Records Reviewed: Patient seen at [facility] 8/26/2023 for complaints of cough and chest congestion.  Chest x-ray at that time showed no acute cardiopulmonary abnormality.  Patient did have a CT PE protocol during this visit showing no evidence of pulmonary embolism, significant for cardiomegaly and probable hepatomegaly.  Labs: On my interpretation, urinalysis negative for UTI, CBC no leukocytosis.  CMP glucose 183. Troponin 10.  BNP less than 36.  Imaging: On my interpretation, CT abdomen pelvis shows fatty liver and hepatomegaly, no acute infectious process.  CT head shows no evidence of intracranial hemorrhage or brain tumor  Telemetry: EKG interpretation: Sinus rhythm rate of 66 with no acute ST changes  Testing considered but not ordered: MRI patient has no focal findings  Nature of Complaint: Acute  Admission vs Discharge: Admission  Discussion: While in the ED IV was placed and labs were obtained appropriate PPE was worn during exam and throughout all  encounters with the patient.  Patient had the above evaluation.  IV established, blood obtained.  Patient given Solu-Medrol for wheezing.  She is also given inhaler treatment.  Chest x-ray shows no obvious pneumonia.  Lab work reassuring, no significant leukocytosis or electrolyte abnormality.  Troponin 10, BNP normal.  On reevaluation patient reports breathing has improved but she generally feels weak and feels like her legs will give out.  No back pain/incisional bladder or bladder function.  Patient was placed in ED observation for generalized weakness, shortness of breath and likely COPD exacerbation.  I spoke with YULY Cottrell who agreed to accept patient for admission to the observation unit.    Amount and/or Complexity of Data Reviewed  Labs: ordered. Decision-making details documented in ED Course.  Radiology: ordered. Decision-making details documented in ED Course.  ECG/medicine tests: ordered. Decision-making details documented in ED Course.    Risk  Prescription drug management.        Final diagnoses:   COPD exacerbation   Generalized weakness   Abdominal pain, unspecified abdominal location       ED Disposition  ED Disposition       ED Disposition   Decision to Admit    Condition   --    Comment   --               No follow-up provider specified.       Medication List      No changes were made to your prescriptions during this visit.            Tiffanie Carter PA  08/31/23 1169

## 2023-08-31 NOTE — PLAN OF CARE
Goal Outcome Evaluation:   Pt A/Ox4 SOA at rest and exertion on 3L NC no home oxygen only Cpap at HS. Family stated will bring in cpap from home.

## 2023-09-01 ENCOUNTER — READMISSION MANAGEMENT (OUTPATIENT)
Dept: CALL CENTER | Facility: HOSPITAL | Age: 53
End: 2023-09-01
Payer: MEDICARE

## 2023-09-01 VITALS
TEMPERATURE: 97.6 F | HEIGHT: 63 IN | BODY MASS INDEX: 41.09 KG/M2 | OXYGEN SATURATION: 90 % | WEIGHT: 231.92 LBS | DIASTOLIC BLOOD PRESSURE: 72 MMHG | RESPIRATION RATE: 23 BRPM | SYSTOLIC BLOOD PRESSURE: 125 MMHG | HEART RATE: 118 BPM

## 2023-09-01 LAB
ALBUMIN SERPL-MCNC: 4.3 G/DL (ref 3.5–5.2)
ALP SERPL-CCNC: 112 U/L (ref 39–117)
ALT SERPL W P-5'-P-CCNC: 30 U/L (ref 1–33)
ANION GAP SERPL CALCULATED.3IONS-SCNC: 12 MMOL/L (ref 5–15)
AST SERPL-CCNC: 28 U/L (ref 1–32)
BASOPHILS # BLD AUTO: 0 10*3/MM3 (ref 0–0.2)
BASOPHILS NFR BLD AUTO: 0.5 % (ref 0–1.5)
BILIRUB CONJ SERPL-MCNC: <0.2 MG/DL (ref 0–0.3)
BILIRUB INDIRECT SERPL-MCNC: NORMAL MG/DL
BILIRUB SERPL-MCNC: 0.3 MG/DL (ref 0–1.2)
BUN SERPL-MCNC: 15 MG/DL (ref 6–20)
BUN/CREAT SERPL: 14.7 (ref 7–25)
CALCIUM SPEC-SCNC: 10.1 MG/DL (ref 8.6–10.5)
CHLORIDE SERPL-SCNC: 99 MMOL/L (ref 98–107)
CHOLEST SERPL-MCNC: 166 MG/DL (ref 0–200)
CO2 SERPL-SCNC: 28 MMOL/L (ref 22–29)
CREAT SERPL-MCNC: 1.02 MG/DL (ref 0.57–1)
DEPRECATED RDW RBC AUTO: 45.1 FL (ref 37–54)
EGFRCR SERPLBLD CKD-EPI 2021: 65.9 ML/MIN/1.73
EOSINOPHIL # BLD AUTO: 0 10*3/MM3 (ref 0–0.4)
EOSINOPHIL NFR BLD AUTO: 0 % (ref 0.3–6.2)
ERYTHROCYTE [DISTWIDTH] IN BLOOD BY AUTOMATED COUNT: 13.2 % (ref 12.3–15.4)
GLUCOSE BLDC GLUCOMTR-MCNC: 214 MG/DL (ref 70–105)
GLUCOSE BLDC GLUCOMTR-MCNC: 255 MG/DL (ref 70–105)
GLUCOSE SERPL-MCNC: 212 MG/DL (ref 65–99)
HBA1C MFR BLD: 6.7 % (ref 4.8–5.6)
HCT VFR BLD AUTO: 41.7 % (ref 34–46.6)
HDLC SERPL-MCNC: 36 MG/DL (ref 40–60)
HGB BLD-MCNC: 14 G/DL (ref 12–15.9)
LDLC SERPL CALC-MCNC: 99 MG/DL (ref 0–100)
LDLC/HDLC SERPL: 2.63 {RATIO}
LYMPHOCYTES # BLD AUTO: 1.1 10*3/MM3 (ref 0.7–3.1)
LYMPHOCYTES NFR BLD AUTO: 10.6 % (ref 19.6–45.3)
MAGNESIUM SERPL-MCNC: 1.9 MG/DL (ref 1.6–2.6)
MCH RBC QN AUTO: 30.7 PG (ref 26.6–33)
MCHC RBC AUTO-ENTMCNC: 33.5 G/DL (ref 31.5–35.7)
MCV RBC AUTO: 91.5 FL (ref 79–97)
MONOCYTES # BLD AUTO: 0.2 10*3/MM3 (ref 0.1–0.9)
MONOCYTES NFR BLD AUTO: 2 % (ref 5–12)
NEUTROPHILS NFR BLD AUTO: 86.9 % (ref 42.7–76)
NEUTROPHILS NFR BLD AUTO: 9.3 10*3/MM3 (ref 1.7–7)
NRBC BLD AUTO-RTO: 0 /100 WBC (ref 0–0.2)
PLATELET # BLD AUTO: 215 10*3/MM3 (ref 140–450)
PMV BLD AUTO: 9.6 FL (ref 6–12)
POTASSIUM SERPL-SCNC: 4.4 MMOL/L (ref 3.5–5.2)
PROT SERPL-MCNC: 7.4 G/DL (ref 6–8.5)
RBC # BLD AUTO: 4.56 10*6/MM3 (ref 3.77–5.28)
SODIUM SERPL-SCNC: 139 MMOL/L (ref 136–145)
T4 FREE SERPL-MCNC: 1.09 NG/DL (ref 0.93–1.7)
TRIGL SERPL-MCNC: 177 MG/DL (ref 0–150)
TSH SERPL DL<=0.05 MIU/L-ACNC: 0.66 UIU/ML (ref 0.27–4.2)
VLDLC SERPL-MCNC: 31 MG/DL (ref 5–40)
WBC NRBC COR # BLD: 10.7 10*3/MM3 (ref 3.4–10.8)

## 2023-09-01 PROCEDURE — 25010000002 METHYLPREDNISOLONE PER 40 MG: Performed by: PHYSICIAN ASSISTANT

## 2023-09-01 PROCEDURE — 63710000001 INSULIN LISPRO (HUMAN) PER 5 UNITS: Performed by: PHYSICIAN ASSISTANT

## 2023-09-01 PROCEDURE — 94761 N-INVAS EAR/PLS OXIMETRY MLT: CPT

## 2023-09-01 PROCEDURE — 94799 UNLISTED PULMONARY SVC/PX: CPT

## 2023-09-01 PROCEDURE — 80048 BASIC METABOLIC PNL TOTAL CA: CPT | Performed by: PHYSICIAN ASSISTANT

## 2023-09-01 PROCEDURE — 83735 ASSAY OF MAGNESIUM: CPT | Performed by: PHYSICIAN ASSISTANT

## 2023-09-01 PROCEDURE — 85025 COMPLETE CBC W/AUTO DIFF WBC: CPT | Performed by: PHYSICIAN ASSISTANT

## 2023-09-01 PROCEDURE — 63710000001 PREDNISONE PER 1 MG: Performed by: NURSE PRACTITIONER

## 2023-09-01 PROCEDURE — 94664 DEMO&/EVAL PT USE INHALER: CPT

## 2023-09-01 PROCEDURE — 84439 ASSAY OF FREE THYROXINE: CPT | Performed by: NURSE PRACTITIONER

## 2023-09-01 PROCEDURE — 84443 ASSAY THYROID STIM HORMONE: CPT | Performed by: NURSE PRACTITIONER

## 2023-09-01 PROCEDURE — 94660 CPAP INITIATION&MGMT: CPT

## 2023-09-01 PROCEDURE — G0378 HOSPITAL OBSERVATION PER HR: HCPCS

## 2023-09-01 PROCEDURE — 63710000001 PREDNISONE PER 5 MG: Performed by: NURSE PRACTITIONER

## 2023-09-01 PROCEDURE — 80076 HEPATIC FUNCTION PANEL: CPT | Performed by: NURSE PRACTITIONER

## 2023-09-01 PROCEDURE — 80061 LIPID PANEL: CPT | Performed by: NURSE PRACTITIONER

## 2023-09-01 PROCEDURE — 83036 HEMOGLOBIN GLYCOSYLATED A1C: CPT | Performed by: NURSE PRACTITIONER

## 2023-09-01 PROCEDURE — 82948 REAGENT STRIP/BLOOD GLUCOSE: CPT

## 2023-09-01 PROCEDURE — 96376 TX/PRO/DX INJ SAME DRUG ADON: CPT

## 2023-09-01 PROCEDURE — 94618 PULMONARY STRESS TESTING: CPT

## 2023-09-01 RX ORDER — CETIRIZINE HYDROCHLORIDE 10 MG/1
10 TABLET ORAL DAILY
Status: DISCONTINUED | OUTPATIENT
Start: 2023-09-01 | End: 2023-09-01 | Stop reason: HOSPADM

## 2023-09-01 RX ORDER — GUAIFENESIN 600 MG/1
1200 TABLET, EXTENDED RELEASE ORAL 2 TIMES DAILY
Qty: 28 TABLET | Refills: 0 | Status: SHIPPED | OUTPATIENT
Start: 2023-09-01 | End: 2023-09-08

## 2023-09-01 RX ORDER — PREDNISONE 10 MG/1
30 TABLET ORAL DAILY
Qty: 6 TABLET | Refills: 0 | Status: SHIPPED | OUTPATIENT
Start: 2023-09-01 | End: 2023-09-03

## 2023-09-01 RX ORDER — ALBUTEROL SULFATE 0.63 MG/3ML
SOLUTION RESPIRATORY (INHALATION)
Status: DISCONTINUED
Start: 2023-09-01 | End: 2023-09-01 | Stop reason: HOSPADM

## 2023-09-01 RX ORDER — PREDNISONE 10 MG/1
10 TABLET ORAL DAILY
Qty: 2 TABLET | Refills: 0 | Status: SHIPPED | OUTPATIENT
Start: 2023-09-05 | End: 2023-09-07

## 2023-09-01 RX ORDER — PREDNISONE 10 MG/1
10 TABLET ORAL DAILY
Status: DISCONTINUED | OUTPATIENT
Start: 2023-09-05 | End: 2023-09-01 | Stop reason: HOSPADM

## 2023-09-01 RX ORDER — BENZONATATE 200 MG/1
200 CAPSULE ORAL 3 TIMES DAILY PRN
Qty: 30 CAPSULE | Refills: 0 | Status: SHIPPED | OUTPATIENT
Start: 2023-09-01

## 2023-09-01 RX ORDER — NICOTINE 21 MG/24HR
1 PATCH, TRANSDERMAL 24 HOURS TRANSDERMAL
Status: DISCONTINUED | OUTPATIENT
Start: 2023-09-01 | End: 2023-09-01 | Stop reason: HOSPADM

## 2023-09-01 RX ORDER — PREDNISONE 20 MG/1
20 TABLET ORAL DAILY
Qty: 2 TABLET | Refills: 0 | Status: SHIPPED | OUTPATIENT
Start: 2023-09-03 | End: 2023-09-05

## 2023-09-01 RX ORDER — BUDESONIDE AND FORMOTEROL FUMARATE DIHYDRATE 160; 4.5 UG/1; UG/1
2 AEROSOL RESPIRATORY (INHALATION)
Qty: 10.2 G | Refills: 0 | Status: SHIPPED | OUTPATIENT
Start: 2023-09-01 | End: 2023-09-01 | Stop reason: HOSPADM

## 2023-09-01 RX ORDER — DICYCLOMINE HYDROCHLORIDE 10 MG/1
10 CAPSULE ORAL 4 TIMES DAILY
Status: DISCONTINUED | OUTPATIENT
Start: 2023-09-01 | End: 2023-09-01 | Stop reason: HOSPADM

## 2023-09-01 RX ORDER — PREDNISONE 20 MG/1
20 TABLET ORAL DAILY
Status: DISCONTINUED | OUTPATIENT
Start: 2023-09-03 | End: 2023-09-01 | Stop reason: HOSPADM

## 2023-09-01 RX ADMIN — ROSUVASTATIN 20 MG: 10 TABLET, FILM COATED ORAL at 10:23

## 2023-09-01 RX ADMIN — PREDNISONE 30 MG: 20 TABLET ORAL at 14:30

## 2023-09-01 RX ADMIN — BUDESONIDE AND FORMOTEROL FUMARATE DIHYDRATE 2 PUFF: 160; 4.5 AEROSOL RESPIRATORY (INHALATION) at 08:02

## 2023-09-01 RX ADMIN — CETIRIZINE HYDROCHLORIDE 10 MG: 10 TABLET, FILM COATED ORAL at 10:24

## 2023-09-01 RX ADMIN — FENOFIBRATE 145 MG: 145 TABLET ORAL at 10:24

## 2023-09-01 RX ADMIN — HYDROCODONE BITARTRATE AND ACETAMINOPHEN 1 TABLET: 10; 325 TABLET ORAL at 10:36

## 2023-09-01 RX ADMIN — IPRATROPIUM BROMIDE AND ALBUTEROL SULFATE 3 ML: .5; 3 SOLUTION RESPIRATORY (INHALATION) at 05:20

## 2023-09-01 RX ADMIN — IPRATROPIUM BROMIDE AND ALBUTEROL SULFATE 3 ML: .5; 3 SOLUTION RESPIRATORY (INHALATION) at 08:06

## 2023-09-01 RX ADMIN — LEVOTHYROXINE SODIUM 50 MCG: 0.05 TABLET ORAL at 05:48

## 2023-09-01 RX ADMIN — INSULIN LISPRO 4 UNITS: 100 INJECTION, SOLUTION INTRAVENOUS; SUBCUTANEOUS at 10:24

## 2023-09-01 RX ADMIN — PANTOPRAZOLE SODIUM 40 MG: 40 TABLET, DELAYED RELEASE ORAL at 10:24

## 2023-09-01 RX ADMIN — BUMETANIDE 1 MG: 1 TABLET ORAL at 10:23

## 2023-09-01 RX ADMIN — CITALOPRAM HYDROBROMIDE 40 MG: 20 TABLET, FILM COATED ORAL at 10:24

## 2023-09-01 RX ADMIN — DICYCLOMINE HYDROCHLORIDE 10 MG: 10 CAPSULE ORAL at 10:23

## 2023-09-01 RX ADMIN — IPRATROPIUM BROMIDE AND ALBUTEROL SULFATE 3 ML: .5; 3 SOLUTION RESPIRATORY (INHALATION) at 11:00

## 2023-09-01 RX ADMIN — METHYLPREDNISOLONE SODIUM SUCCINATE 40 MG: 40 INJECTION, POWDER, FOR SOLUTION INTRAMUSCULAR; INTRAVENOUS at 05:48

## 2023-09-01 RX ADMIN — GUAIFENESIN 1200 MG: 600 TABLET, EXTENDED RELEASE ORAL at 10:23

## 2023-09-01 RX ADMIN — CYANOCOBALAMIN TAB 1000 MCG 1000 MCG: 1000 TAB at 10:24

## 2023-09-01 RX ADMIN — AMOXICILLIN AND CLAVULANATE POTASSIUM 500 MG: 500; 125 TABLET, FILM COATED ORAL at 10:23

## 2023-09-01 RX ADMIN — ASPIRIN 81 MG CHEWABLE TABLET 81 MG: 81 TABLET CHEWABLE at 10:24

## 2023-09-01 NOTE — PLAN OF CARE
Goal Outcome Evaluation:      Patient discharging home with O2. Patient stable. Patient awaiting O2 delivery.

## 2023-09-01 NOTE — DISCHARGE PLACEMENT REQUEST
"[facility] OBSERVATION  [address] [facility] [place] [address]  Dept. Phone:  [phone]  Dept. Fax:  [phone] Date Ordered: Sep 1, 2023         Patient:  Riddhi Cid MRN:  [id number]   [address] [place] [address] :  1970  SSN:    Phone: [phone] Sex:  F     Weight: 105 kg (231 lb 14.8 oz)         Ht Readings from Last 1 Encounters:   23 160 cm (63\")         Home Oxygen Therapy          (Order ID: [id number])    Diagnosis:  COPD exacerbation (J44.1 [ICD-10-CM] 491.21 [ICD-9-CM])  Simple chronic bronchitis (J41.0 [ICD-10-CM] 491.0 [ICD-9-CM])   Quantity:  1     Delivery Modality: Nasal Cannula  Liters Per Minute: 2  Duration: Continuous  Equipment:  Oxygen Concentrator &  &  Portable Gaseous Oxygen System & Portable Oxygen Contents Gaseous &  Conserving Regulator  Length of Need (99 Months = Lifetime): 99 Months = Lifetime        Authorizing Provider's Phone: [phone]  Authorizing Provider:Leora Santos APRN  Authorizing Provider's NPI: [id number]  Order Entered By: Leora Santos APRN 2023  1:04 PM     Electronically signed by: Leora Santos APRN 2023  1:04 PM      Riddhi Cid (52 y.o. Female)       Date of Birth   1970    Social Security Number       Address   [address] [place] [address]    Home Phone   [phone]    MRN   [id number]       Jainism   None    Marital Status                               Admission Date   23    Admission Type   Emergency    Admitting Provider   Mayo Camarena MD    Attending Provider   Mayo Camarena MD    Department, Room/Bed   [facility] OBSERVATION, 113       Discharge Date       Discharge Disposition   Home or Self Care    Discharge Destination                                 Attending Provider: Mayo Camarena MD    Allergies: Sulfa Antibiotics, Meloxicam    Isolation: Droplet   Infection: Rhinovirus  (23)   Code Status: CPR    Ht: " "160 cm (63\")   Wt: 105 kg (231 lb 14.8 oz)    Admission Cmt: None   Principal Problem: COPD exacerbation [J44.1]                   Active Insurance as of 2023       Primary Coverage       Payor Plan Insurance Group Employer/Plan Group    [employer]WELL MEDICARE REPLACEMENT WELL[employer]WELL MEDICARE REPLACEMENT [id number]       Payor Plan Address Payor Plan Phone Number Payor Plan Fax Number Effective Dates    [address]   2022 - None Entered    [facility] ATTN: [facility]S       [place] [address]         Subscriber Name Subscriber Birth Date Member ID       RIDDHI CID 1970 [id number]               Secondary Coverage       Payor Plan Insurance Group Employer/Plan Group    [place]        Payor Plan Address Payor Plan Phone Number Payor Plan Fax Number Effective Dates    [address]   2019 - None Entered    [place] [address]         Subscriber Name Subscriber Birth Date Member ID       RIDDHI CID 1970 [id number]                     Emergency Contacts        (Rel.) Home Phone Work Phone Mobile Phone    johny hall (Daughter) -- -- [phone]                 History & Physical        Rogelio Garcia PA-C at 23 1240       Attestation signed by Mayo Camarena MD at 23 0033    Agree with note                [facility] H&P    Patient Name: Riddhi Cid  : 1970  MRN: [id number]  Primary Care Physician: June Harrison MD  Date of admission: 2023     Patient Care Team:  June Harrison MD as PCP - General (Family Medicine)  Izaiah Fernandez MD as Consulting Physician (Pain Medicine)          Subjective   History Present Illness     Chief Complaint:   Chief Complaint   Patient presents with    Dizziness     Cough and dyspnea    History of Present Illness  Obtained from ED provider HPI on 2023:  Patient is a 52-year-old  female with history of COPD, diabetes, IBS presents to " the ER with complaints of epigastric abdominal pain nausea and some shortness of breath for the last 2 days. Patient states that she was seen at [facility] within the last week and diagnosed with a URI and placed on antibiotics and steroids. Patient states she has not started taking the steroids. She complains of new epigastric abdominal pain that she describes an aching cramping with nausea and 1 episode of vomiting. She also states that she may have had some bright red blood in her stool last night but she is not sure. No melena. She denies dysuria or hematuria. She denies headache lightheadedness or dizziness. Patient does not normally wear oxygen at home and is currently requiring 2 L and states that her O2 has been dropping at home to 87%. She denies any lower extremity swelling but does report increased cramping. Patient has been off of her metformin because she recently had CT with contrast and thinks it may be due to that. Abdominal surgical history significant for cholecystectomy.    08/31/23 (postobservation admission):  Patient emergency HPI noted above including approximately 2-day history of worsening dyspnea with a cough productive of some clear yellow sputum and occasional nausea without vomiting.  She denies any pain including chest pain, fever, peripheral edema or palpitations.  She has noted some leg cramping which is improved with her respiratory status.  Patient does continue to smoke approximately 1 pack of cigarettes per day and does not drink alcohol.  Patient was seen at an outlying facility and had previous imaging and was started on antibiotics and steroids with patient reporting that she had not begun steroid therapy yet.      Review of Systems   Constitutional: Negative.   HENT: Negative.     Eyes: Negative.    Cardiovascular: Negative.    Respiratory:  Positive for cough, shortness of breath, sputum production and wheezing.    Skin: Negative.    Musculoskeletal:  Positive  for muscle cramps.   Gastrointestinal: Negative.    Genitourinary: Negative.    Neurological: Negative.    Psychiatric/Behavioral: Negative.           Personal History     Past Medical History:   Past Medical History:   Diagnosis Date    Allergic     Anemia     Anxiety     Arthritis     Asthma     COPD (chronic obstructive pulmonary disease)     Costochondritis     Degenerative disc disease, lumbar     Depression     Diabetes mellitus     GERD (gastroesophageal reflux disease)     History of chicken pox     Hyperlipidemia     IBS (irritable bowel syndrome)     Injury of back     Irritable colon     Knee pain, left     Obstructive sleep apnea     Thyroid nodule     Tired 07/01/2019    Vitamin D deficiency        Surgical History:      Past Surgical History:   Procedure Laterality Date    BREAST BIOPSY Right     CARPAL TUNNEL RELEASE      CERVICAL DISCECTOMY ANTERIOR      CHOLECYSTECTOMY      ESSURE TUBAL LIGATION             Family History: family history includes Breast cancer in her paternal grandmother; Coronary artery disease in her father; Diabetes in her father; Heart attack in her father; Heart disease in her father; Hyperlipidemia in her father and mother; Hypertension in her father and mother; Hypothyroidism in her grandchild and mother; Other in her father; Prostate cancer in her father; Stroke in her mother. Otherwise pertinent FHx was reviewed and unremarkable.     Social History:  reports that she has been smoking cigarettes. She has a 53.00 pack-year smoking history. She has never used smokeless tobacco. She reports that she does not drink alcohol and does not use drugs.      Medications:  Prior to Admission medications    Medication Sig Start Date End Date Taking? Authorizing Provider   albuterol (PROVENTIL) (2.5 MG/3ML) 0.083% nebulizer solution Take 2.5 mg by nebulization Every 4 (Four) Hours As Needed for Wheezing. 8/17/23  Yes June Harrison MD   albuterol sulfate  (90 Base) MCG/ACT  inhaler Inhale 2 puffs 4 (Four) Times a Day.   Yes ProviderKeaton MD   amoxicillin-clavulanate (AUGMENTIN) 500-125 MG per tablet Take 1 tablet by mouth 2 (Two) Times a Day. 8/30/23 9/13/23 Yes Keaton Montoya MD   aspirin 81 MG chewable tablet Chew 1 tablet Daily.   Yes Keaton Montoya MD   bumetanide (BUMEX) 1 MG tablet Take 1 tablet by mouth Daily.   Yes ProviderKeaton MD   citalopram (CeleXA) 40 MG tablet Take 1 tablet by mouth Daily. 1/16/23  Yes June Harrison MD   dexlansoprazole (Dexilant) 60 MG capsule Take 1 capsule by mouth Daily. 2/13/23  Yes June Harrison MD   famotidine (PEPCID) 40 MG tablet Take 1 tablet by mouth Every Night. 8/11/23  Yes Keaton Montoya MD   fenofibrate (TRICOR) 145 MG tablet Take 1 tablet by mouth Daily. 1/28/19  Yes Keaton Montoya MD   fluticasone (FLONASE) 50 MCG/ACT nasal spray 2 sprays into the nostril(s) as directed by provider Daily.   Yes ProviderKeaton MD   Fluticasone-Salmeterol (Advair Diskus) 250-50 MCG/ACT DISKUS Inhale 1 puff 2 (Two) Times a Day.   Yes ProviderKeaton MD   folic acid (FOLVITE) 1 MG tablet Take 1 tablet by mouth Daily.   Yes ProviderKeaton MD   guaiFENesin (MUCINEX) 600 MG 12 hr tablet Take 1 tablet by mouth 2 (Two) Times a Day. 8/27/23 9/3/23 Yes ProviderKeaton MD   HYDROcodone-acetaminophen (Norco)  MG per tablet Take 1 tablet by mouth Every 6 (Six) Hours As Needed for Moderate Pain. 8/7/23  Yes Izaiah Fernandez MD   levothyroxine (SYNTHROID, LEVOTHROID) 50 MCG tablet Take 1 tablet by mouth Daily.   Yes ProviderKeaton MD   Linzess 290 MCG capsule capsule Take 1 capsule by mouth Every Other Day. 10/7/21  Yes Keaton Montoya MD   metFORMIN ER (GLUCOPHAGE-XR) 500 MG 24 hr tablet Take 1 tablet by mouth Daily With Breakfast.   Yes ProviderKeaton MD   montelukast (SINGULAIR) 10 MG tablet Take 1 tablet by mouth Every Night.   Yes Provider, MD Keaton    Omega-3 Fatty Acids (fish oil) 1000 MG capsule capsule 2 capsules Daily With Breakfast. 1/28/19  Yes Keaton Montoya MD   ondansetron ODT (ZOFRAN-ODT) 4 MG disintegrating tablet Place 1 tablet on the tongue Every 8 (Eight) Hours As Needed. 7/17/23  Yes Keaton Montoya MD   potassium chloride (K-DUR,KLOR-CON) 20 MEQ CR tablet Take 1 tablet by mouth As Needed. Only if taking 2 bumetanide   Yes Keaton Montoya MD   rosuvastatin (CRESTOR) 20 MG tablet Take 1 tablet by mouth Daily.   Yes Keaton Montoya MD   vitamin B-12 (CYANOCOBALAMIN) 1000 MCG tablet Take 1 tablet by mouth Daily.   Yes Keaton Montoya MD   vitamin d (D3 5000) 125 MCG (5000 UT) capsule Take 1 capsule by mouth Daily.   Yes Keaton Montoya MD   Accu-Chek FastClix Lancets misc 1 each by Other route Daily. 8/19/22   June Harrison MD   Blood Glucose Monitoring Suppl (Accu-Chek Guide) w/Device kit 1 each Daily. 8/19/22   June Harrison MD   glucose blood (Accu-Chek Guide) test strip 1 each by Other route Daily. Use as instructed 8/19/22   June Harrison MD   Lancets Misc. (ACCU-CHEK FASTCLIX LANCET) kit Dx: E11.9, DM type 2, controlled    Keaton Montoya MD   Advair Diskus 250-50 MCG/ACT DISKUS INHALE 1 PUFF BY MOUTH TWICE DAILY 10/3/22 8/31/23  June Harrison MD   albuterol sulfate  (90 Base) MCG/ACT inhaler INHALE 2 PUFFS BY MOUTH EVERY 4 HOURS AS NEEDED FOR WHEEZING 6/2/23 8/31/23  June Harrison MD   azelastine (OPTIVAR) 0.05 % ophthalmic solution  9/14/20 8/31/23  Keaton Montoya MD   benzonatate (TESSALON) 100 MG capsule Take 1 capsule by mouth 3 (Three) Times a Day. 8/6/23 8/31/23  Keaton Montoya MD   bumetanide (BUMEX) 1 MG tablet TAKE 1 TO 2 TABLETS BY MOUTH EVERY DAY AS NEEDED 9/12/22 8/31/23  June Harrison MD   Cyanocobalamin (B-12) 1000 MCG tablet controlled-release TAKE 1 TABLET BY MOUTH EVERY DAY 9/23/21 8/31/23  Megan Menard MD   doxycycline (MONODOX) 100 MG  capsule  8/6/23 8/31/23  ProviderKeaton MD   fluticasone (FLONASE) 50 MCG/ACT nasal spray SHAKE LIQUID AND USE 2 SPRAYS IN EACH NOSTRIL EVERY DAY 11/28/22 8/31/23  June Harrison MD   folic acid (FOLVITE) 1 MG tablet TAKE 1 TABLET BY MOUTH DAILY 1/19/22 8/31/23  Megan Menard MD   gabapentin (NEURONTIN) 300 MG capsule Take 1 capsule by mouth 3 (Three) Times a Day. 1/10/22 8/31/23  Izaiah Fernandez MD   levothyroxine (SYNTHROID, LEVOTHROID) 50 MCG tablet TAKE 1 TABLET BY MOUTH DAILY 3/27/23 8/31/23  Megan Menard MD   metFORMIN ER (GLUCOPHAGE-XR) 500 MG 24 hr tablet TAKE 1 TABLET BY MOUTH DAILY 6/6/22 8/31/23  Megan Menard MD   montelukast (SINGULAIR) 10 MG tablet TAKE 1 TABLET BY MOUTH EVERY NIGHT 2/24/23 8/31/23  June Harrison MD   potassium chloride ER (K-TAB) 20 MEQ tablet controlled-release ER tablet TAKE 1 TABLET BY MOUTH EVERY DAY, ONLY IF TAKING 2 BUMETANIDE 3/25/22 8/31/23  June Harrison MD   rosuvastatin (CRESTOR) 20 MG tablet TAKE 1 TABLET BY MOUTH DAILY 5/2/23 8/31/23  June Harrison MD   vitamin D3 125 MCG (5000 UT) capsule capsule TAKE 1 CAPSULE BY MOUTH DAILY 9/22/21 8/31/23  Megan Menard MD       Allergies:    Allergies   Allergen Reactions    Sulfa Antibiotics Rash    Meloxicam GI Intolerance       Objective   Objective     Vital Signs  Temp:  [98.1 °F (36.7 °C)] 98.1 °F (36.7 °C)  Heart Rate:  [71-78] 71  Resp:  [20-28] 20  BP: (112-135)/(64-86) 112/70  SpO2:  [91 %-96 %] 93 %  on  Flow (L/min):  [2-3] 3;   Device (Oxygen Therapy): nasal cannula  Body mass index is 42.66 kg/m².    Physical Exam  Constitutional:       General: She is not in acute distress.     Appearance: Normal appearance. She is not ill-appearing, toxic-appearing or diaphoretic.   HENT:      Head: Normocephalic.      Right Ear: External ear normal.      Left Ear: External ear normal.      Nose: Nose normal.      Mouth/Throat:      Mouth: Mucous membranes are moist.   Eyes:      Extraocular  Movements: Extraocular movements intact.   Cardiovascular:      Rate and Rhythm: Normal rate and regular rhythm.      Pulses: Normal pulses.   Pulmonary:      Effort: Pulmonary effort is normal.      Breath sounds: Wheezing present.   Abdominal:      General: Bowel sounds are normal.      Palpations: Abdomen is soft.   Musculoskeletal:      Cervical back: Normal range of motion.      Right lower leg: No edema.      Left lower leg: No edema.   Skin:     General: Skin is warm and dry.      Capillary Refill: Capillary refill takes less than 2 seconds.   Neurological:      General: No focal deficit present.      Mental Status: She is alert.   Psychiatric:         Mood and Affect: Mood normal.         Behavior: Behavior normal.         Thought Content: Thought content normal.         Judgment: Judgment normal.         Results Review:  I have personally reviewed most recent cardiac tracings, lab results, and radiology images and interpretations and agree with findings, most notably: Troponin, CK, proBNP, CMP, CBC, chest x-ray, CT of abdomen and pelvis, CT of head and EKG.    Results from last 7 days   Lab Units 08/31/23  0629   WBC 10*3/mm3 6.30   HEMOGLOBIN g/dL 13.8   HEMATOCRIT % 41.9   PLATELETS 10*3/mm3 208     Results from last 7 days   Lab Units 08/31/23  0629   SODIUM mmol/L 144   POTASSIUM mmol/L 4.0   CHLORIDE mmol/L 104   CO2 mmol/L 28.0   BUN mg/dL 7   CREATININE mg/dL 0.86   GLUCOSE mg/dL 183*   CALCIUM mg/dL 10.0   ALT (SGPT) U/L 34*   AST (SGOT) U/L 48*   HSTROP T ng/L 10*   PROBNP pg/mL <36.0     Estimated Creatinine Clearance: 89.6 mL/min (by C-G formula based on SCr of 0.86 mg/dL).  Brief Urine Lab Results  (Last result in the past 365 days)        Color   Clarity   Blood   Leuk Est   Nitrite   Protein   CREAT   Urine HCG        08/31/23 0650 Yellow   Clear   Negative   Negative   Negative   Negative                   Microbiology Results (last 10 days)       Procedure Component Value - Date/Time     Respiratory Panel PCR w/COVID-19(SARS-CoV-2) STACY/MAYCOL/APRIL/PAD/COR/MAD/RAHUL In-House, NP Swab in UTM/VTM, 3-4 HR TAT - Swab, Nasopharynx [[id number]]  (Abnormal) Collected: 08/31/23 1325    Lab Status: Final result Specimen: Swab from Nasopharynx Updated: 08/31/23 1427     ADENOVIRUS, PCR Not Detected     Coronavirus 229E Not Detected     Coronavirus HKU1 Not Detected     Coronavirus NL63 Not Detected     Coronavirus OC43 Not Detected     COVID19 Not Detected     Human Metapneumovirus Not Detected     Human Rhinovirus/Enterovirus Detected     Influenza A PCR Not Detected     Influenza B PCR Not Detected     Parainfluenza Virus 1 Not Detected     Parainfluenza Virus 2 Not Detected     Parainfluenza Virus 3 Not Detected     Parainfluenza Virus 4 Not Detected     RSV, PCR Not Detected     Bordetella pertussis pcr Not Detected     Bordetella parapertussis PCR Not Detected     Chlamydophila pneumoniae PCR Not Detected     Mycoplasma pneumo by PCR Not Detected    Narrative:      In the setting of a positive respiratory panel with a viral infection PLUS a negative procalcitonin without other underlying concern for bacterial infection, consider observing off antibiotics or discontinuation of antibiotics and continue supportive care. If the respiratory panel is positive for atypical bacterial infection (Bordetella pertussis, Chlamydophila pneumoniae, or Mycoplasma pneumoniae), consider antibiotic de-escalation to target atypical bacterial infection.            ECG/EMG Results (most recent)       Procedure Component Value Units Date/Time    ECG 12 Lead Other; dizzy [[id number]] Collected: 08/31/23 0558     Updated: 08/31/23 0600     QT Interval 422 ms      QTC Interval 443 ms     Narrative:      HEART RATE= 66  bpm  RR Interval= 908  ms  RI Interval= 175  ms  P Horizontal Axis= -2  deg  P Front Axis= 72  deg  QRSD Interval= 98  ms  QT Interval= 422  ms  QTcB= 443  ms  QRS Axis= 66  deg  T Wave Axis= 77  deg  - ABNORMAL ECG  -  Sinus rhythm  Nonspecific T abnormalities, anterior leads  Electronically Signed By:   Date and Time of Study: 2023-08-31 05:58:03                    CT Head Without Contrast    Result Date: 8/31/2023  Impression: No acute intracranial abnormality Electronically Signed: Paul Moreno MD  8/31/2023 8:53 AM EDT  Workstation ID: [id number]    CT Abdomen Pelvis With Contrast    Result Date: 8/31/2023  Impression: 1. Fatty liver and hepatomegaly 2. Sigmoid diverticulosis 3. Multiple small nonspecific pulmonary nodules. CT chest correlation recommended Electronically Signed: Paul Moreno MD  8/31/2023 9:12 AM EDT  Workstation ID: [id number]    XR Chest 1 View    Result Date: 8/31/2023  Impression: No active cardiopulmonary disease Electronically Signed: Carlos Alberto Barker  8/31/2023 11:05 AM EDT  Workstation ID: [id number]       Estimated Creatinine Clearance: 89.6 mL/min (by C-G formula based on SCr of 0.86 mg/dL).    Assessment & Plan   Assessment/Plan       Active Hospital Problems    Diagnosis  POA    **COPD exacerbation [J44.1]  Yes      Resolved Hospital Problems   No resolved problems to display.     AECOPD  Lab Results   Component Value Date    WBC 6.30 08/31/2023    EOSABS 0.30 08/31/2023   -Additional labs notable for troponin: 10, proBNP less than 36.0, CK: 129  -Chest x-ray: Reported with no active cardiopulmonary process  -ED provider reports patient had CT PE protocol at outlying facility recently which was negative  -Respiratory panel ordered  -EKG: Sinus rhythm at 66 without obvious acute changes or ectopy with a QTc of 443 ms  -In the ED albuterol, Solu-Medrol, Zofran and Pepcid  -Continue Solu-Medrol, DuoNeb, Symbicort and add Tessalon, Mucinex and incentive spirometry  -Telemetry and pulse oximetry    Abdominal pain with possible recent rectal bleeding  Lab Results   Component Value Date    WBC 6.30 08/31/2023    AST 48 (H) 08/31/2023    ALT 34 (H) 08/31/2023    ALKPHOS 115 08/31/2023    BILITOT 0.2  08/31/2023    LIPASE 30 08/31/2023   -Hemoglobin: 13.8, monitor  -CT of abdomen and pelvis: Showed a fatty liver with sigmoid diverticulosis and multiple small nonspecific pulmonary nodules  -In the ED patient given famotidine and Zofran  -Start PPI  -As needed GI cocktail ordered  -Diabetic diet  -Monitor CBC while admitted    Diabetes mellitus  -Moderately controlled   Lab Results   Component Value Date    GLUCOSE 183 (H) 08/31/2023    GLUCOSE 135 (H) 08/21/2023    GLUCOSE 130 (H) 04/17/2023    GLUCOSE 150 (H) 01/16/2023   -Hold metformin  -Correctional insulin ordered  -Diabetic diet  -Monitor AC and HS    Hypothyroidism  -Levothyroxine    Depression/anxiety  -Celexa    Morbid obesity (BMI: 42.66)  -Encourage diet lifestyle modifications          VTE Prophylaxis -   Mechanical Order History:       None          Pharmalogical Order History:       None            CODE STATUS:    Code Status and Medical Interventions:   Ordered at: 08/31/23 1254     Code Status (Patient has no pulse and is not breathing):    CPR (Attempt to Resuscitate)     Medical Interventions (Patient has pulse or is breathing):    Full Support       This patient has been examined wearing personal protective equipment.     I discussed the patient's findings and my recommendations with patient and nursing staff.      Signature:Electronically signed by Rogelio Garcia PA-C, 08/31/23, 2:43 PM EDT.               Electronically signed by Mayo Camarena MD at 09/01/23 0033       Physician Progress Notes (last 24 hours)  Notes from 08/31/23 1325 through 09/01/23 1325   No notes of this type exist for this encounter.          Respiratory Therapy Notes (last 24 hours)        Price Alston, RRT at 09/01/23 1216          Exercise Oximetry    Patient Name:Riddhi Cid   MRN: [id number]   Date: 09/01/23             ROOM AIR BASELINE   SpO2% 87   Heart Rate 109   Blood Pressure      EXERCISE ON ROOM AIR SpO2% EXERCISE ON O2 @ 2 LPM SpO2%   1 MINUTE  1  MINUTE     @ 2  88   2 MINUTES  2 MINUTES   @ 2   89   3 MINUTES  3 MINUTES   @ 2  90   4 MINUTES  4 MINUTES   @ 2  89   5 MINUTES  5 MINUTES  @ 2  91   6 MINUTES  6 MINUTES   @ 2   90              Distance Walked   Distance Walked  6min   Dyspnea (Francois Scale)   Dyspnea (Francois Scale)   Fatigue (Francois Scale)   Fatigue (Francois Scale)   SpO2% Post Exercise   SpO2% Post Exercise 90%   HR Post Exercise   HR Post Exercise 122   Time to Recovery   Time to Recovery     Comments:     Pt will need 2LO2 to maintain sats >88% with exertion.     RN made aware.    Electronically signed by Price Alston, RRT at 09/01/23 8200

## 2023-09-01 NOTE — CASE MANAGEMENT/SOCIAL WORK
Discharge Planning Assessment   [facility]     Patient Name: Riddhi Cid  MRN: [id number]  Today's Date: 9/1/2023    Admit Date: 8/31/2023    Plan: home with dtr.  [facility] cpap (current) and o2 (new)   Discharge Needs Assessment       Row Name 09/01/23 1345       Living Environment    People in Home child(cliff), adult    Current Living Arrangements home    Potentially Unsafe Housing Conditions none    Primary Care Provided by self    Provides Primary Care For no one    Family Caregiver if Needed child(cliff), adult    Quality of Family Relationships helpful;involved       Resource/Environmental Concerns    Resource/Environmental Concerns none    Transportation Concerns none       Transition Planning    Patient/Family Anticipates Transition to home with family    Patient/Family Anticipated Services at Transition none    Transportation Anticipated family or friend will provide       Discharge Needs Assessment    Equipment Currently Used at Home cpap    Concerns to be Addressed denies needs/concerns at this time    Equipment Needed After Discharge oxygen    Provided Post Acute Provider List? N/A                   Discharge Plan       Row Name 09/01/23 3656       Plan    Plan home with dtrTanner  [facility] cpap (current) and o2 (new)    Plan Comments lives at home with dtr. current with [facility] for cpap. discussed need for home o2; referral sent to [facility] and notified by phoen. w. bro to deliver tank to pt's room.  i with [facility]. verified pcp and pharm. denied other dc concerns.                  Continued Care and Services - Admitted Since 8/31/2023       Durable Medical Equipment Coordination complete.      Service Provider Request Status Selected Services Address Phone Fax Patient Preferred    [facility]  Selected Durable Medical Equipment [address] [facility] [place] [address] [phone] --                  Expected Discharge Date and Time       Expected Discharge Date  Expected Discharge Time    Sep 1, 2023            Demographic Summary       Row Name 09/01/23 1344       General Information    Admission Type observation    Arrived From emergency department    Referral Source admission list    Preferred Language English                   Functional Status       Row Name 09/01/23 1345       Functional Status    Usual Activity Tolerance moderate    Current Activity Tolerance moderate       Functional Status, IADL    Medications independent    Meal Preparation independent    Housekeeping independent    Laundry independent    Shopping independent    IADL Comments lives with daughter son in law and 4 grandkids.                          Delmy Aiken RN

## 2023-09-01 NOTE — PROGRESS NOTES
Exercise Oximetry    Patient Name:Riddhi Cid   MRN: [id number]   Date: 09/01/23             ROOM AIR BASELINE   SpO2% 87   Heart Rate 109   Blood Pressure      EXERCISE ON ROOM AIR SpO2% EXERCISE ON O2 @ 2 LPM SpO2%   1 MINUTE  1 MINUTE     @ 2  88   2 MINUTES  2 MINUTES   @ 2   89   3 MINUTES  3 MINUTES   @ 2  90   4 MINUTES  4 MINUTES   @ 2  89   5 MINUTES  5 MINUTES  @ 2  91   6 MINUTES  6 MINUTES   @ 2   90              Distance Walked   Distance Walked  6min   Dyspnea (Francois Scale)   Dyspnea (Francois Scale)   Fatigue (Francois Scale)   Fatigue (Francois Scale)   SpO2% Post Exercise   SpO2% Post Exercise 90%   HR Post Exercise   HR Post Exercise 122   Time to Recovery   Time to Recovery     Comments:     Pt will need 2LO2 to maintain sats >88% with exertion.     RN made aware.

## 2023-09-01 NOTE — OUTREACH NOTE
Prep Survey      Flowsheet Row Responses   [facility] facility patient discharged from? [facility]   Is LACE score < 7 ? Yes   Eligibility [facility]   H[facility]   Date of Admission 08/31/23   Date of Discharge 09/01/23   Discharge Disposition Home or Self Care   Discharge diagnosis COPD exac   Does the patient have one of the following disease processes/diagnoses(primary or secondary)? COPD   Does the patient have Home health ordered? Yes   What is the Home health agency?  [facility]    Is there a DME ordered? No   Prep survey completed? Yes            BRUCE CARTER - Registered Nurse

## 2023-09-01 NOTE — DISCHARGE SUMMARY
[facility]    June Harrison MD    CHIEF COMPLAINT:     Dizziness     HISTORY OF PRESENT ILLNESS:    HPI    Obtained from ED provider HPI on 8/31/2023:  Patient is a 52-year-old  female with history of COPD, diabetes, IBS presents to the ER with complaints of epigastric abdominal pain nausea and some shortness of breath for the last 2 days. Patient states that she was seen at [facility] within the last week and diagnosed with a URI and placed on antibiotics and steroids. Patient states she has not started taking the steroids. She complains of new epigastric abdominal pain that she describes an aching cramping with nausea and 1 episode of vomiting. She also states that she may have had some bright red blood in her stool last night but she is not sure. No melena. She denies dysuria or hematuria. She denies headache lightheadedness or dizziness. Patient does not normally wear oxygen at home and is currently requiring 2 L and states that her O2 has been dropping at home to 87%. She denies any lower extremity swelling but does report increased cramping. Patient has been off of her metformin because she recently had CT with contrast and thinks it may be due to that. Abdominal surgical history significant for cholecystectomy.     08/31/23 (postobservation admission):  Patient emergency HPI noted above including approximately 2-day history of worsening dyspnea with a cough productive of some clear yellow sputum and occasional nausea without vomiting.  She denies any pain including chest pain, fever, peripheral edema or palpitations.  She has noted some leg cramping which is improved with her respiratory status.  Patient does continue to smoke approximately 1 pack of cigarettes per day and does not drink alcohol.  Patient was seen at an outlying facility and had previous imaging and was started on antibiotics and steroids with patient reporting that she had not begun steroid  therapy yet.  Past Medical History:   Diagnosis Date    Allergic     Anemia     Anxiety     Arthritis     Asthma     COPD (chronic obstructive pulmonary disease)     Costochondritis     Degenerative disc disease, lumbar     Depression     Diabetes mellitus     GERD (gastroesophageal reflux disease)     History of chicken pox     Hyperlipidemia     IBS (irritable bowel syndrome)     Injury of back     Irritable colon     Knee pain, left     Obstructive sleep apnea     Thyroid nodule     Tired 07/01/2019    Vitamin D deficiency      Past Surgical History:   Procedure Laterality Date    BREAST BIOPSY Right     CARPAL TUNNEL RELEASE      CERVICAL DISCECTOMY ANTERIOR      CHOLECYSTECTOMY      ESSURE TUBAL LIGATION       Family History   Problem Relation Age of Onset    Stroke Mother     Hypertension Mother     Hyperlipidemia Mother     Hypothyroidism Mother     Heart attack Father     Heart disease Father     Prostate cancer Father     Other Father     Diabetes Father     Hypertension Father     Hyperlipidemia Father     Coronary artery disease Father     Breast cancer Paternal Grandmother     Hypothyroidism Grandchild     Ovarian cancer Neg Hx      Social History     Tobacco Use    Smoking status: Every Day     Packs/day: 1.00     Years: 53.00     Pack years: 53.00     Types: Cigarettes    Smokeless tobacco: Never   Vaping Use    Vaping Use: Never used   Substance Use Topics    Alcohol use: No    Drug use: No     Medications Prior to Admission   Medication Sig Dispense Refill Last Dose    albuterol (PROVENTIL) (2.5 MG/3ML) 0.083% nebulizer solution Take 2.5 mg by nebulization Every 4 (Four) Hours As Needed for Wheezing. 180 mL 3     albuterol sulfate  (90 Base) MCG/ACT inhaler Inhale 2 puffs 4 (Four) Times a Day.       amoxicillin-clavulanate (AUGMENTIN) 500-125 MG per tablet Take 1 tablet by mouth 2 (Two) Times a Day.       aspirin 81 MG chewable tablet Chew 1 tablet Daily.       bumetanide (BUMEX) 1 MG tablet  Take 1 tablet by mouth Daily.       citalopram (CeleXA) 40 MG tablet Take 1 tablet by mouth Daily. 90 tablet 3     dexlansoprazole (Dexilant) 60 MG capsule Take 1 capsule by mouth Daily. 90 capsule 3     famotidine (PEPCID) 40 MG tablet Take 1 tablet by mouth Every Night.       fenofibrate (TRICOR) 145 MG tablet Take 1 tablet by mouth Daily.       fluticasone (FLONASE) 50 MCG/ACT nasal spray 2 sprays into the nostril(s) as directed by provider Daily.       Fluticasone-Salmeterol (Advair Diskus) 250-50 MCG/ACT DISKUS Inhale 1 puff 2 (Two) Times a Day.       folic acid (FOLVITE) 1 MG tablet Take 1 tablet by mouth Daily.       guaiFENesin (MUCINEX) 600 MG 12 hr tablet Take 1 tablet by mouth 2 (Two) Times a Day.       HYDROcodone-acetaminophen (Norco)  MG per tablet Take 1 tablet by mouth Every 6 (Six) Hours As Needed for Moderate Pain. 120 tablet 0     levothyroxine (SYNTHROID, LEVOTHROID) 50 MCG tablet Take 1 tablet by mouth Daily.       Linzess 290 MCG capsule capsule Take 1 capsule by mouth Every Other Day.       metFORMIN ER (GLUCOPHAGE-XR) 500 MG 24 hr tablet Take 1 tablet by mouth Daily With Breakfast.       montelukast (SINGULAIR) 10 MG tablet Take 1 tablet by mouth Every Night.       Omega-3 Fatty Acids (fish oil) 1000 MG capsule capsule 2 capsules Daily With Breakfast.       ondansetron ODT (ZOFRAN-ODT) 4 MG disintegrating tablet Place 1 tablet on the tongue Every 8 (Eight) Hours As Needed.       potassium chloride (K-DUR,KLOR-CON) 20 MEQ CR tablet Take 1 tablet by mouth As Needed. Only if taking 2 bumetanide       rosuvastatin (CRESTOR) 20 MG tablet Take 1 tablet by mouth Daily.       vitamin B-12 (CYANOCOBALAMIN) 1000 MCG tablet Take 1 tablet by mouth Daily.       vitamin d (D3 5000) 125 MCG (5000 UT) capsule Take 1 capsule by mouth Daily.       Accu-Chek FastClix Lancets misc 1 each by Other route Daily. 100 each 3     Blood Glucose Monitoring Suppl (Accu-Chek Guide) w/Device kit 1 each Daily. 1 kit 0      glucose blood (Accu-Chek Guide) test strip 1 each by Other route Daily. Use as instructed 100 each 3     Lancets Misc. (ACCU-CHEK FASTCLIX LANCET) kit Dx: E11.9, DM type 2, controlled        Allergies:  Sulfa antibiotics and Meloxicam    Immunization History   Administered Date(s) Administered    Fluzone >6mos 10/19/2020, 01/16/2023    Tdap 07/21/2020, 07/21/2020           REVIEW OF SYSTEMS:    Review of Systems   Constitutional: Positive for malaise/fatigue.   HENT:  Positive for congestion.    Eyes: Negative.    Cardiovascular:  Positive for dyspnea on exertion.   Respiratory:  Positive for cough, shortness of breath and sputum production.    Endocrine: Negative.    Hematologic/Lymphatic: Negative.    Skin: Negative.    Musculoskeletal: Negative.    Gastrointestinal: Negative.    Genitourinary: Negative.    Neurological: Negative.    Psychiatric/Behavioral: Negative.     Allergic/Immunologic: Negative.      Vital Signs  Temp:  [97.5 °F (36.4 °C)-98.1 °F (36.7 °C)] 97.6 °F (36.4 °C)  Heart Rate:  [] 118  Resp:  [18-23] 23  BP: (102-132)/(52-73) 125/72          Physical Exam:  Physical Exam  Vitals and nursing note reviewed.   Constitutional:       Appearance: Normal appearance. She is obese.   HENT:      Head: Normocephalic and atraumatic.      Right Ear: External ear normal.      Left Ear: External ear normal.      Nose: Congestion present.      Mouth/Throat:      Pharynx: Oropharynx is clear.   Eyes:      Extraocular Movements: Extraocular movements intact.      Conjunctiva/sclera: Conjunctivae normal.      Pupils: Pupils are equal, round, and reactive to light.   Cardiovascular:      Rate and Rhythm: Normal rate and regular rhythm.      Pulses: Normal pulses.      Heart sounds: Normal heart sounds.   Pulmonary:      Effort: Pulmonary effort is normal.      Breath sounds: Wheezing present.   Abdominal:      General: Bowel sounds are normal.      Palpations: Abdomen is soft.   Musculoskeletal:          General: Normal range of motion.      Cervical back: Normal range of motion.   Skin:     General: Skin is warm.      Capillary Refill: Capillary refill takes less than 2 seconds.   Neurological:      Mental Status: She is alert and oriented to person, place, and time.   Psychiatric:         Mood and Affect: Mood normal.         Behavior: Behavior normal.         Thought Content: Thought content normal.         Judgment: Judgment normal.       Emotional Behavior:    WNL   Debilities:   none  Results Review:    I reviewed the patient's new clinical results.  Lab Results (most recent)       Procedure Component Value Units Date/Time    POC Glucose Once [[id number]]  (Abnormal) Collected: 09/01/23 1213    Specimen: Blood Updated: 09/01/23 1214     Glucose 255 mg/dL      Comment: Serial Number: [id number]:  [id number]       T4, Free [[id number]]  (Normal) Collected: 09/01/23 0604    Specimen: Blood from Arm, Left Updated: 09/01/23 0909     Free T4 1.09 ng/dL     Narrative:      Results may be falsely increased if patient taking Biotin.      Lipid Panel [[id number]]  (Abnormal) Collected: 09/01/23 0604    Specimen: Blood from Arm, Left Updated: 09/01/23 0905     Total Cholesterol 166 mg/dL      Triglycerides 177 mg/dL      HDL Cholesterol 36 mg/dL      LDL Cholesterol  99 mg/dL      VLDL Cholesterol 31 mg/dL      LDL/HDL Ratio 2.63    Narrative:      Cholesterol Reference Ranges  (U.S. Department of Health and Human Services ATP III Classifications)    Desirable          <200 mg/dL  Borderline High    200-239 mg/dL  High Risk          >240 mg/dL      Triglyceride Reference Ranges  (U.S. Department of Health and Human Services ATP III Classifications)    Normal           <150 mg/dL  Borderline High  150-199 mg/dL  High             200-499 mg/dL  Very High        >500 mg/dL    HDL Reference Ranges  (U.S. Department of Health and Human Services ATP III Classifications)    Low     <40 mg/dl (major risk factor for CHD)  High     >60 mg/dl ('negative' risk factor for CHD)        LDL Reference Ranges  (U.S. Department of Health and Human Services ATP III Classifications)    Optimal          <100 mg/dL  Near Optimal     100-129 mg/dL  Borderline High  130-159 mg/dL  High             160-189 mg/dL  Very High        >189 mg/dL    Hemoglobin A1c [[id number]]  (Abnormal) Collected: 09/01/23 0604    Specimen: Blood from Arm, Left Updated: 09/01/23 0850     Hemoglobin A1C 6.70 %     TSH [[id number]]  (Normal) Collected: 09/01/23 0604    Specimen: Blood from Arm, Left Updated: 09/01/23 0838     TSH 0.663 uIU/mL     Hepatic Function Panel [[id number]] Collected: 09/01/23 0604    Specimen: Blood from Arm, Left Updated: 09/01/23 0826     Total Protein 7.4 g/dL      Albumin 4.3 g/dL      ALT (SGPT) 30 U/L      AST (SGOT) 28 U/L      Alkaline Phosphatase 112 U/L      Total Bilirubin 0.3 mg/dL      Bilirubin, Direct <0.2 mg/dL      Bilirubin, Indirect --     Comment: Unable to calculate       POC Glucose Once [[id number]]  (Abnormal) Collected: 09/01/23 0737    Specimen: Blood Updated: 09/01/23 0738     Glucose 214 mg/dL      Comment: Serial Number: [id number]:  [id number]       Basic Metabolic Panel [[id number]]  (Abnormal) Collected: 09/01/23 0604    Specimen: Blood from Arm, Left Updated: 09/01/23 0645     Glucose 212 mg/dL      BUN 15 mg/dL      Creatinine 1.02 mg/dL      Sodium 139 mmol/L      Potassium 4.4 mmol/L      Chloride 99 mmol/L      CO2 28.0 mmol/L      Calcium 10.1 mg/dL      BUN/Creatinine Ratio 14.7     Anion Gap 12.0 mmol/L      eGFR 65.9 mL/min/1.73     Narrative:      GFR Normal >60  Chronic Kidney Disease <60  Kidney Failure <15      Magnesium [[id number]]  (Normal) Collected: 09/01/23 0604    Specimen: Blood from Arm, Left Updated: 09/01/23 0645     Magnesium 1.9 mg/dL     CBC & Differential [[id number]]  (Abnormal) Collected: 09/01/23 0604    Specimen: Blood from Arm, Left Updated: 09/01/23 0631    Narrative:      The following  orders were created for panel order CBC & Differential.  Procedure                               Abnormality         Status                     ---------                               -----------         ------                     CBC Auto Differential[[id number]]        Abnormal            Final result                 Please view results for these tests on the individual orders.    CBC Auto Differential [[id number]]  (Abnormal) Collected: 09/01/23 0604    Specimen: Blood from Arm, Left Updated: 09/01/23 0631     WBC 10.70 10*3/mm3      RBC 4.56 10*6/mm3      Hemoglobin 14.0 g/dL      Hematocrit 41.7 %      MCV 91.5 fL      MCH 30.7 pg      MCHC 33.5 g/dL      RDW 13.2 %      RDW-SD 45.1 fl      MPV 9.6 fL      Platelets 215 10*3/mm3      Neutrophil % 86.9 %      Lymphocyte % 10.6 %      Monocyte % 2.0 %      Eosinophil % 0.0 %      Basophil % 0.5 %      Neutrophils, Absolute 9.30 10*3/mm3      Lymphocytes, Absolute 1.10 10*3/mm3      Monocytes, Absolute 0.20 10*3/mm3      Eosinophils, Absolute 0.00 10*3/mm3      Basophils, Absolute 0.00 10*3/mm3      nRBC 0.0 /100 WBC     Respiratory Panel PCR w/COVID-19(SARS-CoV-2) STACY/MAYCOL/APRIL/PAD/COR/MAD/RAHUL In-House, NP Swab in [facility], 3-4 HR TAT - Swab, Nasopharynx [[id number]]  (Abnormal) Collected: 08/31/23 1325    Specimen: Swab from Nasopharynx Updated: 08/31/23 1427     ADENOVIRUS, PCR Not Detected     Coronavirus 229E Not Detected     Coronavirus HKU1 Not Detected     Coronavirus NL63 Not Detected     Coronavirus OC43 Not Detected     COVID19 Not Detected     Human Metapneumovirus Not Detected     Human Rhinovirus/Enterovirus Detected     Influenza A PCR Not Detected     Influenza B PCR Not Detected     Parainfluenza Virus 1 Not Detected     Parainfluenza Virus 2 Not Detected     Parainfluenza Virus 3 Not Detected     Parainfluenza Virus 4 Not Detected     RSV, PCR Not Detected     Bordetella pertussis pcr Not Detected     Bordetella parapertussis PCR Not Detected      Chlamydophila pneumoniae PCR Not Detected     Mycoplasma pneumo by PCR Not Detected    Narrative:      In the setting of a positive respiratory panel with a viral infection PLUS a negative procalcitonin without other underlying concern for bacterial infection, consider observing off antibiotics or discontinuation of antibiotics and continue supportive care. If the respiratory panel is positive for atypical bacterial infection (Bordetella pertussis, Chlamydophila pneumoniae, or Mycoplasma pneumoniae), consider antibiotic de-escalation to target atypical bacterial infection.    CK [[id number]]  (Normal) Collected: 08/31/23 0629    Specimen: Blood Updated: 08/31/23 1114     Creatine Kinase 129 U/L     Magnesium [[id number]]  (Normal) Collected: 08/31/23 0629    Specimen: Blood Updated: 08/31/23 1114     Magnesium 1.8 mg/dL     [place] Draw [[id number]] Collected: 08/31/23 0628    Specimen: Blood Updated: 08/31/23 0730    Narrative:      The following orders were created for panel order [place] Draw.  Procedure                               Abnormality         Status                     ---------                               -----------         ------                     Green Top (Gel)[[id number]]                                  Final result               Lavender Top[[id number]]                                     Final result               Gold Top - SST[[id number]]                                   Final result               Light Blue Top[[id number]]                                   Final result                 Please view results for these tests on the individual orders.    Green Top (Gel) [[id number]] Collected: 08/31/23 0629    Specimen: Blood Updated: 08/31/23 0730     Extra Tube Hold for add-ons.     Comment: Auto resulted.       Lavender Top [[id number]] Collected: 08/31/23 0629    Specimen: Blood Updated: 08/31/23 0730     Extra Tube hold for add-on     Comment: Auto resulted       Gold Top - SST [[id number]]  Collected: 08/31/23 0629    Specimen: Blood Updated: 08/31/23 0730     Extra Tube Hold for add-ons.     Comment: Auto resulted.       Light Blue Top [[id number]] Collected: 08/31/23 0628    Specimen: Blood Updated: 08/31/23 0730     Extra Tube Hold for add-ons.     Comment: Auto resulted       Single High Sensitivity Troponin T [[id number]]  (Abnormal) Collected: 08/31/23 0629    Specimen: Blood Updated: 08/31/23 0709     HS Troponin T 10 ng/L     Narrative:      High Sensitive Troponin T Reference Range:  <10.0 ng/L- Negative Female for AMI  <15.0 ng/L- Negative Male for AMI  >=10 - Abnormal Female indicating possible myocardial injury.  >=15 - Abnormal Male indicating possible myocardial injury.   Clinicians would have to utilize clinical acumen, EKG, Troponin, and serial changes to determine if it is an Acute Myocardial Infarction or myocardial injury due to an underlying chronic condition.         BNP [[id number]]  (Normal) Collected: 08/31/23 0629    Specimen: Blood Updated: 08/31/23 0709     proBNP <36.0 pg/mL     Narrative:      Among patients with dyspnea, NT-proBNP is highly sensitive for the detection of acute congestive heart failure. In addition NT-proBNP of <300 pg/ml effectively rules out acute congestive heart failure with 99% negative predictive value.      Comprehensive Metabolic Panel [[id number]]  (Abnormal) Collected: 08/31/23 0629    Specimen: Blood Updated: 08/31/23 0659     Glucose 183 mg/dL      BUN 7 mg/dL      Creatinine 0.86 mg/dL      Sodium 144 mmol/L      Potassium 4.0 mmol/L      Chloride 104 mmol/L      CO2 28.0 mmol/L      Calcium 10.0 mg/dL      Total Protein 7.1 g/dL      Albumin 4.2 g/dL      ALT (SGPT) 34 U/L      AST (SGOT) 48 U/L      Alkaline Phosphatase 115 U/L      Total Bilirubin 0.2 mg/dL      Globulin 2.9 gm/dL      A/G Ratio 1.4 g/dL      BUN/Creatinine Ratio 8.1     Anion Gap 12.0 mmol/L      eGFR 80.9 mL/min/1.73     Narrative:      GFR Normal >60  Chronic Kidney Disease  <60  Kidney Failure <15      Lipase [[id number]]  (Normal) Collected: 08/31/23 0629    Specimen: Blood Updated: 08/31/23 0659     Lipase 30 U/L     Urinalysis With Culture If Indicated - Urine, Clean Catch [[id number]]  (Normal) Collected: 08/31/23 0650    Specimen: Urine, Clean Catch Updated: 08/31/23 0654     Color, UA Yellow     Appearance, UA Clear     pH, UA <=5.0     Specific Gravity, UA 1.008     Glucose, UA Negative     Ketones, UA Negative     Bilirubin, UA Negative     Blood, UA Negative     Protein, UA Negative     Leuk Esterase, UA Negative     Nitrite, UA Negative     Urobilinogen, UA 0.2 E.U./dL    Narrative:      In absence of clinical symptoms, the presence of pyuria, bacteria, and/or nitrites on the urinalysis result does not correlate with infection.  Urine microscopic not indicated.    CBC & Differential [[id number]]  (Normal) Collected: 08/31/23 0629    Specimen: Blood Updated: 08/31/23 0632    Narrative:      The following orders were created for panel order CBC & Differential.  Procedure                               Abnormality         Status                     ---------                               -----------         ------                     CBC Auto Differential[[id number]]        Normal              Final result                 Please view results for these tests on the individual orders.    CBC Auto Differential [[id number]]  (Normal) Collected: 08/31/23 0629    Specimen: Blood Updated: 08/31/23 0632     WBC 6.30 10*3/mm3      RBC 4.54 10*6/mm3      Hemoglobin 13.8 g/dL      Hematocrit 41.9 %      MCV 92.5 fL      MCH 30.4 pg      MCHC 32.8 g/dL      RDW 13.1 %      RDW-SD 42.0 fl      MPV 9.1 fL      Platelets 208 10*3/mm3      Neutrophil % 64.6 %      Lymphocyte % 22.9 %      Monocyte % 6.7 %      Eosinophil % 4.7 %      Basophil % 1.1 %      Neutrophils, Absolute 4.10 10*3/mm3      Lymphocytes, Absolute 1.50 10*3/mm3      Monocytes, Absolute 0.40 10*3/mm3      Eosinophils, Absolute 0.30  10*3/mm3      Basophils, Absolute 0.10 10*3/mm3      nRBC 0.0 /100 WBC             Imaging Results (Most Recent)       Procedure Component Value Units Date/Time    XR Chest 1 View [[id number]] Collected: 08/31/23 1104     Updated: 08/31/23 1107    Narrative:      XR CHEST 1 VW    Date of Exam: 8/31/2023 10:35 AM EDT    Indication: sob    Comparison: None available.    Findings:  Heart size and pulmonary vasculature are within normal limits. Lungs grossly clear. Costophrenic angles sharp      Impression:      Impression:  No active cardiopulmonary disease      Electronically Signed: Carlos Alberto Mj    8/31/2023 11:05 AM EDT    Workstation ID: [id number]    CT Abdomen Pelvis With Contrast [[id number]] Collected: 08/31/23 0853     Updated: 08/31/23 0914    Narrative:      CT ABDOMEN PELVIS W CONTRAST    Date of Exam: 8/31/2023 8:38 AM EDT    Indication: epigastric abd pain.    Comparison: None available.    Technique: Axial CT images were obtained of the abdomen and pelvis following the uneventful intravenous administration of iodinated contrast. Sagittal and coronal reconstructions were performed.  Automated exposure control and iterative reconstruction   methods were used.        Findings:  Lung Bases:    There are few tiny nonspecific pulmonary nodules at the lung bases    Liver: The liver shows diffuse fatty replacement and is enlarged measuring 22 cm. No definite focal lesions      Biliary/Gallbladder: Cholecystectomy changes. There is no biliary duct dilatation      Spleen:Spleen is normal in size and CT density.    Pancreas:   Pancreas shows homogeneous density. There is no evidence of pancreatic mass or peripancreatic fluid.      Kidneys: Kidneys are normal in size. There are no stones or hydronephrosis.      Adrenals: Adrenal glands are unremarkable.      Retroperitoneal/Lymph Nodes/Vasculature: No retroperitoneal adenopathy is identified by size criteria.      Gastrointestinal/Mesentery: The bowel loops are  non-dilated without definite wall thickening or mass. There is sigmoid diverticulosis with no evidence of acute diverticulitis the appendix appears within normal limits. No evidence of obstruction. No free   air.      Bladder: The bladder is unremarkable    No acute abnormalities in the pelvis      Bony Structures:  Visualized bony structures are consistent with the patient's age.        Impression:      Impression:    1. Fatty liver and hepatomegaly    2. Sigmoid diverticulosis    3. Multiple small nonspecific pulmonary nodules. CT chest correlation recommended            Electronically Signed: Paul Moreno MD    8/31/2023 9:12 AM EDT    Workstation ID: [id number]    CT Head Without Contrast [[id number]] Collected: 08/31/23 0851     Updated: 08/31/23 0855    Narrative:      CT HEAD WO CONTRAST    Date of Exam: 8/31/2023 8:31 AM EDT    Indication: dizzy.    Comparison: None available.    Technique: Axial CT images were obtained of the head without contrast administration.  Coronal reconstructions were performed.  Automated exposure control and iterative reconstruction methods were used.      Findings:  There is no evidence of hemorrhage. There is no mass effect or midline shift.    There is no extracerebral collection.    Ventricles are normal in size and configuration for patient's stated age.     Posterior fossa is within normal limits.    Calvarium and skull base appear intact. There is mucosal thickening bone the right maxillary sinus. There is also mild mucosal thickening in the left maxillary sinus and ethmoids. Visualized orbits are unremarkable.      Impression:      Impression:  No acute intracranial abnormality      Electronically Signed: Paul Moreno MD    8/31/2023 8:53 AM EDT    Workstation ID: [id number]          reviewed    ECG/EMG Results (most recent)       Procedure Component Value Units Date/Time    ECG 12 Lead Other; dizzy [[id number]] Collected: 08/31/23 0558     Updated: 08/31/23 0600     QT  Interval 422 ms      QTC Interval 443 ms     Narrative:      HEART RATE= 66  bpm  RR Interval= 908  ms  NM Interval= 175  ms  P Horizontal Axis= -2  deg  P Front Axis= 72  deg  QRSD Interval= 98  ms  QT Interval= 422  ms  QTcB= 443  ms  QRS Axis= 66  deg  T Wave Axis= 77  deg  - ABNORMAL ECG -  Sinus rhythm  Nonspecific T abnormalities, anterior leads  Electronically Signed By:   Date and Time of Study: 2023-08-31 05:58:03    ECG 12 Lead [[id number]] Resulted: 08/31/23 1811     Updated: 08/31/23 1811    SCANNED - TELEMETRY   [[id number]] Resulted: 08/31/23     Updated: 09/01/23 0835    SCANNED - TELEMETRY   [[id number]] Resulted: 08/31/23     Updated: 09/01/23 0835          reviewed            Microbiology Results (last 10 days)       Procedure Component Value - Date/Time    Respiratory Panel PCR w/COVID-19(SARS-CoV-2) STACY/MAYCOL/APRIL/PAD/COR/MAD/RAHUL In-House, NP Swab in UTM/VTM, 3-4 HR TAT - Swab, Nasopharynx [[id number]]  (Abnormal) Collected: 08/31/23 1325    Lab Status: Final result Specimen: Swab from Nasopharynx Updated: 08/31/23 1427     ADENOVIRUS, PCR Not Detected     Coronavirus 229E Not Detected     Coronavirus HKU1 Not Detected     Coronavirus NL63 Not Detected     Coronavirus OC43 Not Detected     COVID19 Not Detected     Human Metapneumovirus Not Detected     Human Rhinovirus/Enterovirus Detected     Influenza A PCR Not Detected     Influenza B PCR Not Detected     Parainfluenza Virus 1 Not Detected     Parainfluenza Virus 2 Not Detected     Parainfluenza Virus 3 Not Detected     Parainfluenza Virus 4 Not Detected     RSV, PCR Not Detected     Bordetella pertussis pcr Not Detected     Bordetella parapertussis PCR Not Detected     Chlamydophila pneumoniae PCR Not Detected     Mycoplasma pneumo by PCR Not Detected    Narrative:      In the setting of a positive respiratory panel with a viral infection PLUS a negative procalcitonin without other underlying concern for bacterial infection, consider observing off  antibiotics or discontinuation of antibiotics and continue supportive care. If the respiratory panel is positive for atypical bacterial infection (Bordetella pertussis, Chlamydophila pneumoniae, or Mycoplasma pneumoniae), consider antibiotic de-escalation to target atypical bacterial infection.            Assessment & Plan     COPD exacerbation        AECOPD        Lab Results   Component Value Date     WBC 6.30 08/31/2023     EOSABS 0.30 08/31/2023   -Additional labs notable for troponin: 10, proBNP less than 36.0, CK: 129  -Chest x-ray: Reported with no active cardiopulmonary process  -ED provider reports patient had CT PE protocol at outlying facility recently which was negative  -Respiratory panel ordered  -EKG: Sinus rhythm at 66 without obvious acute changes or ectopy with a QTc of 443 ms  -In the ED albuterol, Solu-Medrol, Zofran and Pepcid  -Continue Solu-Medrol, DuoNeb, Symbicort and add Tessalon, Mucinex and incentive spirometry  -Telemetry and pulse oximetry     Abdominal pain with possible recent rectal bleeding        Lab Results   Component Value Date     WBC 6.30 08/31/2023     AST 48 (H) 08/31/2023     ALT 34 (H) 08/31/2023     ALKPHOS 115 08/31/2023     BILITOT 0.2 08/31/2023     LIPASE 30 08/31/2023   -Hemoglobin: 13.8, monitor  -CT of abdomen and pelvis: Showed a fatty liver with sigmoid diverticulosis and multiple small nonspecific pulmonary nodules  -In the ED patient given famotidine and Zofran  -Start PPI  -As needed GI cocktail ordered  -Diabetic diet  -Monitor CBC while admitted     Diabetes mellitus  -Moderately controlled         Lab Results   Component Value Date     GLUCOSE 183 (H) 08/31/2023     GLUCOSE 135 (H) 08/21/2023     GLUCOSE 130 (H) 04/17/2023     GLUCOSE 150 (H) 01/16/2023   -Hold metformin  -Correctional insulin ordered  -Diabetic diet  -Monitor AC and HS     Hypothyroidism  -Levothyroxine     Depression/anxiety  -Celexa     Morbid obesity (BMI: 42.66)  -Encourage diet  lifestyle modifications       I discussed the patients findings and my recommendations with patient and family.     Discharge Diagnosis:      COPD exacerbation      Hospital Course  Patient is a 52 y.o. female presented with dyspnea and dizziness.  Patient was recently seen at [facility] within the past week with upper respiratory infection and placed on antibiotics and steroids.  Patient states she had not started taking steroids though.  Patient had worsening breathing with dizziness and seen at our ED.  Patient reported 1 time of bright red blood per rectum without diarrhea, nausea, or vomiting.  Patient's denies history of hemorrhoids but does states she does have constipation take Linzess at home.  Patient to follow-up with GI regarding IBS.  Patient states she has had a productive cough with yellow sputum.  CBC within normal limits.  Troponin, BNP and CK within normal limits.  Chest x-ray showed no active cardiopulmonary disease.  CT PE protocol outlying facility was negative.  Respiratory panel positive for rhinovirus.  EKG showed sinus rhythm without acute ST changes.  Patient continued on Solu-Medrol, DuoNebs, Symbicort, Tessalon, Mucinex incentive spirometer.  Patient walking oximetry and qualified for 2 L oxygen at home.  Patient states she felt well enough to go home and felt better than when she came in.  Patient to take medication as prescribed.  Patient to follow-up with PCP in 1 to 2 weeks for continued care management.  Test and recommendations reviewed with patient and daughter at bedside and they agree with treatment plan.  If symptoms worsen patient to call 911 or go to nearest ED.    Past Medical History:     Past Medical History:   Diagnosis Date    Allergic     Anemia     Anxiety     Arthritis     Asthma     COPD (chronic obstructive pulmonary disease)     Costochondritis     Degenerative disc disease, lumbar     Depression     Diabetes mellitus     GERD (gastroesophageal reflux  disease)     History of chicken pox     Hyperlipidemia     IBS (irritable bowel syndrome)     Injury of back     Irritable colon     Knee pain, left     Obstructive sleep apnea     Thyroid nodule     Tired 07/01/2019    Vitamin D deficiency        Past Surgical History:     Past Surgical History:   Procedure Laterality Date    BREAST BIOPSY Right     CARPAL TUNNEL RELEASE      CERVICAL DISCECTOMY ANTERIOR      CHOLECYSTECTOMY      ESSURE TUBAL LIGATION         Social History:   Social History     Socioeconomic History    Marital status:    Tobacco Use    Smoking status: Every Day     Packs/day: 1.00     Years: 53.00     Pack years: 53.00     Types: Cigarettes    Smokeless tobacco: Never   Vaping Use    Vaping Use: Never used   Substance and Sexual Activity    Alcohol use: No    Drug use: No    Sexual activity: Defer       Procedures Performed         Consults:   Consults       No orders found for last 30 day(s).            Condition on Discharge:     Stable    Discharge Disposition  Home or Self Care    Discharge Medications     Discharge Medications        New Medications        Instructions Start Date   benzonatate 200 MG capsule  Commonly known as: TESSALON   200 mg, Oral, 3 Times Daily PRN      budesonide-formoterol 160-4.5 MCG/ACT inhaler  Commonly known as: SYMBICORT   2 puffs, Inhalation, 2 Times Daily - RT      predniSONE 10 MG tablet  Commonly known as: DELTASONE   30 mg, Oral, Daily      predniSONE 20 MG tablet  Commonly known as: DELTASONE   20 mg, Oral, Daily   Start Date: September 3, 2023     predniSONE 10 MG tablet  Commonly known as: DELTASONE   10 mg, Oral, Daily   Start Date: September 5, 2023            Changes to Medications        Instructions Start Date   guaiFENesin 600 MG 12 hr tablet  Commonly known as: MUCINEX  What changed: Another medication with the same name was added. Make sure you understand how and when to take each.   600 mg, Oral, 2 Times Daily      guaiFENesin 600 MG 12 hr  tablet  Commonly known as: MUCINEX  What changed: You were already taking a medication with the same name, and this prescription was added. Make sure you understand how and when to take each.   1,200 mg, Oral, 2 Times Daily             Continue These Medications        Instructions Start Date   Accu-Chek FastClix Lancet kit   Does not apply, Dx: E11.9, DM type 2, controlled       Accu-Chek FastClix Lancets misc   1 each, Other, Daily      Accu-Chek Guide w/Device kit   1 each, Does not apply, Daily      Advair Diskus 250-50 MCG/ACT DISKUS  Generic drug: Fluticasone-Salmeterol   1 puff, Inhalation, 2 Times Daily - RT      albuterol sulfate  (90 Base) MCG/ACT inhaler  Commonly known as: PROVENTIL HFA;VENTOLIN HFA;PROAIR HFA   2 puffs, Inhalation, 4 Times Daily - RT      albuterol (2.5 MG/3ML) 0.083% nebulizer solution  Commonly known as: PROVENTIL   2.5 mg, Nebulization, Every 4 Hours PRN      amoxicillin-clavulanate 500-125 MG per tablet  Commonly known as: AUGMENTIN   1 tablet, Oral, 2 Times Daily      aspirin 81 MG chewable tablet   81 mg, Oral, Daily      bumetanide 1 MG tablet  Commonly known as: BUMEX   1 mg, Oral, Daily      citalopram 40 MG tablet  Commonly known as: CeleXA   40 mg, Oral, Daily      D3 5000 125 MCG (5000 UT) capsule capsule  Generic drug: vitamin D3   5,000 Units, Oral, Daily      dexlansoprazole 60 MG capsule  Commonly known as: Dexilant   60 mg, Oral, Daily      famotidine 40 MG tablet  Commonly known as: PEPCID   Take 1 tablet by mouth Every Night.      fenofibrate 145 MG tablet  Commonly known as: TRICOR   145 mg, Oral, Daily      fish oil 1000 MG capsule capsule   2,000 mg, Daily With Breakfast      fluticasone 50 MCG/ACT nasal spray  Commonly known as: FLONASE   2 sprays, Nasal, Daily      folic acid 1 MG tablet  Commonly known as: FOLVITE   1 mg, Oral, Daily      glucose blood test strip  Commonly known as: Accu-Chek Guide   1 each, Other, Daily, Use as instructed       HYDROcodone-acetaminophen  MG per tablet  Commonly known as: Norco   1 tablet, Oral, Every 6 Hours PRN      levothyroxine 50 MCG tablet  Commonly known as: SYNTHROID, LEVOTHROID   50 mcg, Oral, Daily      Linzess 290 MCG capsule capsule  Generic drug: linaclotide   Take 1 capsule by mouth Every Other Day.      metFORMIN  MG 24 hr tablet  Commonly known as: GLUCOPHAGE-XR   500 mg, Oral, Daily With Breakfast      montelukast 10 MG tablet  Commonly known as: SINGULAIR   10 mg, Oral, Nightly      ondansetron ODT 4 MG disintegrating tablet  Commonly known as: ZOFRAN-ODT   Place 1 tablet on the tongue Every 8 (Eight) Hours As Needed.      potassium chloride 20 MEQ CR tablet  Commonly known as: K-DUR,KLOR-CON   20 mEq, Oral, As Needed, Only if taking 2 bumetanide       rosuvastatin 20 MG tablet  Commonly known as: CRESTOR   20 mg, Oral, Daily      vitamin B-12 1000 MCG tablet  Commonly known as: CYANOCOBALAMIN   1,000 mcg, Oral, Daily               Discharge Diet:   Diet Instructions       Diet: Cardiac Diets; Healthy Heart (2-3 Na+); Regular Texture (IDDSI 7); Thin (IDDSI 0)      Discharge Diet: Cardiac Diets    Cardiac Diet: Healthy Heart (2-3 Na+)    Texture: Regular Texture (IDDSI 7)    Fluid Consistency: Thin (IDDSI 0)            Activity at Discharge:   Activity Instructions       Activity as Tolerated      Measure Blood Pressure              Follow-up Appointments  Future Appointments   Date Time Provider Department Center   11/6/2023  8:50 AM Izaiah Fernandez MD [facility]   11/7/2023  3:15 PM Megan Menard MD [facility]   11/20/2023  8:45 AM June Harrison MD [facility]     Additional Instructions for the Follow-ups that You Need to Schedule       Discharge Follow-up with PCP   As directed       Currently Documented PCP:    June Harrison MD    PCP Phone Number:    [phone]     Follow Up Details: 7-10 days                Test Results Pending at Discharge       Risk for  Readmission (LACE) Score: 6 (9/1/2023  6:00 AM)          Leora SantosLELA  09/01/23  13:18 EDT      I spent 40 minutes caring for Riddhi on this date of service. This time includes time spent by me in the following activities: reviewing tests, obtaining and/or reviewing a separately obtained history, performing a medically appropriate examination and/or evaluation, counseling and educating the patient/family/caregiver, ordering medications, tests, or procedures, referring and communicating with other health care professionals, documenting information in the medical record, independently interpreting results and communicating that information with the patient/family/caregiver, and care coordination.

## 2023-09-01 NOTE — PLAN OF CARE
Goal Outcome Evaluation:      No acute events overnight. Patient requesting frequent PRN breathing treatments.

## 2023-09-04 ENCOUNTER — TRANSITIONAL CARE MANAGEMENT TELEPHONE ENCOUNTER (OUTPATIENT)
Dept: CALL CENTER | Facility: HOSPITAL | Age: 53
End: 2023-09-04
Payer: MEDICARE

## 2023-09-04 NOTE — OUTREACH NOTE
Call Center TCM Note      Flowsheet Row Responses   [facility] patient discharged from? [facility]   Does the patient have one of the following disease processes/diagnoses(primary or secondary)? COPD   TCM attempt successful? Yes   Call start time 0939   Call end time 0943   Meds reviewed with patient/caregiver? Yes   Is the patient having any side effects they believe may be caused by any medication additions or changes? No   Does the patient have all medications ordered at discharge? Yes   Is the patient taking all medications as directed (includes completed medication regime)? Yes   Does the patient have an appointment with their PCP within 7-14 days of discharge? No   Nursing Interventions Assisted patient with making appointment per protocol   What is the Home health agency?  [facility]   Has home health visited the patient within 72 hours of discharge? Yes   Has all DME been delivered? Yes   Pulse Ox monitoring None   Psychosocial issues? No   Did the patient receive a copy of their discharge instructions? Yes   Nursing interventions Reviewed instructions with patient   What is the patient's perception of their health status since discharge? Improving   Nursing Interventions Nurse provided patient education   If the patient is a current smoker, are they able to teach back resources for cessation? [id number]   Is the patient/caregiver able to teach back the hierarchy of who to call/visit for symptoms/problems? PCP, Specialist, Home health nurse, Urgent Care, ED, 911 Yes   Is the patient able to teach back COPD zones? Yes   Nursing interventions Education provided on various zones   Patient reports what zone on this call? Green Zone   Green Zone Reports doing well, Breathing without shortness of breath, Usual activity and exercise level, Usual amounts of cough and phlegm/mucous, Slept well last night   Green Zone interventions: Take daily medications, Use oxygen as prescribed, Continue regular  exercise/diet plan, At all times avoid cigarette smoking, vaping and inhaled irritants   TCM call completed? Yes   Call end time 0943   Would this patient benefit from a Referral to [facility] Social Work? No   Is the patient interested in additional calls from an ambulatory ? No            Janice Melgar LPN    9/4/2023, 09:49 EDT

## 2023-09-05 NOTE — CASE MANAGEMENT/SOCIAL WORK
Case Management Discharge Note      Final Note: Home       Durable Medical Equipment Coordination complete.      Service Provider Selected Services Address Phone Fax Patient Preferred    [facility] Medical [facility] [address] [facility] [address] [phone] --                  Transportation Services  Private: Car    Final Discharge Disposition Code: 01 - home or self-care

## 2023-09-11 NOTE — PROGRESS NOTES
Subjective   Riddhi Cid is a 52 y.o. female. Presents to [facility]    Riddhi was seen at [facility] . She was admitted on 08/31/23  for dizziness abdominal pain and nausea. She was discharged on 09/01/23. Discharge diagnosis was copd exacerbation with rhinovirus. Labs that were performed during the encounter included: BMP-abnormal, CBC-abnormal, CK- normal, CMP- abnormal, BNP- normal and magnesium- normal, . Diagnostic studies that were performed included: Chest x-ray-normal and CT Scan-head- normal, CT abdomen- fatty liver, sigmoid diverticulosis. Pulmonary nodules. EKG . Currently Riddhi receives care at home. Complications from the hospital stay include none. The patient stated that they do not need help with their daily life and activities. The patient stated that they do have emotional support at home.  Current outpatient and discharge medications have been reconciled for the patient.  Reviewed by: June Harrison MD      Chief Complaint   Patient presents with    Dizziness       Dizziness  This is a new problem. The current episode started 1 to 4 weeks ago (08/31/23). The problem has been resolved. Associated symptoms include congestion and fatigue. Pertinent negatives include no abdominal pain, nausea or weakness. Treatments tried: steroid pack. The treatment provided mild relief.      I personally reviewed and updated the patient's allergies, medications, problem list, and past medical, surgical, social, and family history. I have reviewed and confirmed the accuracy of the History of Present Illness and Review of Symptoms as documented by the MA/LPN/RN. June Harrison MD    Allergies:  Allergies   Allergen Reactions    Sulfa Antibiotics Rash    Meloxicam GI Intolerance       Social History:  Social History     Socioeconomic History    Marital status:    Tobacco Use    Smoking status: Every Day     Packs/day: 1.00     Years: 53.00     Pack years: 53.00     Types:  Cigarettes    Smokeless tobacco: Never   Vaping Use    Vaping Use: Never used   Substance and Sexual Activity    Alcohol use: No    Drug use: No    Sexual activity: Defer       Family History:  Family History   Problem Relation Age of Onset    Stroke Mother     Hypertension Mother     Hyperlipidemia Mother     Hypothyroidism Mother     Heart attack Father     Heart disease Father     Prostate cancer Father     Other Father     Diabetes Father     Hypertension Father     Hyperlipidemia Father     Coronary artery disease Father     Breast cancer Paternal Grandmother     Hypothyroidism Grandchild     Ovarian cancer Neg Hx        Past Medical History :  Patient Active Problem List   Diagnosis    Class 3 drug-induced obesity with serious comorbidity and body mass index (BMI) of 40.0 to 44.9 in adult    Degeneration of intervertebral disc of cervical region    Degeneration of intervertebral disc of lumbar region    Acquired hypothyroidism    Mixed hyperlipidemia    Major depressive disorder, recurrent, moderate    Obstructive sleep apnea    Vitamin B12 deficiency    Vitamin D deficiency    COPD (chronic obstructive pulmonary disease)    Solitary thyroid nodule    Chronic midline low back pain with right-sided sciatica    Cervicalgia    Cervical stenosis of spine    Arthritis    Moderate persistent asthma without complication    Gastroparalysis    History of chicken pox    IBS (irritable bowel syndrome)    GERD (gastroesophageal reflux disease)    Dependent edema    Optic disc hemorrhage    Controlled diabetes mellitus type 2 with complications    Tension headache    Allergic rhinitis    Disorder of peripheral nervous system    Fibromyositis    Tobacco dependence syndrome    Pap smear, low-risk    Disease due to severe acute respiratory syndrome coronavirus 2 (SARS-CoV-2)    Lung cancer screening declined by patient    Hospital discharge follow-up    Dizziness    Lesion of skin of scalp    Pulmonary nodules        Medication List:    Current Outpatient Medications:     Accu-Chek FastClix Lancets misc, 1 each by Other route Daily., Disp: 100 each, Rfl: 3    albuterol (PROVENTIL) (2.5 MG/3ML) 0.083% nebulizer solution, Take 2.5 mg by nebulization Every 4 (Four) Hours As Needed for Wheezing., Disp: 180 mL, Rfl: 3    albuterol sulfate  (90 Base) MCG/ACT inhaler, Inhale 2 puffs 4 (Four) Times a Day., Disp: 6.7 g, Rfl: 12    aspirin 81 MG chewable tablet, Chew 1 tablet Daily., Disp: , Rfl:     Blood Glucose Monitoring Suppl (Accu-Chek Guide) w/Device kit, 1 each Daily., Disp: 1 kit, Rfl: 0    bumetanide (BUMEX) 1 MG tablet, Take 1 tablet by mouth Daily., Disp: 30 tablet, Rfl: 12    citalopram (CeleXA) 40 MG tablet, Take 1 tablet by mouth Daily., Disp: 90 tablet, Rfl: 3    dexlansoprazole (Dexilant) 60 MG capsule, Take 1 capsule by mouth Daily., Disp: 90 capsule, Rfl: 3    famotidine (PEPCID) 40 MG tablet, Take 1 tablet by mouth Every Night., Disp: , Rfl:     fenofibrate (TRICOR) 145 MG tablet, Take 1 tablet by mouth Daily., Disp: , Rfl:     fluticasone (FLONASE) 50 MCG/ACT nasal spray, 2 sprays into the nostril(s) as directed by provider Daily., Disp: , Rfl:     Fluticasone-Salmeterol (Advair Diskus) 250-50 MCG/ACT DISKUS, Inhale 1 puff 2 (Two) Times a Day., Disp: 60 each, Rfl: 12    folic acid (FOLVITE) 1 MG tablet, Take 1 tablet by mouth Daily., Disp: , Rfl:     glucose blood (Accu-Chek Guide) test strip, 1 each by Other route Daily. Use as instructed, Disp: 100 each, Rfl: 3    HYDROcodone-acetaminophen (Norco)  MG per tablet, Take 1 tablet by mouth Every 6 (Six) Hours As Needed for Moderate Pain., Disp: 120 tablet, Rfl: 0    Lancets Misc. (ACCU-CHEK FASTCLIX LANCET) kit, Dx: E11.9, DM type 2, controlled, Disp: , Rfl:     levothyroxine (SYNTHROID, LEVOTHROID) 50 MCG tablet, Take 1 tablet by mouth Daily., Disp: , Rfl:     Linzess 290 MCG capsule capsule, Take 1 capsule by mouth Every Other Day., Disp: , Rfl:      "metFORMIN ER (GLUCOPHAGE-XR) 500 MG 24 hr tablet, Take 1 tablet by mouth Daily With Breakfast., Disp: , Rfl:     montelukast (SINGULAIR) 10 MG tablet, Take 1 tablet by mouth Every Night., Disp: , Rfl:     Omega-3 Fatty Acids (fish oil) 1000 MG capsule capsule, 2 capsules Daily With Breakfast., Disp: , Rfl:     ondansetron ODT (ZOFRAN-ODT) 4 MG disintegrating tablet, Place 1 tablet on the tongue Every 8 (Eight) Hours As Needed., Disp: , Rfl:     potassium chloride (K-DUR,KLOR-CON) 20 MEQ CR tablet, Take 1 tablet by mouth As Needed. Only if taking 2 bumetanide, Disp: , Rfl:     rosuvastatin (CRESTOR) 20 MG tablet, Take 1 tablet by mouth Daily., Disp: , Rfl:     vitamin B-12 (CYANOCOBALAMIN) 1000 MCG tablet, Take 1 tablet by mouth Daily., Disp: , Rfl:     vitamin D3 125 MCG (5000 UT) capsule capsule, Take 1 capsule by mouth Daily., Disp: , Rfl:     Past Surgical History:  Past Surgical History:   Procedure Laterality Date    BREAST BIOPSY Right     CARPAL TUNNEL RELEASE      CERVICAL DISCECTOMY ANTERIOR      CHOLECYSTECTOMY      ESSURE TUBAL LIGATION         Review of Systems:  Review of Systems   Constitutional:  Positive for fatigue.   HENT:  Positive for congestion.    Gastrointestinal:  Negative for abdominal pain and nausea.   Neurological:  Positive for dizziness. Negative for weakness.     Physical Exam:  Vital Signs:  Vital Signs:   /80 (BP Location: Right arm, Patient Position: Sitting, Cuff Size: Large Adult)   Pulse 104   Temp 98 °F (36.7 °C) (Temporal)   Resp 19   Ht 160 cm (63\")   Wt 110 kg (242 lb 12.8 oz)   SpO2 98% Comment: room air  BMI 43.01 kg/m²     Result Review :   The following data was reviewed by: June Harrison MD on 09/12/2023:                 CT Head Without Contrast    Result Date: 8/31/2023  Impression: No acute intracranial abnormality Electronically Signed: Paul Moreno MD  8/31/2023 8:53 AM EDT  Workstation ID: [id number]    CT Abdomen Pelvis With Contrast    Result " Date: 8/31/2023  Impression: 1. Fatty liver and hepatomegaly 2. Sigmoid diverticulosis 3. Multiple small nonspecific pulmonary nodules. CT chest correlation recommended Electronically Signed: Paul Moreno MD  8/31/2023 9:12 AM EDT  Workstation ID: [id number]    XR Chest 1 View    Result Date: 8/31/2023  Impression: No active cardiopulmonary disease Electronically Signed: Carlos Alberto Barker  8/31/2023 11:05 AM EDT  Workstation ID: [id number]     Physical Exam  Vitals reviewed.   Constitutional:       Appearance: Normal appearance. She is well-developed.   HENT:      Head: Normocephalic and atraumatic.      Right Ear: Tympanic membrane normal.      Left Ear: Tympanic membrane normal.   Eyes:      General:         Right eye: No discharge.         Left eye: No discharge.      Pupils: Pupils are equal, round, and reactive to light.   Cardiovascular:      Rate and Rhythm: Normal rate and regular rhythm.      Heart sounds: Normal heart sounds. No murmur heard.    No friction rub. No gallop.   Pulmonary:      Effort: Pulmonary effort is normal. No respiratory distress.      Breath sounds: Normal breath sounds. No wheezing or rales.   Musculoskeletal:         General: Normal range of motion.   Skin:     General: Skin is warm and dry.      Findings: No rash.      Comments: Right frontal scalp with small benign skin toned mole   Neurological:      General: No focal deficit present.      Mental Status: She is alert and oriented to person, place, and time.      Cranial Nerves: No cranial nerve deficit.      Coordination: Coordination normal.      Gait: Gait normal.      Deep Tendon Reflexes: Reflexes normal.   Psychiatric:         Behavior: Behavior is cooperative.       Assessment and Plan:  Problems Addressed this Visit          Allergies and Adverse Reactions    Allergic rhinitis     contributing            Endocrine and Metabolic    Class 3 drug-induced obesity with serious comorbidity and body mass index (BMI) of 40.0 to 44.9 in  adult     Patient's (Body mass index is 43.01 kg/m².) indicates that they are obese (BMI >30) with health conditions that include diabetes mellitus and dyslipidemias . Weight is worsening. BMI  is above average; BMI management plan is completed. We discussed portion control and increasing exercise.             Health Encounters    Hospital discharge follow-up - Primary       Pulmonary and Pneumonias    COPD (chronic obstructive pulmonary disease)     COPD is improving with treatment.  Continue current medications.             Pulmonary nodules     Seen on CT         Relevant Orders    CT Chest Without Contrast Diagnostic       Skin    Lesion of skin of scalp     Right frontal, small benign appearing, she will let me know if it changes            Symptoms and Signs    Dizziness     Exam negative. May be from illness  Recheck in a week    Diagnosis, treatment and and course discussed. Potential side effects discussed. Return if there is worsening or persistence of symptoms.     Dicussed if there is loss of vision, confusion, weakness or numbness in an extremity, dropping of an eyelid or one side of the face, severe pain, intractable vomiting, go to the ER              Tobacco    Tobacco dependence syndrome     Diagnoses         Codes Comments    Hospital discharge follow-up    -  Primary ICD-10-CM: Z09  ICD-9-CM: V67.59     Dizziness     ICD-10-CM: R42  ICD-9-CM: 780.4     Tobacco dependence syndrome     ICD-10-CM: F17.200  ICD-9-CM: 305.1     Class 3 drug-induced obesity with serious comorbidity and body mass index (BMI) of 40.0 to 44.9 in adult     ICD-10-CM: E66.1, Z68.41  ICD-9-CM: 278.01, V85.41     Lesion of skin of scalp     ICD-10-CM: L98.9  ICD-9-CM: 709.9     Pulmonary nodules     ICD-10-CM: R91.8  ICD-9-CM: 793.19     Seasonal allergic rhinitis due to pollen     ICD-10-CM: J30.1  ICD-9-CM: 477.0     Simple chronic bronchitis     ICD-10-CM: J41.0  ICD-9-CM: 491.0              An After Visit Summary and PPPS  were given to the patient.

## 2023-09-12 ENCOUNTER — OFFICE VISIT (OUTPATIENT)
Dept: FAMILY MEDICINE CLINIC | Facility: CLINIC | Age: 53
End: 2023-09-12
Payer: MEDICARE

## 2023-09-12 VITALS
HEIGHT: 63 IN | WEIGHT: 242.8 LBS | HEART RATE: 104 BPM | SYSTOLIC BLOOD PRESSURE: 126 MMHG | OXYGEN SATURATION: 98 % | DIASTOLIC BLOOD PRESSURE: 80 MMHG | BODY MASS INDEX: 43.02 KG/M2 | RESPIRATION RATE: 19 BRPM | TEMPERATURE: 98 F

## 2023-09-12 DIAGNOSIS — R42 DIZZINESS: ICD-10-CM

## 2023-09-12 DIAGNOSIS — E66.1 CLASS 3 DRUG-INDUCED OBESITY WITH SERIOUS COMORBIDITY AND BODY MASS INDEX (BMI) OF 40.0 TO 44.9 IN ADULT: ICD-10-CM

## 2023-09-12 DIAGNOSIS — J41.0 SIMPLE CHRONIC BRONCHITIS: ICD-10-CM

## 2023-09-12 DIAGNOSIS — R91.8 PULMONARY NODULES: ICD-10-CM

## 2023-09-12 DIAGNOSIS — F17.200 TOBACCO DEPENDENCE SYNDROME: ICD-10-CM

## 2023-09-12 DIAGNOSIS — Z09 HOSPITAL DISCHARGE FOLLOW-UP: Primary | ICD-10-CM

## 2023-09-12 DIAGNOSIS — L98.9 LESION OF SKIN OF SCALP: ICD-10-CM

## 2023-09-12 DIAGNOSIS — J30.1 SEASONAL ALLERGIC RHINITIS DUE TO POLLEN: ICD-10-CM

## 2023-09-12 NOTE — ASSESSMENT & PLAN NOTE
Patient's (Body mass index is 43.01 kg/m².) indicates that they are obese (BMI >30) with health conditions that include diabetes mellitus and dyslipidemias . Weight is worsening. BMI  is above average; BMI management plan is completed. We discussed portion control and increasing exercise.

## 2023-09-18 NOTE — PROGRESS NOTES
Subjective   Riddhi Cid is a 52 y.o. female. Presents to [facility]    Chief Complaint   Patient presents with    Dizziness       Dizziness  This is a new problem. The current episode started 1 to 4 weeks ago. The problem occurs intermittently. The problem has been waxing and waning. Associated symptoms include fatigue and nausea. Pertinent negatives include no abdominal pain, coughing, headaches, vertigo, vomiting or weakness. She has tried nothing for the symptoms.    Occurs when she gets tired. She has sleep apnea. She is more fatigue    She last saw her sleep doctor over a year ago.     I personally reviewed and updated the patient's allergies, medications, problem list, and past medical, surgical, social, and family history. I have reviewed and confirmed the accuracy of the History of Present Illness and Review of Symptoms as documented by the MA/LPN/RN. June Harrison MD    Allergies:  Allergies   Allergen Reactions    Sulfa Antibiotics Rash    Meloxicam GI Intolerance       Social History:  Social History     Socioeconomic History    Marital status:    Tobacco Use    Smoking status: Every Day     Packs/day: 1.00     Years: 53.00     Pack years: 53.00     Types: Cigarettes    Smokeless tobacco: Never   Vaping Use    Vaping Use: Never used   Substance and Sexual Activity    Alcohol use: No    Drug use: No    Sexual activity: Defer       Family History:  Family History   Problem Relation Age of Onset    Stroke Mother     Hypertension Mother     Hyperlipidemia Mother     Hypothyroidism Mother     Heart attack Father     Heart disease Father     Prostate cancer Father     Other Father     Diabetes Father     Hypertension Father     Hyperlipidemia Father     Coronary artery disease Father     Breast cancer Paternal Grandmother     Hypothyroidism Grandchild     Ovarian cancer Neg Hx        Past Medical History :  Patient Active Problem List   Diagnosis    Class 3 drug-induced obesity  with serious comorbidity and body mass index (BMI) of 40.0 to 44.9 in adult    Degeneration of intervertebral disc of cervical region    Degeneration of intervertebral disc of lumbar region    Acquired hypothyroidism    Mixed hyperlipidemia    Major depressive disorder, recurrent, moderate    Obstructive sleep apnea    Vitamin B12 deficiency    Vitamin D deficiency    COPD (chronic obstructive pulmonary disease)    Solitary thyroid nodule    Chronic midline low back pain with right-sided sciatica    Cervicalgia    Cervical stenosis of spine    Arthritis    Moderate persistent asthma without complication    Gastroparalysis    History of chicken pox    IBS (irritable bowel syndrome)    GERD (gastroesophageal reflux disease)    Dependent edema    Optic disc hemorrhage    Controlled diabetes mellitus type 2 with complications    Tension headache    Allergic rhinitis    Disorder of peripheral nervous system    Fibromyositis    Tobacco dependence syndrome    Pap smear, low-risk    Disease due to severe acute respiratory syndrome coronavirus 2 (SARS-CoV-2)    Lung cancer screening declined by patient    Hospital discharge follow-up    Dizziness    Lesion of skin of scalp    Pulmonary nodules       Medication List:    Current Outpatient Medications:     Accu-Chek FastClix Lancets misc, 1 each by Other route Daily., Disp: 100 each, Rfl: 3    albuterol (PROVENTIL) (2.5 MG/3ML) 0.083% nebulizer solution, Take 2.5 mg by nebulization Every 4 (Four) Hours As Needed for Wheezing., Disp: 180 mL, Rfl: 3    albuterol sulfate  (90 Base) MCG/ACT inhaler, Inhale 2 puffs 4 (Four) Times a Day., Disp: 6.7 g, Rfl: 12    aspirin 81 MG chewable tablet, Chew 1 tablet Daily., Disp: , Rfl:     Blood Glucose Monitoring Suppl (Accu-Chek Guide) w/Device kit, 1 each Daily., Disp: 1 kit, Rfl: 0    bumetanide (BUMEX) 1 MG tablet, Take 1 tablet by mouth Daily., Disp: 30 tablet, Rfl: 12    citalopram (CeleXA) 40 MG tablet, Take 1 tablet by mouth  Daily., Disp: 90 tablet, Rfl: 3    dexlansoprazole (Dexilant) 60 MG capsule, Take 1 capsule by mouth Daily., Disp: 90 capsule, Rfl: 3    famotidine (PEPCID) 40 MG tablet, Take 1 tablet by mouth Every Night., Disp: , Rfl:     fenofibrate (TRICOR) 145 MG tablet, Take 1 tablet by mouth Daily., Disp: , Rfl:     fluticasone (FLONASE) 50 MCG/ACT nasal spray, 2 sprays into the nostril(s) as directed by provider Daily., Disp: , Rfl:     Fluticasone-Salmeterol (Advair Diskus) 250-50 MCG/ACT DISKUS, Inhale 1 puff 2 (Two) Times a Day., Disp: 60 each, Rfl: 12    folic acid (FOLVITE) 1 MG tablet, Take 1 tablet by mouth Daily., Disp: , Rfl:     glucose blood (Accu-Chek Guide) test strip, 1 each by Other route Daily. Use as instructed, Disp: 100 each, Rfl: 3    HYDROcodone-acetaminophen (Norco)  MG per tablet, Take 1 tablet by mouth Every 6 (Six) Hours As Needed for Moderate Pain., Disp: 120 tablet, Rfl: 0    Lancets Misc. (ACCU-CHEK FASTCLIX LANCET) kit, Dx: E11.9, DM type 2, controlled, Disp: , Rfl:     levothyroxine (SYNTHROID, LEVOTHROID) 50 MCG tablet, Take 1 tablet by mouth Daily., Disp: , Rfl:     Linzess 290 MCG capsule capsule, Take 1 capsule by mouth Every Other Day., Disp: , Rfl:     metFORMIN ER (GLUCOPHAGE-XR) 500 MG 24 hr tablet, Take 1 tablet by mouth Daily With Breakfast., Disp: , Rfl:     montelukast (SINGULAIR) 10 MG tablet, Take 1 tablet by mouth Every Night., Disp: , Rfl:     Omega-3 Fatty Acids (fish oil) 1000 MG capsule capsule, 2 capsules Daily With Breakfast., Disp: , Rfl:     ondansetron ODT (ZOFRAN-ODT) 4 MG disintegrating tablet, Place 1 tablet on the tongue Every 8 (Eight) Hours As Needed., Disp: , Rfl:     potassium chloride (K-DUR,KLOR-CON) 20 MEQ CR tablet, Take 1 tablet by mouth As Needed. Only if taking 2 bumetanide, Disp: , Rfl:     rosuvastatin (CRESTOR) 20 MG tablet, Take 1 tablet by mouth Daily., Disp: , Rfl:     vitamin B-12 (CYANOCOBALAMIN) 1000 MCG tablet, Take 1 tablet by mouth  "Daily., Disp: , Rfl:     vitamin D3 125 MCG (5000 UT) capsule capsule, Take 1 capsule by mouth Daily., Disp: , Rfl:     Past Surgical History:  Past Surgical History:   Procedure Laterality Date    BREAST BIOPSY Right     CARPAL TUNNEL RELEASE      CERVICAL DISCECTOMY ANTERIOR      CHOLECYSTECTOMY      ESSURE TUBAL LIGATION           Physical Exam:      Vital Signs:    Vitals:    09/19/23 1411   BP: 134/76   Pulse: 103   Resp: 18   Temp: 97.1 °F (36.2 °C)   SpO2: 96%        /76 (BP Location: Right arm, Patient Position: Sitting, Cuff Size: Adult)   Pulse 103   Temp 97.1 °F (36.2 °C) (Temporal)   Resp 18   Ht 160 cm (63\")   Wt 111 kg (245 lb 3.2 oz)   SpO2 96% Comment: room air  BMI 43.44 kg/m²     Wt Readings from Last 3 Encounters:   09/19/23 111 kg (245 lb 3.2 oz)   09/12/23 110 kg (242 lb 12.8 oz)   09/01/23 105 kg (231 lb 14.8 oz)       Result Review :                Physical Exam  Vitals reviewed.   Constitutional:       Appearance: Normal appearance. She is well-developed.   HENT:      Head: Normocephalic and atraumatic.   Eyes:      General:         Right eye: No discharge.         Left eye: No discharge.   Cardiovascular:      Rate and Rhythm: Normal rate and regular rhythm.      Heart sounds: Normal heart sounds. No murmur heard.    No friction rub. No gallop.   Pulmonary:      Effort: Pulmonary effort is normal. No respiratory distress.      Breath sounds: Normal breath sounds. No wheezing or rales.   Skin:     General: Skin is warm and dry.      Findings: No rash.   Neurological:      Mental Status: She is alert and oriented to person, place, and time.      Coordination: Coordination normal.      Gait: Gait normal.   Psychiatric:         Behavior: Behavior is cooperative.       Assessment and Plan:  Problems Addressed this Visit          Endocrine and Metabolic    Class 3 drug-induced obesity with serious comorbidity and body mass index (BMI) of 40.0 to 44.9 in adult     Patient's (Body mass " index is 43.44 kg/m².) indicates that they are obese (BMI >30) with health conditions that include dyslipidemias . Weight is worsening. BMI  is above average; BMI management plan is completed. We discussed portion control and increasing exercise.             Pulmonary and Pneumonias    COPD (chronic obstructive pulmonary disease)     COPD is improving with treatment.  Refill advair             Relevant Medications    Fluticasone-Salmeterol (Advair Diskus) 250-50 MCG/ACT DISKUS    albuterol sulfate  (90 Base) MCG/ACT inhaler    Other Relevant Orders    XR Chest 2 View (Completed)       Symptoms and Signs    Dependent edema     Refill bumex         Relevant Medications    bumetanide (BUMEX) 1 MG tablet    Dizziness - Primary     better            Tobacco    Tobacco dependence syndrome     Diagnoses         Codes Comments    Dizziness    -  Primary ICD-10-CM: R42  ICD-9-CM: 780.4     Tobacco dependence syndrome     ICD-10-CM: F17.200  ICD-9-CM: 305.1     Class 3 drug-induced obesity with serious comorbidity and body mass index (BMI) of 40.0 to 44.9 in adult     ICD-10-CM: E66.1, Z68.41  ICD-9-CM: 278.01, V85.41     Dependent edema     ICD-10-CM: R60.9  ICD-9-CM: 782.3     Simple chronic bronchitis     ICD-10-CM: J41.0  ICD-9-CM: 491.0                     An After Visit Summary and PPPS were given to the patient.

## 2023-09-19 ENCOUNTER — OFFICE VISIT (OUTPATIENT)
Dept: FAMILY MEDICINE CLINIC | Facility: CLINIC | Age: 53
End: 2023-09-19
Payer: MEDICARE

## 2023-09-19 VITALS
OXYGEN SATURATION: 96 % | BODY MASS INDEX: 43.45 KG/M2 | SYSTOLIC BLOOD PRESSURE: 134 MMHG | WEIGHT: 245.2 LBS | HEART RATE: 103 BPM | TEMPERATURE: 97.1 F | HEIGHT: 63 IN | DIASTOLIC BLOOD PRESSURE: 76 MMHG | RESPIRATION RATE: 18 BRPM

## 2023-09-19 DIAGNOSIS — R42 DIZZINESS: Primary | ICD-10-CM

## 2023-09-19 DIAGNOSIS — R60.9 DEPENDENT EDEMA: ICD-10-CM

## 2023-09-19 DIAGNOSIS — F17.200 TOBACCO DEPENDENCE SYNDROME: ICD-10-CM

## 2023-09-19 DIAGNOSIS — J41.0 SIMPLE CHRONIC BRONCHITIS: ICD-10-CM

## 2023-09-19 DIAGNOSIS — E66.1 CLASS 3 DRUG-INDUCED OBESITY WITH SERIOUS COMORBIDITY AND BODY MASS INDEX (BMI) OF 40.0 TO 44.9 IN ADULT: ICD-10-CM

## 2023-09-19 PROBLEM — J44.1 COPD EXACERBATION: Status: RESOLVED | Noted: 2023-08-31 | Resolved: 2023-09-19

## 2023-09-19 PROBLEM — R05.1 ACUTE COUGH: Status: RESOLVED | Noted: 2023-08-17 | Resolved: 2023-09-19

## 2023-09-19 PROCEDURE — 1160F RVW MEDS BY RX/DR IN RCRD: CPT | Performed by: FAMILY MEDICINE

## 2023-09-19 PROCEDURE — 1159F MED LIST DOCD IN RCRD: CPT | Performed by: FAMILY MEDICINE

## 2023-09-19 PROCEDURE — 3044F HG A1C LEVEL LT 7.0%: CPT | Performed by: FAMILY MEDICINE

## 2023-09-19 PROCEDURE — 99213 OFFICE O/P EST LOW 20 MIN: CPT | Performed by: FAMILY MEDICINE

## 2023-09-19 RX ORDER — FLUTICASONE PROPIONATE AND SALMETEROL 250; 50 UG/1; UG/1
1 POWDER RESPIRATORY (INHALATION) 2 TIMES DAILY
Qty: 60 EACH | Refills: 12 | Status: SHIPPED | OUTPATIENT
Start: 2023-09-19

## 2023-09-19 RX ORDER — BUMETANIDE 1 MG/1
1 TABLET ORAL DAILY
Qty: 30 TABLET | Refills: 12 | Status: SHIPPED | OUTPATIENT
Start: 2023-09-19

## 2023-09-19 RX ORDER — ALBUTEROL SULFATE 90 UG/1
2 AEROSOL, METERED RESPIRATORY (INHALATION)
Qty: 6.7 G | Refills: 12 | Status: SHIPPED | OUTPATIENT
Start: 2023-09-19

## 2023-09-19 NOTE — ASSESSMENT & PLAN NOTE
Patient's (Body mass index is 43.44 kg/m².) indicates that they are obese (BMI >30) with health conditions that include dyslipidemias . Weight is worsening. BMI  is above average; BMI management plan is completed. We discussed portion control and increasing exercise.

## 2023-09-21 ENCOUNTER — HOSPITAL ENCOUNTER (OUTPATIENT)
Dept: GENERAL RADIOLOGY | Facility: HOSPITAL | Age: 53
Discharge: HOME | End: 2023-09-21
Admitting: FAMILY MEDICINE
Payer: MEDICARE

## 2023-09-21 PROCEDURE — 71046 X-RAY EXAM CHEST 2 VIEWS: CPT

## 2023-09-22 ENCOUNTER — TELEPHONE (OUTPATIENT)
Dept: FAMILY MEDICINE CLINIC | Facility: CLINIC | Age: 53
End: 2023-09-22
Payer: MEDICARE

## 2023-09-22 ENCOUNTER — TELEPHONE (OUTPATIENT)
Dept: FAMILY MEDICINE CLINIC | Facility: CLINIC | Age: 53
End: 2023-09-22

## 2023-09-22 PROBLEM — R91.8 PULMONARY NODULES: Status: ACTIVE | Noted: 2023-09-22

## 2023-09-22 NOTE — TELEPHONE ENCOUNTER
ZOIE did a CT PE protocol on 08/26/2023 that is the only CT I'm seeing Can we use that or does she need another CT?

## 2023-09-22 NOTE — TELEPHONE ENCOUNTER
Caller: Riddhi Cid    Relationship: Self    Best call back number: [phone]     What is the best time to reach you: ANYTIME    Who are you requesting to speak with (clinical staff, provider,  specific staff member): CLINICAL       What was the call regarding: PATIENT STATED SHE HAD A CT SCAN OF HER CHEST DONE AT [facility] AT THE END OF AUGUST    PATIENT IS REQUESTING TO KNOW IF DR. GRACIA WANTED TO USE THOSE SCAN RESULTS OR HAVE PATIENT DO ANOTHER CT SCAN    PLEASE ADVISE

## 2023-09-22 NOTE — TELEPHONE ENCOUNTER
Called and let pt know that Dr. Harrison would still like her to have the CT done since the Ct done on the 31st showed nodules. She said she will get it done.

## 2023-09-22 NOTE — TELEPHONE ENCOUNTER
I ordered her CT of her chest. I forgot to mention it to her. She had some nodules in her CT of her abdomen and with her being a smoker and her dizziness, I wanted to check it out.

## 2023-09-22 NOTE — ASSESSMENT & PLAN NOTE
Exam negative. May be from illness  Recheck in a week    Diagnosis, treatment and and course discussed. Potential side effects discussed. Return if there is worsening or persistence of symptoms.     Dicussed if there is loss of vision, confusion, weakness or numbness in an extremity, dropping of an eyelid or one side of the face, severe pain, intractable vomiting, go to the ER

## 2023-09-25 ENCOUNTER — TELEPHONE (OUTPATIENT)
Dept: FAMILY MEDICINE CLINIC | Facility: CLINIC | Age: 53
End: 2023-09-25

## 2023-09-25 NOTE — TELEPHONE ENCOUNTER
Caller: JAGJIT MOORE---[facility]    Best call back number: [phone]     Patient is needing:     CASE NUMBER: [id number]    CALLING TO GIVE THE STATUS  OF A PA FOR THE ORDER FOR A LOW DOSE CT SCAN FOR LUNG CANCER SCREENING.

## 2023-10-02 NOTE — PROGRESS NOTES
Subjective   Riddhi Cid is a 53 y.o. female. Presents to [facility]    Chief Complaint   Patient presents with    Cough       Cough  This is a new problem. The current episode started 1 to 4 weeks ago. The problem has been gradually improving. The cough is Non-productive. Pertinent negatives include no chest pain, ear congestion, ear pain, fever, nasal congestion, rhinorrhea, shortness of breath or wheezing. Nothing aggravates the symptoms. Treatments tried: mucinex. The treatment provided moderate relief.      She is feeling better    I personally reviewed and updated the patient's allergies, medications, problem list, and past medical, surgical, social, and family history. I have reviewed and confirmed the accuracy of the History of Present Illness and Review of Symptoms as documented by the MA/LPN/RN. June Harrison MD    Allergies:  Allergies   Allergen Reactions    Sulfa Antibiotics Rash    Meloxicam GI Intolerance       Social History:  Social History     Socioeconomic History    Marital status:    Tobacco Use    Smoking status: Every Day     Packs/day: 1.00     Years: 53.00     Additional pack years: 0.00     Total pack years: 53.00     Types: Cigarettes    Smokeless tobacco: Never   Vaping Use    Vaping Use: Never used   Substance and Sexual Activity    Alcohol use: No    Drug use: No    Sexual activity: Defer       Family History:  Family History   Problem Relation Age of Onset    Stroke Mother     Hypertension Mother     Hyperlipidemia Mother     Hypothyroidism Mother     Heart attack Father     Heart disease Father     Prostate cancer Father     Other Father     Diabetes Father     Hypertension Father     Hyperlipidemia Father     Coronary artery disease Father     Breast cancer Paternal Grandmother     Hypothyroidism Grandchild     Ovarian cancer Neg Hx        Past Medical History :  Patient Active Problem List   Diagnosis    Class 3 drug-induced obesity with serious  comorbidity and body mass index (BMI) of 40.0 to 44.9 in adult    Degeneration of intervertebral disc of cervical region    Degeneration of intervertebral disc of lumbar region    Acquired hypothyroidism    Mixed hyperlipidemia    Major depressive disorder, recurrent, moderate    Obstructive sleep apnea    Vitamin B12 deficiency    Vitamin D deficiency    COPD (chronic obstructive pulmonary disease)    Solitary thyroid nodule    Chronic midline low back pain with right-sided sciatica    Cervicalgia    Cervical stenosis of spine    Arthritis    Moderate persistent asthma without complication    Gastroparalysis    History of chicken pox    IBS (irritable bowel syndrome)    GERD (gastroesophageal reflux disease)    Dependent edema    Optic disc hemorrhage    Controlled diabetes mellitus type 2 with complications    Tension headache    Allergic rhinitis    Disorder of peripheral nervous system    Fibromyositis    Tobacco dependence syndrome    Cervical smear, as part of routine gynecological examination    Disease due to severe acute respiratory syndrome coronavirus 2 (SARS-CoV-2)    Lung cancer screening declined by patient    Acute cough    Hospital discharge follow-up    Dizziness    Lesion of skin of scalp    Pulmonary nodules       Medication List:    Current Outpatient Medications:     Accu-Chek FastClix Lancets misc, 1 each by Other route Daily., Disp: 100 each, Rfl: 3    albuterol (PROVENTIL) (2.5 MG/3ML) 0.083% nebulizer solution, Take 2.5 mg by nebulization Every 4 (Four) Hours As Needed for Wheezing., Disp: 180 mL, Rfl: 3    albuterol sulfate  (90 Base) MCG/ACT inhaler, Inhale 2 puffs 4 (Four) Times a Day., Disp: 6.7 g, Rfl: 12    aspirin 81 MG chewable tablet, Chew 1 tablet Daily., Disp: , Rfl:     Blood Glucose Monitoring Suppl (Accu-Chek Guide) w/Device kit, 1 each Daily., Disp: 1 kit, Rfl: 0    bumetanide (BUMEX) 1 MG tablet, Take 1 tablet by mouth Daily., Disp: 30 tablet, Rfl: 12    citalopram  (CeleXA) 40 MG tablet, Take 1 tablet by mouth Daily., Disp: 90 tablet, Rfl: 3    dexlansoprazole (Dexilant) 60 MG capsule, Take 1 capsule by mouth Daily., Disp: 90 capsule, Rfl: 3    famotidine (PEPCID) 40 MG tablet, Take 1 tablet by mouth Every Night., Disp: , Rfl:     fenofibrate (TRICOR) 145 MG tablet, Take 1 tablet by mouth Daily., Disp: , Rfl:     fluticasone (FLONASE) 50 MCG/ACT nasal spray, 2 sprays into the nostril(s) as directed by provider Daily., Disp: , Rfl:     Fluticasone-Salmeterol (Advair Diskus) 250-50 MCG/ACT DISKUS, Inhale 1 puff 2 (Two) Times a Day., Disp: 60 each, Rfl: 12    folic acid (FOLVITE) 1 MG tablet, Take 1 tablet by mouth Daily., Disp: , Rfl:     glucose blood (Accu-Chek Guide) test strip, 1 each by Other route Daily. Use as instructed, Disp: 100 each, Rfl: 3    Lancets Misc. (ACCU-CHEK FASTCLIX LANCET) kit, Dx: E11.9, DM type 2, controlled, Disp: , Rfl:     levothyroxine (SYNTHROID, LEVOTHROID) 50 MCG tablet, Take 1 tablet by mouth Daily., Disp: , Rfl:     Linzess 290 MCG capsule capsule, Take 1 capsule by mouth Every Other Day., Disp: , Rfl:     metFORMIN ER (GLUCOPHAGE-XR) 500 MG 24 hr tablet, Take 1 tablet by mouth Daily With Breakfast., Disp: , Rfl:     montelukast (SINGULAIR) 10 MG tablet, Take 1 tablet by mouth Every Night., Disp: , Rfl:     Omega-3 Fatty Acids (fish oil) 1000 MG capsule capsule, 2 capsules Daily With Breakfast., Disp: , Rfl:     ondansetron ODT (ZOFRAN-ODT) 4 MG disintegrating tablet, Place 1 tablet on the tongue Every 8 (Eight) Hours As Needed., Disp: , Rfl:     potassium chloride (K-DUR,KLOR-CON) 20 MEQ CR tablet, Take 1 tablet by mouth As Needed. Only if taking 2 bumetanide, Disp: , Rfl:     rosuvastatin (CRESTOR) 20 MG tablet, Take 1 tablet by mouth Daily., Disp: , Rfl:     vitamin B-12 (CYANOCOBALAMIN) 1000 MCG tablet, Take 1 tablet by mouth Daily., Disp: , Rfl:     vitamin D3 125 MCG (5000 UT) capsule capsule, Take 1 capsule by mouth Daily., Disp: , Rfl:  "    HYDROcodone-acetaminophen (Norco)  MG per tablet, Take 1 tablet by mouth Every 6 (Six) Hours As Needed for Moderate Pain., Disp: 120 tablet, Rfl: 0    Past Surgical History:  Past Surgical History:   Procedure Laterality Date    BREAST BIOPSY Right     CARPAL TUNNEL RELEASE      CERVICAL DISCECTOMY ANTERIOR      CHOLECYSTECTOMY      ESSURE TUBAL LIGATION           Physical Exam:      Vital Signs:    Vitals:    10/03/23 0846   BP: 120/70   Pulse: 95   Resp: 18   Temp: 97.1 øF (36.2 øC)   SpO2: 98%        /70 (BP Location: Right arm, Patient Position: Sitting, Cuff Size: Adult)   Pulse 95   Temp 97.1 øF (36.2 øC) (Temporal)   Resp 18   Ht 160 cm (63\")   Wt 112 kg (246 lb)   SpO2 98% Comment: room air  BMI 43.58 kg/mý     Wt Readings from Last 3 Encounters:   10/03/23 112 kg (246 lb)   09/19/23 111 kg (245 lb 3.2 oz)   09/12/23 110 kg (242 lb 12.8 oz)       Result Review :                Physical Exam  Vitals reviewed.   Constitutional:       Appearance: Normal appearance. She is well-developed.   HENT:      Head: Normocephalic and atraumatic.   Eyes:      General:         Right eye: No discharge.         Left eye: No discharge.   Cardiovascular:      Rate and Rhythm: Normal rate and regular rhythm.      Heart sounds: Normal heart sounds. No murmur heard.     No friction rub. No gallop.   Pulmonary:      Effort: Pulmonary effort is normal. No respiratory distress.      Breath sounds: Normal breath sounds. No wheezing or rales.   Skin:     General: Skin is warm and dry.      Findings: No rash.   Neurological:      Mental Status: She is alert and oriented to person, place, and time.      Coordination: Coordination normal.      Gait: Gait normal.   Psychiatric:         Behavior: Behavior is cooperative.         Assessment and Plan:  Problems Addressed this Visit          Endocrine and Metabolic    Class 3 drug-induced obesity with serious comorbidity and body mass index (BMI) of 40.0 to 44.9 in " adult     Patient's (Body mass index is 43.58 kg/mý.) indicates that they are obese (BMI >30) with health conditions that include hypertension, diabetes mellitus, and dyslipidemias . Weight is worsening. BMI  is above average; BMI management plan is completed. We discussed portion control and increasing exercise.          Controlled diabetes mellitus type 2 with complications     Referral to podiatry         Relevant Orders    Ambulatory Referral to Podiatry       Pulmonary and Pneumonias    COPD (chronic obstructive pulmonary disease)     COPD is improving with treatment.  Continue current medications.                Tobacco    Tobacco dependence syndrome       Other    Acute cough - Primary     She is feeling better  Finish treatment          Diagnoses         Codes Comments    Acute cough    -  Primary ICD-10-CM: R05.1  ICD-9-CM: 786.2     Tobacco dependence syndrome     ICD-10-CM: F17.200  ICD-9-CM: 305.1     Class 3 drug-induced obesity with serious comorbidity and body mass index (BMI) of 40.0 to 44.9 in adult     ICD-10-CM: E66.1, Z68.41  ICD-9-CM: 278.01, V85.41     Simple chronic bronchitis     ICD-10-CM: J41.0  ICD-9-CM: 491.0     Controlled type 2 diabetes mellitus with complication, without long-term current use of insulin     ICD-10-CM: E11.8  ICD-9-CM: 250.90                     An After Visit Summary and PPPS were given to the patient.

## 2023-10-03 ENCOUNTER — OFFICE VISIT (OUTPATIENT)
Dept: FAMILY MEDICINE CLINIC | Facility: CLINIC | Age: 53
End: 2023-10-03
Payer: MEDICARE

## 2023-10-03 VITALS
TEMPERATURE: 97.1 F | WEIGHT: 246 LBS | HEART RATE: 95 BPM | SYSTOLIC BLOOD PRESSURE: 120 MMHG | OXYGEN SATURATION: 98 % | HEIGHT: 63 IN | BODY MASS INDEX: 43.59 KG/M2 | RESPIRATION RATE: 18 BRPM | DIASTOLIC BLOOD PRESSURE: 70 MMHG

## 2023-10-03 DIAGNOSIS — E66.1 CLASS 3 DRUG-INDUCED OBESITY WITH SERIOUS COMORBIDITY AND BODY MASS INDEX (BMI) OF 40.0 TO 44.9 IN ADULT: ICD-10-CM

## 2023-10-03 DIAGNOSIS — F17.200 TOBACCO DEPENDENCE SYNDROME: ICD-10-CM

## 2023-10-03 DIAGNOSIS — R05.1 ACUTE COUGH: Primary | ICD-10-CM

## 2023-10-03 DIAGNOSIS — E11.8 CONTROLLED TYPE 2 DIABETES MELLITUS WITH COMPLICATION, WITHOUT LONG-TERM CURRENT USE OF INSULIN: ICD-10-CM

## 2023-10-03 DIAGNOSIS — J41.0 SIMPLE CHRONIC BRONCHITIS: ICD-10-CM

## 2023-10-03 PROCEDURE — 1160F RVW MEDS BY RX/DR IN RCRD: CPT | Performed by: FAMILY MEDICINE

## 2023-10-03 PROCEDURE — 99212 OFFICE O/P EST SF 10 MIN: CPT | Performed by: FAMILY MEDICINE

## 2023-10-03 PROCEDURE — 1159F MED LIST DOCD IN RCRD: CPT | Performed by: FAMILY MEDICINE

## 2023-10-03 PROCEDURE — 3044F HG A1C LEVEL LT 7.0%: CPT | Performed by: FAMILY MEDICINE

## 2023-10-03 NOTE — ASSESSMENT & PLAN NOTE
Patient's (Body mass index is 43.58 kg/mý.) indicates that they are obese (BMI >30) with health conditions that include hypertension, diabetes mellitus, and dyslipidemias . Weight is worsening. BMI  is above average; BMI management plan is completed. We discussed portion control and increasing exercise.

## 2023-10-04 DIAGNOSIS — G89.29 CHRONIC MIDLINE LOW BACK PAIN WITH RIGHT-SIDED SCIATICA: ICD-10-CM

## 2023-10-04 DIAGNOSIS — M54.41 CHRONIC MIDLINE LOW BACK PAIN WITH RIGHT-SIDED SCIATICA: ICD-10-CM

## 2023-10-04 NOTE — TELEPHONE ENCOUNTER
Caller: TRINO KAMINSKI    Relationship: PATIENT     Best call back number: [phone]    Requested Prescriptions:   Requested Prescriptions     Pending Prescriptions Disp Refills    HYDROcodone-acetaminophen (Norco)  MG per tablet 120 tablet 0     Sig: Take 1 tablet by mouth Every 6 (Six) Hours As Needed for Moderate Pain.        Pharmacy where request should be sent:    [id number] - [place], [place] - [address] AT [facility] [address] - [phone]  - [phone] FX   [address] [facility] [address]   Phone: [phone] Fax: [phone]       Last office visit with prescribing clinician: 8/7/2023   Last telemedicine visit with prescribing clinician: Visit date not found   Next office visit with prescribing clinician: 11/6/2023     Additional details provided by patient: CALLING IN EARLY BECAUSE OF WEEKEND     Does the patient have less than a 3 day supply:  [x] Yes  [] No    Would you like a call back once the refill request has been completed: [] Yes [x] No    If the office needs to give you a call back, can they leave a voicemail: [] Yes [x] No    Kerry Marley Rep   10/04/23 11:57 EDT

## 2023-10-09 RX ORDER — HYDROCODONE BITARTRATE AND ACETAMINOPHEN 10; 325 MG/1; MG/1
1 TABLET ORAL EVERY 6 HOURS PRN
Qty: 120 TABLET | Refills: 0 | Status: SHIPPED | OUTPATIENT
Start: 2023-10-09

## 2023-10-09 NOTE — TELEPHONE ENCOUNTER
Caller: Riddhi Cid    Relationship to patient: Self    Best call back number: [phone]    Patient is needing: THE PATIENT STATED SHE HAS CALLED AROUND TO SEVERAL PHARMACIES AND THEY ARE ALL OUT OF NORCO 10 MG. SHE STATED [facility] IN [place] HAS 7.5 MG, SHE WOULD LIKE HER PRESCRIPTION SENT TO   [id number], [place] - [address] AT [facility] - [phone] [facility] [phone]  [phone]       PLEASE CALL THE PATIENT WHEN THE RX HAS BEEN SENT TO THE [facility] IN [place]

## 2023-11-01 ENCOUNTER — OFFICE VISIT (OUTPATIENT)
Dept: PAIN MEDICINE | Facility: CLINIC | Age: 53
End: 2023-11-01
Payer: MEDICARE

## 2023-11-01 ENCOUNTER — OFFICE (AMBULATORY)
Dept: URBAN - METROPOLITAN AREA CLINIC 64 | Facility: CLINIC | Age: 53
End: 2023-11-01

## 2023-11-01 VITALS
HEART RATE: 62 BPM | WEIGHT: 246 LBS | DIASTOLIC BLOOD PRESSURE: 77 MMHG | SYSTOLIC BLOOD PRESSURE: 127 MMHG | HEIGHT: 63 IN

## 2023-11-01 VITALS
HEART RATE: 70 BPM | SYSTOLIC BLOOD PRESSURE: 131 MMHG | DIASTOLIC BLOOD PRESSURE: 72 MMHG | OXYGEN SATURATION: 97 % | RESPIRATION RATE: 16 BRPM

## 2023-11-01 DIAGNOSIS — M54.41 CHRONIC MIDLINE LOW BACK PAIN WITH RIGHT-SIDED SCIATICA: Primary | ICD-10-CM

## 2023-11-01 DIAGNOSIS — M48.02 CERVICAL STENOSIS OF SPINE: ICD-10-CM

## 2023-11-01 DIAGNOSIS — R11.0 NAUSEA: ICD-10-CM

## 2023-11-01 DIAGNOSIS — K59.00 CONSTIPATION, UNSPECIFIED: ICD-10-CM

## 2023-11-01 DIAGNOSIS — R10.84 GENERALIZED ABDOMINAL PAIN: ICD-10-CM

## 2023-11-01 DIAGNOSIS — M51.36 DEGENERATION OF INTERVERTEBRAL DISC OF LUMBAR REGION: ICD-10-CM

## 2023-11-01 DIAGNOSIS — M54.2 CERVICALGIA: ICD-10-CM

## 2023-11-01 DIAGNOSIS — M50.30 DEGENERATION OF INTERVERTEBRAL DISC OF CERVICAL REGION: ICD-10-CM

## 2023-11-01 DIAGNOSIS — K21.9 GASTRO-ESOPHAGEAL REFLUX DISEASE WITHOUT ESOPHAGITIS: ICD-10-CM

## 2023-11-01 DIAGNOSIS — G89.29 CHRONIC MIDLINE LOW BACK PAIN WITH RIGHT-SIDED SCIATICA: Primary | ICD-10-CM

## 2023-11-01 DIAGNOSIS — Z86.010 PERSONAL HISTORY OF COLONIC POLYPS: ICD-10-CM

## 2023-11-01 PROCEDURE — 99214 OFFICE O/P EST MOD 30 MIN: CPT | Performed by: NURSE PRACTITIONER

## 2023-11-01 PROCEDURE — 1125F AMNT PAIN NOTED PAIN PRSNT: CPT | Performed by: PHYSICAL MEDICINE & REHABILITATION

## 2023-11-01 PROCEDURE — 99213 OFFICE O/P EST LOW 20 MIN: CPT | Performed by: PHYSICAL MEDICINE & REHABILITATION

## 2023-11-01 PROCEDURE — 1160F RVW MEDS BY RX/DR IN RCRD: CPT | Performed by: PHYSICAL MEDICINE & REHABILITATION

## 2023-11-01 PROCEDURE — G0463 HOSPITAL OUTPT CLINIC VISIT: HCPCS | Performed by: PHYSICAL MEDICINE & REHABILITATION

## 2023-11-01 PROCEDURE — 1159F MED LIST DOCD IN RCRD: CPT | Performed by: PHYSICAL MEDICINE & REHABILITATION

## 2023-11-01 RX ORDER — DICYCLOMINE HYDROCHLORIDE 10 MG/1
40 CAPSULE ORAL
Qty: 60 | Refills: 11 | Status: ACTIVE
Start: 2023-11-01

## 2023-11-01 RX ORDER — HYDROCODONE BITARTRATE AND ACETAMINOPHEN 7.5; 325 MG/1; MG/1
1 TABLET ORAL EVERY 6 HOURS PRN
Qty: 120 TABLET | Refills: 0 | Status: SHIPPED | OUTPATIENT
Start: 2023-11-01

## 2023-11-01 NOTE — PROGRESS NOTES
Subjective   Riddhi Cid is a 53 y.o. female.     History of Present Illness  Neck pain radiates to b/l shoulders, and left arm pain. ACDF C5/7 (4/11/2016), also h/o fibromyalgia,  also pain in lower back and b/l legs and feet. 10/10 at worst, 6/10 at best, worse with activity, improves with rest and meds, always present, varies, aching, radiates in BLE. Imaging reviewed, satisfactory ACDF. Referred for pain management. Neck pain improved with ACDF, tapered off Pixley with worsening pain, still somewhat severe. Taking Cymbalta which helps, tried Lyrica with weight gain, trying to lose weight. Restarted Pixley at BID - qdaily prn but worsening pain with exercise to lose weight, increased to BID-TID #75, then TID, then QID prn with adequate relief, reduced to #105, tolerating. Gastric sleeve surgery planned for March-April 2017, had to miss cardiac clearance and EGD due to illness and social issues, has decided against surgery, trying to lose weight with diet and exercise. Worsening b/l CTS symptoms, R>L, known h/o of CTS. Saw Juan Franz for worsening neck pain to LUE, ACDF is intact, started Diclofenac for DJD, Elavil. Could not tolerate even low-dose Gabapentin with drowsiness. Started Celebrex but did not feel like doing anything, stopped, stopped Mobic. Stopping numerous meds due to side effects, started Gabapentin with Dr. Pisano with benefit. Pain worsening, DDD on MRI, started PT which is helping. Back pain worsening, especially at night. Had COVID-19, doing better, plans to get vaccinated.  Back Pain  Pertinent negatives include no abdominal pain, bladder incontinence, chest pain, fever, numbness or weakness.        The following portions of the patient's history were reviewed and updated as appropriate: allergies, current medications, past family history, past medical history, past social history, past surgical history and problem list.    Review of Systems   Constitutional:  Negative for chills, fatigue and  fever.   HENT:  Negative for hearing loss and trouble swallowing.    Eyes:  Negative for visual disturbance.   Respiratory:  Negative for shortness of breath.    Cardiovascular:  Negative for chest pain.   Gastrointestinal:  Positive for constipation and nausea. Negative for abdominal pain, diarrhea and vomiting.   Genitourinary:  Negative for urinary incontinence.   Musculoskeletal:  Positive for arthralgias, back pain and neck pain. Negative for joint swelling and myalgias.   Neurological:  Positive for headache. Negative for dizziness, weakness and numbness.       Objective   Physical Exam   Constitutional: She is oriented to person, place, and time. She appears well-developed and well-nourished.   HENT:   Head: Normocephalic and atraumatic.   Eyes: Pupils are equal, round, and reactive to light.   Cardiovascular: Normal rate, regular rhythm and normal heart sounds.   Pulmonary/Chest: Breath sounds normal.   Abdominal: Soft. Bowel sounds are normal. She exhibits no distension. There is no abdominal tenderness.   Neurological: She is alert and oriented to person, place, and time. She has normal reflexes. She displays normal reflexes. No sensory deficit.   Psychiatric: Her behavior is normal. Thought content normal.         Assessment & Plan   Diagnoses and all orders for this visit:    1. Chronic midline low back pain with right-sided sciatica (Primary)    2. Cervicalgia    3. Cervical stenosis of spine    4. Degeneration of intervertebral disc of cervical region    5. Degeneration of intervertebral disc of lumbar region        INspect reviewed, in order. Low risk. Repeat UDS 5/8/23 in order.  Reduced to Norco 10/325mg q6-8h #105, too painful, restarted q6h prn #120, then reduced to #105 successfully but pain worsening. Cont at #120. Reduce to 7.5mg QID prn. Filled 10/9/23.   Afraid to start Lyrica due to possible weight gain. Restarted Gabapentin, takes 300-900mg/day as tolerated, helping a great deal.  Severe  GERD, IBS, family h/o CV dz. Stopped Diclofenac, could not tolerate Celebrex 200mg qdaily. Taking Naproxen with PCP but hard on her stomach, no personal CV issues. Began Mobic 7.5mg qdaily prn.  Began Tizanidine 4mg qHS prn.  Began Licart prn, can take NSAIDs per patient.  Cont other meds as prescribed.  Cont TENS, unit provided here, patient reports it helps her pain significantly.  Cont b/l CTS braces at night only.  Patient reports she cannot start PT at this time as her daughter currently works 12h shifts, may be able to begin in future if her daughter can change to an 8h shift schedule.  Will begin neuropathic comp cream if her current cream does not work.  Ordered LSO for truncal stability. Cont PT.  Letter to return to work. Does not drive or operate heavy machinery while using pain medication.  RTC 3 months for f/u. Fills in [place] currently

## 2023-11-30 NOTE — PROGRESS NOTES
Subjective   Riddhi Cid is a 53 y.o. female. Presents to [facility]    Chief Complaint   Patient presents with    Diabetes    Hyperlipidemia    Hypothyroidism    Fall       Diabetes  She presents for her follow-up diabetic visit. She has type 2 diabetes mellitus. Pertinent negatives for hypoglycemia include no dizziness or headaches. Pertinent negatives for diabetes include no chest pain, no fatigue and no weakness. There are no hypoglycemic complications. There are no diabetic complications. Risk factors for coronary artery disease include diabetes mellitus, dyslipidemia, hypertension, obesity and stress. Current diabetic treatment includes oral agent (monotherapy). She is compliant with treatment most of the time. She is following a generally healthy diet. Meal planning includes avoidance of concentrated sweets. She participates in exercise intermittently. Her overall blood glucose range is 110-130 mg/dl. An ACE inhibitor/angiotensin II receptor blocker is not being taken. She sees a podiatrist.Eye exam is not current.   Hyperlipidemia  This is a chronic problem. The current episode started more than 1 year ago. The problem is controlled. Exacerbating diseases include hypothyroidism and obesity. There are no known factors aggravating her hyperlipidemia. Pertinent negatives include no chest pain or shortness of breath. Current antihyperlipidemic treatment includes statins. The current treatment provides moderate improvement of lipids. There are no compliance problems.  Risk factors for coronary artery disease include dyslipidemia, diabetes mellitus, hypertension, obesity, stress and post-menopausal.   Hypothyroidism  This is a chronic problem. The current episode started more than 1 year ago. Pertinent negatives include no chest pain, fatigue, headaches, numbness or weakness. Treatments tried: levothyroxine 50 MCG. The treatment provided moderate relief.   Fall  The accident occurred 3 to 5  days ago (11/26/23). The fall occurred while walking. She landed on Hard floor. There was no blood loss. Point of impact: unknown. The pain is present in the right lower leg and right knee (right side of back). The pain is at a severity of 7/10. The pain is mild. The symptoms are aggravated by pressure on injury, movement and use of injured limb. Pertinent negatives include no bowel incontinence, headaches, loss of consciousness, numbness or tingling. She has tried nothing for the symptoms.   Back Pain  This is a new (and right leg pain) problem. The current episode started in the past 7 days. The problem occurs daily. The problem has been waxing and waning since onset. The pain is present in the lumbar spine. The quality of the pain is described as aching. The pain radiates to the right knee. The pain is moderate. Pertinent negatives include no bladder incontinence, bowel incontinence, chest pain, headaches, numbness, paresthesias, pelvic pain, perianal numbness, tingling or weakness. Risk factors include obesity. She has tried heat and ice (opioids, nsaids) for the symptoms. The treatment provided moderate relief.          I personally reviewed and updated the patient's allergies, medications, problem list, and past medical, surgical, social, and family history. I have reviewed and confirmed the accuracy of the History of Present Illness and Review of Symptoms as documented by the MA/LPN/RN. June Harrison MD    Allergies:  Allergies   Allergen Reactions    Sulfa Antibiotics Rash    Meloxicam GI Intolerance       Social History:  Social History     Socioeconomic History    Marital status:    Tobacco Use    Smoking status: Every Day     Packs/day: 1.00     Years: 53.00     Additional pack years: 0.00     Total pack years: 53.00     Types: Cigarettes    Smokeless tobacco: Never   Vaping Use    Vaping Use: Never used   Substance and Sexual Activity    Alcohol use: No    Drug use: No    Sexual activity:  Defer       Family History:  Family History   Problem Relation Age of Onset    Stroke Mother     Hypertension Mother     Hyperlipidemia Mother     Hypothyroidism Mother     Heart attack Father     Heart disease Father     Prostate cancer Father     Other Father     Diabetes Father     Hypertension Father     Hyperlipidemia Father     Coronary artery disease Father     Breast cancer Paternal Grandmother     Hypothyroidism Grandchild     Ovarian cancer Neg Hx        Past Medical History :  Patient Active Problem List   Diagnosis    Class 3 severe obesity due to excess calories with serious comorbidity and body mass index (BMI) of 40.0 to 44.9 in adult    Degeneration of intervertebral disc of cervical region    Degeneration of intervertebral disc of lumbar region    Acquired hypothyroidism    Mixed hyperlipidemia    Major depressive disorder, recurrent, moderate    Obstructive sleep apnea    Vitamin B12 deficiency    Vitamin D deficiency    COPD (chronic obstructive pulmonary disease)    Solitary thyroid nodule    Chronic midline low back pain with right-sided sciatica    Cervicalgia    Cervical stenosis of spine    Arthritis    Moderate persistent asthma without complication    Gastroparalysis    History of chicken pox    IBS (irritable bowel syndrome)    GERD (gastroesophageal reflux disease)    Dependent edema    Optic disc hemorrhage    Controlled diabetes mellitus type 2 with complications    Tension headache    Allergic rhinitis    Disorder of peripheral nervous system    Fibromyositis    Tobacco dependence syndrome    Cervical smear, as part of routine gynecological examination    Disease due to severe acute respiratory syndrome coronavirus 2 (SARS-CoV-2)    Lung cancer screening declined by patient    Acute cough    Hospital discharge follow-up    Dizziness    Lesion of skin of scalp    Pulmonary nodules    Right calf pain       Medication List:    Current Outpatient Medications:     Accu-Chek [facility]Clix Lancets  [facility], 1 each by Other route Daily., Disp: 100 each, Rfl: 3    albuterol (PROVENTIL) (2.5 MG/3ML) 0.083% nebulizer solution, Take 2.5 mg by nebulization Every 4 (Four) Hours As Needed for Wheezing., Disp: 180 mL, Rfl: 3    albuterol sulfate  (90 Base) MCG/ACT inhaler, Inhale 2 puffs 4 (Four) Times a Day., Disp: 6.7 g, Rfl: 12    aspirin 81 MG chewable tablet, Chew 1 tablet Daily., Disp: , Rfl:     atorvastatin (LIPITOR) 80 MG tablet, 0, Disp: , Rfl:     Blood Glucose Monitoring Suppl (Accu-Chek Guide) w/Device kit, 1 each Daily., Disp: 1 kit, Rfl: 0    bumetanide (BUMEX) 1 MG tablet, Take 1 tablet by mouth Daily., Disp: 30 tablet, Rfl: 12    citalopram (CeleXA) 40 MG tablet, Take 1 tablet by mouth Daily., Disp: 90 tablet, Rfl: 3    dexlansoprazole (Dexilant) 60 MG capsule, Take 1 capsule by mouth Daily., Disp: 90 capsule, Rfl: 3    dicyclomine (BENTYL) 10 MG capsule, TAKE 1 CAPSULE BY MOUTH EVERY 6 HOURS AS NEEDED FOR PAIN, Disp: , Rfl:     famotidine (PEPCID) 40 MG tablet, Take 1 tablet by mouth Every Night., Disp: , Rfl:     fenofibrate (TRICOR) 145 MG tablet, Take 1 tablet by mouth Daily., Disp: , Rfl:     fluticasone (FLONASE) 50 MCG/ACT nasal spray, 2 sprays into the nostril(s) as directed by provider Daily., Disp: , Rfl:     Fluticasone-Salmeterol (Advair Diskus) 250-50 MCG/ACT DISKUS, Inhale 1 puff 2 (Two) Times a Day., Disp: 60 each, Rfl: 12    folic acid (FOLVITE) 1 MG tablet, Take 1 tablet by mouth Daily., Disp: , Rfl:     gabapentin (NEURONTIN) 300 MG capsule, 1 cap BID PRN, Disp: , Rfl:     glucose blood (Accu-Chek Guide) test strip, 1 each by Other route Daily. Use as instructed, Disp: 100 each, Rfl: 3    HYDROcodone-acetaminophen (Norco)  MG per tablet, Take 1 tablet by mouth Every 6 (Six) Hours As Needed for Moderate Pain., Disp: 120 tablet, Rfl: 0    HYDROcodone-acetaminophen (Norco) 7.5-325 MG per tablet, Take 1 tablet by mouth Every 6 (Six) Hours As Needed for Moderate Pain., Disp: 120  tablet, Rfl: 0    HYDROcodone-acetaminophen (Norco) 7.5-325 MG per tablet, Take 1 tablet by mouth Every 6 (Six) Hours As Needed for Moderate Pain., Disp: 120 tablet, Rfl: 0    Lancets Misc. (ACCU-CHEK FASTCLIX LANCET) kit, Dx: E11.9, DM type 2, controlled, Disp: , Rfl:     levothyroxine (SYNTHROID, LEVOTHROID) 50 MCG tablet, Take 1 tablet by mouth Daily., Disp: , Rfl:     Linzess 290 MCG capsule capsule, Take 1 capsule by mouth Every Other Day., Disp: , Rfl:     metFORMIN ER (GLUCOPHAGE-XR) 500 MG 24 hr tablet, Take 1 tablet by mouth Daily With Breakfast., Disp: , Rfl:     montelukast (SINGULAIR) 10 MG tablet, Take 1 tablet by mouth Every Night., Disp: , Rfl:     Omega-3 Fatty Acids (fish oil) 1000 MG capsule capsule, 2 capsules Daily With Breakfast., Disp: , Rfl:     ondansetron ODT (ZOFRAN-ODT) 4 MG disintegrating tablet, Place 1 tablet on the tongue Every 8 (Eight) Hours As Needed., Disp: , Rfl:     potassium chloride (K-DUR,KLOR-CON) 20 MEQ CR tablet, Take 1 tablet by mouth As Needed. Only if taking 2 bumetanide, Disp: , Rfl:     potassium chloride (MICRO-K) 8 MEQ CR capsule, 1 cap qd with water pill, Disp: , Rfl:     rosuvastatin (CRESTOR) 20 MG tablet, Take 1 tablet by mouth Daily., Disp: , Rfl:     vitamin B-12 (CYANOCOBALAMIN) 1000 MCG tablet, Take 1 tablet by mouth Daily., Disp: , Rfl:     vitamin D3 125 MCG (5000 UT) capsule capsule, Take 1 capsule by mouth Daily., Disp: , Rfl:     HYDROcodone-acetaminophen (Norco) 7.5-325 MG per tablet, Take 1 tablet by mouth Every 6 (Six) Hours As Needed for Moderate Pain., Disp: 120 tablet, Rfl: 0    Past Surgical History:  Past Surgical History:   Procedure Laterality Date    BREAST BIOPSY Right     CARPAL TUNNEL RELEASE      CERVICAL DISCECTOMY ANTERIOR      CHOLECYSTECTOMY      ESSURE TUBAL LIGATION           Physical Exam:      Vital Signs:    Vitals:    12/01/23 0854   BP: 140/82   Pulse: 98   Resp: 18   Temp: 97.7 °F (36.5 °C)   SpO2: 98%        /82 (BP  "Location: Right arm, Patient Position: Sitting, Cuff Size: Adult)   Pulse 98   Temp 97.7 °F (36.5 °C) (Temporal)   Resp 18   Ht 160 cm (63\")   Wt 112 kg (246 lb 12.8 oz)   SpO2 98% Comment: room air  BMI 43.72 kg/m²     Wt Readings from Last 3 Encounters:   12/01/23 112 kg (246 lb 12.8 oz)   10/03/23 112 kg (246 lb)   09/19/23 111 kg (245 lb 3.2 oz)       Result Review :   The following data was reviewed by: June Harrison MD on 12/01/2023:  A1C Last 3 Results          8/17/2023    08:40 9/1/2023    06:04 12/1/2023    08:59   HGBA1C Last 3 Results   Hemoglobin A1C 6.9  6.70  7.3             Brief Urine Lab Results  (Last result in the past 365 days)        Color   Clarity   Blood   Leuk Est   Nitrite   Protein   CREAT   Urine HCG        12/01/23 0910 Yellow   Clear   Negative   Negative   Negative   Negative                     Physical Exam  Vitals reviewed.   Constitutional:       General: She is not in acute distress.     Appearance: Normal appearance. She is well-developed. She is not diaphoretic.   HENT:      Head: Normocephalic and atraumatic.   Eyes:      General:         Right eye: No discharge.         Left eye: No discharge.   Cardiovascular:      Rate and Rhythm: Normal rate and regular rhythm.      Heart sounds: Normal heart sounds. No murmur heard.     No friction rub. No gallop.   Pulmonary:      Effort: Pulmonary effort is normal. No respiratory distress.      Breath sounds: Normal breath sounds. No wheezing or rales.   Musculoskeletal:         General: No deformity.      Lumbar back: No deformity, spasms or tenderness. Normal range of motion.        Legs:    Skin:     General: Skin is warm and dry.      Findings: No rash.   Neurological:      Mental Status: She is alert and oriented to person, place, and time.      Motor: No abnormal muscle tone.      Coordination: Coordination normal.      Gait: Gait normal.      Deep Tendon Reflexes: Reflexes normal.   Psychiatric:         Behavior: " Behavior is cooperative.         Assessment and Plan:  Problems Addressed this Visit          Cardiac and Vasculature    Mixed hyperlipidemia    Relevant Medications    atorvastatin (LIPITOR) 80 MG tablet    Other Relevant Orders    Comprehensive Metabolic Panel    Lipid Panel With / Chol / HDL Ratio       Endocrine and Metabolic    Class 3 severe obesity due to excess calories with serious comorbidity and body mass index (BMI) of 40.0 to 44.9 in adult    Acquired hypothyroidism    Controlled diabetes mellitus type 2 with complications - Primary     Diabetes is worsening.   Continue current treatment regimen.  Reminded to bring in blood sugar diary at next visit.  Dietary recommendations for ADA diet.  Diabetes will be reassessed in 3 months.         Relevant Orders    POCT urinalysis dipstick, manual (Completed)    POCT Glucose    POC Glycosylated Hemoglobin (Hb A1C) (Completed)       Musculoskeletal and Injuries    Chronic midline low back pain with right-sided sciatica     Acute on chronic         Relevant Orders    XR Spine Lumbar 2 or 3 View    Right calf pain     U/S negative for DVT  From muscle spasm    Ice, nsaids discussed         Relevant Orders    US Venous Doppler Lower Extremity Right (duplex) (Completed)       Tobacco    Tobacco dependence syndrome     Other Visit Diagnoses       Fall, initial encounter        Need for influenza vaccination        Relevant Orders    Fluzone (or Fluarix & Flulaval for VFC) >6mos (Completed)          Diagnoses         Codes Comments    Controlled type 2 diabetes mellitus with complication, without long-term current use of insulin    -  Primary ICD-10-CM: E11.8  ICD-9-CM: 250.90     Mixed hyperlipidemia     ICD-10-CM: E78.2  ICD-9-CM: 272.2     Acquired hypothyroidism     ICD-10-CM: E03.9  ICD-9-CM: 244.9     Tobacco dependence syndrome     ICD-10-CM: F17.200  ICD-9-CM: 305.1     Class 3 severe obesity due to excess calories with serious comorbidity and body mass index  (BMI) of 40.0 to 44.9 in adult     ICD-10-CM: E66.01, Z68.41  ICD-9-CM: 278.01, V85.41     Fall, initial encounter     ICD-10-CM: W19.XXXA  ICD-9-CM: E888.9     Need for influenza vaccination     ICD-10-CM: Z23  ICD-9-CM: V04.81     Right calf pain     ICD-10-CM: M79.661  ICD-9-CM: 729.5     Chronic midline low back pain with right-sided sciatica     ICD-10-CM: M54.41, G89.29  ICD-9-CM: 724.2, 724.3, 338.29                     An After Visit Summary and PPPS were given to the patient.

## 2023-12-01 ENCOUNTER — HOSPITAL ENCOUNTER (OUTPATIENT)
Dept: ULTRASOUND IMAGING | Facility: HOSPITAL | Age: 53
Discharge: HOME | End: 2023-12-01
Payer: MEDICARE

## 2023-12-01 ENCOUNTER — OFFICE VISIT (OUTPATIENT)
Dept: FAMILY MEDICINE CLINIC | Facility: CLINIC | Age: 53
End: 2023-12-01
Payer: MEDICARE

## 2023-12-01 VITALS
DIASTOLIC BLOOD PRESSURE: 82 MMHG | TEMPERATURE: 97.7 F | RESPIRATION RATE: 18 BRPM | OXYGEN SATURATION: 98 % | WEIGHT: 246.8 LBS | HEART RATE: 98 BPM | HEIGHT: 63 IN | SYSTOLIC BLOOD PRESSURE: 140 MMHG | BODY MASS INDEX: 43.73 KG/M2

## 2023-12-01 DIAGNOSIS — G89.29 CHRONIC MIDLINE LOW BACK PAIN WITH RIGHT-SIDED SCIATICA: ICD-10-CM
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
## 2023-12-01 DIAGNOSIS — E03.9 ACQUIRED HYPOTHYROIDISM: ICD-10-CM

## 2023-12-01 DIAGNOSIS — M79.661 RIGHT CALF PAIN: ICD-10-CM

## 2023-12-01 DIAGNOSIS — E11.8 CONTROLLED TYPE 2 DIABETES MELLITUS WITH COMPLICATION, WITHOUT LONG-TERM CURRENT USE OF INSULIN: Primary | ICD-10-CM

## 2023-12-01 DIAGNOSIS — M54.41 CHRONIC MIDLINE LOW BACK PAIN WITH RIGHT-SIDED SCIATICA: ICD-10-CM

## 2023-12-01 DIAGNOSIS — Z23 NEED FOR INFLUENZA VACCINATION: ICD-10-CM

## 2023-12-01 DIAGNOSIS — E78.2 MIXED HYPERLIPIDEMIA: ICD-10-CM

## 2023-12-01 DIAGNOSIS — F17.200 TOBACCO DEPENDENCE SYNDROME: ICD-10-CM

## 2023-12-01 DIAGNOSIS — E66.01 CLASS 3 SEVERE OBESITY DUE TO EXCESS CALORIES WITH SERIOUS COMORBIDITY AND BODY MASS INDEX (BMI) OF 40.0 TO 44.9 IN ADULT: ICD-10-CM

## 2023-12-01 LAB
BILIRUB BLD-MCNC: NEGATIVE MG/DL
CLARITY, POC: CLEAR
COLOR UR: YELLOW
EXPIRATION DATE: ABNORMAL
GLUCOSE UR STRIP-MCNC: NEGATIVE MG/DL
HBA1C MFR BLD: 7.3 % (ref 4.5–5.7)
KETONES UR QL: NEGATIVE
LEUKOCYTE EST, POC: NEGATIVE
Lab: ABNORMAL
NITRITE UR-MCNC: NEGATIVE MG/ML
PH UR: 6 [PH] (ref 5–8)
PROT UR STRIP-MCNC: NEGATIVE MG/DL
RBC # UR STRIP: NEGATIVE /UL
SP GR UR: 1.01 (ref 1–1.03)
UROBILINOGEN UR QL: NORMAL

## 2023-12-01 PROCEDURE — 93971 EXTREMITY STUDY: CPT

## 2023-12-01 RX ORDER — ATORVASTATIN CALCIUM 80 MG/1
TABLET, FILM COATED ORAL
COMMUNITY

## 2023-12-01 RX ORDER — POTASSIUM CHLORIDE 600 MG/1
CAPSULE, EXTENDED RELEASE ORAL
COMMUNITY

## 2023-12-01 RX ORDER — GABAPENTIN 300 MG/1
CAPSULE ORAL
COMMUNITY

## 2023-12-08 ENCOUNTER — TELEPHONE (OUTPATIENT)
Dept: PAIN MEDICINE | Facility: CLINIC | Age: 53
End: 2023-12-08
Payer: MEDICARE

## 2023-12-08 DIAGNOSIS — G89.29 CHRONIC MIDLINE LOW BACK PAIN WITH RIGHT-SIDED SCIATICA: ICD-10-CM

## 2023-12-08 DIAGNOSIS — M54.41 CHRONIC MIDLINE LOW BACK PAIN WITH RIGHT-SIDED SCIATICA: ICD-10-CM

## 2023-12-08 RX ORDER — HYDROCODONE BITARTRATE AND ACETAMINOPHEN 7.5; 325 MG/1; MG/1
1 TABLET ORAL EVERY 6 HOURS PRN
Qty: 120 TABLET | Refills: 0 | Status: SHIPPED | OUTPATIENT
Start: 2023-12-08

## 2023-12-08 NOTE — TELEPHONE ENCOUNTER
Pt's pharmacy out of medication  Pt confirmed [facility] has RX in stock  1st transfer  INSPECT in chart

## 2023-12-08 NOTE — TELEPHONE ENCOUNTER
Hub staff attempted to follow warm transfer process and was unsuccessful     Caller: Riddhi Cid    Relationship to patient: Self    Best call back number: [phone]    Patient is needing: PATIENT CALLED DIFFERENT PHARMACIES AND SHE CONFIRMED THAT [facility] IS THE ONLY WITH HYDROCODONE 5-325MG AT THIS TIME. PATIENT CURRENT AT HIGHER DOSE, AND SHE IS SEEKING CLINICAL ADVISE. PLEASE RETURN CALL

## 2023-12-10 PROBLEM — E66.01 CLASS 3 SEVERE OBESITY DUE TO EXCESS CALORIES WITH SERIOUS COMORBIDITY AND BODY MASS INDEX (BMI) OF 40.0 TO 44.9 IN ADULT: Status: ACTIVE | Noted: 2018-03-22

## 2023-12-11 NOTE — ASSESSMENT & PLAN NOTE
Diabetes is worsening.   Continue current treatment regimen.  Reminded to bring in blood sugar diary at next visit.  Dietary recommendations for ADA diet.  Diabetes will be reassessed in 3 months.

## 2023-12-14 NOTE — PROGRESS NOTES
Physical Therapy Daily Progress Note        Patient: Riddhi Cid   : 1970  Diagnosis/ICD-10 Code:  Degeneration of lumbar intervertebral disc [M51.36]  Referring practitioner: June Harrison MD  Date of Initial Visit: Type: THERAPY  Noted: 2019  Today's Date: 2019  Patient seen for 2 sessions         Riddhi Cid reports: still having issues with left foot/ankle with walking and rolling foot out of shoe      Subjective     Objective   See Exercise, Manual, and Modality Logs for complete treatment.   eval of left ankle reveals moderate tightness heel cord/ankle joint with df to (-) 5 degrees to neutral, eversion strength is 3/5, inversion 4/5      Assessment/Plan   Very weak and tight left ankle after hx of frequent ankle sprains in past    Progress per Plan of Care           Timed:         Manual Therapy:    15     mins  75834;     Therapeutic Exercise:    15     mins  63286;     Neuromuscular Sha:    0    mins  07137;    Therapeutic Activity:     0     mins  50542;     Gait Trainin     mins  48864;     Ultrasound:     0     mins  40590;    Ionto                               0    mins   07671  Self Care                       0     mins   67529  Canalith Repos               0    mins  4209    Un-Timed:  Electrical Stimulation:    15     mins  56004 ( );  Dry Needling     0     mins self-pay  Traction     0     mins 55546  Low Eval     0     Mins  26760  Mod Eval     0     Mins  13509  High Eval                       0     Mins  86507    Timed Treatment:   30   mins   Total Treatment:     45   mins    Stephanie Roman PT  Physical Therapist  [place] license [id number]    
Statement Selected

## 2023-12-29 RX ORDER — FLUTICASONE PROPIONATE 50 MCG
2 SPRAY, SUSPENSION (ML) NASAL DAILY
Qty: 48 G | Refills: 3 | Status: SHIPPED | OUTPATIENT
Start: 2023-12-29

## 2024-01-03 DIAGNOSIS — M54.41 CHRONIC MIDLINE LOW BACK PAIN WITH RIGHT-SIDED SCIATICA: ICD-10-CM

## 2024-01-03 DIAGNOSIS — G89.29 CHRONIC MIDLINE LOW BACK PAIN WITH RIGHT-SIDED SCIATICA: ICD-10-CM

## 2024-01-03 RX ORDER — HYDROCODONE BITARTRATE AND ACETAMINOPHEN 7.5; 325 MG/1; MG/1
1 TABLET ORAL EVERY 6 HOURS PRN
Qty: 120 TABLET | Refills: 0 | OUTPATIENT
Start: 2024-01-03

## 2024-01-06 DIAGNOSIS — G89.29 CHRONIC MIDLINE LOW BACK PAIN WITH RIGHT-SIDED SCIATICA: ICD-10-CM

## 2024-01-06 DIAGNOSIS — M54.41 CHRONIC MIDLINE LOW BACK PAIN WITH RIGHT-SIDED SCIATICA: ICD-10-CM

## 2024-01-08 ENCOUNTER — TELEPHONE (OUTPATIENT)
Dept: PAIN MEDICINE | Facility: CLINIC | Age: 54
End: 2024-01-08

## 2024-01-08 DIAGNOSIS — M54.41 CHRONIC MIDLINE LOW BACK PAIN WITH RIGHT-SIDED SCIATICA: ICD-10-CM

## 2024-01-08 DIAGNOSIS — G89.29 CHRONIC MIDLINE LOW BACK PAIN WITH RIGHT-SIDED SCIATICA: ICD-10-CM

## 2024-01-08 RX ORDER — HYDROCODONE BITARTRATE AND ACETAMINOPHEN 7.5; 325 MG/1; MG/1
1 TABLET ORAL EVERY 6 HOURS PRN
Qty: 120 TABLET | Refills: 0 | OUTPATIENT
Start: 2024-01-08

## 2024-01-08 RX ORDER — HYDROCODONE BITARTRATE AND ACETAMINOPHEN 7.5; 325 MG/1; MG/1
1 TABLET ORAL EVERY 6 HOURS PRN
Qty: 120 TABLET | Refills: 0 | Status: SHIPPED | OUTPATIENT
Start: 2024-01-08

## 2024-01-08 NOTE — TELEPHONE ENCOUNTER
.    Caller: Riddhi Cid    Relationship: Self    Best call back number:     Requested Prescriptions:   HYDROcodone-acetaminophen (Norco) 7.5-325 MG per tablet     Pharmacy where request should be sent:  [facility]    Last office visit with prescribing clinician: 11/1/2023   Last telemedicine visit with prescribing clinician: Visit date not found   Next office visit with prescribing clinician: 2/5/2024     Additional details provided by patient: LESS THAN 3 DAY SUPPLY    Does the patient have less than a 3 day supply:  [x] Yes  [] No    Would you like a call back once the refill request has been completed: [x] Yes [] No    If the office needs to give you a call back, can they leave a voicemail: [x] Yes [] No    Kerry Guy Rep   01/08/24 10:46 EST

## 2024-01-20 DIAGNOSIS — F33.1 MAJOR DEPRESSIVE DISORDER, RECURRENT, MODERATE: ICD-10-CM

## 2024-01-22 RX ORDER — CITALOPRAM 40 MG/1
40 TABLET ORAL DAILY
Qty: 90 TABLET | Refills: 3 | Status: SHIPPED | OUTPATIENT
Start: 2024-01-22

## 2024-02-05 ENCOUNTER — HOSPITAL ENCOUNTER (OUTPATIENT)
Dept: GENERAL RADIOLOGY | Facility: HOSPITAL | Age: 54
Discharge: HOME | End: 2024-02-05
Payer: MEDICARE

## 2024-02-05 ENCOUNTER — OFFICE VISIT (OUTPATIENT)
Dept: PAIN MEDICINE | Facility: CLINIC | Age: 54
End: 2024-02-05
Payer: MEDICARE

## 2024-02-05 VITALS
SYSTOLIC BLOOD PRESSURE: 151 MMHG | OXYGEN SATURATION: 97 % | HEART RATE: 72 BPM | DIASTOLIC BLOOD PRESSURE: 81 MMHG | RESPIRATION RATE: 16 BRPM

## 2024-02-05 DIAGNOSIS — M54.41 CHRONIC MIDLINE LOW BACK PAIN WITH RIGHT-SIDED SCIATICA: Primary | ICD-10-CM

## 2024-02-05 DIAGNOSIS — M54.2 CERVICALGIA: ICD-10-CM

## 2024-02-05 DIAGNOSIS — G89.29 CHRONIC MIDLINE LOW BACK PAIN WITH RIGHT-SIDED SCIATICA: ICD-10-CM

## 2024-02-05 DIAGNOSIS — M48.02 CERVICAL STENOSIS OF SPINE: ICD-10-CM

## 2024-02-05 DIAGNOSIS — M79.661 RIGHT CALF PAIN: ICD-10-CM

## 2024-02-05 DIAGNOSIS — M51.36 DEGENERATION OF INTERVERTEBRAL DISC OF LUMBAR REGION: ICD-10-CM

## 2024-02-05 DIAGNOSIS — M54.41 CHRONIC MIDLINE LOW BACK PAIN WITH RIGHT-SIDED SCIATICA: ICD-10-CM

## 2024-02-05 DIAGNOSIS — M54.6 THORACIC SPINE PAIN: ICD-10-CM

## 2024-02-05 DIAGNOSIS — G89.29 CHRONIC MIDLINE LOW BACK PAIN WITH RIGHT-SIDED SCIATICA: Primary | ICD-10-CM

## 2024-02-05 DIAGNOSIS — M50.30 DEGENERATION OF INTERVERTEBRAL DISC OF CERVICAL REGION: ICD-10-CM

## 2024-02-05 PROCEDURE — 99214 OFFICE O/P EST MOD 30 MIN: CPT | Performed by: PHYSICAL MEDICINE & REHABILITATION

## 2024-02-05 PROCEDURE — 1159F MED LIST DOCD IN RCRD: CPT | Performed by: PHYSICAL MEDICINE & REHABILITATION

## 2024-02-05 PROCEDURE — 1160F RVW MEDS BY RX/DR IN RCRD: CPT | Performed by: PHYSICAL MEDICINE & REHABILITATION

## 2024-02-05 PROCEDURE — 72100 X-RAY EXAM L-S SPINE 2/3 VWS: CPT

## 2024-02-05 PROCEDURE — 72072 X-RAY EXAM THORAC SPINE 3VWS: CPT

## 2024-02-05 PROCEDURE — G0463 HOSPITAL OUTPT CLINIC VISIT: HCPCS | Performed by: PHYSICAL MEDICINE & REHABILITATION

## 2024-02-05 PROCEDURE — 1125F AMNT PAIN NOTED PAIN PRSNT: CPT | Performed by: PHYSICAL MEDICINE & REHABILITATION

## 2024-02-05 RX ORDER — HYDROCODONE BITARTRATE AND ACETAMINOPHEN 10; 325 MG/1; MG/1
1 TABLET ORAL EVERY 6 HOURS PRN
Qty: 120 TABLET | Refills: 0 | Status: SHIPPED | OUTPATIENT
Start: 2024-02-05

## 2024-02-05 NOTE — PROGRESS NOTES
Subjective   Riddhi Cid is a 53 y.o. female.     History of Present Illness  Neck pain radiates to b/l shoulders, and left arm pain. ACDF C5/7 (4/11/2016), also h/o fibromyalgia,  also pain in lower back and b/l legs and feet. 10/10 at worst, 6/10 at best, worse with activity, improves with rest and meds, always present, varies, aching, radiates in BLE. Imaging reviewed, satisfactory ACDF. Referred for pain management. Neck pain improved with ACDF, tapered off [place] with worsening pain, still somewhat severe. Taking Cymbalta which helps, tried Lyrica with weight gain, trying to lose weight. Restarted [place] at BID - qdaily prn but worsening pain with exercise to lose weight, increased to BID-TID #75, then TID, then QID prn with adequate relief, reduced to #105, tolerating. Gastric sleeve surgery planned for March-April 2017, had to miss cardiac clearance and EGD due to illness and social issues, has decided against surgery, trying to lose weight with diet and exercise. Worsening b/l CTS symptoms, R>L, known h/o of CTS. Saw Juan Franz for worsening neck pain to LUE, ACDF is intact, started Diclofenac for DJD, Elavil. Could not tolerate even low-dose Gabapentin with drowsiness. Started Celebrex but did not feel like doing anything, stopped, stopped Mobic. Stopping numerous meds due to side effects, started Gabapentin with Dr. Pisano with benefit. Pain worsening, DDD on MRI, started PT which is helping. Back pain worsening, especially at night. Had COVID-19, doing better, plans to get vaccinated.  Back Pain  Pertinent negatives include no abdominal pain, bladder incontinence, chest pain, fever, numbness or weakness.        The following portions of the patient's history were reviewed and updated as appropriate: allergies, current medications, past family history, past medical history, past social history, past surgical history and problem list.    Review of Systems   Constitutional:  Negative for chills, fatigue and  fever.   HENT:  Negative for hearing loss and trouble swallowing.    Eyes:  Negative for visual disturbance.   Respiratory:  Negative for shortness of breath.    Cardiovascular:  Negative for chest pain.   Gastrointestinal:  Positive for constipation and nausea. Negative for abdominal pain, diarrhea and vomiting.   Genitourinary:  Negative for urinary incontinence.   Musculoskeletal:  Positive for arthralgias, back pain and neck pain. Negative for joint swelling and myalgias.   Neurological:  Positive for headache. Negative for dizziness, weakness and numbness.       Objective   Physical Exam   Constitutional: She is oriented to person, place, and time. She appears well-developed and well-nourished.   HENT:   Head: Normocephalic and atraumatic.   Eyes: Pupils are equal, round, and reactive to light.   Cardiovascular: Normal rate, regular rhythm and normal heart sounds.   Pulmonary/Chest: Breath sounds normal.   Abdominal: Soft. Bowel sounds are normal. She exhibits no distension. There is no abdominal tenderness.   Neurological: She is alert and oriented to person, place, and time. She has normal reflexes. She displays normal reflexes. No sensory deficit.   Psychiatric: Her behavior is normal. Thought content normal.         Assessment & Plan   Diagnoses and all orders for this visit:    1. Chronic midline low back pain with right-sided sciatica (Primary)    2. Cervicalgia    3. Cervical stenosis of spine    4. Degeneration of intervertebral disc of cervical region    5. Degeneration of intervertebral disc of lumbar region    6. Right calf pain        [place] reviewed, in order. Low risk. Repeat UDS 5/8/23 in order.  Reduced to Norco 10/325mg q6-8h #105, too painful, restarted q6h prn #120, then reduced to #105 successfully but pain worsening. Reduced to 7.5mg QID prn, intolerable, increase back to 10mg QID prn. Filled 1/8/24.   Afraid to start Lyrica due to possible weight gain. Restarted Gabapentin, takes  300-900mg/day as tolerated, helping a great deal.  Severe GERD, IBS, family h/o CV dz. Stopped Diclofenac, could not tolerate Celebrex 200mg qdaily. Taking Naproxen with PCP but hard on her stomach, no personal CV issues. Began Mobic 7.5mg qdaily prn.  Began Tizanidine 4mg qHS prn.  Began Licart prn, can take NSAIDs per patient.  Cont other meds as prescribed.  Cont TENS, unit provided here, patient reports it helps her pain significantly.  Cont b/l CTS braces at night only.  Patient reports she cannot start PT at this time as her daughter currently works 12h shifts, may be able to begin in future if her daughter can change to an 8h shift schedule.  Will begin neuropathic comp cream if her current cream does not work.  Ordered LSO for truncal stability. Cont PT.  New X-ray T-spine to eval for worsening pathology.  Letter to return to work. Does not drive or operate heavy machinery while using pain medication.  RTC 3 months for f/u.

## 2024-02-09 RX ORDER — METFORMIN HYDROCHLORIDE 500 MG/1
500 TABLET, EXTENDED RELEASE ORAL DAILY
Qty: 90 TABLET | Refills: 3 | Status: SHIPPED | OUTPATIENT
Start: 2024-02-09

## 2024-02-09 RX ORDER — METFORMIN HYDROCHLORIDE 500 MG/1
500 TABLET, EXTENDED RELEASE ORAL DAILY
Qty: 30 TABLET | Refills: 0 | Status: SHIPPED | OUTPATIENT
Start: 2024-02-09 | End: 2024-02-09

## 2024-02-13 ENCOUNTER — OFFICE VISIT (OUTPATIENT)
Dept: FAMILY MEDICINE CLINIC | Facility: CLINIC | Age: 54
End: 2024-02-13
Payer: MEDICARE

## 2024-02-13 VITALS
RESPIRATION RATE: 20 BRPM | SYSTOLIC BLOOD PRESSURE: 120 MMHG | HEART RATE: 80 BPM | DIASTOLIC BLOOD PRESSURE: 66 MMHG | HEIGHT: 63 IN | OXYGEN SATURATION: 94 % | TEMPERATURE: 97.6 F | BODY MASS INDEX: 45.36 KG/M2 | WEIGHT: 256 LBS

## 2024-02-13 DIAGNOSIS — R05.1 ACUTE COUGH: ICD-10-CM

## 2024-02-13 DIAGNOSIS — J44.0 CHRONIC OBSTRUCTIVE PULMONARY DISEASE WITH ACUTE LOWER RESPIRATORY INFECTION: Primary | ICD-10-CM

## 2024-02-13 DIAGNOSIS — E66.01 CLASS 3 SEVERE OBESITY DUE TO EXCESS CALORIES WITH SERIOUS COMORBIDITY AND BODY MASS INDEX (BMI) OF 40.0 TO 44.9 IN ADULT: ICD-10-CM

## 2024-02-13 DIAGNOSIS — J01.40 ACUTE NON-RECURRENT PANSINUSITIS: ICD-10-CM

## 2024-02-13 LAB
EXPIRATION DATE: NORMAL
FLUAV AG UPPER RESP QL IA.RAPID: NOT DETECTED
FLUBV AG UPPER RESP QL IA.RAPID: NOT DETECTED
INTERNAL CONTROL: NORMAL
Lab: NORMAL
SARS-COV-2 AG UPPER RESP QL IA.RAPID: NOT DETECTED

## 2024-02-13 PROCEDURE — 87428 SARSCOV & INF VIR A&B AG IA: CPT | Performed by: NURSE PRACTITIONER

## 2024-02-13 PROCEDURE — 99213 OFFICE O/P EST LOW 20 MIN: CPT | Performed by: NURSE PRACTITIONER

## 2024-02-13 RX ORDER — PREDNISONE 20 MG/1
20 TABLET ORAL 2 TIMES DAILY
Qty: 10 TABLET | Refills: 0 | Status: SHIPPED | OUTPATIENT
Start: 2024-02-13 | End: 2024-02-18

## 2024-02-13 RX ORDER — DOXYCYCLINE 100 MG/1
100 CAPSULE ORAL 2 TIMES DAILY
Qty: 20 CAPSULE | Refills: 0 | Status: SHIPPED | OUTPATIENT
Start: 2024-02-13 | End: 2024-02-23

## 2024-02-28 ENCOUNTER — OFFICE (AMBULATORY)
Dept: URBAN - METROPOLITAN AREA PATHOLOGY 19 | Facility: PATHOLOGY | Age: 54
End: 2024-02-28
Payer: COMMERCIAL

## 2024-02-28 ENCOUNTER — ON CAMPUS - OUTPATIENT (AMBULATORY)
Dept: URBAN - METROPOLITAN AREA HOSPITAL 2 | Facility: HOSPITAL | Age: 54
End: 2024-02-28
Payer: MEDICAID

## 2024-02-28 ENCOUNTER — OFFICE (AMBULATORY)
Dept: URBAN - METROPOLITAN AREA PATHOLOGY 19 | Facility: PATHOLOGY | Age: 54
End: 2024-02-28
Payer: MEDICAID

## 2024-02-28 VITALS
HEART RATE: 76 BPM | SYSTOLIC BLOOD PRESSURE: 148 MMHG | OXYGEN SATURATION: 96 % | RESPIRATION RATE: 17 BRPM | DIASTOLIC BLOOD PRESSURE: 93 MMHG | SYSTOLIC BLOOD PRESSURE: 161 MMHG | SYSTOLIC BLOOD PRESSURE: 142 MMHG | HEART RATE: 75 BPM | DIASTOLIC BLOOD PRESSURE: 73 MMHG | RESPIRATION RATE: 21 BRPM | HEART RATE: 69 BPM | OXYGEN SATURATION: 93 % | DIASTOLIC BLOOD PRESSURE: 95 MMHG | OXYGEN SATURATION: 95 % | HEART RATE: 70 BPM | SYSTOLIC BLOOD PRESSURE: 157 MMHG | SYSTOLIC BLOOD PRESSURE: 134 MMHG | SYSTOLIC BLOOD PRESSURE: 147 MMHG | HEART RATE: 94 BPM | HEIGHT: 63 IN | SYSTOLIC BLOOD PRESSURE: 143 MMHG | RESPIRATION RATE: 15 BRPM | DIASTOLIC BLOOD PRESSURE: 88 MMHG | DIASTOLIC BLOOD PRESSURE: 78 MMHG | SYSTOLIC BLOOD PRESSURE: 124 MMHG | RESPIRATION RATE: 16 BRPM | TEMPERATURE: 96 F | RESPIRATION RATE: 20 BRPM | DIASTOLIC BLOOD PRESSURE: 98 MMHG | HEART RATE: 73 BPM | OXYGEN SATURATION: 100 % | SYSTOLIC BLOOD PRESSURE: 149 MMHG | DIASTOLIC BLOOD PRESSURE: 76 MMHG | DIASTOLIC BLOOD PRESSURE: 87 MMHG | WEIGHT: 242 LBS | SYSTOLIC BLOOD PRESSURE: 153 MMHG | HEART RATE: 83 BPM

## 2024-02-28 DIAGNOSIS — K44.9 DIAPHRAGMATIC HERNIA WITHOUT OBSTRUCTION OR GANGRENE: ICD-10-CM

## 2024-02-28 DIAGNOSIS — D12.3 BENIGN NEOPLASM OF TRANSVERSE COLON: ICD-10-CM

## 2024-02-28 DIAGNOSIS — K63.5 POLYP OF COLON: ICD-10-CM

## 2024-02-28 DIAGNOSIS — K31.89 OTHER DISEASES OF STOMACH AND DUODENUM: ICD-10-CM

## 2024-02-28 DIAGNOSIS — Z86.010 PERSONAL HISTORY OF COLONIC POLYPS: ICD-10-CM

## 2024-02-28 DIAGNOSIS — D12.0 BENIGN NEOPLASM OF CECUM: ICD-10-CM

## 2024-02-28 DIAGNOSIS — K21.9 GASTRO-ESOPHAGEAL REFLUX DISEASE WITHOUT ESOPHAGITIS: ICD-10-CM

## 2024-02-28 DIAGNOSIS — Z09 ENCOUNTER FOR FOLLOW-UP EXAMINATION AFTER COMPLETED TREATMEN: ICD-10-CM

## 2024-02-28 LAB
GI HISTOLOGY: A. STOMACH ANTRUM: (no result)
GI HISTOLOGY: B. CECUM: (no result)
GI HISTOLOGY: C. TRANSVERSE COLON: (no result)
GI HISTOLOGY: D. SIGMOID COLON: (no result)
GI HISTOLOGY: PDF REPORT: (no result)

## 2024-02-28 PROCEDURE — 88305 TISSUE EXAM BY PATHOLOGIST: CPT | Performed by: PATHOLOGY

## 2024-02-28 PROCEDURE — 88342 IMHCHEM/IMCYTCHM 1ST ANTB: CPT | Performed by: PATHOLOGY

## 2024-02-28 PROCEDURE — 45385 COLONOSCOPY W/LESION REMOVAL: CPT | Mod: PT | Performed by: INTERNAL MEDICINE

## 2024-02-28 PROCEDURE — 43239 EGD BIOPSY SINGLE/MULTIPLE: CPT | Performed by: INTERNAL MEDICINE

## 2024-03-08 NOTE — PROGRESS NOTES
Subjective   Riddhi Cid is a 53 y.o. female. Presents to [facility]    Chief Complaint   Patient presents with    Diabetes    Hyperlipidemia    Hypothyroidism       Diabetes  She presents for her follow-up diabetic visit. She has type 2 diabetes mellitus. Pertinent negatives for hypoglycemia include no dizziness, headaches or sweats. Associated symptoms include fatigue. Pertinent negatives for diabetes include no chest pain and no weakness. There are no hypoglycemic complications. There are no diabetic complications. Risk factors for coronary artery disease include diabetes mellitus, dyslipidemia, hypertension, obesity and stress. Current diabetic treatment includes oral agent (monotherapy). She is compliant with treatment most of the time. She is following a generally healthy diet. Meal planning includes avoidance of concentrated sweets. She participates in exercise intermittently. Her overall blood glucose range is 110-130 mg/dl. An ACE inhibitor/angiotensin II receptor blocker is not being taken. She sees a podiatrist.Eye exam is not current.   Hyperlipidemia  This is a chronic problem. The current episode started more than 1 year ago. The problem is controlled. Exacerbating diseases include hypothyroidism and obesity. There are no known factors aggravating her hyperlipidemia. Pertinent negatives include no chest pain or shortness of breath. Current antihyperlipidemic treatment includes statins. The current treatment provides moderate improvement of lipids. There are no compliance problems.  Risk factors for coronary artery disease include dyslipidemia, diabetes mellitus, hypertension, obesity, stress and post-menopausal.   Hypothyroidism  This is a chronic problem. The current episode started more than 1 year ago. Associated symptoms include fatigue. Pertinent negatives include no chest pain, headaches or weakness. Treatments tried: levothyroxine 50 MCG. The treatment provided moderate  relief.   COPD  There is no shortness of breath. This is a chronic problem. The current episode started more than 1 year ago. The problem occurs daily. The problem has been gradually worsening. Associated symptoms include postnasal drip and rhinorrhea. Pertinent negatives include no chest pain, ear congestion, ear pain, headaches, orthopnea or sweats. Her symptoms are aggravated by change in weather and any activity. Her symptoms are alleviated by nothing. Her past medical history is significant for COPD.        I personally reviewed and updated the patient's allergies, medications, problem list, and past medical, surgical, social, and family history. I have reviewed and confirmed the accuracy of the History of Present Illness and Review of Symptoms as documented by the MA/LPN/RN. June Harrison MD    Allergies:  Allergies   Allergen Reactions    Sulfa Antibiotics Rash    Meloxicam GI Intolerance       Social History:  Social History     Socioeconomic History    Marital status:    Tobacco Use    Smoking status: Every Day     Current packs/day: 1.00     Average packs/day: 1 pack/day for 53.0 years (53.0 ttl pk-yrs)     Types: Cigarettes    Smokeless tobacco: Never   Vaping Use    Vaping status: Never Used   Substance and Sexual Activity    Alcohol use: No    Drug use: No    Sexual activity: Defer       Family History:  Family History   Problem Relation Age of Onset    Stroke Mother     Hypertension Mother     Hyperlipidemia Mother     Hypothyroidism Mother     Heart attack Father     Heart disease Father     Prostate cancer Father     Other Father     Diabetes Father     Hypertension Father     Hyperlipidemia Father     Coronary artery disease Father     Breast cancer Paternal Grandmother     Hypothyroidism Grandchild     Ovarian cancer Neg Hx        Past Medical History :  Patient Active Problem List   Diagnosis    Class 3 severe obesity due to excess calories with serious comorbidity and body mass index  (BMI) of 40.0 to 44.9 in adult    Degeneration of intervertebral disc of cervical region    Degeneration of intervertebral disc of lumbar region    Acquired hypothyroidism    Mixed hyperlipidemia    Major depressive disorder, recurrent, moderate    Obstructive sleep apnea    Vitamin B12 deficiency    Vitamin D deficiency    COPD (chronic obstructive pulmonary disease)    Solitary thyroid nodule    Chronic midline low back pain with right-sided sciatica    Cervicalgia    Cervical stenosis of spine    Arthritis    Moderate persistent asthma without complication    Gastroparalysis    History of chicken pox    IBS (irritable bowel syndrome)    GERD (gastroesophageal reflux disease)    Dependent edema    Optic disc hemorrhage    Type 2 diabetes mellitus with hyperglycemia, without long-term current use of insulin    Tension headache    Allergic rhinitis    Disorder of peripheral nervous system    Fibromyositis    Tobacco dependence syndrome    Cervical smear, as part of routine gynecological examination    Disease due to severe acute respiratory syndrome coronavirus 2 (SARS-CoV-2)    Lung cancer screening declined by patient    Acute cough    Hospital discharge follow-up    Dizziness    Lesion of skin of scalp    Pulmonary nodules    Right calf pain    Thrush       Medication List:    Current Outpatient Medications:     Accu-Chek FastClix Lancets misc, 1 each by Other route Daily., Disp: 100 each, Rfl: 3    albuterol (PROVENTIL) (2.5 MG/3ML) 0.083% nebulizer solution, Take 2.5 mg by nebulization Every 4 (Four) Hours As Needed for Wheezing., Disp: 180 mL, Rfl: 3    albuterol sulfate  (90 Base) MCG/ACT inhaler, Inhale 2 puffs 4 (Four) Times a Day., Disp: 6.7 g, Rfl: 12    aspirin 81 MG chewable tablet, Chew 1 tablet Daily., Disp: , Rfl:     Blood Glucose Monitoring Suppl (Accu-Chek Guide) w/Device kit, 1 each Daily., Disp: 1 kit, Rfl: 0    bumetanide (BUMEX) 1 MG tablet, Take 1 tablet by mouth Daily., Disp: 30  tablet, Rfl: 12    citalopram (CeleXA) 40 MG tablet, TAKE 1 TABLET BY MOUTH DAILY, Disp: 90 tablet, Rfl: 3    dexlansoprazole (Dexilant) 60 MG capsule, Take 1 capsule by mouth Daily., Disp: 90 capsule, Rfl: 3    dicyclomine (BENTYL) 10 MG capsule, TAKE 1 CAPSULE BY MOUTH EVERY 6 HOURS AS NEEDED FOR PAIN, Disp: , Rfl:     famotidine (PEPCID) 40 MG tablet, Take 1 tablet by mouth Every Night., Disp: , Rfl:     fenofibrate (TRICOR) 145 MG tablet, Take 1 tablet by mouth Daily., Disp: , Rfl:     fluticasone (FLONASE) 50 MCG/ACT nasal spray, SHAKE LIQUID AND USE 2 SPRAYS IN EACH NOSTRIL EVERY DAY, Disp: 48 g, Rfl: 3    Fluticasone-Salmeterol (Advair Diskus) 250-50 MCG/ACT DISKUS, Inhale 1 puff 2 (Two) Times a Day., Disp: 60 each, Rfl: 12    folic acid (FOLVITE) 1 MG tablet, Take 1 tablet by mouth Daily., Disp: , Rfl:     gabapentin (NEURONTIN) 300 MG capsule, 1 cap BID PRN, Disp: , Rfl:     glucose blood (Accu-Chek Guide) test strip, 1 each by Other route Daily. Use as instructed, Disp: 100 each, Rfl: 3    HYDROcodone-acetaminophen (Norco)  MG per tablet, Take 1 tablet by mouth Every 6 (Six) Hours As Needed for Moderate Pain., Disp: 120 tablet, Rfl: 0    HYDROcodone-acetaminophen (Norco)  MG per tablet, Take 1 tablet by mouth Every 6 (Six) Hours As Needed for Moderate Pain., Disp: 120 tablet, Rfl: 0    HYDROcodone-acetaminophen (Norco)  MG per tablet, Take 1 tablet by mouth Every 6 (Six) Hours As Needed for Moderate Pain., Disp: 120 tablet, Rfl: 0    HYDROcodone-acetaminophen (Norco) 7.5-325 MG per tablet, Take 1 tablet by mouth Every 6 (Six) Hours As Needed for Moderate Pain., Disp: 120 tablet, Rfl: 0    HYDROcodone-acetaminophen (Norco) 7.5-325 MG per tablet, Take 1 tablet by mouth Every 6 (Six) Hours As Needed for Moderate Pain., Disp: 120 tablet, Rfl: 0    HYDROcodone-acetaminophen (Norco) 7.5-325 MG per tablet, Take 1 tablet by mouth Every 6 (Six) Hours As Needed for Moderate Pain., Disp: 120 tablet,  Rfl: 0    Lancets Misc. (ACCU-CHEK FASTCLIX LANCET) kit, Dx: E11.9, DM type 2, controlled, Disp: , Rfl:     levothyroxine (SYNTHROID, LEVOTHROID) 50 MCG tablet, Take 1 tablet by mouth Daily., Disp: , Rfl:     Linzess 290 MCG capsule capsule, Take 1 capsule by mouth Every Other Day., Disp: , Rfl:     metFORMIN ER (GLUCOPHAGE-XR) 500 MG 24 hr tablet, Take 2 tablets by mouth Daily With Breakfast., Disp: 180 tablet, Rfl: 3    metoclopramide (REGLAN) 10 MG tablet, Take 1 tablet by mouth 4 (Four) Times a Day As Needed., Disp: , Rfl:     montelukast (SINGULAIR) 10 MG tablet, Take 1 tablet by mouth Every Night., Disp: , Rfl:     Omega-3 Fatty Acids (fish oil) 1000 MG capsule capsule, 2 capsules Daily With Breakfast., Disp: , Rfl:     ondansetron ODT (ZOFRAN-ODT) 4 MG disintegrating tablet, Place 1 tablet on the tongue Every 8 (Eight) Hours As Needed., Disp: , Rfl:     potassium chloride (K-DUR,KLOR-CON) 20 MEQ CR tablet, Take 1 tablet by mouth Daily As Needed. Only if taking 2 bumetanide, Disp: , Rfl:     rosuvastatin (CRESTOR) 20 MG tablet, Take 1 tablet by mouth Daily., Disp: , Rfl:     vitamin B-12 (CYANOCOBALAMIN) 1000 MCG tablet, Take 1 tablet by mouth Daily., Disp: , Rfl:     vitamin D3 125 MCG (5000 UT) capsule capsule, Take 1 capsule by mouth Daily., Disp: , Rfl:     doxycycline (MONODOX) 100 MG capsule, Take 1 capsule by mouth 2 (Two) Times a Day., Disp: 20 capsule, Rfl: 0    empagliflozin (Jardiance) 10 MG tablet tablet, Take 1 tablet by mouth Daily., Disp: 30 tablet, Rfl: 12    nystatin (MYCOSTATIN) 100,000 unit/mL suspension, Swish and swallow 5 mL 4 (Four) Times a Day. 1/2 dose each side of mouth retain in mouth as long as possible before swallowing, Disp: 280 mL, Rfl: 0    predniSONE (DELTASONE) 20 MG tablet, Take 1 tablet by mouth Daily. TID x 3 days, BID x 3 days, QD x 3 days, 1/2 tab daily x 4 days, Disp: 20 tablet, Rfl: 0    Past Surgical History:  Past Surgical History:   Procedure Laterality Date     "BREAST BIOPSY Right     CARPAL TUNNEL RELEASE Right     CERVICAL DISCECTOMY ANTERIOR      CHOLECYSTECTOMY      ESSURE TUBAL LIGATION           Physical Exam:      Vital Signs:    Vitals:    03/12/24 0911   BP: 126/84   Pulse: 81   Resp: 19   Temp: 97.5 °F (36.4 °C)   SpO2: 96%        /84 (BP Location: Right arm, Patient Position: Sitting, Cuff Size: Adult)   Pulse 81   Temp 97.5 °F (36.4 °C) (Temporal)   Resp 19   Ht 160 cm (63\")   Wt 109 kg (240 lb 9.6 oz)   SpO2 96% Comment: 2L oxygen  BMI 42.62 kg/m²     Wt Readings from Last 3 Encounters:   03/18/24 107 kg (235 lb)   03/12/24 109 kg (240 lb 9.6 oz)   02/13/24 116 kg (256 lb)       Result Review :   The following data was reviewed by: June Harrison MD on 03/12/2024:  A1C Last 3 Results          9/1/2023    06:04 12/1/2023    08:59 3/12/2024    09:27   HGBA1C Last 3 Results   Hemoglobin A1C 6.70  7.3  8.2                 Physical Exam  Vitals reviewed.   Constitutional:       Appearance: Normal appearance. She is well-developed.   HENT:      Head: Normocephalic and atraumatic.   Eyes:      General:         Right eye: No discharge.         Left eye: No discharge.   Cardiovascular:      Rate and Rhythm: Normal rate and regular rhythm.      Heart sounds: Normal heart sounds. No murmur heard.     No friction rub. No gallop.   Pulmonary:      Effort: Pulmonary effort is normal. No respiratory distress.      Breath sounds: Decreased air movement present. Wheezing present. No rales.   Skin:     General: Skin is warm and dry.      Findings: No rash.   Neurological:      Mental Status: She is alert and oriented to person, place, and time.      Coordination: Coordination normal.      Gait: Gait normal.   Psychiatric:         Behavior: Behavior is cooperative.         Assessment and Plan:  Problems Addressed this Visit          Cardiac and Vasculature    Mixed hyperlipidemia    Relevant Orders    Comprehensive Metabolic Panel    Lipid Panel With / Chol / HDL " Ratio       Endocrine and Metabolic    Acquired hypothyroidism    Relevant Medications    predniSONE (DELTASONE) 20 MG tablet    Type 2 diabetes mellitus with hyperglycemia, without long-term current use of insulin - Primary     Diabetes is worsening.   Medication changes per orders.  Reminded to bring in blood sugar diary at next visit.  Recommended a Mediterranean style of eating  Regular aerobic exercise.  Diabetes will be reassessed in 3 months         Relevant Medications    metFORMIN ER (GLUCOPHAGE-XR) 500 MG 24 hr tablet    empagliflozin (Jardiance) 10 MG tablet tablet    Other Relevant Orders    POC Glycosylated Hemoglobin (Hb A1C) (Completed)    POCT urinalysis dipstick, manual (Completed)       Pulmonary and Pneumonias    COPD (chronic obstructive pulmonary disease)     COPD is worsening.    Plan:  As in plan.    Discussed medication dosage, use, side effects, and goals of treatment in detail.    Discussed monitoring symptoms and use of quick-relief medications and maintenance medication..    Patient Treatment Goals:   symptom prevention, minimizing limitation in activity, prevention of exacerbations and use of ER/inpatient care, maintenance of optimal pulmonary function, and minimization of adverse effects of treatment    Followup at the next regular appointment.           Relevant Medications    doxycycline (MONODOX) 100 MG capsule    predniSONE (DELTASONE) 20 MG tablet    Other Relevant Orders    XR Chest 2 View    Pulmonary nodules    Relevant Orders    CT Chest Without Contrast Diagnostic       Tobacco    Tobacco dependence syndrome     Diagnoses         Codes Comments    Type 2 diabetes mellitus with hyperglycemia, without long-term current use of insulin    -  Primary ICD-10-CM: E11.65  ICD-9-CM: 250.00, 790.29     Mixed hyperlipidemia     ICD-10-CM: E78.2  ICD-9-CM: 272.2     Acquired hypothyroidism     ICD-10-CM: E03.9  ICD-9-CM: 244.9     Tobacco dependence syndrome     ICD-10-CM:  F17.200  ICD-9-CM: 305.1     Pulmonary nodules     ICD-10-CM: R91.8  ICD-9-CM: 793.19     Chronic obstructive pulmonary disease with acute exacerbation     ICD-10-CM: J44.1  ICD-9-CM: 491.21     Simple chronic bronchitis     ICD-10-CM: J41.0  ICD-9-CM: 491.0               This medical care serves as the continuing focal point of all needed health care services.         An After Visit Summary and PPPS were given to the patient.

## 2024-03-12 ENCOUNTER — OFFICE VISIT (OUTPATIENT)
Dept: FAMILY MEDICINE CLINIC | Facility: CLINIC | Age: 54
End: 2024-03-12
Payer: MEDICARE

## 2024-03-12 VITALS
RESPIRATION RATE: 19 BRPM | SYSTOLIC BLOOD PRESSURE: 126 MMHG | TEMPERATURE: 97.5 F | DIASTOLIC BLOOD PRESSURE: 84 MMHG | BODY MASS INDEX: 42.63 KG/M2 | HEART RATE: 81 BPM | HEIGHT: 63 IN | OXYGEN SATURATION: 96 % | WEIGHT: 240.6 LBS

## 2024-03-12 DIAGNOSIS — E78.2 MIXED HYPERLIPIDEMIA: ICD-10-CM

## 2024-03-12 DIAGNOSIS — J41.0 SIMPLE CHRONIC BRONCHITIS: ICD-10-CM

## 2024-03-12 DIAGNOSIS — E03.9 ACQUIRED HYPOTHYROIDISM: ICD-10-CM

## 2024-03-12 DIAGNOSIS — F17.200 TOBACCO DEPENDENCE SYNDROME: ICD-10-CM

## 2024-03-12 DIAGNOSIS — R91.8 PULMONARY NODULES: ICD-10-CM

## 2024-03-12 DIAGNOSIS — J44.1 CHRONIC OBSTRUCTIVE PULMONARY DISEASE WITH ACUTE EXACERBATION: ICD-10-CM

## 2024-03-12 DIAGNOSIS — E11.65 TYPE 2 DIABETES MELLITUS WITH HYPERGLYCEMIA, WITHOUT LONG-TERM CURRENT USE OF INSULIN: Primary | ICD-10-CM

## 2024-03-12 LAB
BILIRUB BLD-MCNC: NEGATIVE MG/DL
CLARITY, POC: CLEAR
COLOR UR: YELLOW
EXPIRATION DATE: ABNORMAL
GLUCOSE UR STRIP-MCNC: NEGATIVE MG/DL
HBA1C MFR BLD: 8.2 % (ref 4.5–5.7)
KETONES UR QL: NEGATIVE
LEUKOCYTE EST, POC: NEGATIVE
Lab: ABNORMAL
NITRITE UR-MCNC: NEGATIVE MG/ML
PH UR: 6 [PH] (ref 5–8)
PROT UR STRIP-MCNC: NEGATIVE MG/DL
RBC # UR STRIP: NEGATIVE /UL
SP GR UR: 1.02 (ref 1–1.03)
UROBILINOGEN UR QL: NORMAL

## 2024-03-12 RX ORDER — METFORMIN HYDROCHLORIDE 500 MG/1
1000 TABLET, EXTENDED RELEASE ORAL
Qty: 180 TABLET | Refills: 3 | Status: SHIPPED | OUTPATIENT
Start: 2024-03-12

## 2024-03-12 RX ORDER — PREDNISONE 20 MG/1
20 TABLET ORAL DAILY
Qty: 20 TABLET | Refills: 0 | Status: SHIPPED | OUTPATIENT
Start: 2024-03-12

## 2024-03-12 RX ORDER — METHYLPREDNISOLONE ACETATE 40 MG/ML
40 INJECTION, SUSPENSION INTRA-ARTICULAR; INTRALESIONAL; INTRAMUSCULAR; SOFT TISSUE ONCE
Status: COMPLETED | OUTPATIENT
Start: 2024-03-12 | End: 2024-03-12

## 2024-03-12 RX ORDER — CEFTRIAXONE 1 G/1
1 INJECTION, POWDER, FOR SOLUTION INTRAMUSCULAR; INTRAVENOUS ONCE
Status: COMPLETED | OUTPATIENT
Start: 2024-03-12 | End: 2024-03-12

## 2024-03-12 RX ORDER — DOXYCYCLINE 100 MG/1
100 CAPSULE ORAL 2 TIMES DAILY
Qty: 20 CAPSULE | Refills: 0 | Status: SHIPPED | OUTPATIENT
Start: 2024-03-12

## 2024-03-12 RX ADMIN — METHYLPREDNISOLONE ACETATE 40 MG: 40 INJECTION, SUSPENSION INTRA-ARTICULAR; INTRALESIONAL; INTRAMUSCULAR; SOFT TISSUE at 10:15

## 2024-03-12 RX ADMIN — CEFTRIAXONE 1 G: 1 INJECTION, POWDER, FOR SOLUTION INTRAMUSCULAR; INTRAVENOUS at 10:14

## 2024-03-12 NOTE — ASSESSMENT & PLAN NOTE
COPD is worsening.    Plan:  As in plan.    Discussed medication dosage, use, side effects, and goals of treatment in detail.    Discussed monitoring symptoms and use of quick-relief medications and maintenance medication..    Patient Treatment Goals:   symptom prevention, minimizing limitation in activity, prevention of exacerbations and use of ER/inpatient care, maintenance of optimal pulmonary function, and minimization of adverse effects of treatment    Followup at the next regular appointment.

## 2024-03-18 ENCOUNTER — OFFICE VISIT (OUTPATIENT)
Dept: FAMILY MEDICINE CLINIC | Facility: CLINIC | Age: 54
End: 2024-03-18
Payer: MEDICARE

## 2024-03-18 VITALS
SYSTOLIC BLOOD PRESSURE: 122 MMHG | OXYGEN SATURATION: 96 % | WEIGHT: 235 LBS | DIASTOLIC BLOOD PRESSURE: 80 MMHG | RESPIRATION RATE: 19 BRPM | TEMPERATURE: 97.6 F | HEIGHT: 63 IN | HEART RATE: 105 BPM | BODY MASS INDEX: 41.64 KG/M2

## 2024-03-18 DIAGNOSIS — J45.40 MODERATE PERSISTENT ASTHMA WITHOUT COMPLICATION: ICD-10-CM

## 2024-03-18 DIAGNOSIS — E66.01 CLASS 3 SEVERE OBESITY DUE TO EXCESS CALORIES WITH SERIOUS COMORBIDITY AND BODY MASS INDEX (BMI) OF 40.0 TO 44.9 IN ADULT: ICD-10-CM

## 2024-03-18 DIAGNOSIS — B37.0 THRUSH: ICD-10-CM

## 2024-03-18 DIAGNOSIS — F17.200 TOBACCO DEPENDENCE SYNDROME: ICD-10-CM

## 2024-03-18 DIAGNOSIS — R05.1 ACUTE COUGH: Primary | ICD-10-CM

## 2024-03-18 DIAGNOSIS — J41.0 SIMPLE CHRONIC BRONCHITIS: ICD-10-CM

## 2024-03-18 PROCEDURE — 3052F HG A1C>EQUAL 8.0%<EQUAL 9.0%: CPT | Performed by: FAMILY MEDICINE

## 2024-03-18 PROCEDURE — 99214 OFFICE O/P EST MOD 30 MIN: CPT | Performed by: FAMILY MEDICINE

## 2024-03-18 PROCEDURE — 1160F RVW MEDS BY RX/DR IN RCRD: CPT | Performed by: FAMILY MEDICINE

## 2024-03-18 PROCEDURE — 1159F MED LIST DOCD IN RCRD: CPT | Performed by: FAMILY MEDICINE

## 2024-03-18 NOTE — ASSESSMENT & PLAN NOTE
Patient's (Body mass index is 41.63 kg/m².) indicates that they are morbidly/severely obese (BMI > 40 or > 35 with obesity - related health condition) with health conditions that include diabetes mellitus . Weight is improving with lifestyle modifications. BMI  is above average; BMI management plan is completed. We discussed low calorie, low carb based diet program, portion control, and increasing exercise.

## 2024-03-18 NOTE — ASSESSMENT & PLAN NOTE
COPD is improving with treatment.    Plan:  Continue same medication/s without change.    Discussed medication dosage, use, side effects, and goals of treatment in detail.    Discussed monitoring symptoms and use of quick-relief medications and maintenance medication..    Patient Treatment Goals:   symptom prevention, minimizing limitation in activity, prevention of exacerbations and use of ER/inpatient care, maintenance of optimal pulmonary function, and minimization of adverse effects of treatment    Followup in 3 months.

## 2024-03-18 NOTE — PROGRESS NOTES
Subjective   Riddhi Cid is a 53 y.o. female. Presents to [facility]    Chief Complaint   Patient presents with    Cough       Cough  This is a new problem. The current episode started 1 to 4 weeks ago. The problem has been improved. The problem occurs every few hours. The cough is Productive of sputum. Pertinent negatives include no chest pain, headaches, nasal congestion, postnasal drip, rhinorrhea, sore throat, shortness of breath or wheezing. Nothing aggravates the symptoms. Treatments tried: doxycycline and prednisone. The treatment provided moderate relief.    She is feeling much better  Not needing oxygen    I personally reviewed and updated the patient's allergies, medications, problem list, and past medical, surgical, social, and family history. I have reviewed and confirmed the accuracy of the History of Present Illness and Review of Symptoms as documented by the MA/LPN/RN. June Harrison MD    Allergies:  Allergies   Allergen Reactions    Sulfa Antibiotics Rash    Meloxicam GI Intolerance       Social History:  Social History     Socioeconomic History    Marital status:    Tobacco Use    Smoking status: Every Day     Current packs/day: 1.00     Average packs/day: 1 pack/day for 53.0 years (53.0 ttl pk-yrs)     Types: Cigarettes    Smokeless tobacco: Never   Vaping Use    Vaping status: Never Used   Substance and Sexual Activity    Alcohol use: No    Drug use: No    Sexual activity: Defer       Family History:  Family History   Problem Relation Age of Onset    Stroke Mother     Hypertension Mother     Hyperlipidemia Mother     Hypothyroidism Mother     Heart attack Father     Heart disease Father     Prostate cancer Father     Other Father     Diabetes Father     Hypertension Father     Hyperlipidemia Father     Coronary artery disease Father     Breast cancer Paternal Grandmother     Hypothyroidism Grandchild     Ovarian cancer Neg Hx        Past Medical History :  Patient  Active Problem List   Diagnosis    Class 3 severe obesity due to excess calories with serious comorbidity and body mass index (BMI) of 40.0 to 44.9 in adult    Degeneration of intervertebral disc of cervical region    Degeneration of intervertebral disc of lumbar region    Acquired hypothyroidism    Mixed hyperlipidemia    Major depressive disorder, recurrent, moderate    Obstructive sleep apnea    Vitamin B12 deficiency    Vitamin D deficiency    COPD (chronic obstructive pulmonary disease)    Solitary thyroid nodule    Chronic midline low back pain with right-sided sciatica    Cervicalgia    Cervical stenosis of spine    Arthritis    Moderate persistent asthma without complication    Gastroparalysis    History of chicken pox    IBS (irritable bowel syndrome)    GERD (gastroesophageal reflux disease)    Dependent edema    Optic disc hemorrhage    Type 2 diabetes mellitus with hyperglycemia, without long-term current use of insulin    Tension headache    Allergic rhinitis    Disorder of peripheral nervous system    Fibromyositis    Tobacco dependence syndrome    Cervical smear, as part of routine gynecological examination    Disease due to severe acute respiratory syndrome coronavirus 2 (SARS-CoV-2)    Lung cancer screening declined by patient    Acute cough    Hospital discharge follow-up    Dizziness    Lesion of skin of scalp    Pulmonary nodules    Right calf pain    Thrush       Medication List:    Current Outpatient Medications:     Accu-Chek FastClix Lancets misc, 1 each by Other route Daily., Disp: 100 each, Rfl: 3    albuterol (PROVENTIL) (2.5 MG/3ML) 0.083% nebulizer solution, Take 2.5 mg by nebulization Every 4 (Four) Hours As Needed for Wheezing., Disp: 180 mL, Rfl: 3    albuterol sulfate  (90 Base) MCG/ACT inhaler, Inhale 2 puffs 4 (Four) Times a Day., Disp: 6.7 g, Rfl: 12    aspirin 81 MG chewable tablet, Chew 1 tablet Daily., Disp: , Rfl:     Blood Glucose Monitoring Suppl (Accu-Chek Guide)  w/Device kit, 1 each Daily., Disp: 1 kit, Rfl: 0    bumetanide (BUMEX) 1 MG tablet, Take 1 tablet by mouth Daily., Disp: 30 tablet, Rfl: 12    citalopram (CeleXA) 40 MG tablet, TAKE 1 TABLET BY MOUTH DAILY, Disp: 90 tablet, Rfl: 3    dexlansoprazole (Dexilant) 60 MG capsule, Take 1 capsule by mouth Daily., Disp: 90 capsule, Rfl: 3    dicyclomine (BENTYL) 10 MG capsule, TAKE 1 CAPSULE BY MOUTH EVERY 6 HOURS AS NEEDED FOR PAIN, Disp: , Rfl:     doxycycline (MONODOX) 100 MG capsule, Take 1 capsule by mouth 2 (Two) Times a Day., Disp: 20 capsule, Rfl: 0    empagliflozin (Jardiance) 10 MG tablet tablet, Take 1 tablet by mouth Daily., Disp: 30 tablet, Rfl: 12    famotidine (PEPCID) 40 MG tablet, Take 1 tablet by mouth Every Night., Disp: , Rfl:     fenofibrate (TRICOR) 145 MG tablet, Take 1 tablet by mouth Daily., Disp: , Rfl:     fluticasone (FLONASE) 50 MCG/ACT nasal spray, SHAKE LIQUID AND USE 2 SPRAYS IN EACH NOSTRIL EVERY DAY, Disp: 48 g, Rfl: 3    Fluticasone-Salmeterol (Advair Diskus) 250-50 MCG/ACT DISKUS, Inhale 1 puff 2 (Two) Times a Day., Disp: 60 each, Rfl: 12    folic acid (FOLVITE) 1 MG tablet, Take 1 tablet by mouth Daily., Disp: , Rfl:     gabapentin (NEURONTIN) 300 MG capsule, 1 cap BID PRN, Disp: , Rfl:     glucose blood (Accu-Chek Guide) test strip, 1 each by Other route Daily. Use as instructed, Disp: 100 each, Rfl: 3    HYDROcodone-acetaminophen (Norco)  MG per tablet, Take 1 tablet by mouth Every 6 (Six) Hours As Needed for Moderate Pain., Disp: 120 tablet, Rfl: 0    HYDROcodone-acetaminophen (Norco)  MG per tablet, Take 1 tablet by mouth Every 6 (Six) Hours As Needed for Moderate Pain., Disp: 120 tablet, Rfl: 0    HYDROcodone-acetaminophen (Norco)  MG per tablet, Take 1 tablet by mouth Every 6 (Six) Hours As Needed for Moderate Pain., Disp: 120 tablet, Rfl: 0    HYDROcodone-acetaminophen (Norco) 7.5-325 MG per tablet, Take 1 tablet by mouth Every 6 (Six) Hours As Needed for  Moderate Pain., Disp: 120 tablet, Rfl: 0    HYDROcodone-acetaminophen (Norco) 7.5-325 MG per tablet, Take 1 tablet by mouth Every 6 (Six) Hours As Needed for Moderate Pain., Disp: 120 tablet, Rfl: 0    HYDROcodone-acetaminophen (Norco) 7.5-325 MG per tablet, Take 1 tablet by mouth Every 6 (Six) Hours As Needed for Moderate Pain., Disp: 120 tablet, Rfl: 0    Lancets Misc. (ACCU-CHEK FASTCLIX LANCET) kit, Dx: E11.9, DM type 2, controlled, Disp: , Rfl:     levothyroxine (SYNTHROID, LEVOTHROID) 50 MCG tablet, Take 1 tablet by mouth Daily., Disp: , Rfl:     Linzess 290 MCG capsule capsule, Take 1 capsule by mouth Every Other Day., Disp: , Rfl:     metFORMIN ER (GLUCOPHAGE-XR) 500 MG 24 hr tablet, Take 2 tablets by mouth Daily With Breakfast., Disp: 180 tablet, Rfl: 3    metoclopramide (REGLAN) 10 MG tablet, Take 1 tablet by mouth 4 (Four) Times a Day As Needed., Disp: , Rfl:     montelukast (SINGULAIR) 10 MG tablet, Take 1 tablet by mouth Every Night., Disp: , Rfl:     Omega-3 Fatty Acids (fish oil) 1000 MG capsule capsule, 2 capsules Daily With Breakfast., Disp: , Rfl:     ondansetron ODT (ZOFRAN-ODT) 4 MG disintegrating tablet, Place 1 tablet on the tongue Every 8 (Eight) Hours As Needed., Disp: , Rfl:     potassium chloride (K-DUR,KLOR-CON) 20 MEQ CR tablet, Take 1 tablet by mouth Daily As Needed. Only if taking 2 bumetanide, Disp: , Rfl:     predniSONE (DELTASONE) 20 MG tablet, Take 1 tablet by mouth Daily. TID x 3 days, BID x 3 days, QD x 3 days, 1/2 tab daily x 4 days, Disp: 20 tablet, Rfl: 0    rosuvastatin (CRESTOR) 20 MG tablet, Take 1 tablet by mouth Daily., Disp: , Rfl:     vitamin B-12 (CYANOCOBALAMIN) 1000 MCG tablet, Take 1 tablet by mouth Daily., Disp: , Rfl:     vitamin D3 125 MCG (5000 UT) capsule capsule, Take 1 capsule by mouth Daily., Disp: , Rfl:     nystatin (MYCOSTATIN) 100,000 unit/mL suspension, Swish and swallow 5 mL 4 (Four) Times a Day. 1/2 dose each side of mouth retain in mouth as long as  "possible before swallowing, Disp: 280 mL, Rfl: 0    Past Surgical History:  Past Surgical History:   Procedure Laterality Date    BREAST BIOPSY Right     CARPAL TUNNEL RELEASE Right     CERVICAL DISCECTOMY ANTERIOR      CHOLECYSTECTOMY      ESSURE TUBAL LIGATION           Physical Exam:      Vital Signs:    Vitals:    03/18/24 0859   BP: 122/80   Pulse: 105   Resp: 19   Temp: 97.6 °F (36.4 °C)   SpO2: 96%        /80 (BP Location: Right arm, Patient Position: Sitting, Cuff Size: Adult)   Pulse 105   Temp 97.6 °F (36.4 °C) (Temporal)   Resp 19   Ht 160 cm (63\")   Wt 107 kg (235 lb)   SpO2 96% Comment: room air  BMI 41.63 kg/m²     Wt Readings from Last 3 Encounters:   03/18/24 107 kg (235 lb)   03/12/24 109 kg (240 lb 9.6 oz)   02/13/24 116 kg (256 lb)       Result Review :                Physical Exam  Vitals reviewed.   Constitutional:       Appearance: Normal appearance. She is well-developed.   HENT:      Head: Normocephalic and atraumatic.      Right Ear: Tympanic membrane normal.      Left Ear: Tympanic membrane normal.      Mouth/Throat:     Eyes:      General:         Right eye: No discharge.         Left eye: No discharge.   Cardiovascular:      Rate and Rhythm: Normal rate and regular rhythm.      Heart sounds: Normal heart sounds. No murmur heard.     No friction rub. No gallop.   Pulmonary:      Effort: Pulmonary effort is normal. No respiratory distress.      Breath sounds: Normal breath sounds. No wheezing or rales.   Skin:     General: Skin is warm and dry.      Findings: No rash.   Neurological:      Mental Status: She is alert and oriented to person, place, and time.      Coordination: Coordination normal.      Gait: Gait normal.   Psychiatric:         Behavior: Behavior is cooperative.         Assessment and Plan:  Problems Addressed this Visit          Endocrine and Metabolic    Class 3 severe obesity due to excess calories with serious comorbidity and body mass index (BMI) of 40.0 to " 44.9 in adult     Patient's (Body mass index is 41.63 kg/m².) indicates that they are morbidly/severely obese (BMI > 40 or > 35 with obesity - related health condition) with health conditions that include diabetes mellitus . Weight is improving with lifestyle modifications. BMI  is above average; BMI management plan is completed. We discussed low calorie, low carb based diet program, portion control, and increasing exercise.             Pulmonary and Pneumonias    COPD (chronic obstructive pulmonary disease)     COPD is improving with treatment.    Plan:  Continue same medication/s without change.    Discussed medication dosage, use, side effects, and goals of treatment in detail.    Discussed monitoring symptoms and use of quick-relief medications and maintenance medication..    Patient Treatment Goals:   symptom prevention, minimizing limitation in activity, prevention of exacerbations and use of ER/inpatient care, maintenance of optimal pulmonary function, and minimization of adverse effects of treatment    Followup in 3 months.           Moderate persistent asthma without complication     Exacerbation resolved                Tobacco    Tobacco dependence syndrome       Other    Acute cough - Primary    Thrush     From nebulizer  Mild  Start oral treatment         Relevant Medications    nystatin (MYCOSTATIN) 100,000 unit/mL suspension     Diagnoses         Codes Comments    Acute cough    -  Primary ICD-10-CM: R05.1  ICD-9-CM: 786.2     Tobacco dependence syndrome     ICD-10-CM: F17.200  ICD-9-CM: 305.1     Class 3 severe obesity due to excess calories with serious comorbidity and body mass index (BMI) of 40.0 to 44.9 in adult     ICD-10-CM: E66.01, Z68.41  ICD-9-CM: 278.01, V85.41     Thrush     ICD-10-CM: B37.0  ICD-9-CM: 112.0     Simple chronic bronchitis     ICD-10-CM: J41.0  ICD-9-CM: 491.0     Moderate persistent asthma without complication     ICD-10-CM: J45.40  ICD-9-CM: 493.90                           An After Visit Summary and PPPS were given to the patient.

## 2024-03-24 NOTE — ASSESSMENT & PLAN NOTE
Diabetes is worsening.   Medication changes per orders.  Reminded to bring in blood sugar diary at next visit.  Recommended a Mediterranean style of eating  Regular aerobic exercise.  Diabetes will be reassessed in 3 months

## 2024-04-10 ENCOUNTER — TELEPHONE (OUTPATIENT)
Dept: FAMILY MEDICINE CLINIC | Facility: CLINIC | Age: 54
End: 2024-04-10
Payer: MEDICARE

## 2024-04-10 NOTE — TELEPHONE ENCOUNTER
PATIENT CALLED AND STATES SHE HAS A YEAST INFECTION FOR ABOUT A WEEK AND HALF. SHE WAS ON ANTIBIOTIC AND BELIEVES IT MAY BE THE JARDIANCE.    PLEASE CALL AND ADVISE  [phone]      [id number] - [place], [place] [facility] [address] [facility] OF  &  - [phone]  - [phone]

## 2024-04-11 RX ORDER — FLUCONAZOLE 150 MG/1
150 TABLET ORAL ONCE
Qty: 1 TABLET | Refills: 0 | Status: SHIPPED | OUTPATIENT
Start: 2024-04-11 | End: 2024-04-11

## 2024-04-22 RX ORDER — MONTELUKAST SODIUM 10 MG/1
10 TABLET ORAL NIGHTLY
Qty: 90 TABLET | Refills: 3 | Status: SHIPPED | OUTPATIENT
Start: 2024-04-22

## 2024-05-06 ENCOUNTER — OFFICE VISIT (OUTPATIENT)
Dept: PAIN MEDICINE | Facility: CLINIC | Age: 54
End: 2024-05-06
Payer: MEDICARE

## 2024-05-06 VITALS
RESPIRATION RATE: 16 BRPM | SYSTOLIC BLOOD PRESSURE: 125 MMHG | DIASTOLIC BLOOD PRESSURE: 81 MMHG | OXYGEN SATURATION: 94 % | HEART RATE: 98 BPM

## 2024-05-06 DIAGNOSIS — Z79.899 HIGH RISK MEDICATION USE: Primary | ICD-10-CM

## 2024-05-06 DIAGNOSIS — M54.2 CERVICALGIA: ICD-10-CM

## 2024-05-06 DIAGNOSIS — M50.30 DEGENERATION OF INTERVERTEBRAL DISC OF CERVICAL REGION: ICD-10-CM

## 2024-05-06 DIAGNOSIS — M54.41 CHRONIC MIDLINE LOW BACK PAIN WITH RIGHT-SIDED SCIATICA: ICD-10-CM

## 2024-05-06 DIAGNOSIS — M51.36 DEGENERATION OF INTERVERTEBRAL DISC OF LUMBAR REGION: ICD-10-CM

## 2024-05-06 DIAGNOSIS — M48.02 CERVICAL STENOSIS OF SPINE: ICD-10-CM

## 2024-05-06 DIAGNOSIS — G89.29 CHRONIC MIDLINE LOW BACK PAIN WITH RIGHT-SIDED SCIATICA: ICD-10-CM

## 2024-05-06 PROCEDURE — G0463 HOSPITAL OUTPT CLINIC VISIT: HCPCS | Performed by: PHYSICAL MEDICINE & REHABILITATION

## 2024-05-06 PROCEDURE — 99213 OFFICE O/P EST LOW 20 MIN: CPT | Performed by: PHYSICAL MEDICINE & REHABILITATION

## 2024-05-06 PROCEDURE — 1159F MED LIST DOCD IN RCRD: CPT | Performed by: PHYSICAL MEDICINE & REHABILITATION

## 2024-05-06 PROCEDURE — 1125F AMNT PAIN NOTED PAIN PRSNT: CPT | Performed by: PHYSICAL MEDICINE & REHABILITATION

## 2024-05-06 PROCEDURE — 1160F RVW MEDS BY RX/DR IN RCRD: CPT | Performed by: PHYSICAL MEDICINE & REHABILITATION

## 2024-05-06 RX ORDER — HYDROCODONE BITARTRATE AND ACETAMINOPHEN 10; 325 MG/1; MG/1
1 TABLET ORAL EVERY 6 HOURS PRN
Qty: 120 TABLET | Refills: 0 | Status: SHIPPED | OUTPATIENT
Start: 2024-05-06

## 2024-05-29 ENCOUNTER — TELEPHONE (OUTPATIENT)
Dept: FAMILY MEDICINE CLINIC | Facility: CLINIC | Age: 54
End: 2024-05-29
Payer: MEDICARE

## 2024-05-29 NOTE — TELEPHONE ENCOUNTER
05/29/24: Patient request chest xray be cancelled she said that it was for when she was sick and is no longer sick now.

## 2024-06-18 ENCOUNTER — TELEPHONE (OUTPATIENT)
Dept: FAMILY MEDICINE CLINIC | Facility: CLINIC | Age: 54
End: 2024-06-18
Payer: MEDICARE

## 2024-06-18 ENCOUNTER — TELEPHONE (OUTPATIENT)
Dept: NEUROLOGY | Facility: CLINIC | Age: 54
End: 2024-06-18
Payer: MEDICARE

## 2024-06-18 RX ORDER — FLUCONAZOLE 150 MG/1
150 TABLET ORAL ONCE
Qty: 1 TABLET | Refills: 0 | Status: SHIPPED | OUTPATIENT
Start: 2024-06-18 | End: 2024-06-18

## 2024-06-18 NOTE — TELEPHONE ENCOUNTER
Caller: Riddhi Cid    Relationship: Self    Best call back number: [phone]    What medication are you requesting: PLEASE ADVISE    What are your current symptoms: YEAST INFECTION    How long have you been experiencing symptoms: 7 DAYS    Have you had these symptoms before:    [x] Yes  [] No    Have you been treated for these symptoms before:   [x] Yes  [] No    If a prescription is needed, what is your preferred pharmacy and phone number: [id number] - [place] AT [facility] OF  &  - [phone] [facility] [phone] FX     Additional notes:  STATES SHE HAS GOTTEN THIS BEFORE DUE TO TAKING THE JARDIANCE.     PLEASE CALL TO CONFIRM OR DISCUSS.

## 2024-06-18 NOTE — TELEPHONE ENCOUNTER
Caller: Riddhi Cid    Relationship: Self      Date of last appointment: 10-19-21    Issues/Supplies requested: CPAP/BI-BAP SUPPLIES & CANNULA FOR OXYGEN    Type of mask (fill or nasal): FULL    Does equipment use a modem or chip:     Ordering physician's name: SEIPEL, JOSEPH, MD    Patient contact information: [phone]    Fax number where the order should be sent:     STATED SHE NEEDS AN ORDER SENT TO [facility] IN [place].    PLEASE ADVISE

## 2024-06-20 NOTE — TELEPHONE ENCOUNTER
Diflucan looks like the order has been .   But I let pt know it was sent in, and receipt was confirmed by pharmacy

## 2024-06-27 NOTE — PROGRESS NOTES
Subsequent Medicare Wellness Visit    Subjective    History of Present Illness:  Riddhi Cid is a 53 y.o. female who presents for a Subsequent Medicare Wellness Visit.    The following portions of the patient's history were reviewed and   updated as appropriate: allergies, current medications, past family history, past medical history, past social history, past surgical history, and problem list.  Family History   Problem Relation Age of Onset    Stroke Mother     Hypertension Mother     Hyperlipidemia Mother     Hypothyroidism Mother     Heart attack Father     Heart disease Father     Prostate cancer Father     Other Father         metastatic cancer    Diabetes Father     Hypertension Father     Hyperlipidemia Father     Coronary artery disease Father     Breast cancer Paternal Grandmother     Hypothyroidism Grandchild     Ovarian cancer Neg Hx      Past Surgical History:   Procedure Laterality Date    BREAST BIOPSY Right     CARPAL TUNNEL RELEASE Right     CERVICAL DISCECTOMY ANTERIOR      CHOLECYSTECTOMY      ESSURE TUBAL LIGATION          Compared to one year ago, the patient feels her physical   health is the same.    Compared to one year ago, the patient feels her mental   health is the same.    Recent Hospitalizations:  This patient has had a [facility] admission record on file within the last 365 days.    Current Medical Providers:  Patient Care Team:  June Harrison MD as PCP - General (Family Medicine)  Izaiah Fernandez MD as Consulting Physician (Pain Medicine)    Outpatient Medications Prior to Visit   Medication Sig Dispense Refill    Accu-Chek FastClix Lancets misc 1 each by Other route Daily. 100 each 3    albuterol (PROVENTIL) (2.5 MG/3ML) 0.083% nebulizer solution Take 2.5 mg by nebulization Every 4 (Four) Hours As Needed for Wheezing. 180 mL 3    albuterol sulfate  (90 Base) MCG/ACT inhaler Inhale 2 puffs 4 (Four) Times a Day. 6.7 g 12    aspirin 81 MG chewable tablet Chew 1  tablet Daily.      Blood Glucose Monitoring Suppl (Accu-Chek Guide) w/Device kit 1 each Daily. 1 kit 0    bumetanide (BUMEX) 1 MG tablet Take 1 tablet by mouth Daily. 30 tablet 12    citalopram (CeleXA) 40 MG tablet TAKE 1 TABLET BY MOUTH DAILY 90 tablet 3    dexlansoprazole (Dexilant) 60 MG capsule Take 1 capsule by mouth Daily. 90 capsule 3    dicyclomine (BENTYL) 10 MG capsule TAKE 1 CAPSULE BY MOUTH EVERY 6 HOURS AS NEEDED FOR PAIN      empagliflozin (Jardiance) 10 MG tablet tablet Take 1 tablet by mouth Daily. 30 tablet 12    famotidine (PEPCID) 40 MG tablet Take 1 tablet by mouth Every Night.      fenofibrate (TRICOR) 145 MG tablet Take 1 tablet by mouth Daily.      fluticasone (FLONASE) 50 MCG/ACT nasal spray SHAKE LIQUID AND USE 2 SPRAYS IN EACH NOSTRIL EVERY DAY 48 g 3    folic acid (FOLVITE) 1 MG tablet Take 1 tablet by mouth Daily.      glucose blood (Accu-Chek Guide) test strip 1 each by Other route Daily. Use as instructed 100 each 3    HYDROcodone-acetaminophen (Norco)  MG per tablet Take 1 tablet by mouth Every 6 (Six) Hours As Needed for Moderate Pain. 120 tablet 0    HYDROcodone-acetaminophen (Norco)  MG per tablet Take 1 tablet by mouth Every 6 (Six) Hours As Needed for Moderate Pain. 120 tablet 0    HYDROcodone-acetaminophen (Norco)  MG per tablet Take 1 tablet by mouth Every 6 (Six) Hours As Needed for Moderate Pain. 120 tablet 0    Lancets Misc. (ACCU-CHEK FASTCLIX LANCET) kit Dx: E11.9, DM type 2, controlled      levothyroxine (SYNTHROID, LEVOTHROID) 50 MCG tablet Take 1 tablet by mouth Daily.      Linzess 290 MCG capsule capsule Take 1 capsule by mouth Every Other Day.      metFORMIN ER (GLUCOPHAGE-XR) 500 MG 24 hr tablet Take 2 tablets by mouth Daily With Breakfast. 180 tablet 3    metoclopramide (REGLAN) 10 MG tablet Take 1 tablet by mouth 4 (Four) Times a Day As Needed.      montelukast (SINGULAIR) 10 MG tablet TAKE 1 TABLET BY MOUTH EVERY NIGHT 90 tablet 3    Omega-3  Fatty Acids (fish oil) 1000 MG capsule capsule 2 capsules Daily With Breakfast.      ondansetron ODT (ZOFRAN-ODT) 4 MG disintegrating tablet Place 1 tablet on the tongue Every 8 (Eight) Hours As Needed.      potassium chloride (K-DUR,KLOR-CON) 20 MEQ CR tablet Take 1 tablet by mouth Daily As Needed. Only if taking 2 bumetanide      rosuvastatin (CRESTOR) 20 MG tablet Take 1 tablet by mouth Daily.      vitamin B-12 (CYANOCOBALAMIN) 1000 MCG tablet Take 1 tablet by mouth Daily.      vitamin D3 125 MCG (5000 UT) capsule capsule Take 1 capsule by mouth Daily.      gabapentin (NEURONTIN) 300 MG capsule 1 cap BID PRN      doxycycline (MONODOX) 100 MG capsule Take 1 capsule by mouth 2 (Two) Times a Day. (Patient not taking: Reported on 7/1/2024) 20 capsule 0    Fluticasone-Salmeterol (Advair Diskus) 250-50 MCG/ACT DISKUS Inhale 1 puff 2 (Two) Times a Day. (Patient not taking: Reported on 7/1/2024) 60 each 12    nystatin (MYCOSTATIN) 100,000 unit/mL suspension Swish and swallow 5 mL 4 (Four) Times a Day. 1/2 dose each side of mouth retain in mouth as long as possible before swallowing (Patient not taking: Reported on 7/1/2024) 280 mL 0    predniSONE (DELTASONE) 20 MG tablet Take 1 tablet by mouth Daily. TID x 3 days, BID x 3 days, QD x 3 days, 1/2 tab daily x 4 days (Patient not taking: Reported on 7/1/2024) 20 tablet 0     No facility-administered medications prior to visit.     Pain Score    07/01/24 0958   PainSc: 0-No pain      Opioid medication/s are on active medication list.  and I have evaluated her active treatment plan and pain score trends (see table).  Vitals:    07/01/24 0958   PainSc: 0-No pain     I have reviewed the chart for potential of high risk medication and harmful drug interactions in the elderly.          Aspirin is on active medication list. Aspirin use is indicated based on review of current medical condition/s. Pros and cons of this therapy have been discussed today. Benefits of this medication  outweigh potential harm.  Patient has been encouraged to continue taking this medication.  .      Patient Active Problem List   Diagnosis    Class 3 severe obesity due to excess calories with serious comorbidity and body mass index (BMI) of 40.0 to 44.9 in adult    Degeneration of intervertebral disc of cervical region    Degeneration of intervertebral disc of lumbar region    Acquired hypothyroidism    Mixed hyperlipidemia    Major depressive disorder, recurrent, moderate    Obstructive sleep apnea    Vitamin B12 deficiency    Vitamin D deficiency    COPD (chronic obstructive pulmonary disease)    Solitary thyroid nodule    Chronic midline low back pain with right-sided sciatica    Cervicalgia    Cervical stenosis of spine    Arthritis    Moderate persistent asthma without complication    Gastroparalysis    History of chicken pox    IBS (irritable bowel syndrome)    GERD (gastroesophageal reflux disease)    Dependent edema    Optic disc hemorrhage    Type 2 diabetes mellitus with hyperglycemia, without long-term current use of insulin    Tension headache    Allergic rhinitis    Disorder of peripheral nervous system    Fibromyositis    Tobacco dependence syndrome    Cervical smear, as part of routine gynecological examination    Disease due to severe acute respiratory syndrome coronavirus 2 (SARS-CoV-2)    Lung cancer screening declined by patient    Acute cough    Hospital discharge follow-up    Dizziness    Lesion of skin of scalp    Pulmonary nodules    Right calf pain    Thrush    Screening mammogram for breast cancer     Advance Care Planning  Advance Directive is not on file.  ACP discussion was held with the patient during this visit. Patient does not have an advance directive, information provided.    Discussion with Patientregarding advanced directives. Living Will, POST, and Advanced Care Planning form discussed at length and reviewed with patient. I spent over 16 minutes  with patient reviewing  "information and documenting  in the chart.  . Reviewed medical interventions with patient and the differences between each: Comfort, Limited and Full. . Discussed the use of antibiotics at the end of life. Discussed artificially administered nutrition, patient is aware that if she is alert and oriented they can change their mind at any time. . Advised to discuss with healthcare representative if they can comply with wishes. Patient encouraged to have a meeting to discuss his decision regarding advanced care directives and goals of care with extended family and significant  friends  In regard to the POST form:While options were reviewed with patient today, she is unable to make decisions at this time. She is going to think about it, review the forms provided and should she decide to complete the document let my office know. and advised to give to family members, place in an easily accessible place and take with them if going to the hospital or Emergency room.          Objective    Vitals:    24 0958   BP: 120/82   BP Location: Right arm   Patient Position: Sitting   Cuff Size: Large Adult   Pulse: 103   Resp: 17   Temp: 97.8 °F (36.6 °C)   TempSrc: Temporal   SpO2: 96%  Comment: room air   Weight: 110 kg (242 lb 6.4 oz)   Height: 160 cm (63\")   PainSc: 0-No pain       Does the patient have evidence of cognitive impairment? No           HEALTH RISK ASSESSMENT    Smoking Status:  Social History     Tobacco Use   Smoking Status Every Day    Current packs/day: 1.00    Average packs/day: 1 pack/day for 53.0 years (53.0 ttl pk-yrs)    Types: Cigarettes   Smokeless Tobacco Never     Alcohol Consumption:  Social History     Substance and Sexual Activity   Alcohol Use No     Fall Risk Screen:    STEADI Fall Risk Assessment was completed, and patient is at LOW risk for falls.Assessment completed on:2024    Depression Screenin/1/2024     9:00 AM   PHQ-2/PHQ-9 Depression Screening   Little Interest or Pleasure " in Doing Things 0-->not at all   Feeling Down, Depressed or Hopeless 0-->not at all   PHQ-9: Brief Depression Severity Measure Score 0       Health Habits and Functional and Cognitive Screenin/1/2024     9:00 AM   Functional & Cognitive Status   Do you have difficulty preparing food and eating? No   Do you have difficulty bathing yourself, getting dressed or grooming yourself? No   Do you have difficulty using the toilet? No   Do you have difficulty moving around from place to place? No   Do you have trouble with steps or getting out of a bed or a chair? No   Current Diet Unhealthy Diet   Dental Exam Up to date   Eye Exam Up to date   Exercise (times per week) 0 times per week   Current Exercises Include No Regular Exercise   Do you need help using the phone?  No   Are you deaf or do you have serious difficulty hearing?  No   Do you need help to go to places out of walking distance? No   Do you need help shopping? No   Do you need help preparing meals?  No   Do you need help with housework?  No   Do you need help with laundry? No   Do you need help taking your medications? No   Do you need help managing money? No   Do you ever drive or ride in a car without wearing a seat belt? No   Have you felt unusual stress, anger or loneliness in the last month? No   Who do you live with? Child   If you need help, do you have trouble finding someone available to you? No   Have you been bothered in the last four weeks by sexual problems? No   Do you have difficulty concentrating, remembering or making decisions? No       Age-appropriate Screening Schedule:  Refer to the list below for future screening recommendations based on patient's age, sex and/or medical conditions. Orders for these recommended tests are listed in the plan section. The patient has been provided with a written plan.    Health Maintenance   Topic Date Due    Hepatitis B (1 of 3 - 19+ 3-dose series) Never done    ZOSTER VACCINE (1 of 2) Never done     LUNG CANCER SCREENING  Never done    COVID-19 Vaccine (1 - -24 season) Never done    Pneumococcal Vaccine 0-64 (1 of 2 - PCV) 2025 (Originally 10/4/1976)    INFLUENZA VACCINE  2024    LIPID PANEL  2024    HEMOGLOBIN A1C  2025    PAP SMEAR  2025    MAMMOGRAM  2025    BMI FOLLOWUP  2025    ANNUAL WELLNESS VISIT  2025    TDAP/TD VACCINES (3 - Td or Tdap) 2030    COLORECTAL CANCER SCREENING  2031    HEPATITIS C SCREENING  Completed    DIABETIC FOOT EXAM  Discontinued    DIABETIC EYE EXAM  Discontinued    URINE MICROALBUMIN  Discontinued              Assessment & Plan   Medically necessary, significant, and separately identifiable medical problems identified during this visit are addressed on a separate visit note.    CMS Preventative Services Quick Reference  Risk Factors Identified During Encounter  Chronic Pain: Natural history and expected course discussed. Questions answered.  The above risks/problems have been discussed with the patient.  Follow up actions/plans if indicated are seen below in the Assessment/Plan Section.  Pertinent information has been shared with the patient in the After Visit Summary.    Follow Up:   No follow-ups on file.     An After Visit Summary and PPPS were made available to the patient.    Medicare Wellness  Personal Prevention Plan of Service     Date of Office Visit:  2024  Encounter Provider:  June Harrison MD  Place of Service:  [facility]  Patient Name: Riddhi Cid  :  1970    As part of the Medicare Wellness portion of your visit today, we are providing you with this personalized preventive plan of services (PPPS). This plan is based upon recommendations of the [place] Preventive Services Task Force (USPSTF) and the Advisory Committee on Immunization Practices (ACIP).    This lists the preventive care services that should be considered, and provides dates of  when you are due. Items listed as completed are up-to-date and do not require any further intervention.    Health Maintenance   Topic Date Due    Hepatitis B (1 of 3 - 19+ 3-dose series) Never done    ZOSTER VACCINE (1 of 2) Never done    LUNG CANCER SCREENING  Never done    COVID-19 Vaccine (1 - 2023-24 season) Never done    Pneumococcal Vaccine 0-64 (1 of 2 - PCV) 02/13/2025 (Originally 10/4/1976)    INFLUENZA VACCINE  08/01/2024    LIPID PANEL  09/01/2024    HEMOGLOBIN A1C  01/01/2025    PAP SMEAR  01/14/2025    MAMMOGRAM  03/01/2025    BMI FOLLOWUP  03/18/2025    ANNUAL WELLNESS VISIT  07/01/2025    TDAP/TD VACCINES (3 - Td or Tdap) 07/21/2030    COLORECTAL CANCER SCREENING  02/28/2031    HEPATITIS C SCREENING  Completed    DIABETIC FOOT EXAM  Discontinued    DIABETIC EYE EXAM  Discontinued    URINE MICROALBUMIN  Discontinued       Orders Placed This Encounter   Procedures    Mammo Screening Digital Tomosynthesis Bilateral With CAD     Standing Status:   Future     Standing Expiration Date:   7/1/2025     Order Specific Question:   Reason for Exam:     Answer:   screen     Order Specific Question:   Release to patient     Answer:   Routine Release [[id number]]    Comprehensive Metabolic Panel     Order Specific Question:   Release to patient     Answer:   Routine Release [[id number]]    Lipid Panel With / Chol / HDL Ratio     Order Specific Question:   Release to patient     Answer:   Routine Release [[id number]]    TSH     Order Specific Question:   Release to patient     Answer:   Routine Release [[id number]]    POCT urinalysis dipstick, manual     Order Specific Question:   Release to patient     Answer:   Routine Release [[id number]]    POC Glycosylated Hemoglobin (Hb A1C)     Order Specific Question:   Release to patient     Answer:   Routine Release [[id number]]    CBC & Differential     Order Specific Question:   Manual Differential     Answer:   No     Order Specific Question:   Release to patient      Answer:   Routine Release [[id number]]       No follow-ups on file.  Sit-to-Stand Exercise    The sit-to-stand exercise (also known as the chair stand or chair rise exercise) strengthens your lower body and helps you maintain or improve your mobility and independence. The goal is to do the sit-to-stand exercise without using your hands. This will be easier as you become stronger. You should always talk with your health care provider before starting any exercise program, especially if you have had recent surgery.  Do the exercise exactly as told by your health care provider and adjust it as directed. It is normal to feel mild stretching, pulling, tightness, or discomfort as you do this exercise, but you should stop right away if you feel sudden pain or your pain gets worse. Do not begin doing this exercise until told by your health care provider.  What the sit-to-stand exercise does  The sit-to-stand exercise helps to strengthen the muscles in your thighs and the muscles in the center of your body that give you stability (core muscles). This exercise is especially helpful if:  You have had knee or hip surgery.  You have trouble getting up from a chair, out of a car, or off the toilet.  How to do the sit-to-stand exercise  Sit toward the front edge of a sturdy chair without armrests. Your knees should be bent and your feet should be flat on the floor and shoulder-width apart.  Place your hands lightly on each side of the seat. Keep your back and neck as straight as possible, with your chest slightly forward.  Breathe in slowly. Lean forward and slightly shift your weight to the front of your feet.  Breathe out as you slowly stand up. Use your hands as little as possible.  Stand and pause for a full breath in and out.  Breathe in as you sit down slowly. Tighten your core and abdominal muscles to control your lowering as much as possible.  Breathe out slowly.  Do this exercise 10-15 times. If needed, do it fewer times  until you build up strength.  Rest for 1 minute, then do another set of 10-15 repetitions.  To change the difficulty of the sit-to-stand exercise  If the exercise is too difficult, use a chair with sturdy armrests, and push off the armrests to help you come to the standing position. You can also use the armrests to help slowly lower yourself back to sitting. As this gets easier, try to use your arms less. You can also place a firm cushion or pillow on the chair to make the surface higher.  If this exercise is too easy, do not use your arms to help raise or lower yourself. You can also wear a weighted vest, use hand weights, increase your repetitions, or try a lower chair.  General tips  You may feel tired when starting an exercise routine. This is normal.  You may have muscle soreness that lasts a few days. This is normal. As you get stronger, you may not feel muscle soreness.  Use smooth, steady movements.  Do not  hold your breath during strength exercises. This can cause unsafe changes in your blood pressure.  Breathe in slowly through your nose, and breathe out slowly through your mouth.  Summary  Strengthening your lower body is an important step to help you move safely and independently.  The sit-to-stand exercise helps strengthen the muscles in your thighs and core.  You should always talk with your health care provider before starting any exercise program, especially if you have had recent surgery.  This information is not intended to replace advice given to you by your health care provider. Make sure you discuss any questions you have with your health care provider.  Document Revised: 10/16/2019 Document Reviewed: 02/08/2018  Elsevier Patient Education © 2021 [employer]    Advance Care Planning and Advance Directives     You make decisions on a daily basis - decisions about where you want to live, your career, your home, your life. Perhaps one of the most important decisions you face is your choice for  future medical care. Take time to talk with your family and your healthcare team and start planning today.  Advance Care Planning is a process that can help you:  Understand possible future healthcare decisions in light of your own experiences  Reflect on those decision in light of your goals and values  Discuss your decisions with those closest to you and the healthcare professionals that care for you  Make a plan by creating a document that reflects your wishes    Surrogate Decision Maker  In the event of a medical emergency, which has left you unable to communicate or to make your own decisions, you would need someone to make decisions for you.  It is important to discuss your preferences for medical treatment with this person while you are in good health.     Qualities of a surrogate decision maker:  Willing to take on this role and responsibility  Knows what you want for future medical care  Willing to follow your wishes even if they don't agree with them  Able to make difficult medical decisions under stressful circumstances    Advance Directives  These are legal documents you can create that will guide your healthcare team and decision maker(s) when needed. These documents can be stored in the electronic medical record.    Living Will - a legal document to guide your care if you have a terminal condition or a serious illness and are unable to communicate. States vary by statute in document names/types, but most forms may include one or more of the following:        -  Directions regarding life-prolonging treatments        -  Directions regarding artificially provided nutrition/hydration        -  Choosing a healthcare decision maker        -  Direction regarding organ/tissue donation    Durable Power of  for Healthcare - this document names an -in-fact to make medical decisions for you, but it may also allow this person to make personal and financial decisions for you. Please seek the advice  of an  if you need this type of document.    **Advance Directives are not required and no one may discriminate against you if you do not sign one.    Medical Orders  Many states allow specific forms/orders signed by your physician to record your wishes for medical treatment in your current state of health. This form, signed in personal communication with your physician, addresses resuscitation and other medical interventions that you may or may not want.  For more information or to schedule a time with a [facility] Advance Care Planning Facilitator contact: [facility] or call [phone] and someone will contact you directly.    Fall Prevention in the Home, Adult  Falls can cause injuries and can happen to people of all ages. There are many things you can do to make your home safe and to help prevent falls. Ask for help when making these changes.  What actions can I take to prevent falls?  General Instructions  Use good lighting in all rooms. Replace any light bulbs that burn out.  Turn on the lights in dark areas. Use night-lights.  Keep items that you use often in easy-to-reach places. Lower the shelves around your home if needed.  Set up your furniture so you have a clear path. Avoid moving your furniture around.  Do not have throw rugs or other things on the floor that can make you trip.  Avoid walking on wet floors.  If any of your floors are uneven, fix them.  Add color or contrast paint or tape to clearly nikko and help you see:  Grab bars or handrails.  First and last steps of staircases.  Where the edge of each step is.  If you use a stepladder:  Make sure that it is fully opened. Do not climb a closed stepladder.  Make sure the sides of the stepladder are locked in place.  Ask someone to hold the stepladder while you use it.  Know where your pets are when moving through your home.  What can I do in the bathroom?         Keep the floor dry. Clean up any water on the floor right  away.  Remove soap buildup in the tub or shower.  Use nonskid mats or decals on the floor of the tub or shower.  Attach bath mats securely with double-sided, nonslip rug tape.  If you need to sit down in the shower, use a plastic, nonslip stool.  Install grab bars by the toilet and in the tub and shower. Do not use towel bars as grab bars.  What can I do in the bedroom?  Make sure that you have a light by your bed that is easy to reach.  Do not use any sheets or blankets for your bed that hang to the floor.  Have a firm chair with side arms that you can use for support when you get dressed.  What can I do in the kitchen?  Clean up any spills right away.  If you need to reach something above you, use a step stool with a grab bar.  Keep electrical cords out of the way.  Do not use floor polish or wax that makes floors slippery.  What can I do with my stairs?  Do not leave any items on the stairs.  Make sure that you have a light switch at the top and the bottom of the stairs.  Make sure that there are handrails on both sides of the stairs. Fix handrails that are broken or loose.  Install nonslip stair treads on all your stairs.  Avoid having throw rugs at the top or bottom of the stairs.  Choose a carpet that does not hide the edge of the steps on the stairs.  Check carpeting to make sure that it is firmly attached to the stairs. Fix carpet that is loose or worn.  What can I do on the outside of my home?  Use bright outdoor lighting.  Fix the edges of walkways and driveways and fix any cracks.  Remove anything that might make you trip as you walk through a door, such as a raised step or threshold.  Trim any bushes or trees on paths to your home.  Check to see if handrails are loose or broken and that both sides of all steps have handrails.  Install guardrails along the edges of any raised decks and porches.  Clear paths of anything that can make you trip, such as tools or rocks.  Have leaves, snow, or ice cleared  regularly.  Use sand or salt on paths during winter.  Clean up any spills in your garage right away. This includes grease or oil spills.  What other actions can I take?  Wear shoes that:  Have a low heel. Do not wear high heels.  Have rubber bottoms.  Feel good on your feet and fit well.  Are closed at the toe. Do not wear open-toe sandals.  Use tools that help you move around if needed. These include:  Canes.  Walkers.  Scooters.  Crutches.  Review your medicines with your doctor. Some medicines can make you feel dizzy. This can increase your chance of falling.  Ask your doctor what else you can do to help prevent falls.  Where to find more information  Centers for Disease Control and Prevention, [employer]AD[employer]: www.cdc.gov  [employer] [place] Aging: www.katty.nih.gov  Contact a doctor if:  You are afraid of falling at home.  You feel weak, drowsy, or dizzy at home.  You fall at home.  Summary  There are many simple things that you can do to make your home safe and to help prevent falls.  Ways to make your home safe include removing things that can make you trip and installing grab bars in the bathroom.  Ask for help when making these changes in your home.  This information is not intended to replace advice given to you by your health care provider. Make sure you discuss any questions you have with your health care provider.  Document Revised: 07/21/2021 Document Reviewed: 07/21/2021  [employer] Patient Education © 2021 [employer]         +++++E/M portion medically necessary secondary to new or uncontrolled chronic problem+++++++    Subjective   Riddhi Cid is here for:    Chief Complaint   Patient presents with    Medicare Wellness-subsequent    Diabetes    Hyperlipidemia    Hypothyroidism       Diabetes  She presents for her follow-up diabetic visit. She has type 2 diabetes mellitus. Pertinent negatives for hypoglycemia include no dizziness or sweats. Pertinent negatives for diabetes include no chest pain, no  fatigue and no weakness. There are no hypoglycemic complications. There are no diabetic complications. Risk factors for coronary artery disease include diabetes mellitus, post-menopausal, dyslipidemia, obesity and family history. She is following a generally unhealthy diet. Meal planning includes avoidance of concentrated sweets. She never participates in exercise. Her overall blood glucose range is 130-140 mg/dl. (Does not check regularly at home. ) An ACE inhibitor/angiotensin II receptor blocker is not being taken. She does not see a podiatrist.Eye exam is current.   Hyperlipidemia  This is a chronic problem. The current episode started more than 1 year ago. Exacerbating diseases include diabetes, hypothyroidism and obesity. Pertinent negatives include no chest pain or shortness of breath. Current antihyperlipidemic treatment includes statins and herbal therapy. The current treatment provides moderate improvement of lipids. Risk factors for coronary artery disease include dyslipidemia, diabetes mellitus, post-menopausal, obesity and family history.   Hypothyroidism  This is a chronic problem. The current episode started more than 1 year ago. Pertinent negatives include no chest pain, fatigue or weakness. Treatments tried: levothyroxine 50 MCG  The treatment provided moderate relief.         Physical Exam:  Review of Systems   Constitutional:  Negative for fatigue.   Respiratory:  Negative for shortness of breath.    Cardiovascular:  Negative for chest pain.   Neurological:  Negative for dizziness and weakness.        Physical Exam  Vitals and nursing note reviewed.   Constitutional:       General: She is not in acute distress.     Appearance: She is well-developed. She is not diaphoretic.   HENT:      Head: Normocephalic and atraumatic.      Right Ear: Tympanic membrane and external ear normal.      Left Ear: Tympanic membrane and external ear normal.      Nose: Nose normal.      Mouth/Throat:      Pharynx: No  oropharyngeal exudate.   Eyes:      General: No scleral icterus.        Right eye: No discharge.         Left eye: No discharge.      Conjunctiva/sclera: Conjunctivae normal.      Pupils: Pupils are equal, round, and reactive to light.   Neck:      Thyroid: No thyromegaly.      Trachea: No tracheal deviation.   Cardiovascular:      Rate and Rhythm: Normal rate and regular rhythm.      Heart sounds: Normal heart sounds. No murmur heard.     No friction rub. No gallop.   Pulmonary:      Effort: Pulmonary effort is normal. No respiratory distress.      Breath sounds: Normal breath sounds. No stridor. No wheezing or rales.   Abdominal:      General: Bowel sounds are normal. There is no distension.      Palpations: Abdomen is soft. There is no mass.      Tenderness: There is no abdominal tenderness. There is no guarding or rebound.   Musculoskeletal:         General: No tenderness or deformity. Normal range of motion.      Cervical back: Normal range of motion and neck supple.   Lymphadenopathy:      Cervical: No cervical adenopathy.   Skin:     General: Skin is warm and dry.      Capillary Refill: Capillary refill takes less than 2 seconds.      Coloration: Skin is not pale.      Findings: No erythema or rash.   Neurological:      Mental Status: She is alert and oriented to person, place, and time.      Cranial Nerves: No cranial nerve deficit.      Sensory: No sensory deficit.      Motor: No tremor, atrophy or abnormal muscle tone.      Coordination: Coordination normal.      Gait: Gait normal.      Deep Tendon Reflexes: Reflexes are normal and symmetric. Reflexes normal.   Psychiatric:         Behavior: Behavior normal.         Thought Content: Thought content normal.         Cognition and Memory: Memory is not impaired. She does not exhibit impaired recent memory or impaired remote memory.         Judgment: Judgment normal.         Result Review :   The following data was reviewed by: June Harrison MD on  07/01/2024:  A1C Last 3 Results          12/1/2023    08:59 3/12/2024    09:27 7/1/2024    10:11   HGBA1C Last 3 Results   Hemoglobin A1C 7.3  8.2  7.3           Assessment and Plan:  Problem List Items Addressed This Visit          Cardiac and Vasculature    Mixed hyperlipidemia    Current Assessment & Plan      She is on crestor and fenofibrate.   Continue current treatment  Check labs         Relevant Orders    CBC & Differential    Comprehensive Metabolic Panel    Lipid Panel With / Chol / HDL Ratio       Endocrine and Metabolic    Class 3 severe obesity due to excess calories with serious comorbidity and body mass index (BMI) of 40.0 to 44.9 in adult    Acquired hypothyroidism    Relevant Orders    TSH    Type 2 diabetes mellitus with hyperglycemia, without long-term current use of insulin    Current Assessment & Plan     Diabetes is improving with treatment.   Continue current treatment regimen.  Recommended a Mediterranean style of eating  Diabetes will be reassessed in 3 months         Relevant Orders    POC Glycosylated Hemoglobin (Hb A1C) (Completed)       Musculoskeletal and Injuries    Fibromyositis    Current Assessment & Plan     Unchanged  Refilled gabapentin         Relevant Medications    gabapentin (NEURONTIN) 300 MG capsule       Tobacco    Tobacco dependence syndrome       Other    Screening mammogram for breast cancer    Relevant Orders    Mammo Screening Digital Tomosynthesis Bilateral With CAD     Other Visit Diagnoses       Medicare annual wellness visit, subsequent    -  Primary    Relevant Orders    POCT urinalysis dipstick, manual (Completed)

## 2024-07-01 ENCOUNTER — OFFICE VISIT (OUTPATIENT)
Dept: FAMILY MEDICINE CLINIC | Facility: CLINIC | Age: 54
End: 2024-07-01
Payer: MEDICARE

## 2024-07-01 VITALS
TEMPERATURE: 97.8 F | HEIGHT: 63 IN | OXYGEN SATURATION: 96 % | BODY MASS INDEX: 42.95 KG/M2 | HEART RATE: 103 BPM | DIASTOLIC BLOOD PRESSURE: 82 MMHG | WEIGHT: 242.4 LBS | SYSTOLIC BLOOD PRESSURE: 120 MMHG | RESPIRATION RATE: 17 BRPM

## 2024-07-01 DIAGNOSIS — E78.2 MIXED HYPERLIPIDEMIA: ICD-10-CM

## 2024-07-01 DIAGNOSIS — F17.200 TOBACCO DEPENDENCE SYNDROME: ICD-10-CM

## 2024-07-01 DIAGNOSIS — E03.9 ACQUIRED HYPOTHYROIDISM: ICD-10-CM

## 2024-07-01 DIAGNOSIS — Z00.00 MEDICARE ANNUAL WELLNESS VISIT, SUBSEQUENT: Primary | ICD-10-CM

## 2024-07-01 DIAGNOSIS — Z12.31 SCREENING MAMMOGRAM FOR BREAST CANCER: ICD-10-CM

## 2024-07-01 DIAGNOSIS — E11.65 TYPE 2 DIABETES MELLITUS WITH HYPERGLYCEMIA, WITHOUT LONG-TERM CURRENT USE OF INSULIN: ICD-10-CM

## 2024-07-01 DIAGNOSIS — M79.7 FIBROMYOSITIS: ICD-10-CM

## 2024-07-01 DIAGNOSIS — E66.01 CLASS 3 SEVERE OBESITY DUE TO EXCESS CALORIES WITH SERIOUS COMORBIDITY AND BODY MASS INDEX (BMI) OF 40.0 TO 44.9 IN ADULT: ICD-10-CM

## 2024-07-01 LAB
BILIRUB BLD-MCNC: NEGATIVE MG/DL
CLARITY, POC: CLEAR
COLOR UR: YELLOW
EXPIRATION DATE: ABNORMAL
GLUCOSE UR STRIP-MCNC: ABNORMAL MG/DL
HBA1C MFR BLD: 7.3 % (ref 4.5–5.7)
KETONES UR QL: NEGATIVE
LEUKOCYTE EST, POC: NEGATIVE
Lab: ABNORMAL
NITRITE UR-MCNC: NEGATIVE MG/ML
PH UR: 5 [PH] (ref 5–8)
PROT UR STRIP-MCNC: NEGATIVE MG/DL
RBC # UR STRIP: NEGATIVE /UL
SP GR UR: 1.01 (ref 1–1.03)
UROBILINOGEN UR QL: NORMAL

## 2024-07-01 RX ORDER — GABAPENTIN 300 MG/1
300 CAPSULE ORAL NIGHTLY
Qty: 30 CAPSULE | Refills: 12 | Status: SHIPPED | OUTPATIENT
Start: 2024-07-01

## 2024-07-08 ENCOUNTER — TELEPHONE (OUTPATIENT)
Dept: PAIN MEDICINE | Facility: CLINIC | Age: 54
End: 2024-07-08
Payer: MEDICARE

## 2024-07-08 NOTE — TELEPHONE ENCOUNTER
She is, but for a broken bone, she can increase to q4h prn for now, and have her call before she runs out, thans.

## 2024-07-08 NOTE — TELEPHONE ENCOUNTER
Pt called & reported that she broke her fibula and will see the ortho provider this week. Pt wants to know if she can increase her pain medication. I informed pt she is already on a high dose but I would ask you.  Please advise

## 2024-07-11 NOTE — ASSESSMENT & PLAN NOTE
Diabetes is improving with treatment.   Continue current treatment regimen.  Recommended a Mediterranean style of eating  Diabetes will be reassessed in 3 months

## 2024-07-18 ENCOUNTER — TELEPHONE (OUTPATIENT)
Dept: NEUROLOGY | Facility: CLINIC | Age: 54
End: 2024-07-18

## 2024-07-18 NOTE — TELEPHONE ENCOUNTER
Provider: LIANA    Caller: TRINO    Phone Number: [phone]    Reason for Call: PT CALLED AND IS NEEDING TO CANCEL SAME DAY APPT AS PT BROKE ANKLE. PT DID RESCHEDULE.  THANK YOU

## 2024-07-22 RX ORDER — ROSUVASTATIN CALCIUM 20 MG/1
20 TABLET, COATED ORAL DAILY
Qty: 90 TABLET | Refills: 3 | Status: SHIPPED | OUTPATIENT
Start: 2024-07-22

## 2024-07-26 ENCOUNTER — OFFICE VISIT (OUTPATIENT)
Dept: PAIN MEDICINE | Facility: CLINIC | Age: 54
End: 2024-07-26
Payer: MEDICARE

## 2024-07-26 VITALS
HEART RATE: 68 BPM | SYSTOLIC BLOOD PRESSURE: 119 MMHG | WEIGHT: 242 LBS | OXYGEN SATURATION: 93 % | BODY MASS INDEX: 42.87 KG/M2 | RESPIRATION RATE: 16 BRPM | DIASTOLIC BLOOD PRESSURE: 68 MMHG

## 2024-07-26 DIAGNOSIS — G89.29 CHRONIC MIDLINE LOW BACK PAIN WITH RIGHT-SIDED SCIATICA: ICD-10-CM

## 2024-07-26 DIAGNOSIS — M54.2 CERVICALGIA: Primary | ICD-10-CM

## 2024-07-26 DIAGNOSIS — M50.30 DEGENERATION OF INTERVERTEBRAL DISC OF CERVICAL REGION: ICD-10-CM

## 2024-07-26 DIAGNOSIS — M54.41 CHRONIC MIDLINE LOW BACK PAIN WITH RIGHT-SIDED SCIATICA: ICD-10-CM

## 2024-07-26 DIAGNOSIS — G44.209 TENSION HEADACHE: ICD-10-CM

## 2024-07-26 DIAGNOSIS — M51.36 DEGENERATION OF INTERVERTEBRAL DISC OF LUMBAR REGION: ICD-10-CM

## 2024-07-26 DIAGNOSIS — M79.7 FIBROMYOSITIS: ICD-10-CM

## 2024-07-26 RX ORDER — FLUTICASONE PROPIONATE AND SALMETEROL 250; 50 UG/1; UG/1
1 POWDER RESPIRATORY (INHALATION) 2 TIMES DAILY
COMMUNITY
Start: 2024-07-17

## 2024-07-26 RX ORDER — HYDROCODONE BITARTRATE AND ACETAMINOPHEN 10; 325 MG/1; MG/1
1 TABLET ORAL EVERY 6 HOURS PRN
Qty: 120 TABLET | Refills: 0 | Status: SHIPPED | OUTPATIENT
Start: 2024-07-26

## 2024-07-26 RX ORDER — HYDROCODONE BITARTRATE AND ACETAMINOPHEN 10; 325 MG/1; MG/1
1 TABLET ORAL EVERY 6 HOURS PRN
Qty: 120 TABLET | Refills: 0 | Status: SHIPPED | OUTPATIENT
Start: 2024-07-26 | End: 2024-07-26 | Stop reason: SDUPTHER

## 2024-07-26 RX ORDER — IBUPROFEN 800 MG/1
800 TABLET ORAL
COMMUNITY
Start: 2024-07-09

## 2024-07-26 NOTE — TELEPHONE ENCOUNTER
PATIENT STATED WOULD LIKE TO TRY AND GET [facility] IN [place] AND MAY NEED SOMEONE TO CALL TO EXPLAIN SITUATION TO THEM.  PATIENT IS IN GREAT DEAL OF PAIN.  PLEASE CALL PATIENT EITHER WAY  [phone]

## 2024-07-26 NOTE — PROGRESS NOTES
Subjective   Riddhi Cid is a 53 y.o. female.     History of Present Illness  Neck pain radiates to b/l shoulders, and left arm pain. ACDF C5/7 (4/11/2016), also h/o fibromyalgia,  also pain in lower back and b/l legs and feet. 10/10 at worst, 6/10 at best, worse with activity, improves with rest and meds, always present, varies, aching, radiates in BLE. Imaging reviewed, satisfactory ACDF. Referred for pain management. Neck pain improved with ACDF, tapered off Isle with worsening pain, still somewhat severe. Taking Cymbalta which helps, tried Lyrica with weight gain, trying to lose weight. Restarted Isle at BID - qdaily prn but worsening pain with exercise to lose weight, increased to BID-TID #75, then TID, then QID prn with adequate relief, reduced to #105, tolerating. Gastric sleeve surgery planned for March-April 2017, had to miss cardiac clearance and EGD due to illness and social issues, has decided against surgery, trying to lose weight with diet and exercise. Worsening b/l CTS symptoms, R>L, known h/o of CTS. Saw Juan Franz for worsening neck pain to LUE, ACDF is intact, started Diclofenac for DJD, Elavil. Could not tolerate even low-dose Gabapentin with drowsiness. Started Celebrex but did not feel like doing anything, stopped, stopped Mobic. Stopping numerous meds due to side effects, started Gabapentin with Dr. Pisano with benefit. Pain worsening, DDD on MRI, started PT which is helping. Back pain worsening, especially at night. Had COVID-19, doing better, plans to get vaccinated. Fx left ankle, in boot, NWB.  Back Pain  Pertinent negatives include no abdominal pain, bladder incontinence, chest pain, fever, numbness or weakness.        The following portions of the patient's history were reviewed and updated as appropriate: allergies, current medications, past family history, past medical history, past social history, past surgical history and problem list.    Review of Systems   Constitutional:   Negative for chills, fatigue and fever.   HENT:  Negative for hearing loss and trouble swallowing.    Eyes:  Negative for visual disturbance.   Respiratory:  Negative for shortness of breath.    Cardiovascular:  Negative for chest pain.   Gastrointestinal:  Positive for constipation and nausea. Negative for abdominal pain, diarrhea and vomiting.   Genitourinary:  Negative for urinary incontinence.   Musculoskeletal:  Positive for arthralgias, back pain and neck pain. Negative for joint swelling and myalgias.   Neurological:  Positive for headache. Negative for dizziness, weakness and numbness.       Objective   Physical Exam   Constitutional: She is oriented to person, place, and time. She appears well-developed and well-nourished.   HENT:   Head: Normocephalic and atraumatic.   Eyes: Pupils are equal, round, and reactive to light.   Cardiovascular: Normal rate, regular rhythm and normal heart sounds.   Pulmonary/Chest: Breath sounds normal.   Abdominal: Soft. Bowel sounds are normal. She exhibits no distension. There is no abdominal tenderness.   Neurological: She is alert and oriented to person, place, and time. She has normal reflexes. She displays normal reflexes. No sensory deficit.   Psychiatric: Her behavior is normal. Thought content normal.         Assessment & Plan   Diagnoses and all orders for this visit:    1. Cervicalgia (Primary)    2. Degeneration of intervertebral disc of cervical region    3. Degeneration of intervertebral disc of lumbar region    4. Fibromyositis    5. Tension headache    6. Chronic midline low back pain with right-sided sciatica        INspect reviewed, in order. Low risk. Repeat UDS 5/6/24 in order.  Reduced to Norco 10/325mg q6-8h #105, too painful, restarted q6h prn #120, then reduced to #105 successfully but pain worsening. Reduced to 7.5mg QID prn, intolerable, increased back to 10mg QID prn, briefly increased by Ortho for L ankle fx pain. Filled 7/9/24.   Patient's symptoms  are still adequately managed by current medication regimen, is doing well at this strength and dosage, therefore I will continue to prescribe unchanged as the most appropriate course of treatment.  Afraid to start Lyrica due to possible weight gain. Restarted Gabapentin, takes 300-900mg/day as tolerated, helping a great deal.  Severe GERD, IBS, family h/o CV dz. Stopped Diclofenac, could not tolerate Celebrex 200mg qdaily. Taking Naproxen with PCP but hard on her stomach, no personal CV issues. Began Mobic 7.5mg qdaily prn.  Began Tizanidine 4mg qHS prn.  Began Licart prn, can take NSAIDs per patient.  Cont other meds as prescribed.  Cont TENS, unit provided here, patient reports it helps her pain significantly.  Cont b/l CTS braces at night only.  Patient reports she cannot start PT at this time as her daughter currently works 12h shifts, may be able to begin in future if her daughter can change to an 8h shift schedule.  Will begin neuropathic comp cream if her current cream does not work.  Ordered LSO for truncal stability. Cont PT.  New X-ray T-spine to eval for worsening pathology.  Letter to return to work. Does not drive or operate heavy machinery while using pain medication.  RTC 3 months for f/u.

## 2024-07-26 NOTE — TELEPHONE ENCOUNTER
Caller: Riddhi Kaminski    Relationship: Self    Best call back number:     Requested Prescriptions:   Requested Prescriptions     Pending Prescriptions Disp Refills    HYDROcodone-acetaminophen (Norco)  MG per tablet 120 tablet 0     Sig: Take 1 tablet by mouth Every 6 (Six) Hours As Needed for Moderate Pain.        Pharmacy where request should be sent: [id number] - [place] [facility] [address] [address] [address] AT [facility] [address]  & [address] [facility] - [phone] [facility] [phone] FX     Last office visit with prescribing clinician: 7/26/2024   Last telemedicine visit with prescribing clinician: Visit date not found   Next office visit with prescribing clinician: 10/24/2024     Additional details provided by patient: MS KAMINSKI STATED SHE SPOKE WITH THE PHARMACY ND THEY TOLD HER THEY COULD NOT REFILL HER MEDICATION EARLIER THAN 8/7/24 BECAUSE THE RX HASN'T CHANGED. THEY WOULD ONLY BE ABLE TO CHANGE THE FILL DATE IF THE PRESCRIPTION WAS CHANGED    Does the patient have less than a 3 day supply:  [x] Yes  [] No    Would you like a call back once the refill request has been completed: [x] Yes [] No    If the office needs to give you a call back, can they leave a voicemail: [x] Yes [] No    Kerry Anders Rep   07/26/24 11:50 EDT

## 2024-07-26 NOTE — TELEPHONE ENCOUNTER
Spoke to pharmacy and tried to explain why there is a early fill date but they still will not fill the rx until 8/7. Patient informed.

## 2024-07-29 RX ORDER — HYDROCODONE BITARTRATE AND ACETAMINOPHEN 10; 325 MG/1; MG/1
1 TABLET ORAL EVERY 6 HOURS PRN
Qty: 120 TABLET | Refills: 0 | Status: SHIPPED | OUTPATIENT
Start: 2024-07-29

## 2024-08-01 ENCOUNTER — OFFICE VISIT (OUTPATIENT)
Dept: ENDOCRINOLOGY | Facility: CLINIC | Age: 54
End: 2024-08-01
Payer: MEDICARE

## 2024-08-01 ENCOUNTER — TELEPHONE (OUTPATIENT)
Dept: FAMILY MEDICINE CLINIC | Facility: CLINIC | Age: 54
End: 2024-08-01
Payer: MEDICARE

## 2024-08-01 ENCOUNTER — LAB (OUTPATIENT)
Dept: LAB | Facility: HOSPITAL | Age: 54
End: 2024-08-01
Payer: MEDICARE

## 2024-08-01 VITALS
HEART RATE: 89 BPM | SYSTOLIC BLOOD PRESSURE: 122 MMHG | BODY MASS INDEX: 43.59 KG/M2 | DIASTOLIC BLOOD PRESSURE: 75 MMHG | WEIGHT: 246 LBS | HEIGHT: 63 IN | OXYGEN SATURATION: 95 %

## 2024-08-01 DIAGNOSIS — E04.1 THYROID NODULE: ICD-10-CM

## 2024-08-01 DIAGNOSIS — E11.65 TYPE 2 DIABETES MELLITUS WITH HYPERGLYCEMIA, WITHOUT LONG-TERM CURRENT USE OF INSULIN: ICD-10-CM

## 2024-08-01 DIAGNOSIS — B37.31 CANDIDA VAGINITIS: Primary | ICD-10-CM

## 2024-08-01 DIAGNOSIS — E03.9 ACQUIRED HYPOTHYROIDISM: ICD-10-CM

## 2024-08-01 DIAGNOSIS — E03.9 ACQUIRED HYPOTHYROIDISM: Primary | ICD-10-CM

## 2024-08-01 LAB
T4 FREE SERPL-MCNC: 1.19 NG/DL (ref 0.93–1.7)
TSH SERPL DL<=0.05 MIU/L-ACNC: 2.42 UIU/ML (ref 0.27–4.2)

## 2024-08-01 PROCEDURE — 84443 ASSAY THYROID STIM HORMONE: CPT

## 2024-08-01 PROCEDURE — 84439 ASSAY OF FREE THYROXINE: CPT

## 2024-08-01 PROCEDURE — 36415 COLL VENOUS BLD VENIPUNCTURE: CPT

## 2024-08-01 RX ORDER — FLUCONAZOLE 150 MG/1
150 TABLET ORAL ONCE
Qty: 1 TABLET | Refills: 0 | Status: SHIPPED | OUTPATIENT
Start: 2024-08-01 | End: 2024-08-01

## 2024-08-01 NOTE — TELEPHONE ENCOUNTER
Caller: Riddhi Cid    Relationship: Self    Best call back number: [phone]     Requested Prescriptions: ANTIBIOTIC TO HELP WITH YEAST INFECTION DUE TO JARDIANCE      Pharmacy where request should be sent:  [id number] - [place]  [address] [address] [address]  [address]  - [phone] PH - [phone]     Last office visit with prescribing clinician: 7/1/2024   Last telemedicine visit with prescribing clinician: Visit date not found   Next office visit with prescribing clinician: 10/7/2024     Additional details provided by patient: PATIENT IS NEEDING ANTIBIOTIC TO HELP WITH YEAST INFECTION DUE TO JARDIANCE. PLEASE SENT PRESCRIPTION INTO PHARMACY ASAP    If the office needs to give you a call back, can they leave a voicemail: [x] Yes [] No    Kerry Ruiz Rep   08/01/24 11:40 EDT

## 2024-08-01 NOTE — PATIENT INSTRUCTIONS
Please stop smoking as it can cause worsening of gastroparesis  Continue current management otherwise  Discussed with your primary care for possible starting a very small dose of Ozempic at 0.25 mg once a week and increasing the metformin dose.  Labs today and arrange for thyroid ultrasound in next few days.

## 2024-08-01 NOTE — PROGRESS NOTES
Endocrine Progress Note Outpatient     Patient Care Team:  June Harrison MD as PCP - General (Family Medicine)  Izaiah Fernandez MD as Consulting Physician (Pain Medicine)    Chief Complaint: Hypothyroidism/thyroid nodule          HPI: This is a 53-year-old female with history of hypothyroidism, thyroid nodule, vitamin D deficiency and hyperglycemia is here for follow-up.  Last seen back in July 2021.  Has been more than 3 years but we have seen her.    For hypothyroidism: She is currently on levothyroxine at 50 mcg p.o. daily.    Vitamin D deficiency: She is currently on vitamin D supplementation.    She also has been diagnosed with type 2 diabetes since last seen.  She is currently on Jardiance 10 mg once a day and metformin.    No recent labs.    Past Medical History:   Diagnosis Date    Abdominal pain     Allergic     Anemia     Anxiety     Arthritis     Asthma     Constipation     COPD (chronic obstructive pulmonary disease)     Costochondritis     Degenerative disc disease, lumbar     Depression     Diabetes mellitus     Elevated liver enzymes     Fibromyalgia     Gastroparesis     GERD (gastroesophageal reflux disease)     Hemorrhoids     History of chicken pox     History of colon polyps     Hyperlipidemia     IBS (irritable bowel syndrome)     JOSSE (iron deficiency anemia)     Injury of back     Irritable colon     Knee pain, left     LEÓN (nonalcoholic steatohepatitis)     Nausea     Obstructive sleep apnea     Slow to wake up after anesthesia     Thyroid nodule     Tired 07/01/2019    Vitamin D deficiency        Social History     Socioeconomic History    Marital status:     Number of children: 3    Years of education: GED   Tobacco Use    Smoking status: Every Day     Current packs/day: 1.00     Average packs/day: 1 pack/day for 53.0 years (53.0 ttl pk-yrs)     Types: Cigarettes    Smokeless tobacco: Never   Vaping Use    Vaping status: Never Used   Substance and Sexual Activity    Alcohol  use: No    Drug use: No    Sexual activity: Defer       Family History   Problem Relation Age of Onset    Stroke Mother     Hypertension Mother     Hyperlipidemia Mother     Hypothyroidism Mother     Cancer Father     Heart attack Father     Heart disease Father     Prostate cancer Father     Diabetes Father     Hypertension Father     Hyperlipidemia Father     Coronary artery disease Father     Breast cancer Paternal Grandmother     Hypothyroidism Grandchild     Ovarian cancer Neg Hx        Allergies   Allergen Reactions    Sulfa Antibiotics Rash    Meloxicam GI Intolerance       ROS:   Constitutional:  Denies fatigue, tiredness.    Eyes:  Denies change in visual acuity   HENT:  Denies nasal congestion or sore throat   Respiratory: denies cough, shortness of breath.   Cardiovascular:  denies chest pain, edema   GI:  Denies abdominal pain, admits nausea.  Denies vomiting.  Musculoskeletal:  Denies back pain or joint pain   Integument:  Denies dry skin and rash   Neurologic:  Denies headache, focal weakness or sensory changes   Endocrine:  Denies polyuria or polydipsia   Psychiatric:  Denies depression or anxiety      Vitals:    08/01/24 0909   BP: 122/75   Pulse: 89   SpO2: 95%     Body mass index is 43.58 kg/m².     Physical Exam:  GEN: NAD, conversant  EYES: EOMI, PERRL,  NECK: no thyromegaly,   CV: RRR  LUNG: CTA  NEURO: no tremors, DTR normal  PSYCH: Awake and coherent      Results Review:     I reviewed the patient's new clinical results.    Lab Results   Component Value Date    HGBA1C 7.3 (A) 07/01/2024    HGBA1C 8.2 (A) 03/12/2024    HGBA1C 7.3 (A) 12/01/2023      Lab Results   Component Value Date    GLUCOSE 212 (H) 09/01/2023    BUN 15 09/01/2023    CREATININE 1.02 (H) 09/01/2023    EGFRIFNONA 66 07/30/2021    EGFRIFAFRI 76 07/30/2021    BCR 14.7 09/01/2023    K 4.4 09/01/2023    CO2 28.0 09/01/2023    CALCIUM 10.1 09/01/2023    PROTENTOTREF 7.1 08/21/2023    ALBUMIN 4.3 09/01/2023    LABIL2 2.0 08/21/2023     AST 28 09/01/2023    ALT 30 09/01/2023    CHOL 166 09/01/2023    TRIG 177 (H) 09/01/2023    LDL 99 09/01/2023    HDL 36 (L) 09/01/2023     Lab Results   Component Value Date    TSH 0.663 09/01/2023    FREET4 1.09 09/01/2023         Medication Review: Reviewed.       Current Outpatient Medications:     Accu-Chek FastClix Lancets misc, 1 each by Other route Daily., Disp: 100 each, Rfl: 3    albuterol (PROVENTIL) (2.5 MG/3ML) 0.083% nebulizer solution, Take 2.5 mg by nebulization Every 4 (Four) Hours As Needed for Wheezing., Disp: 180 mL, Rfl: 3    albuterol sulfate  (90 Base) MCG/ACT inhaler, Inhale 2 puffs 4 (Four) Times a Day., Disp: 6.7 g, Rfl: 12    aspirin 81 MG chewable tablet, Chew 1 tablet Daily., Disp: , Rfl:     Blood Glucose Monitoring Suppl (Accu-Chek Guide) w/Device kit, 1 each Daily., Disp: 1 kit, Rfl: 0    bumetanide (BUMEX) 1 MG tablet, Take 1 tablet by mouth Daily., Disp: 30 tablet, Rfl: 12    citalopram (CeleXA) 40 MG tablet, TAKE 1 TABLET BY MOUTH DAILY, Disp: 90 tablet, Rfl: 3    dexlansoprazole (Dexilant) 60 MG capsule, Take 1 capsule by mouth Daily., Disp: 90 capsule, Rfl: 3    dicyclomine (BENTYL) 10 MG capsule, TAKE 1 CAPSULE BY MOUTH EVERY 6 HOURS AS NEEDED FOR PAIN, Disp: , Rfl:     empagliflozin (Jardiance) 10 MG tablet tablet, Take 1 tablet by mouth Daily., Disp: 30 tablet, Rfl: 12    famotidine (PEPCID) 40 MG tablet, Take 1 tablet by mouth Every Night., Disp: , Rfl:     fenofibrate (TRICOR) 145 MG tablet, Take 1 tablet by mouth Daily., Disp: , Rfl:     fluticasone (FLONASE) 50 MCG/ACT nasal spray, SHAKE LIQUID AND USE 2 SPRAYS IN EACH NOSTRIL EVERY DAY, Disp: 48 g, Rfl: 3    folic acid (FOLVITE) 1 MG tablet, Take 1 tablet by mouth Daily., Disp: , Rfl:     gabapentin (NEURONTIN) 300 MG capsule, Take 1 capsule by mouth Every Night., Disp: 30 capsule, Rfl: 12    glucose blood (Accu-Chek Guide) test strip, 1 each by Other route Daily. Use as instructed, Disp: 100 each, Rfl: 3     HYDROcodone-acetaminophen (Norco)  MG per tablet, Take 1 tablet by mouth Every 6 (Six) Hours As Needed for Moderate Pain., Disp: 120 tablet, Rfl: 0    HYDROcodone-acetaminophen (Norco)  MG per tablet, Take 1 tablet by mouth Every 6 (Six) Hours As Needed for Moderate Pain., Disp: 120 tablet, Rfl: 0    HYDROcodone-acetaminophen (Norco)  MG per tablet, Take 1 tablet by mouth Every 6 (Six) Hours As Needed for Moderate Pain., Disp: 120 tablet, Rfl: 0    ibuprofen (ADVIL,MOTRIN) 800 MG tablet, Take 1 tablet by mouth., Disp: , Rfl:     Lancets Misc. (ACCU-CHEK FASTCLIX LANCET) kit, Dx: E11.9, DM type 2, controlled, Disp: , Rfl:     levothyroxine (SYNTHROID, LEVOTHROID) 50 MCG tablet, Take 1 tablet by mouth Daily., Disp: , Rfl:     Linzess 290 MCG capsule capsule, Take 1 capsule by mouth Every Other Day., Disp: , Rfl:     metFORMIN ER (GLUCOPHAGE-XR) 500 MG 24 hr tablet, Take 2 tablets by mouth Daily With Breakfast., Disp: 180 tablet, Rfl: 3    metoclopramide (REGLAN) 10 MG tablet, Take 1 tablet by mouth 4 (Four) Times a Day As Needed., Disp: , Rfl:     montelukast (SINGULAIR) 10 MG tablet, TAKE 1 TABLET BY MOUTH EVERY NIGHT, Disp: 90 tablet, Rfl: 3    Omega-3 Fatty Acids (fish oil) 1000 MG capsule capsule, 2 capsules Daily With Breakfast., Disp: , Rfl:     ondansetron ODT (ZOFRAN-ODT) 4 MG disintegrating tablet, Place 1 tablet on the tongue Every 8 (Eight) Hours As Needed., Disp: , Rfl:     potassium chloride (K-DUR,KLOR-CON) 20 MEQ CR tablet, Take 1 tablet by mouth Daily As Needed. Only if taking 2 bumetanide, Disp: , Rfl:     rosuvastatin (CRESTOR) 20 MG tablet, TAKE 1 TABLET BY MOUTH DAILY, Disp: 90 tablet, Rfl: 3    vitamin B-12 (CYANOCOBALAMIN) 1000 MCG tablet, Take 1 tablet by mouth Daily., Disp: , Rfl:     vitamin D3 125 MCG (5000 UT) capsule capsule, Take 1 capsule by mouth Daily., Disp: , Rfl:     Wixela Inhub 250-50 MCG/ACT DISKUS, Inhale 1 puff 2 (Two) Times a Day., Disp: , Rfl:            Assessment and plan:  Hypothyroidism: Currently on levothyroxine at 50 mcg p.o. daily, no recent labs will check TSH and free T4.    Thyroid nodule: No recent ultrasound, will check ultrasound of thyroid.    Type 2 diabetes with hyperglycemia: She is being managed by PCP, currently on metformin 500 mg 2 tablets daily along with Jardiance however she is having issues with yeast infection with Jardiance.  She does have gastroparesis and is questioning whether Ozempic will be a good idea.  I advised her that she can try maybe the smallest possible dose of Ozempic at 0.25 mg once a week and increase metformin to 500 mg 2 tablets twice a day to see if that would work and does not exacerbate gastroparesis.  She will discuss this with her PCP.          Megan Menard MD FACE.

## 2024-09-25 DIAGNOSIS — J41.0 SIMPLE CHRONIC BRONCHITIS: ICD-10-CM

## 2024-09-25 RX ORDER — ALBUTEROL SULFATE 90 UG/1
2 INHALANT RESPIRATORY (INHALATION) EVERY 4 HOURS PRN
Qty: 18 G | Refills: 5 | Status: SHIPPED | OUTPATIENT
Start: 2024-09-25

## 2024-09-26 ENCOUNTER — OFFICE VISIT (OUTPATIENT)
Dept: FAMILY MEDICINE CLINIC | Facility: CLINIC | Age: 54
End: 2024-09-26
Payer: MEDICARE

## 2024-09-26 ENCOUNTER — HOSPITAL ENCOUNTER (OUTPATIENT)
Dept: GENERAL RADIOLOGY | Facility: HOSPITAL | Age: 54
Discharge: HOME | End: 2024-09-26
Payer: MEDICARE

## 2024-09-26 VITALS
DIASTOLIC BLOOD PRESSURE: 82 MMHG | BODY MASS INDEX: 45.07 KG/M2 | HEIGHT: 63 IN | WEIGHT: 254.4 LBS | HEART RATE: 85 BPM | TEMPERATURE: 97.6 F | RESPIRATION RATE: 22 BRPM | SYSTOLIC BLOOD PRESSURE: 124 MMHG | OXYGEN SATURATION: 95 %

## 2024-09-26 DIAGNOSIS — R05.1 ACUTE COUGH: Primary | ICD-10-CM

## 2024-09-26 DIAGNOSIS — R05.8 PRODUCTIVE COUGH: ICD-10-CM

## 2024-09-26 DIAGNOSIS — R06.2 WHEEZING: ICD-10-CM

## 2024-09-26 PROCEDURE — 1125F AMNT PAIN NOTED PAIN PRSNT: CPT

## 2024-09-26 PROCEDURE — 71046 X-RAY EXAM CHEST 2 VIEWS: CPT

## 2024-09-26 PROCEDURE — 3051F HG A1C>EQUAL 7.0%<8.0%: CPT

## 2024-09-26 PROCEDURE — 99214 OFFICE O/P EST MOD 30 MIN: CPT

## 2024-09-26 PROCEDURE — 87428 SARSCOV & INF VIR A&B AG IA: CPT

## 2024-09-26 RX ORDER — DOXYCYCLINE 100 MG/1
100 CAPSULE ORAL 2 TIMES DAILY
Qty: 20 CAPSULE | Refills: 0 | Status: SHIPPED | OUTPATIENT
Start: 2024-09-26

## 2024-09-26 RX ORDER — PREDNISONE 20 MG/1
20 TABLET ORAL 2 TIMES DAILY
Qty: 10 TABLET | Refills: 0 | Status: SHIPPED | OUTPATIENT
Start: 2024-09-26 | End: 2024-10-01

## 2024-09-26 RX ORDER — BROMPHENIRAMINE MALEATE, PSEUDOEPHEDRINE HYDROCHLORIDE, AND DEXTROMETHORPHAN HYDROBROMIDE 2; 30; 10 MG/5ML; MG/5ML; MG/5ML
10 SYRUP ORAL 4 TIMES DAILY PRN
Qty: 300 ML | Refills: 0 | Status: SHIPPED | OUTPATIENT
Start: 2024-09-26

## 2024-10-03 NOTE — PROGRESS NOTES
Subjective   Riddhi Cid is a 54 y.o. female. Presents to [facility] MEDICAL [facility]    Chief Complaint   Patient presents with    Diabetes    Hyperlipidemia    Hypothyroidism    URI       Diabetes  She presents for her follow-up diabetic visit. She has type 2 diabetes mellitus. Hypoglycemia symptoms include headaches. Pertinent negatives for hypoglycemia include no dizziness. Associated symptoms include fatigue. Pertinent negatives for diabetes include no foot ulcerations and no weakness. There are no hypoglycemic complications. There are no diabetic complications. Risk factors for coronary artery disease include diabetes mellitus, dyslipidemia, hypertension, obesity and stress. Current diabetic treatment includes oral agent (monotherapy). She is compliant with treatment most of the time. She is following a generally healthy diet. Meal planning includes avoidance of concentrated sweets. She participates in exercise intermittently. Her overall blood glucose range is 110-130 mg/dl. (Has recently been on steroid so running average greater than 200) An ACE inhibitor/angiotensin II receptor blocker is not being taken. She sees a podiatrist.Eye exam is not current.   Hyperlipidemia  This is a chronic problem. The current episode started more than 1 year ago. The problem is controlled. Exacerbating diseases include hypothyroidism and obesity. There are no known factors aggravating her hyperlipidemia. Associated symptoms include myalgias and shortness of breath. Pertinent negatives include no leg pain. Current antihyperlipidemic treatment includes statins. The current treatment provides moderate improvement of lipids. There are no compliance problems.  Risk factors for coronary artery disease include dyslipidemia, diabetes mellitus, hypertension, obesity, stress and post-menopausal.   Hypothyroidism  This is a chronic problem. The current episode started more than 1 year ago. Associated symptoms include congestion,  coughing, fatigue, headaches, myalgias and nausea. Pertinent negatives include no vomiting or weakness. Treatments tried: levothyroxine 50 MCG. The treatment provided moderate relief.   URI   This is a recurrent problem. The current episode started 1 to 4 weeks ago. The problem has been unchanged. There has been no fever. Associated symptoms include congestion, coughing, headaches and nausea. Pertinent negatives include no rhinorrhea or vomiting. Treatments tried: antibiotic/ steroid. The treatment provided mild relief.        I personally reviewed and updated the patient's allergies, medications, problem list, and past medical, surgical, social, and family history. I have reviewed and confirmed the accuracy of the History of Present Illness and Review of Symptoms as documented by the MA/LPN/RN. June Harrison MD    Allergies:  Allergies   Allergen Reactions    Sulfa Antibiotics Rash    Meloxicam GI Intolerance       Social History:  Social History     Socioeconomic History    Marital status:     Number of children: 3    Years of education: GED   Tobacco Use    Smoking status: Every Day     Current packs/day: 1.00     Average packs/day: 1 pack/day for 53.0 years (53.0 ttl pk-yrs)     Types: Cigarettes     Passive exposure: Current    Smokeless tobacco: Never   Vaping Use    Vaping status: Never Used   Substance and Sexual Activity    Alcohol use: No    Drug use: No    Sexual activity: Defer       Family History:  Family History   Problem Relation Age of Onset    Stroke Mother     Hypertension Mother     Hyperlipidemia Mother     Hypothyroidism Mother     Cancer Father     Heart attack Father     Heart disease Father     Prostate cancer Father     Diabetes Father     Hypertension Father     Hyperlipidemia Father     Coronary artery disease Father     Breast cancer Paternal Grandmother     Hypothyroidism Grandchild     Ovarian cancer Neg Hx        Past Medical History :  Patient Active Problem List    Diagnosis    Class 3 severe obesity due to excess calories with serious comorbidity and body mass index (BMI) of 40.0 to 44.9 in adult    Degeneration of intervertebral disc of cervical region    Degeneration of intervertebral disc of lumbar region    Acquired hypothyroidism    Mixed hyperlipidemia    Major depressive disorder, recurrent, moderate    Obstructive sleep apnea    Vitamin B12 deficiency    Vitamin D deficiency    COPD (chronic obstructive pulmonary disease)    Thyroid nodule    Chronic midline low back pain with right-sided sciatica    Cervicalgia    Cervical stenosis of spine    Arthritis    Moderate persistent asthma without complication    Gastroparalysis    History of chicken pox    IBS (irritable bowel syndrome)    GERD (gastroesophageal reflux disease)    Dependent edema    Optic disc hemorrhage    Type 2 diabetes mellitus with hyperglycemia, without long-term current use of insulin    Tension headache    Allergic rhinitis    Disorder of peripheral nervous system    Fibromyositis    Tobacco dependence syndrome    Cervical smear, as part of routine gynecological examination    Disease due to severe acute respiratory syndrome coronavirus 2 (SARS-CoV-2)    Lung cancer screening declined by patient    Acute cough    Hospital discharge follow-up    Dizziness    Lesion of skin of scalp    Pulmonary nodules    Right calf pain    Thrush    Screening mammogram for breast cancer    Candida vaginitis       Medication List:    Current Outpatient Medications:     Accu-Chek FastClix Lancets misc, 1 each by Other route Daily., Disp: 100 each, Rfl: 3    albuterol (PROVENTIL) (2.5 MG/3ML) 0.083% nebulizer solution, Take 2.5 mg by nebulization Every 4 (Four) Hours As Needed for Wheezing., Disp: 180 mL, Rfl: 3    albuterol sulfate  (90 Base) MCG/ACT inhaler, Inhale 2 puffs Every 4 (Four) Hours As Needed for Wheezing., Disp: 6.7 g, Rfl: 5    aspirin 81 MG chewable tablet, Chew 1 tablet Daily., Disp: , Rfl:      Blood Glucose Monitoring Suppl (Accu-Chek Guide) w/Device kit, 1 each Daily., Disp: 1 kit, Rfl: 0    brompheniramine-pseudoephedrine-DM 30-2-10 MG/5ML syrup, Take 10 mL by mouth 4 (Four) Times a Day As Needed for Congestion or Cough., Disp: 300 mL, Rfl: 0    bumetanide (BUMEX) 1 MG tablet, Take 1 tablet by mouth Daily., Disp: 30 tablet, Rfl: 12    citalopram (CeleXA) 40 MG tablet, TAKE 1 TABLET BY MOUTH DAILY, Disp: 90 tablet, Rfl: 3    dexlansoprazole (Dexilant) 60 MG capsule, Take 1 capsule by mouth Daily., Disp: 90 capsule, Rfl: 3    dicyclomine (BENTYL) 10 MG capsule, TAKE 1 CAPSULE BY MOUTH EVERY 6 HOURS AS NEEDED FOR PAIN, Disp: , Rfl:     doxycycline (VIBRAMYCIN) 100 MG capsule, Take 1 capsule by mouth 2 (Two) Times a Day., Disp: 20 capsule, Rfl: 0    famotidine (PEPCID) 40 MG tablet, Take 1 tablet by mouth Every Night., Disp: , Rfl:     fluticasone (FLONASE) 50 MCG/ACT nasal spray, SHAKE LIQUID AND USE 2 SPRAYS IN EACH NOSTRIL EVERY DAY, Disp: 48 g, Rfl: 3    folic acid (FOLVITE) 1 MG tablet, Take 1 tablet by mouth Daily., Disp: , Rfl:     gabapentin (NEURONTIN) 300 MG capsule, Take 1 capsule by mouth Every Night., Disp: 30 capsule, Rfl: 12    glucose blood (Accu-Chek Guide) test strip, 1 each by Other route Daily. Use as instructed, Disp: 100 each, Rfl: 3    HYDROcodone-acetaminophen (Norco)  MG per tablet, Take 1 tablet by mouth Every 6 (Six) Hours As Needed for Moderate Pain., Disp: 120 tablet, Rfl: 0    HYDROcodone-acetaminophen (Norco)  MG per tablet, Take 1 tablet by mouth Every 6 (Six) Hours As Needed for Moderate Pain., Disp: 120 tablet, Rfl: 0    HYDROcodone-acetaminophen (Norco)  MG per tablet, Take 1 tablet by mouth Every 6 (Six) Hours As Needed for Moderate Pain., Disp: 120 tablet, Rfl: 0    ibuprofen (ADVIL,MOTRIN) 800 MG tablet, Take 1 tablet by mouth., Disp: , Rfl:     Lancets Misc. (ACCU-CHEK FASTCLIX LANCET) kit, Dx: E11.9, DM type 2, controlled, Disp: , Rfl:      levothyroxine (SYNTHROID, LEVOTHROID) 50 MCG tablet, Take 1 tablet by mouth Daily., Disp: , Rfl:     Linzess 290 MCG capsule capsule, Take 1 capsule by mouth Every Other Day., Disp: , Rfl:     metFORMIN ER (GLUCOPHAGE-XR) 500 MG 24 hr tablet, Take 2 tablets by mouth Daily With Breakfast., Disp: 180 tablet, Rfl: 3    metoclopramide (REGLAN) 10 MG tablet, Take 1 tablet by mouth 4 (Four) Times a Day As Needed., Disp: , Rfl:     montelukast (SINGULAIR) 10 MG tablet, TAKE 1 TABLET BY MOUTH EVERY NIGHT, Disp: 90 tablet, Rfl: 3    Omega-3 Fatty Acids (fish oil) 1000 MG capsule capsule, 2 capsules Daily With Breakfast., Disp: , Rfl:     ondansetron ODT (ZOFRAN-ODT) 4 MG disintegrating tablet, Place 1 tablet on the tongue Every 8 (Eight) Hours As Needed., Disp: , Rfl:     potassium chloride (K-DUR,KLOR-CON) 20 MEQ CR tablet, Take 1 tablet by mouth Daily As Needed. Only if taking 2 bumetanide, Disp: , Rfl:     rosuvastatin (CRESTOR) 20 MG tablet, TAKE 1 TABLET BY MOUTH DAILY, Disp: 90 tablet, Rfl: 3    vitamin B-12 (CYANOCOBALAMIN) 1000 MCG tablet, Take 1 tablet by mouth Daily., Disp: , Rfl:     vitamin D3 125 MCG (5000 UT) capsule capsule, Take 1 capsule by mouth Daily., Disp: , Rfl:     Wixela Inhub 250-50 MCG/ACT DISKUS, Inhale 1 puff 2 (Two) Times a Day., Disp: , Rfl:     Budeson-Glycopyrrol-Formoterol (Breztri Aerosphere) 160-9-4.8 MCG/ACT aerosol inhaler, Inhale 2 puffs 2 (Two) Times a Day., Disp: 5.9 g, Rfl: 0    fenofibrate (TRICOR) 145 MG tablet, Take 1 tablet by mouth Daily., Disp: 90 tablet, Rfl: 3    nystatin (MYCOSTATIN) 100,000 unit/mL suspension, Swish and swallow 5 mL 4 (Four) Times a Day. 1/2 dose each side of mouth retain in mouth as long as possible before swallowing, Disp: 280 mL, Rfl: 0    Semaglutide,0.25 or 0.5MG/DOS, (Ozempic, 0.25 or 0.5 MG/DOSE,) 2 MG/1.5ML solution pen-injector, Inject 0.25 mg under the skin into the appropriate area as directed 1 (One) Time Per Week., Disp: 1.5 mL, Rfl: 3    Past  "Surgical History:  Past Surgical History:   Procedure Laterality Date    BREAST BIOPSY Right     CARPAL TUNNEL RELEASE Right     CERVICAL DISCECTOMY ANTERIOR      CHOLECYSTECTOMY      ESSURE TUBAL LIGATION           Physical Exam:      Vital Signs:    Vitals:    10/07/24 1007   BP: 112/78   Pulse: 102   Resp: 18   Temp: 96.8 °F (36 °C)   SpO2: 95%        /78 (BP Location: Right arm, Patient Position: Sitting, Cuff Size: Large Adult)   Pulse 102   Temp 96.8 °F (36 °C)   Resp 18   Ht 160 cm (63\")   Wt 111 kg (244 lb)   SpO2 95%   BMI 43.22 kg/m²     Wt Readings from Last 3 Encounters:   10/07/24 111 kg (244 lb)   09/26/24 115 kg (254 lb 6.4 oz)   08/01/24 112 kg (246 lb)       Result Review :   The following data was reviewed by: June Harrison MD on 10/07/2024:  A1C Last 3 Results          3/12/2024    09:27 7/1/2024    10:11 10/7/2024    10:27   HGBA1C Last 3 Results   Hemoglobin A1C 8.2  7.3  7.1                 Physical Exam  Vitals reviewed.   Constitutional:       Appearance: Normal appearance. She is well-developed.   HENT:      Head: Normocephalic and atraumatic.      Right Ear: External ear normal. No decreased hearing noted. No tenderness. No middle ear effusion. Tympanic membrane is not injected, erythematous, retracted or bulging.      Left Ear: External ear normal. No decreased hearing noted. No tenderness.  No middle ear effusion. Tympanic membrane is not injected, erythematous, retracted or bulging.      Nose: Nose normal. No rhinorrhea.      Mouth/Throat:      Pharynx: No oropharyngeal exudate or posterior oropharyngeal erythema.   Eyes:      General:         Right eye: No discharge.         Left eye: No discharge.   Cardiovascular:      Rate and Rhythm: Normal rate and regular rhythm.      Heart sounds: Normal heart sounds. No murmur heard.     No friction rub. No gallop.   Pulmonary:      Effort: Pulmonary effort is normal. No respiratory distress.      Breath sounds: Normal breath " sounds. No wheezing or rales.   Lymphadenopathy:      Cervical: No cervical adenopathy.   Skin:     General: Skin is warm and dry.      Findings: No rash.   Neurological:      Mental Status: She is alert and oriented to person, place, and time.      Coordination: Coordination normal.      Gait: Gait normal.   Psychiatric:         Behavior: Behavior is cooperative.         Assessment and Plan:  Problems Addressed this Visit          Cardiac and Vasculature    Mixed hyperlipidemia       She is on crestor and fenofibrate.   Continue current treatment  Check labs  (She did not get them yet)            Endocrine and Metabolic    Class 3 severe obesity due to excess calories with serious comorbidity and body mass index (BMI) of 40.0 to 44.9 in adult     Patient's (Body mass index is 43.22 kg/m².) indicates that they are morbidly/severely obese (BMI > 40 or > 35 with obesity - related health condition) with health conditions that include hypertension and diabetes mellitus . Weight is improving with lifestyle modifications. BMI  is above average; BMI management plan is completed. We discussed low calorie, low carb based diet program, portion control, and increasing exercise.          Acquired hypothyroidism    Type 2 diabetes mellitus with hyperglycemia, without long-term current use of insulin - Primary    Relevant Medications    Semaglutide,0.25 or 0.5MG/DOS, (Ozempic, 0.25 or 0.5 MG/DOSE,) 2 MG/1.5ML solution pen-injector    Other Relevant Orders    POC Glycosylated Hemoglobin (Hb A1C) (Completed)       Genitourinary and Reproductive     Candida vaginitis     Sent diflucan  If no improvement, she is to see me if it returns  Stop jardiance         Relevant Medications    nystatin (MYCOSTATIN) 100,000 unit/mL suspension       Pulmonary and Pneumonias    COPD (chronic obstructive pulmonary disease)    Relevant Medications    albuterol sulfate  (90 Base) MCG/ACT inhaler    Budeson-Glycopyrrol-Formoterol (Breztri  Aerosphere) 160-9-4.8 MCG/ACT aerosol inhaler       Other    Acute cough     Diagnoses         Codes Comments    Type 2 diabetes mellitus with hyperglycemia, without long-term current use of insulin    -  Primary ICD-10-CM: E11.65  ICD-9-CM: 250.00, 790.29     Mixed hyperlipidemia     ICD-10-CM: E78.2  ICD-9-CM: 272.2     Acquired hypothyroidism     ICD-10-CM: E03.9  ICD-9-CM: 244.9     Class 3 severe obesity due to excess calories with serious comorbidity and body mass index (BMI) of 40.0 to 44.9 in adult     ICD-10-CM: E66.813, E66.01, Z68.41  ICD-9-CM: 278.01, V85.41     Acute cough     ICD-10-CM: R05.1  ICD-9-CM: 786.2     Simple chronic bronchitis     ICD-10-CM: J41.0  ICD-9-CM: 491.0     Candida vaginitis     ICD-10-CM: B37.31  ICD-9-CM: 112.1                          An After Visit Summary and PPPS were given to the patient.       This document is intended for medical expert use only. Reading of this document by patients and/or patient's family without participating medical staff guidance may result in misinterpretation and unintended morbidity. Any interpretation of such data is the responsibility of the patient and/or family member responsible for the patient in concert with their primary or specialist providers, not to be left for sources of online searches such as [employer], [employer] or similar queries. Relying on these approaches to knowledge may result in misinterpretation, misguided goals of care and even death should patients or family members try recommendations outside of the realm of professional medical care.

## 2024-10-07 ENCOUNTER — OFFICE VISIT (OUTPATIENT)
Dept: FAMILY MEDICINE CLINIC | Facility: CLINIC | Age: 54
End: 2024-10-07
Payer: MEDICARE

## 2024-10-07 VITALS
HEART RATE: 102 BPM | OXYGEN SATURATION: 95 % | WEIGHT: 244 LBS | RESPIRATION RATE: 18 BRPM | SYSTOLIC BLOOD PRESSURE: 112 MMHG | TEMPERATURE: 96.8 F | HEIGHT: 63 IN | DIASTOLIC BLOOD PRESSURE: 78 MMHG | BODY MASS INDEX: 43.23 KG/M2

## 2024-10-07 DIAGNOSIS — E78.2 MIXED HYPERLIPIDEMIA: ICD-10-CM

## 2024-10-07 DIAGNOSIS — J41.0 SIMPLE CHRONIC BRONCHITIS: ICD-10-CM

## 2024-10-07 DIAGNOSIS — B37.31 CANDIDA VAGINITIS: ICD-10-CM

## 2024-10-07 DIAGNOSIS — E66.813 CLASS 3 SEVERE OBESITY DUE TO EXCESS CALORIES WITH SERIOUS COMORBIDITY AND BODY MASS INDEX (BMI) OF 40.0 TO 44.9 IN ADULT: ICD-10-CM

## 2024-10-07 DIAGNOSIS — E11.65 TYPE 2 DIABETES MELLITUS WITH HYPERGLYCEMIA, WITHOUT LONG-TERM CURRENT USE OF INSULIN: Primary | ICD-10-CM

## 2024-10-07 DIAGNOSIS — R05.1 ACUTE COUGH: ICD-10-CM

## 2024-10-07 DIAGNOSIS — E66.01 CLASS 3 SEVERE OBESITY DUE TO EXCESS CALORIES WITH SERIOUS COMORBIDITY AND BODY MASS INDEX (BMI) OF 40.0 TO 44.9 IN ADULT: ICD-10-CM

## 2024-10-07 DIAGNOSIS — E03.9 ACQUIRED HYPOTHYROIDISM: ICD-10-CM

## 2024-10-07 LAB
EXPIRATION DATE: ABNORMAL
HBA1C MFR BLD: 7.1 % (ref 4.5–5.7)
Lab: ABNORMAL

## 2024-10-07 PROCEDURE — 3051F HG A1C>EQUAL 7.0%<8.0%: CPT | Performed by: FAMILY MEDICINE

## 2024-10-07 PROCEDURE — 1160F RVW MEDS BY RX/DR IN RCRD: CPT | Performed by: FAMILY MEDICINE

## 2024-10-07 PROCEDURE — 1159F MED LIST DOCD IN RCRD: CPT | Performed by: FAMILY MEDICINE

## 2024-10-07 PROCEDURE — 1125F AMNT PAIN NOTED PAIN PRSNT: CPT | Performed by: FAMILY MEDICINE

## 2024-10-07 PROCEDURE — G2211 COMPLEX E/M VISIT ADD ON: HCPCS | Performed by: FAMILY MEDICINE

## 2024-10-07 PROCEDURE — 99214 OFFICE O/P EST MOD 30 MIN: CPT | Performed by: FAMILY MEDICINE

## 2024-10-07 PROCEDURE — 83036 HEMOGLOBIN GLYCOSYLATED A1C: CPT | Performed by: FAMILY MEDICINE

## 2024-10-07 RX ORDER — BUDESONIDE, GLYCOPYRROLATE, AND FORMOTEROL FUMARATE 160; 9; 4.8 UG/1; UG/1; UG/1
2 AEROSOL, METERED RESPIRATORY (INHALATION) 2 TIMES DAILY
Qty: 5.9 G | Refills: 0 | Status: SHIPPED | OUTPATIENT
Start: 2024-10-07

## 2024-10-07 RX ORDER — ALBUTEROL SULFATE 90 UG/1
2 INHALANT RESPIRATORY (INHALATION) EVERY 4 HOURS PRN
Qty: 6.7 G | Refills: 5 | Status: SHIPPED | OUTPATIENT
Start: 2024-10-07

## 2024-10-07 RX ORDER — NYSTATIN 100000 [USP'U]/ML
500000 SUSPENSION ORAL 4 TIMES DAILY
Qty: 280 ML | Refills: 0 | Status: SHIPPED | OUTPATIENT
Start: 2024-10-07

## 2024-10-07 RX ORDER — SEMAGLUTIDE 1.34 MG/ML
0.25 INJECTION, SOLUTION SUBCUTANEOUS WEEKLY
Qty: 1.5 ML | Refills: 3 | Status: SHIPPED | OUTPATIENT
Start: 2024-10-07

## 2024-10-07 RX ORDER — FLUCONAZOLE 150 MG/1
150 TABLET ORAL ONCE
Qty: 1 TABLET | Refills: 0 | Status: SHIPPED | OUTPATIENT
Start: 2024-10-07 | End: 2024-10-07

## 2024-10-08 ENCOUNTER — TELEPHONE (OUTPATIENT)
Dept: FAMILY MEDICINE CLINIC | Facility: CLINIC | Age: 54
End: 2024-10-08
Payer: MEDICARE

## 2024-10-08 NOTE — TELEPHONE ENCOUNTER
Caller: Riddhi Cid    Relationship: Self    Best call back number: [phone]*    What is the best time to reach you: ANYTIME    Who are you requesting to speak with (clinical staff, provider,  specific staff member): CLINICAL    What was the call regarding: PATIENT CALLING STATING THAT THE Semaglutide,0.25 or 0.5MG/DOS, (Ozempic, 0.25 or 0.5 MG/DOSE,) 2 MG/1.5ML solution pen-injector NEEDS A PRIOR AUTHORIZATION. THE PATIENT STATES THAT SHE WILL NEED TO CONTINUE TAKING THE JARDIANCE UNTIL THE PRIOR AUTHORIZATION HAS BEEN APPROVED TO GET OZEMPIC, DUE TO CONTROLLING HER SUGAR.      [id number] - [place], [place] [address] AT [facility] OF  &  - [phone] PH - [phone]     Is it okay if the provider responds through MyChart: NO

## 2024-10-08 NOTE — TELEPHONE ENCOUNTER
PA has been received and Dr. Harrison assistant has worked on this PA . We do not have a determination as of yet from the insurance company on this but once we do we will let her know

## 2024-10-11 ENCOUNTER — TELEPHONE (OUTPATIENT)
Dept: FAMILY MEDICINE CLINIC | Facility: CLINIC | Age: 54
End: 2024-10-11
Payer: MEDICARE

## 2024-10-11 NOTE — TELEPHONE ENCOUNTER
Caller: Riddhi Cid    Relationship: Self    Best call back number: [phone]      What was the call regarding: PATIENT CALLING STATES SHE TOOK LAST DOSE OF JARDIANCE LAST NIGHT AT 7PM     WANTED TO SEE WHEN SHE SHOULD TAKE THE FIRST DOSE OF OZEMPIC?    ALSO ASKED IF SHE STILL NEEDS TO TAKE THE METFORMIN DAILY    PLEASE GIVE CALL BACK

## 2024-10-13 LAB
ALBUMIN SERPL-MCNC: 4.3 G/DL (ref 3.8–4.9)
ALP SERPL-CCNC: 124 IU/L (ref 44–121)
ALT SERPL-CCNC: 39 IU/L (ref 0–32)
AST SERPL-CCNC: 38 IU/L (ref 0–40)
BASOPHILS # BLD AUTO: 0.1 X10E3/UL (ref 0–0.2)
BASOPHILS NFR BLD AUTO: 1 %
BILIRUB SERPL-MCNC: 0.4 MG/DL (ref 0–1.2)
BUN SERPL-MCNC: 8 MG/DL (ref 6–24)
BUN/CREAT SERPL: 8 (ref 9–23)
CALCIUM SERPL-MCNC: 10.1 MG/DL (ref 8.7–10.2)
CHLORIDE SERPL-SCNC: 103 MMOL/L (ref 96–106)
CHOLEST SERPL-MCNC: 145 MG/DL (ref 100–199)
CHOLEST/HDLC SERPL: 3.4 RATIO (ref 0–4.4)
CO2 SERPL-SCNC: 23 MMOL/L (ref 20–29)
CREAT SERPL-MCNC: 1.04 MG/DL (ref 0.57–1)
EGFRCR SERPLBLD CKD-EPI 2021: 63 ML/MIN/1.73
EOSINOPHIL # BLD AUTO: 0.3 X10E3/UL (ref 0–0.4)
EOSINOPHIL NFR BLD AUTO: 3 %
ERYTHROCYTE [DISTWIDTH] IN BLOOD BY AUTOMATED COUNT: 13.1 % (ref 11.7–15.4)
GLOBULIN SER CALC-MCNC: 2.4 G/DL (ref 1.5–4.5)
GLUCOSE SERPL-MCNC: 164 MG/DL (ref 70–99)
HCT VFR BLD AUTO: 45.8 % (ref 34–46.6)
HDLC SERPL-MCNC: 43 MG/DL
HGB BLD-MCNC: 14.6 G/DL (ref 11.1–15.9)
IMM GRANULOCYTES # BLD AUTO: 0 X10E3/UL (ref 0–0.1)
IMM GRANULOCYTES NFR BLD AUTO: 0 %
LDLC SERPL CALC-MCNC: 61 MG/DL (ref 0–99)
LYMPHOCYTES # BLD AUTO: 2.6 X10E3/UL (ref 0.7–3.1)
LYMPHOCYTES NFR BLD AUTO: 27 %
MCH RBC QN AUTO: 30.1 PG (ref 26.6–33)
MCHC RBC AUTO-ENTMCNC: 31.9 G/DL (ref 31.5–35.7)
MCV RBC AUTO: 94 FL (ref 79–97)
MONOCYTES # BLD AUTO: 0.5 X10E3/UL (ref 0.1–0.9)
MONOCYTES NFR BLD AUTO: 6 %
NEUTROPHILS # BLD AUTO: 5.9 X10E3/UL (ref 1.4–7)
NEUTROPHILS NFR BLD AUTO: 63 %
PLATELET # BLD AUTO: 194 X10E3/UL (ref 150–450)
POTASSIUM SERPL-SCNC: 5.1 MMOL/L (ref 3.5–5.2)
PROT SERPL-MCNC: 6.7 G/DL (ref 6–8.5)
RBC # BLD AUTO: 4.85 X10E6/UL (ref 3.77–5.28)
SODIUM SERPL-SCNC: 143 MMOL/L (ref 134–144)
TRIGL SERPL-MCNC: 261 MG/DL (ref 0–149)
TSH SERPL DL<=0.005 MIU/L-ACNC: 3.33 UIU/ML (ref 0.45–4.5)
VLDLC SERPL CALC-MCNC: 41 MG/DL (ref 5–40)
WBC # BLD AUTO: 9.4 X10E3/UL (ref 3.4–10.8)

## 2024-10-14 ENCOUNTER — TELEPHONE (OUTPATIENT)
Dept: FAMILY MEDICINE CLINIC | Facility: CLINIC | Age: 54
End: 2024-10-14
Payer: MEDICARE

## 2024-10-14 RX ORDER — FENOFIBRATE 145 MG/1
145 TABLET, COATED ORAL DAILY
Qty: 90 TABLET | Refills: 3 | Status: SHIPPED | OUTPATIENT
Start: 2024-10-14

## 2024-10-14 NOTE — PROGRESS NOTES
Subjective   Riddhi Cid is a 54 y.o. female. Presents to [facility]    Chief Complaint   Patient presents with    URI       URI   This is a recurrent problem. The current episode started 1 to 4 weeks ago. The problem has been gradually improving. There has been no fever. Associated symptoms include nausea. Pertinent negatives include no congestion, coughing, headaches, rhinorrhea, sinus pain, sneezing, vomiting or wheezing. She has tried inhaler use (antibiotic/ steroid, doxycycline , inhaler) for the symptoms. The treatment provided significant relief.        I personally reviewed and updated the patient's allergies, medications, problem list, and past medical, surgical, social, and family history. I have reviewed and confirmed the accuracy of the History of Present Illness and Review of Symptoms as documented by the MA/LPN/RN. June Harrison MD    Allergies:  Allergies   Allergen Reactions    Sulfa Antibiotics Rash    Meloxicam GI Intolerance       Social History:  Social History     Socioeconomic History    Marital status:     Number of children: 3    Years of education: GED   Tobacco Use    Smoking status: Every Day     Current packs/day: 1.00     Average packs/day: 1 pack/day for 53.0 years (53.0 ttl pk-yrs)     Types: Cigarettes     Passive exposure: Current    Smokeless tobacco: Never   Vaping Use    Vaping status: Never Used   Substance and Sexual Activity    Alcohol use: No    Drug use: No    Sexual activity: Defer       Family History:  Family History   Problem Relation Age of Onset    Stroke Mother     Hypertension Mother     Hyperlipidemia Mother     Hypothyroidism Mother     Cancer Father     Heart attack Father     Heart disease Father     Prostate cancer Father     Diabetes Father     Hypertension Father     Hyperlipidemia Father     Coronary artery disease Father     Breast cancer Paternal Grandmother     Hypothyroidism Grandchild     Ovarian cancer Neg Hx        Past  Medical History :  Patient Active Problem List   Diagnosis    Class 3 severe obesity due to excess calories with serious comorbidity and body mass index (BMI) of 40.0 to 44.9 in adult    Degeneration of intervertebral disc of cervical region    Degeneration of intervertebral disc of lumbar region with discogenic back pain and lower extremity pain    Acquired hypothyroidism    Mixed hyperlipidemia    Major depressive disorder, recurrent, moderate    Obstructive sleep apnea    Vitamin B12 deficiency    Vitamin D deficiency    COPD (chronic obstructive pulmonary disease)    Thyroid nodule    Chronic midline low back pain with right-sided sciatica    Cervicalgia    Cervical stenosis of spine    Arthritis    Moderate persistent asthma without complication    Gastroparalysis    History of chicken pox    IBS (irritable bowel syndrome)    GERD (gastroesophageal reflux disease)    Dependent edema    Optic disc hemorrhage    Type 2 diabetes mellitus with hyperglycemia, without long-term current use of insulin    Tension headache    Allergic rhinitis    Disorder of peripheral nervous system    Fibromyositis    Tobacco dependence syndrome    Cervical smear, as part of routine gynecological examination    Disease due to severe acute respiratory syndrome coronavirus 2 (SARS-CoV-2)    Lung cancer screening declined by patient    Acute cough    Hospital discharge follow-up    Dizziness    Lesion of skin of scalp    Pulmonary nodules    Right calf pain    Thrush    Screening mammogram for breast cancer    Candida vaginitis       Medication List:    Current Outpatient Medications:     Accu-Chek FastClix Lancets misc, 1 each by Other route Daily., Disp: 100 each, Rfl: 3    albuterol (PROVENTIL) (2.5 MG/3ML) 0.083% nebulizer solution, Take 2.5 mg by nebulization Every 4 (Four) Hours As Needed for Wheezing., Disp: 180 mL, Rfl: 3    albuterol sulfate  (90 Base) MCG/ACT inhaler, Inhale 2 puffs Every 4 (Four) Hours As Needed for  Wheezing., Disp: 6.7 g, Rfl: 5    aspirin 81 MG chewable tablet, Chew 1 tablet Daily., Disp: , Rfl:     Blood Glucose Monitoring Suppl (Accu-Chek Guide) w/Device kit, 1 each Daily., Disp: 1 kit, Rfl: 0    Budeson-Glycopyrrol-Formoterol (Breztri Aerosphere) 160-9-4.8 MCG/ACT aerosol inhaler, Inhale 2 puffs 2 (Two) Times a Day., Disp: 5.9 g, Rfl: 0    bumetanide (BUMEX) 1 MG tablet, Take 1 tablet by mouth Daily., Disp: 30 tablet, Rfl: 12    citalopram (CeleXA) 40 MG tablet, TAKE 1 TABLET BY MOUTH DAILY, Disp: 90 tablet, Rfl: 3    dexlansoprazole (Dexilant) 60 MG capsule, Take 1 capsule by mouth Daily., Disp: 90 capsule, Rfl: 3    dicyclomine (BENTYL) 10 MG capsule, TAKE 1 CAPSULE BY MOUTH EVERY 6 HOURS AS NEEDED FOR PAIN, Disp: , Rfl:     famotidine (PEPCID) 40 MG tablet, Take 1 tablet by mouth Every Night., Disp: , Rfl:     fenofibrate (TRICOR) 145 MG tablet, Take 1 tablet by mouth Daily., Disp: 90 tablet, Rfl: 3    fluticasone (FLONASE) 50 MCG/ACT nasal spray, SHAKE LIQUID AND USE 2 SPRAYS IN EACH NOSTRIL EVERY DAY, Disp: 48 g, Rfl: 3    folic acid (FOLVITE) 1 MG tablet, Take 1 tablet by mouth Daily., Disp: , Rfl:     gabapentin (NEURONTIN) 300 MG capsule, Take 1 capsule by mouth Every Night., Disp: 30 capsule, Rfl: 12    glucose blood (Accu-Chek Guide) test strip, 1 each by Other route Daily. Use as instructed, Disp: 100 each, Rfl: 3    ibuprofen (ADVIL,MOTRIN) 800 MG tablet, Take 1 tablet by mouth., Disp: , Rfl:     Lancets Misc. (ACCU-CHEK FASTCLIX LANCET) kit, Dx: E11.9, DM type 2, controlled, Disp: , Rfl:     levothyroxine (SYNTHROID, LEVOTHROID) 50 MCG tablet, Take 1 tablet by mouth Daily., Disp: , Rfl:     Linzess 290 MCG capsule capsule, Take 1 capsule by mouth Every Other Day., Disp: , Rfl:     metFORMIN ER (GLUCOPHAGE-XR) 500 MG 24 hr tablet, Take 2 tablets by mouth Daily With Breakfast., Disp: 180 tablet, Rfl: 3    metoclopramide (REGLAN) 10 MG tablet, Take 1 tablet by mouth 4 (Four) Times a Day As  Needed., Disp: , Rfl:     montelukast (SINGULAIR) 10 MG tablet, TAKE 1 TABLET BY MOUTH EVERY NIGHT, Disp: 90 tablet, Rfl: 3    nystatin (MYCOSTATIN) 100,000 unit/mL suspension, Swish and swallow 5 mL 4 (Four) Times a Day. 1/2 dose each side of mouth retain in mouth as long as possible before swallowing, Disp: 280 mL, Rfl: 0    Omega-3 Fatty Acids (fish oil) 1000 MG capsule capsule, 2 capsules Daily With Breakfast., Disp: , Rfl:     ondansetron ODT (ZOFRAN-ODT) 4 MG disintegrating tablet, Place 1 tablet on the tongue Every 8 (Eight) Hours As Needed for Vomiting., Disp: 20 tablet, Rfl: 0    potassium chloride (K-DUR,KLOR-CON) 20 MEQ CR tablet, Take 1 tablet by mouth Daily As Needed. Only if taking 2 bumetanide, Disp: , Rfl:     rosuvastatin (CRESTOR) 20 MG tablet, TAKE 1 TABLET BY MOUTH DAILY, Disp: 90 tablet, Rfl: 3    Semaglutide,0.25 or 0.5MG/DOS, (Ozempic, 0.25 or 0.5 MG/DOSE,) 2 MG/1.5ML solution pen-injector, Inject 0.25 mg under the skin into the appropriate area as directed 1 (One) Time Per Week., Disp: 1.5 mL, Rfl: 3    vitamin B-12 (CYANOCOBALAMIN) 1000 MCG tablet, Take 1 tablet by mouth Daily., Disp: , Rfl:     vitamin D3 125 MCG (5000 UT) capsule capsule, Take 1 capsule by mouth Daily., Disp: , Rfl:     Wixela Inhub 250-50 MCG/ACT DISKUS, Inhale 1 puff 2 (Two) Times a Day., Disp: , Rfl:     HYDROcodone-acetaminophen (Norco)  MG per tablet, Take 1 tablet by mouth Every 6 (Six) Hours As Needed for Moderate Pain., Disp: 120 tablet, Rfl: 0    HYDROcodone-acetaminophen (Norco)  MG per tablet, Take 1 tablet by mouth Every 6 (Six) Hours As Needed for Moderate Pain., Disp: 120 tablet, Rfl: 0    HYDROcodone-acetaminophen (Norco)  MG per tablet, Take 1 tablet by mouth Every 6 (Six) Hours As Needed for Moderate Pain., Disp: 120 tablet, Rfl: 0    Past Surgical History:  Past Surgical History:   Procedure Laterality Date    BREAST BIOPSY Right     CARPAL TUNNEL RELEASE Right     CERVICAL DISCECTOMY  "ANTERIOR      CHOLECYSTECTOMY      ESSURE TUBAL LIGATION           Physical Exam:      Vital Signs:    Vitals:    10/17/24 0806   BP: 126/84   Pulse: 90   Resp: 18   Temp: 97.6 °F (36.4 °C)   SpO2: 95%        /84 (BP Location: Right arm, Patient Position: Sitting, Cuff Size: Adult)   Pulse 90   Temp 97.6 °F (36.4 °C) (Temporal)   Resp 18   Ht 160 cm (63\")   Wt 111 kg (244 lb 6.4 oz)   SpO2 95%   BMI 43.29 kg/m²     Wt Readings from Last 3 Encounters:   10/24/24 111 kg (245 lb)   10/17/24 111 kg (244 lb 6.4 oz)   10/07/24 111 kg (244 lb)       Result Review :   The following data was reviewed by: June Harrison MD on 10/17/2024:  CMP          10/12/2024    09:46   CMP   Glucose 164    BUN 8    Creatinine 1.04    Sodium 143    Potassium 5.1    Chloride 103    Calcium 10.1    Total Protein 6.7    Albumin 4.3    Globulin 2.4    Total Bilirubin 0.4    Alkaline Phosphatase 124    AST (SGOT) 38    ALT (SGPT) 39    BUN/Creatinine Ratio 8      Lipid Panel          10/12/2024    09:46   Lipid Panel   Total Cholesterol 145    Triglycerides 261    HDL Cholesterol 43    VLDL Cholesterol 41    LDL Cholesterol  61               Physical Exam  Vitals reviewed.   Constitutional:       Appearance: Normal appearance. She is well-developed.   HENT:      Head: Normocephalic and atraumatic.   Eyes:      General:         Right eye: No discharge.         Left eye: No discharge.   Cardiovascular:      Rate and Rhythm: Normal rate and regular rhythm.      Heart sounds: Normal heart sounds. No murmur heard.     No friction rub. No gallop.   Pulmonary:      Effort: Pulmonary effort is normal. No respiratory distress.      Breath sounds: Normal breath sounds. No wheezing or rales.   Skin:     General: Skin is warm and dry.      Findings: No rash.   Neurological:      Mental Status: She is alert and oriented to person, place, and time.      Coordination: Coordination normal.      Gait: Gait normal.   Psychiatric:         Behavior: " Behavior is cooperative.         Assessment and Plan:  Reviewed her labs with her  Problems Addressed this Visit          Endocrine and Metabolic    Class 3 severe obesity due to excess calories with serious comorbidity and body mass index (BMI) of 40.0 to 44.9 in adult     Patient's (Body mass index is 43.29 kg/m².) indicates that they are morbidly/severely obese (BMI > 40 or > 35 with obesity - related health condition) with health conditions that include hypertension and diabetes mellitus . Weight is improving with lifestyle modifications. BMI  is above average; BMI management plan is completed. We discussed low calorie, low carb based diet program, portion control, and increasing exercise.             Gastrointestinal Abdominal     Gastroparalysis     Unchanged. She sees Dr Clark.  Refill zofran         Relevant Medications    ondansetron ODT (ZOFRAN-ODT) 4 MG disintegrating tablet       Pulmonary and Pneumonias    COPD (chronic obstructive pulmonary disease)     COPD is improving with treatment.     Plan:  Continue same medication/s without change.    Discussed medication dosage, use, side effects, and goals of treatment in detail.    Discussed monitoring symptoms and use of quick-relief medications and maintenance medication..     Patient Treatment Goals:   symptom prevention, minimizing limitation in activity, prevention of exacerbations and use of ER/inpatient care, maintenance of optimal pulmonary function, and minimization of adverse effects of treatment     Followup in 3 months.            Other    Acute cough - Primary     She is feeling better  Finish treatment          Other Visit Diagnoses       Need for influenza vaccination        Relevant Orders    Fluzone >6mos ([id number]) (Completed)          Diagnoses         Codes Comments    Acute cough    -  Primary ICD-10-CM: R05.1  ICD-9-CM: 786.2     Simple chronic bronchitis     ICD-10-CM: J41.0  ICD-9-CM: 491.0     Class 3 severe obesity due to excess  calories with serious comorbidity and body mass index (BMI) of 40.0 to 44.9 in adult     ICD-10-CM: E66.813, E66.01, Z68.41  ICD-9-CM: 278.01, V85.41     Gastroparalysis     ICD-10-CM: K31.84  ICD-9-CM: 536.3     Need for influenza vaccination     ICD-10-CM: Z23  ICD-9-CM: V04.81                          An After Visit Summary and PPPS were given to the patient.       This document is intended for medical expert use only. Reading of this document by patients and/or patient's family without participating medical staff guidance may result in misinterpretation and unintended morbidity. Any interpretation of such data is the responsibility of the patient and/or family member responsible for the patient in concert with their primary or specialist providers, not to be left for sources of online searches such as [employer], hoohbe or similar queries. Relying on these approaches to knowledge may result in misinterpretation, misguided goals of care and even death should patients or family members try recommendations outside of the realm of professional medical care.

## 2024-10-14 NOTE — TELEPHONE ENCOUNTER
Patient called and said that she is out of her fenofibrate.  Please send to [facility] in [place].  Thanks Kristine

## 2024-10-15 NOTE — ASSESSMENT & PLAN NOTE
She is on crestor and fenofibrate.   Continue current treatment  Check labs  (She did not get them yet)

## 2024-10-15 NOTE — ASSESSMENT & PLAN NOTE
Patient's (Body mass index is 43.22 kg/m².) indicates that they are morbidly/severely obese (BMI > 40 or > 35 with obesity - related health condition) with health conditions that include hypertension and diabetes mellitus . Weight is improving with lifestyle modifications. BMI  is above average; BMI management plan is completed. We discussed low calorie, low carb based diet program, portion control, and increasing exercise.

## 2024-10-17 ENCOUNTER — OFFICE VISIT (OUTPATIENT)
Dept: FAMILY MEDICINE CLINIC | Facility: CLINIC | Age: 54
End: 2024-10-17
Payer: MEDICARE

## 2024-10-17 VITALS
OXYGEN SATURATION: 95 % | SYSTOLIC BLOOD PRESSURE: 126 MMHG | DIASTOLIC BLOOD PRESSURE: 84 MMHG | WEIGHT: 244.4 LBS | HEIGHT: 63 IN | TEMPERATURE: 97.6 F | BODY MASS INDEX: 43.3 KG/M2 | HEART RATE: 90 BPM | RESPIRATION RATE: 18 BRPM

## 2024-10-17 DIAGNOSIS — K31.84 GASTROPARALYSIS: ICD-10-CM

## 2024-10-17 DIAGNOSIS — J41.0 SIMPLE CHRONIC BRONCHITIS: ICD-10-CM

## 2024-10-17 DIAGNOSIS — E66.813 CLASS 3 SEVERE OBESITY DUE TO EXCESS CALORIES WITH SERIOUS COMORBIDITY AND BODY MASS INDEX (BMI) OF 40.0 TO 44.9 IN ADULT: ICD-10-CM

## 2024-10-17 DIAGNOSIS — R05.1 ACUTE COUGH: Primary | ICD-10-CM

## 2024-10-17 DIAGNOSIS — Z23 NEED FOR INFLUENZA VACCINATION: ICD-10-CM

## 2024-10-17 DIAGNOSIS — E66.01 CLASS 3 SEVERE OBESITY DUE TO EXCESS CALORIES WITH SERIOUS COMORBIDITY AND BODY MASS INDEX (BMI) OF 40.0 TO 44.9 IN ADULT: ICD-10-CM

## 2024-10-17 PROCEDURE — 90656 IIV3 VACC NO PRSV 0.5 ML IM: CPT | Performed by: FAMILY MEDICINE

## 2024-10-17 PROCEDURE — G0008 ADMIN INFLUENZA VIRUS VAC: HCPCS | Performed by: FAMILY MEDICINE

## 2024-10-17 PROCEDURE — 99214 OFFICE O/P EST MOD 30 MIN: CPT | Performed by: FAMILY MEDICINE

## 2024-10-17 PROCEDURE — 1125F AMNT PAIN NOTED PAIN PRSNT: CPT | Performed by: FAMILY MEDICINE

## 2024-10-17 PROCEDURE — 3051F HG A1C>EQUAL 7.0%<8.0%: CPT | Performed by: FAMILY MEDICINE

## 2024-10-17 RX ORDER — ONDANSETRON 4 MG/1
4 TABLET, ORALLY DISINTEGRATING ORAL EVERY 8 HOURS PRN
Qty: 20 TABLET | Refills: 0 | Status: SHIPPED | OUTPATIENT
Start: 2024-10-17

## 2024-10-17 NOTE — ASSESSMENT & PLAN NOTE
Patient's (Body mass index is 43.29 kg/m².) indicates that they are morbidly/severely obese (BMI > 40 or > 35 with obesity - related health condition) with health conditions that include hypertension and diabetes mellitus . Weight is improving with lifestyle modifications. BMI  is above average; BMI management plan is completed. We discussed low calorie, low carb based diet program, portion control, and increasing exercise.

## 2024-10-21 ENCOUNTER — TELEPHONE (OUTPATIENT)
Dept: FAMILY MEDICINE CLINIC | Facility: CLINIC | Age: 54
End: 2024-10-21
Payer: MEDICARE

## 2024-10-21 NOTE — TELEPHONE ENCOUNTER
Caller: Riddhi Cid    Relationship: Self    Best call back number: [phone]     What medication are you requesting:   Semaglutide,0.25 or 0.5MG/DOS, (Ozempic, 0.25 or 0.5 MG/DOSE,) 2 MG/1.5ML solution pen-injector        What are your current symptoms: A1C      Have you had these symptoms before:    [x] Yes  [] No    Have you been treated for these symptoms before:   [x] Yes  [] No        Additional notes:   PATIENT WANTS TO KNOW IF CAT[facility]  HAS CALL ABOUT HER USING THE OZEMPIC.  SHE IS STILL TAKING JARDIANCE  UNTIL SHE HEARS ABOUT THE OZEMPIC. PLEASE CALL TO DISCUSS

## 2024-10-22 NOTE — TELEPHONE ENCOUNTER
Called pt she said that she only checks her sugars in the evenings it runs about 180s.  I had to [facility] for Dr. Clark's office to call me back to discuss

## 2024-10-24 ENCOUNTER — OFFICE VISIT (OUTPATIENT)
Dept: PAIN MEDICINE | Facility: CLINIC | Age: 54
End: 2024-10-24
Payer: MEDICARE

## 2024-10-24 ENCOUNTER — TELEPHONE (OUTPATIENT)
Dept: PAIN MEDICINE | Facility: CLINIC | Age: 54
End: 2024-10-24

## 2024-10-24 VITALS
WEIGHT: 245 LBS | RESPIRATION RATE: 16 BRPM | SYSTOLIC BLOOD PRESSURE: 139 MMHG | OXYGEN SATURATION: 95 % | BODY MASS INDEX: 43.4 KG/M2 | DIASTOLIC BLOOD PRESSURE: 95 MMHG | HEART RATE: 75 BPM

## 2024-10-24 DIAGNOSIS — G89.29 CHRONIC MIDLINE LOW BACK PAIN WITH RIGHT-SIDED SCIATICA: ICD-10-CM

## 2024-10-24 DIAGNOSIS — M54.41 CHRONIC MIDLINE LOW BACK PAIN WITH RIGHT-SIDED SCIATICA: ICD-10-CM

## 2024-10-24 DIAGNOSIS — M48.02 CERVICAL STENOSIS OF SPINE: ICD-10-CM

## 2024-10-24 DIAGNOSIS — M51.362 DEGENERATION OF INTERVERTEBRAL DISC OF LUMBAR REGION WITH DISCOGENIC BACK PAIN AND LOWER EXTREMITY PAIN: ICD-10-CM

## 2024-10-24 DIAGNOSIS — M50.30 DEGENERATION OF INTERVERTEBRAL DISC OF CERVICAL REGION: ICD-10-CM

## 2024-10-24 DIAGNOSIS — M54.41 CHRONIC MIDLINE LOW BACK PAIN WITH RIGHT-SIDED SCIATICA: Primary | ICD-10-CM

## 2024-10-24 DIAGNOSIS — G89.29 CHRONIC MIDLINE LOW BACK PAIN WITH RIGHT-SIDED SCIATICA: Primary | ICD-10-CM

## 2024-10-24 DIAGNOSIS — M54.2 CERVICALGIA: ICD-10-CM

## 2024-10-24 RX ORDER — HYDROCODONE BITARTRATE AND ACETAMINOPHEN 10; 325 MG/1; MG/1
1 TABLET ORAL EVERY 6 HOURS PRN
Qty: 120 TABLET | Refills: 0 | Status: SHIPPED | OUTPATIENT
Start: 2024-10-24 | End: 2024-10-24 | Stop reason: SDUPTHER

## 2024-10-24 RX ORDER — HYDROCODONE BITARTRATE AND ACETAMINOPHEN 10; 325 MG/1; MG/1
1 TABLET ORAL EVERY 6 HOURS PRN
Qty: 120 TABLET | Refills: 0 | Status: SHIPPED | OUTPATIENT
Start: 2024-10-24

## 2024-10-24 NOTE — PROGRESS NOTES
Subjective   Riddhi Cid is a 54 y.o. female.     History of Present Illness  Neck pain radiates to b/l shoulders, and left arm pain. ACDF C5/7 (4/11/2016), also h/o fibromyalgia,  also pain in lower back and b/l legs and feet. 10/10 at worst, 6/10 at best, worse with activity, improves with rest and meds, always present, varies, aching, radiates in BLE. Imaging reviewed, satisfactory ACDF. Referred for pain management. Neck pain improved with ACDF, tapered off [employer] with worsening pain, still somewhat severe. Taking Cymbalta which helps, tried Lyrica with weight gain, trying to lose weight. Restarted [employer] at BID - qdaily prn but worsening pain with exercise to lose weight, increased to BID-TID #75, then TID, then QID prn with adequate relief, reduced to #105, tolerating. Gastric sleeve surgery planned for March-April 2017, had to miss cardiac clearance and EGD due to illness and social issues, has decided against surgery, trying to lose weight with diet and exercise. Worsening b/l CTS symptoms, R>L, known h/o of CTS. Saw Juan Franz for worsening neck pain to LUE, ACDF is intact, started Diclofenac for DJD, Elavil. Could not tolerate even low-dose Gabapentin with drowsiness. Started Celebrex but did not feel like doing anything, stopped, stopped Mobic. Stopping numerous meds due to side effects, started Gabapentin with Dr. Pisano with benefit. Pain worsening, DDD on MRI, started PT which is helping. Back pain worsening, especially at night. Had COVID-19, doing better, plans to get vaccinated. Fx left ankle, in boot, NWB.  Neck Pain   Pertinent negatives include no chest pain, fever, numbness, trouble swallowing or weakness.   Back Pain  Pertinent negatives include no abdominal pain, bladder incontinence, chest pain, fever, numbness or weakness.        The following portions of the patient's history were reviewed and updated as appropriate: allergies, current medications, past family history, past medical  history, past social history, past surgical history and problem list.    Review of Systems   Constitutional:  Negative for chills, fatigue and fever.   HENT:  Negative for hearing loss and trouble swallowing.    Eyes:  Negative for visual disturbance.   Respiratory:  Negative for shortness of breath.    Cardiovascular:  Negative for chest pain.   Gastrointestinal:  Positive for constipation and nausea. Negative for abdominal pain, diarrhea and vomiting.   Genitourinary:  Negative for urinary incontinence.   Musculoskeletal:  Positive for arthralgias, back pain and neck pain. Negative for joint swelling and myalgias.   Neurological:  Positive for headache. Negative for dizziness, weakness and numbness.       Objective   Physical Exam   Constitutional: She is oriented to person, place, and time. She appears well-developed and well-nourished.   HENT:   Head: Normocephalic and atraumatic.   Eyes: Pupils are equal, round, and reactive to light.   Cardiovascular: Normal rate, regular rhythm and normal heart sounds.   Pulmonary/Chest: Breath sounds normal.   Abdominal: Soft. Bowel sounds are normal. She exhibits no distension. There is no abdominal tenderness.   Neurological: She is alert and oriented to person, place, and time. She has normal reflexes. She displays normal reflexes. No sensory deficit.   Psychiatric: Her behavior is normal. Thought content normal.         Assessment & Plan   Diagnoses and all orders for this visit:    1. Chronic midline low back pain with right-sided sciatica (Primary)    2. Cervicalgia    3. Cervical stenosis of spine    4. Degeneration of intervertebral disc of cervical region    5. Degeneration of intervertebral disc of lumbar region with discogenic back pain and lower extremity pain        [facility]spect reviewed, in order. Low risk. Repeat UDS 5/6/24 in order.  Reduced to Norco 10/325mg q6-8h #105, too painful, restarted q6h prn #120, then reduced to #105 successfully but pain worsening.  Reduced to 7.5mg QID prn, intolerable, increased back to 10mg QID prn, briefly increased by Ortho for L ankle fx pain, likely having surgery soon, has increased pain. Filled 9/27/24. Will refill slightly early this one time 2/2 fx pain.  Patient's symptoms are still adequately managed by current medication regimen, is doing well at this strength and dosage, therefore I will continue to prescribe unchanged as the most appropriate course of treatment.  Afraid to start Lyrica due to possible weight gain. Restarted Gabapentin, takes 300-900mg/day as tolerated, helping a great deal.  Severe GERD, IBS, family h/o CV dz. Stopped Diclofenac, could not tolerate Celebrex 200mg qdaily. Taking Naproxen with PCP but hard on her stomach, no personal CV issues. Began Mobic 7.5mg qdaily prn.  Began Tizanidine 4mg qHS prn.  Began Licart prn, can take NSAIDs per patient.  Cont other meds as prescribed.  Cont TENS, unit provided here, patient reports it helps her pain significantly.  Cont b/l CTS braces at night only.  Patient reports she cannot start PT at this time as her daughter currently works 12h shifts, may be able to begin in future if her daughter can change to an 8h shift schedule.  Will begin neuropathic comp cream if her current cream does not work.  Ordered LSO for truncal stability. Cont PT.  New X-ray T-spine to eval for worsening pathology.  Letter to return to work. Does not drive or operate heavy machinery while using pain medication.  RTC 3 months for f/u.

## 2024-10-24 NOTE — TELEPHONE ENCOUNTER
Caller: Riddhi Cid    Relationship: Self    Best call back number:     Requested Prescriptions:   HYDROcodone-acetaminophen (Norco)  MG per tablet     Pharmacy where request should be sent:  [facility] IN [place]     Last office visit with prescribing clinician: 10/24/2024   Last telemedicine visit with prescribing clinician: Visit date not found   Next office visit with prescribing clinician: 1/20/2025     Additional details provided by patient: PRESCRIPTION WAS SENT TO [facility]STEAD OF [facility]    Does the patient have less than a 3 day supply:  [x] Yes  [] No    Would you like a call back once the refill request has been completed: [] Yes [] No    If the office needs to give you a call back, can they leave a voicemail: [] Yes [] No    Kerry Guy   10/24/24 11:00 EDT

## 2024-11-05 DIAGNOSIS — K31.84 GASTROPARALYSIS: ICD-10-CM

## 2024-11-05 RX ORDER — FLUTICASONE PROPIONATE AND SALMETEROL 250; 50 UG/1; UG/1
1 POWDER RESPIRATORY (INHALATION) 2 TIMES DAILY
Qty: 60 EACH | Refills: 12 | Status: SHIPPED | OUTPATIENT
Start: 2024-11-05

## 2024-11-05 RX ORDER — ONDANSETRON 4 MG/1
TABLET, ORALLY DISINTEGRATING ORAL
Qty: 20 TABLET | Refills: 0 | Status: SHIPPED | OUTPATIENT
Start: 2024-11-05

## 2024-11-13 ENCOUNTER — TELEPHONE (OUTPATIENT)
Dept: FAMILY MEDICINE CLINIC | Facility: CLINIC | Age: 54
End: 2024-11-13
Payer: MEDICARE

## 2024-11-13 DIAGNOSIS — B37.9 YEAST INFECTION: Primary | ICD-10-CM

## 2024-11-13 RX ORDER — FLUCONAZOLE 150 MG/1
150 TABLET ORAL WEEKLY
Qty: 4 TABLET | Refills: 0 | Status: SHIPPED | OUTPATIENT
Start: 2024-11-13

## 2024-11-13 NOTE — TELEPHONE ENCOUNTER
Called and let her know she said she will get this. She will ask the surgeon if this will mess with anything.  I also asked about her being on jardiance. I don't see this on er med list. She said lidia started her on ozempic but this has never actually been approved so dr drew told her to go back on the Jardiance.

## 2024-11-13 NOTE — TELEPHONE ENCOUNTER
Okay to send Diflucan 150 mg weekly as needed #4 with no refills, should come in to be seen if she does not improve, thanks

## 2024-11-13 NOTE — TELEPHONE ENCOUNTER
Caller: Riddhi Cid    Relationship: Self    Best call back number:     [phone]      What medication are you requesting: SOMETHING FOR A YEAST INFECTION    What are your current symptoms: ITCHING, BURNING, IRRATION     How long have you been experiencing symptoms: A WEEK     Have you had these symptoms before:    [x] Yes  [] No    Have you been treated for these symptoms before:   [x] Yes  [] No    If a prescription is needed, what is your preferred pharmacy and phone number:      [id number] [place] AT [facility] OF  &  - [phone] [facility] [phone] FX     Additional notes:    SHE HAS STOPPED TAKING THE JARDIANCE HOPING IT WOULD GO AWAY BUT IT HAS NOT CLEARED UP    SHE IS HAVING SURGERY THIS COMING MONDAY AND WANTS TO KNOW IF TAKING ANY NEW MEDICATION WOULD EFFECT THE SURGERY IF SO SHE WILL WAIT TO TAKE THE MEDICATION      PLEASE ADVISE

## 2024-11-25 ENCOUNTER — TELEPHONE (OUTPATIENT)
Dept: PAIN MEDICINE | Facility: HOSPITAL | Age: 54
End: 2024-11-25
Payer: MEDICARE

## 2024-11-25 NOTE — TELEPHONE ENCOUNTER
Pt called and stated that she recently had surgery and the surgeon gave her percocet for the pain.  The percocet is making her sleepy, so she wanted to know if dr david could just prescribe the hydrocodone for her and give her 5 a day.  Pt has an rx at the [facility] but they won't fill it until 12-1-24 bc pt got a week of percocoet and should've had a week of hydrocodone left.  Pt does not have hydrocodone left bc she took extra meds before her surgery.  Pt states that dr david is aware of this.  Dr david filled her last rx a little early due to her fx pain but pt was never given permission to take extra meds.  Her hydrocodone should be taken as prescribed.  Pt was told that she needs to reach out to wait to hear back from her surgeon. If she is still needing more pain meds than normally prescribed that needs to come from the surgeon.  Pt verbalized understanding.

## 2024-11-26 NOTE — TELEPHONE ENCOUNTER
Pharmacist is asking the ok to fill her Rx early she states patient has call them multiple times and they just waiting on the ok from you.

## 2024-11-27 NOTE — TELEPHONE ENCOUNTER
I am saying that I have given her what pain medicine that I can give. Anything else would have to come from the surgeon if he feels that it's appropriate. Otherwise she will have to wait for the next refill.    98.4

## 2024-12-03 DIAGNOSIS — K31.84 GASTROPARALYSIS: ICD-10-CM

## 2024-12-09 RX ORDER — ONDANSETRON 4 MG/1
TABLET, ORALLY DISINTEGRATING ORAL
Qty: 20 TABLET | Refills: 0 | Status: SHIPPED | OUTPATIENT
Start: 2024-12-09

## 2024-12-20 RX ORDER — METFORMIN HYDROCHLORIDE 500 MG/1
1000 TABLET, EXTENDED RELEASE ORAL
Qty: 180 TABLET | Refills: 3 | Status: SHIPPED | OUTPATIENT
Start: 2024-12-20

## 2025-01-02 DIAGNOSIS — J41.0 SIMPLE CHRONIC BRONCHITIS: ICD-10-CM

## 2025-01-03 RX ORDER — ALBUTEROL SULFATE 90 UG/1
2 INHALANT RESPIRATORY (INHALATION) EVERY 4 HOURS PRN
Qty: 18 G | Refills: 12 | Status: SHIPPED | OUTPATIENT
Start: 2025-01-03

## 2025-01-20 ENCOUNTER — OFFICE VISIT (OUTPATIENT)
Dept: PAIN MEDICINE | Facility: CLINIC | Age: 55
End: 2025-01-20
Payer: MEDICARE

## 2025-01-20 VITALS
DIASTOLIC BLOOD PRESSURE: 77 MMHG | RESPIRATION RATE: 16 BRPM | HEART RATE: 92 BPM | OXYGEN SATURATION: 95 % | SYSTOLIC BLOOD PRESSURE: 134 MMHG

## 2025-01-20 DIAGNOSIS — M54.41 CHRONIC MIDLINE LOW BACK PAIN WITH RIGHT-SIDED SCIATICA: Primary | ICD-10-CM

## 2025-01-20 DIAGNOSIS — M50.30 DEGENERATION OF INTERVERTEBRAL DISC OF CERVICAL REGION: ICD-10-CM

## 2025-01-20 DIAGNOSIS — M54.2 CERVICALGIA: ICD-10-CM

## 2025-01-20 DIAGNOSIS — G89.29 CHRONIC MIDLINE LOW BACK PAIN WITH RIGHT-SIDED SCIATICA: Primary | ICD-10-CM

## 2025-01-20 DIAGNOSIS — M51.362 DEGENERATION OF INTERVERTEBRAL DISC OF LUMBAR REGION WITH DISCOGENIC BACK PAIN AND LOWER EXTREMITY PAIN: ICD-10-CM

## 2025-01-20 DIAGNOSIS — M48.02 CERVICAL STENOSIS OF SPINE: ICD-10-CM

## 2025-01-20 PROCEDURE — 1160F RVW MEDS BY RX/DR IN RCRD: CPT | Performed by: PHYSICAL MEDICINE & REHABILITATION

## 2025-01-20 PROCEDURE — 99214 OFFICE O/P EST MOD 30 MIN: CPT | Performed by: PHYSICAL MEDICINE & REHABILITATION

## 2025-01-20 PROCEDURE — 1159F MED LIST DOCD IN RCRD: CPT | Performed by: PHYSICAL MEDICINE & REHABILITATION

## 2025-01-20 PROCEDURE — 1125F AMNT PAIN NOTED PAIN PRSNT: CPT | Performed by: PHYSICAL MEDICINE & REHABILITATION

## 2025-01-20 PROCEDURE — G2211 COMPLEX E/M VISIT ADD ON: HCPCS | Performed by: PHYSICAL MEDICINE & REHABILITATION

## 2025-01-20 RX ORDER — HYDROCODONE BITARTRATE AND ACETAMINOPHEN 10; 325 MG/1; MG/1
1 TABLET ORAL EVERY 6 HOURS PRN
Qty: 120 TABLET | Refills: 0 | Status: SHIPPED | OUTPATIENT
Start: 2025-01-20

## 2025-01-20 NOTE — PROGRESS NOTES
Subjective   Riddhi Cid is a 54 y.o. female.     History of Present Illness  CC: Neck pain    Neck pain radiates to b/l shoulders, and left arm pain, began > 10 years ago, treated by me since 7/14/21. ACDF C5/7 (4/11/2016), also h/o fibromyalgia,  also pain in lower back and b/l legs and feet. 10/10 at worst, 6/10 at best, worse with activity, improves with rest and meds, always present, varies, aching, radiates in BLE. Imaging reviewed, satisfactory ACDF. Referred for pain management. Neck pain improved with ACDF, tapered off [place] with worsening pain, still somewhat severe. Taking Cymbalta which helps, tried Lyrica with weight gain, trying to lose weight. Restarted [place] at BID - qdaily prn but worsening pain with exercise to lose weight, increased to BID-TID #75, then TID, then QID prn with adequate relief, reduced to #105, tolerating. Gastric sleeve surgery planned for March-April 2017, had to miss cardiac clearance and EGD due to illness and social issues, has decided against surgery, trying to lose weight with diet and exercise. Worsening b/l CTS symptoms, R>L, known h/o of CTS. Saw Juan Franz for worsening neck pain to LUE, ACDF is intact, started Diclofenac for DJD, Elavil. Could not tolerate even low-dose Gabapentin with drowsiness. Started Celebrex but did not feel like doing anything, stopped, stopped Mobic. Stopping numerous meds due to side effects, started Gabapentin with Dr. Pisano with benefit. Pain worsening, DDD on MRI, started PT which is helping. Back pain worsening, especially at night. Had COVID-19, doing better, plans to get vaccinated. Fx left ankle, in boot, NWB.  Foot Pain  Symptoms include nausea and neck pain.    Pertinent negative symptoms include no abdominal pain, no chest pain, no chills, no fatigue, no fever, no myalgias, no numbness, no vomiting and no weakness.   Back Pain  Pertinent negatives include no abdominal pain, bladder incontinence, chest pain, fever, numbness or  weakness.   Neck Pain   Pertinent negatives include no chest pain, fever, numbness, trouble swallowing or weakness.        The following portions of the patient's history were reviewed and updated as appropriate: allergies, current medications, past family history, past medical history, past social history, past surgical history and problem list.    Review of Systems   Constitutional:  Negative for chills, fatigue and fever.   HENT:  Negative for hearing loss and trouble swallowing.    Eyes:  Negative for visual disturbance.   Respiratory:  Negative for shortness of breath.    Cardiovascular:  Negative for chest pain.   Gastrointestinal:  Positive for constipation and nausea. Negative for abdominal pain, diarrhea and vomiting.   Genitourinary:  Negative for urinary incontinence.   Musculoskeletal:  Positive for arthralgias, back pain and neck pain. Negative for joint swelling and myalgias.   Neurological:  Positive for headache. Negative for dizziness, weakness and numbness.       Objective   Physical Exam   Constitutional: She is oriented to person, place, and time. She appears well-developed and well-nourished.   HENT:   Head: Normocephalic and atraumatic.   Eyes: Pupils are equal, round, and reactive to light.   Cardiovascular: Normal rate, regular rhythm and normal heart sounds.   Pulmonary/Chest: Effort normal and breath sounds normal.   Abdominal: Soft. Normal appearance and bowel sounds are normal. She exhibits no distension. There is no abdominal tenderness.   Musculoskeletal:      Comments: CAM boot on LLE   Neurological: She is alert and oriented to person, place, and time. She has normal reflexes. She displays normal reflexes. No sensory deficit.   Psychiatric: Her behavior is normal. Mood, judgment and thought content normal.         Assessment & Plan   Diagnoses and all orders for this visit:    1. Chronic midline low back pain with right-sided sciatica (Primary)    2. Cervicalgia    3. Cervical  stenosis of spine    4. Degeneration of intervertebral disc of cervical region    5. Degeneration of intervertebral disc of lumbar region with discogenic back pain and lower extremity pain        INspect reviewed, in order. Low risk. Repeat UDS 5/6/24 in order.  Reduced to Norco 10/325mg q6-8h #105, too painful, restarted q6h prn #120, then reduced to #105 successfully but pain worsening. Reduced to 7.5mg QID prn, intolerable, increased back to 10mg QID prn, briefly increased by Ortho for L ankle fx pain, likely having surgery soon, has increased pain. Filled 12/26/24. Patient's symptoms are still adequately managed by current medication regimen, is doing well at this strength and dosage, therefore I will continue to prescribe unchanged as the most appropriate course of treatment.  Afraid to start Lyrica due to possible weight gain. Restarted Gabapentin, takes 300-900mg/day as tolerated, helping a great deal.  Severe GERD, IBS, family h/o CV dz. Stopped Diclofenac, could not tolerate Celebrex 200mg qdaily. Taking Naproxen with PCP but hard on her stomach, no personal CV issues. Began Mobic 7.5mg qdaily prn.  Began Tizanidine 4mg qHS prn.  Began Licart prn, can take NSAIDs per patient.  Cont other meds as prescribed.  Cont TENS, unit provided here, patient reports it helps her pain significantly.  Cont b/l CTS braces at night only.  Patient reports she cannot start PT at this time as her daughter currently works 12h shifts, may be able to begin in future if her daughter can change to an 8h shift schedule.  Will begin neuropathic comp cream if her current cream does not work.  Ordered LSO for truncal stability. Cont PT.  New X-ray T-spine to eval for worsening pathology.  Letter to return to work. Does not drive or operate heavy machinery while using pain medication.  RTC 3 months for f/u.

## 2025-02-04 ENCOUNTER — HOSPITAL ENCOUNTER (OUTPATIENT)
Dept: ULTRASOUND IMAGING | Facility: HOSPITAL | Age: 55
Discharge: HOME | End: 2025-02-04
Payer: MEDICARE

## 2025-02-04 ENCOUNTER — HOSPITAL ENCOUNTER (OUTPATIENT)
Dept: CT IMAGING | Facility: HOSPITAL | Age: 55
Discharge: HOME | End: 2025-02-04
Payer: MEDICARE

## 2025-02-04 DIAGNOSIS — E03.9 ACQUIRED HYPOTHYROIDISM: ICD-10-CM

## 2025-02-04 DIAGNOSIS — E04.1 THYROID NODULE: ICD-10-CM

## 2025-02-04 DIAGNOSIS — R91.8 PULMONARY NODULES: ICD-10-CM

## 2025-02-04 PROCEDURE — 71250 CT THORAX DX C-: CPT

## 2025-02-04 PROCEDURE — 76536 US EXAM OF HEAD AND NECK: CPT

## 2025-02-18 DIAGNOSIS — F33.1 MAJOR DEPRESSIVE DISORDER, RECURRENT, MODERATE: ICD-10-CM

## 2025-02-19 RX ORDER — CITALOPRAM HYDROBROMIDE 40 MG/1
40 TABLET ORAL DAILY
Qty: 90 TABLET | Refills: 1 | Status: SHIPPED | OUTPATIENT
Start: 2025-02-19

## 2025-02-25 ENCOUNTER — TELEPHONE (OUTPATIENT)
Dept: FAMILY MEDICINE CLINIC | Facility: CLINIC | Age: 55
End: 2025-02-25
Payer: MEDICARE

## 2025-02-25 NOTE — TELEPHONE ENCOUNTER
Patient called stating her sugar was 327 yesterday. She said that her sugar has been increasing recently. She said last time she was in you told patient to stop the jardiance due to yeast infections, and patient said you would contact her GI doc to see if she is okay to start ozempic. She said she has not heard about the ozempic.     She said that the last 3-4 days her linzess is not working like it usually does, she said she hasn't had any relief. (Her sugar has been also increasing wondering if that has anything to do with it?)    She said her foot is still hurting from surgery and wondering if the screws in her foot is causing her sugar to be high or anything.

## 2025-03-21 ENCOUNTER — APPOINTMENT (OUTPATIENT)
Dept: CT IMAGING | Facility: HOSPITAL | Age: 55
End: 2025-03-21
Payer: MEDICARE

## 2025-03-21 ENCOUNTER — HOSPITAL ENCOUNTER (EMERGENCY)
Facility: HOSPITAL | Age: 55
Discharge: HOME | End: 2025-03-22
Payer: MEDICARE

## 2025-03-21 DIAGNOSIS — B37.31 CANDIDIASIS OF GENITALIA IN FEMALE: ICD-10-CM

## 2025-03-21 DIAGNOSIS — R79.89 ELEVATED LFTS: ICD-10-CM

## 2025-03-21 DIAGNOSIS — K59.00 CONSTIPATION, UNSPECIFIED CONSTIPATION TYPE: ICD-10-CM

## 2025-03-21 DIAGNOSIS — K57.30 DIVERTICULOSIS LARGE INTESTINE W/O PERFORATION OR ABSCESS W/O BLEEDING: ICD-10-CM

## 2025-03-21 DIAGNOSIS — R10.10 PAIN OF UPPER ABDOMEN: Primary | ICD-10-CM

## 2025-03-21 LAB
ALBUMIN SERPL-MCNC: 4.2 G/DL (ref 3.5–5.2)
ALBUMIN/GLOB SERPL: 1.4 G/DL
ALP SERPL-CCNC: 162 U/L (ref 39–117)
ALT SERPL W P-5'-P-CCNC: 46 U/L (ref 1–33)
ANION GAP SERPL CALCULATED.3IONS-SCNC: 11.8 MMOL/L (ref 5–15)
AST SERPL-CCNC: 56 U/L (ref 1–32)
BACTERIA UR QL AUTO: ABNORMAL /HPF
BASOPHILS # BLD AUTO: 0.06 10*3/MM3 (ref 0–0.2)
BASOPHILS NFR BLD AUTO: 0.8 % (ref 0–1.5)
BILIRUB SERPL-MCNC: 0.3 MG/DL (ref 0–1.2)
BILIRUB UR QL STRIP: NEGATIVE
BUN SERPL-MCNC: 8 MG/DL (ref 6–20)
BUN/CREAT SERPL: 9.6 (ref 7–25)
CALCIUM SPEC-SCNC: 9.3 MG/DL (ref 8.6–10.5)
CHLORIDE SERPL-SCNC: 101 MMOL/L (ref 98–107)
CLARITY UR: CLEAR
CO2 SERPL-SCNC: 26.2 MMOL/L (ref 22–29)
COLOR UR: YELLOW
CREAT SERPL-MCNC: 0.83 MG/DL (ref 0.57–1)
DEPRECATED RDW RBC AUTO: 43.5 FL (ref 37–54)
EGFRCR SERPLBLD CKD-EPI 2021: 83.4 ML/MIN/1.73
EOSINOPHIL # BLD AUTO: 0.23 10*3/MM3 (ref 0–0.4)
EOSINOPHIL NFR BLD AUTO: 3 % (ref 0.3–6.2)
ERYTHROCYTE [DISTWIDTH] IN BLOOD BY AUTOMATED COUNT: 12.9 % (ref 12.3–15.4)
GLOBULIN UR ELPH-MCNC: 2.9 GM/DL
GLUCOSE SERPL-MCNC: 230 MG/DL (ref 65–99)
GLUCOSE UR STRIP-MCNC: ABNORMAL MG/DL
HCT VFR BLD AUTO: 42.3 % (ref 34–46.6)
HGB BLD-MCNC: 13.6 G/DL (ref 12–15.9)
HGB UR QL STRIP.AUTO: NEGATIVE
HOLD SPECIMEN: NORMAL
HOLD SPECIMEN: NORMAL
HYALINE CASTS UR QL AUTO: ABNORMAL /LPF
IMM GRANULOCYTES # BLD AUTO: 0.03 10*3/MM3 (ref 0–0.05)
IMM GRANULOCYTES NFR BLD AUTO: 0.4 % (ref 0–0.5)
KETONES UR QL STRIP: NEGATIVE
LEUKOCYTE ESTERASE UR QL STRIP.AUTO: ABNORMAL
LIPASE SERPL-CCNC: 41 U/L (ref 13–60)
LYMPHOCYTES # BLD AUTO: 2.11 10*3/MM3 (ref 0.7–3.1)
LYMPHOCYTES NFR BLD AUTO: 27.9 % (ref 19.6–45.3)
MCH RBC QN AUTO: 29.6 PG (ref 26.6–33)
MCHC RBC AUTO-ENTMCNC: 32.2 G/DL (ref 31.5–35.7)
MCV RBC AUTO: 92.2 FL (ref 79–97)
MONOCYTES # BLD AUTO: 0.35 10*3/MM3 (ref 0.1–0.9)
MONOCYTES NFR BLD AUTO: 4.6 % (ref 5–12)
NEUTROPHILS NFR BLD AUTO: 4.79 10*3/MM3 (ref 1.7–7)
NEUTROPHILS NFR BLD AUTO: 63.3 % (ref 42.7–76)
NITRITE UR QL STRIP: NEGATIVE
NRBC BLD AUTO-RTO: 0 /100 WBC (ref 0–0.2)
PH UR STRIP.AUTO: 6 [PH] (ref 5–8)
PLATELET # BLD AUTO: 200 10*3/MM3 (ref 140–450)
PMV BLD AUTO: 11.5 FL (ref 6–12)
POTASSIUM SERPL-SCNC: 4.3 MMOL/L (ref 3.5–5.2)
PROT SERPL-MCNC: 7.1 G/DL (ref 6–8.5)
PROT UR QL STRIP: NEGATIVE
RBC # BLD AUTO: 4.59 10*6/MM3 (ref 3.77–5.28)
RBC # UR STRIP: ABNORMAL /HPF
REF LAB TEST METHOD: ABNORMAL
SODIUM SERPL-SCNC: 139 MMOL/L (ref 136–145)
SP GR UR STRIP: 1.04 (ref 1–1.03)
SQUAMOUS #/AREA URNS HPF: ABNORMAL /HPF
UROBILINOGEN UR QL STRIP: ABNORMAL
WBC # UR STRIP: ABNORMAL /HPF
WBC NRBC COR # BLD AUTO: 7.57 10*3/MM3 (ref 3.4–10.8)
WHOLE BLOOD HOLD COAG: NORMAL
WHOLE BLOOD HOLD SPECIMEN: NORMAL

## 2025-03-21 PROCEDURE — 74177 CT ABD & PELVIS W/CONTRAST: CPT

## 2025-03-21 PROCEDURE — 25510000001 IOPAMIDOL PER 1 ML

## 2025-03-21 PROCEDURE — 99285 EMERGENCY DEPT VISIT HI MDM: CPT

## 2025-03-21 PROCEDURE — 96374 THER/PROPH/DIAG INJ IV PUSH: CPT

## 2025-03-21 PROCEDURE — 81001 URINALYSIS AUTO W/SCOPE: CPT

## 2025-03-21 PROCEDURE — 80053 COMPREHEN METABOLIC PANEL: CPT

## 2025-03-21 PROCEDURE — 83690 ASSAY OF LIPASE: CPT

## 2025-03-21 PROCEDURE — 85025 COMPLETE CBC W/AUTO DIFF WBC: CPT

## 2025-03-21 PROCEDURE — 96375 TX/PRO/DX INJ NEW DRUG ADDON: CPT

## 2025-03-21 PROCEDURE — 25010000002 KETOROLAC TROMETHAMINE PER 15 MG

## 2025-03-21 PROCEDURE — 25010000002 ONDANSETRON PER 1 MG

## 2025-03-21 RX ORDER — KETOROLAC TROMETHAMINE 30 MG/ML
15 INJECTION, SOLUTION INTRAMUSCULAR; INTRAVENOUS ONCE
Status: COMPLETED | OUTPATIENT
Start: 2025-03-21 | End: 2025-03-21

## 2025-03-21 RX ORDER — IOPAMIDOL 755 MG/ML
100 INJECTION, SOLUTION INTRAVASCULAR
Status: COMPLETED | OUTPATIENT
Start: 2025-03-21 | End: 2025-03-21

## 2025-03-21 RX ORDER — PANTOPRAZOLE SODIUM 40 MG/10ML
80 INJECTION, POWDER, LYOPHILIZED, FOR SOLUTION INTRAVENOUS ONCE
Status: COMPLETED | OUTPATIENT
Start: 2025-03-21 | End: 2025-03-22

## 2025-03-21 RX ORDER — ONDANSETRON 2 MG/ML
4 INJECTION INTRAMUSCULAR; INTRAVENOUS ONCE
Status: COMPLETED | OUTPATIENT
Start: 2025-03-21 | End: 2025-03-21

## 2025-03-21 RX ORDER — FLUCONAZOLE 150 MG/1
150 TABLET ORAL ONCE
Qty: 1 TABLET | Refills: 0 | Status: SHIPPED | OUTPATIENT
Start: 2025-03-21 | End: 2025-03-21

## 2025-03-21 RX ORDER — SODIUM CHLORIDE 0.9 % (FLUSH) 0.9 %
10 SYRINGE (ML) INJECTION AS NEEDED
Status: DISCONTINUED | OUTPATIENT
Start: 2025-03-21 | End: 2025-03-22 | Stop reason: HOSPADM

## 2025-03-21 RX ORDER — FLUCONAZOLE 150 MG/1
150 TABLET ORAL ONCE
Status: COMPLETED | OUTPATIENT
Start: 2025-03-21 | End: 2025-03-22

## 2025-03-21 RX ADMIN — ONDANSETRON 4 MG: 2 INJECTION, SOLUTION INTRAMUSCULAR; INTRAVENOUS at 21:38

## 2025-03-21 RX ADMIN — IOPAMIDOL 100 ML: 755 INJECTION, SOLUTION INTRAVENOUS at 22:40

## 2025-03-21 RX ADMIN — KETOROLAC TROMETHAMINE 15 MG: 30 INJECTION, SOLUTION INTRAMUSCULAR at 21:38

## 2025-03-22 VITALS
WEIGHT: 256.84 LBS | DIASTOLIC BLOOD PRESSURE: 65 MMHG | HEIGHT: 63 IN | SYSTOLIC BLOOD PRESSURE: 123 MMHG | BODY MASS INDEX: 45.51 KG/M2 | TEMPERATURE: 97.7 F | HEART RATE: 79 BPM | OXYGEN SATURATION: 91 % | RESPIRATION RATE: 18 BRPM

## 2025-03-22 PROCEDURE — 96375 TX/PRO/DX INJ NEW DRUG ADDON: CPT

## 2025-03-22 RX ADMIN — PANTOPRAZOLE SODIUM 80 MG: 40 INJECTION, POWDER, LYOPHILIZED, FOR SOLUTION INTRAVENOUS at 00:16

## 2025-03-22 RX ADMIN — FLUCONAZOLE 150 MG: 150 TABLET ORAL at 00:16

## 2025-03-22 NOTE — DISCHARGE INSTRUCTIONS
Take all prescribed medications as directed.  Take Colace twice daily for the next week.  Take MiraLAX daily for the next week.    Follow-up with your gastroenterologist, call to make an appointment.  Keep your appointment with Dr. Harrison for next Friday.    Return to the ER for any new or worsening symptoms.

## 2025-03-22 NOTE — ED PROVIDER NOTES
Subjective   Chief Complaint   Patient presents with    Abdominal Pain    Weakness - Generalized       History of Present Illness  Patient is a 54-year-old lady who arrives today with her daughter.  She reports past medical history significant for fibromyalgia, gastroparesis, IBS-C, back pain, leg pain, history of cholecystectomy.  She reports upper abdominal pain that radiates into her back x 6 weeks.  [facility] and they did not do any labs but took an x-ray of her abdomen told her she was constipated gave her an enema Colace Linzess MiraLAX and sent her home.  She has not followed up with her primary care doctor but does have an appointment scheduled for next week on Friday.  She has not followed up with her gastroenterologist.  Reports worsening pain over the last 6 weeks.  Review of Systems   Gastrointestinal:  Positive for abdominal distention, abdominal pain and constipation.   Musculoskeletal:  Positive for back pain and myalgias.   All other systems reviewed and are negative.      Past Medical History:   Diagnosis Date    Abdominal pain     Allergic     Anemia     Anxiety     Arthritis     Asthma     Constipation     COPD (chronic obstructive pulmonary disease)     Costochondritis     Degenerative disc disease, lumbar     Depression     Diabetes mellitus     Elevated liver enzymes     Fibromyalgia     Gastroparesis     GERD (gastroesophageal reflux disease)     Hemorrhoids     History of chicken pox     History of colon polyps     Hyperlipidemia     IBS (irritable bowel syndrome)     JOSSE (iron deficiency anemia)     Injury of back     Irritable colon     Knee pain, left     LEÓN (nonalcoholic steatohepatitis)     Nausea     Obstructive sleep apnea     Slow to wake up after anesthesia     Thyroid nodule     Tired 07/01/2019    Vitamin D deficiency        Allergies   Allergen Reactions    Sulfa Antibiotics Rash    Meloxicam GI Intolerance       Past Surgical History:   Procedure Laterality Date     BREAST BIOPSY Right     CARPAL TUNNEL RELEASE Right     CERVICAL DISCECTOMY ANTERIOR      CHOLECYSTECTOMY      ESSURE TUBAL LIGATION         Family History   Problem Relation Age of Onset    Stroke Mother     Hypertension Mother     Hyperlipidemia Mother     Hypothyroidism Mother     Cancer Father     Heart attack Father     Heart disease Father     Prostate cancer Father     Diabetes Father     Hypertension Father     Hyperlipidemia Father     Coronary artery disease Father     Breast cancer Paternal Grandmother     Hypothyroidism Grandchild     Ovarian cancer Neg Hx        Social History     Socioeconomic History    Marital status:     Number of children: 3    Years of education: GED   Tobacco Use    Smoking status: Every Day     Current packs/day: 1.00     Average packs/day: 1 pack/day for 53.0 years (53.0 ttl pk-yrs)     Types: Cigarettes     Passive exposure: Current    Smokeless tobacco: Never   Vaping Use    Vaping status: Never Used   Substance and Sexual Activity    Alcohol use: No    Drug use: No    Sexual activity: Defer           Objective   Physical Exam  Vitals and nursing note reviewed.   Constitutional:       General: She is not in acute distress.     Appearance: She is well-developed. She is obese. She is not ill-appearing, toxic-appearing or diaphoretic.   HENT:      Head: Normocephalic and atraumatic.      Mouth/Throat:      Mouth: Mucous membranes are moist.      Pharynx: Oropharynx is clear.   Eyes:      Extraocular Movements: Extraocular movements intact.      Pupils: Pupils are equal, round, and reactive to light.   Cardiovascular:      Rate and Rhythm: Normal rate and regular rhythm.      Heart sounds: Normal heart sounds.   Pulmonary:      Effort: Pulmonary effort is normal.      Breath sounds: Normal breath sounds.   Abdominal:      General: Abdomen is protuberant. Bowel sounds are normal.      Palpations: Abdomen is soft.      Tenderness: There is abdominal tenderness in the  "epigastric area. There is no right CVA tenderness, left CVA tenderness, guarding or rebound. Negative signs include Drake's sign and McBurney's sign.   Skin:     General: Skin is warm and dry.      Capillary Refill: Capillary refill takes less than 2 seconds.   Neurological:      General: No focal deficit present.      Mental Status: She is alert.   Psychiatric:         Mood and Affect: Mood normal.         Behavior: Behavior normal.         Procedures           ED Course      /65   Pulse 79   Temp 97.7 °F (36.5 °C)   Resp 18   Ht 160 cm (63\")   Wt 116 kg (256 lb 13.4 oz)   LMP  (LMP Unknown)   SpO2 91%   BMI 45.50 kg/m²   Labs Reviewed   COMPREHENSIVE METABOLIC PANEL - Abnormal; Notable for the following components:       Result Value    Glucose 230 (*)     ALT (SGPT) 46 (*)     AST (SGOT) 56 (*)     Alkaline Phosphatase 162 (*)     All other components within normal limits    Narrative:     GFR Categories in Chronic Kidney Disease (CKD)      GFR Category          GFR (mL/min/1.73)    Interpretation  G1                     90 or greater         Normal or high (1)  G2                      60-89                Mild decrease (1)  G3a                   45-59                Mild to moderate decrease  G3b                   30-44                Moderate to severe decrease  G4                    15-29                Severe decrease  G5                    14 or less           Kidney failure          (1)In the absence of evidence of kidney disease, neither GFR category G1 or G2 fulfill the criteria for CKD.    eGFR calculation 2021 CKD-EPI creatinine equation, which does not include race as a factor   URINALYSIS W/ MICROSCOPIC IF INDICATED (NO CULTURE) - Abnormal; Notable for the following components:    Specific Gravity, UA 1.036 (*)     Glucose, UA >=1000 mg/dL (3+) (*)     Leuk Esterase, UA Trace (*)     All other components within normal limits   CBC WITH AUTO DIFFERENTIAL - Abnormal; Notable for the " following components:    Monocyte % 4.6 (*)     All other components within normal limits   URINALYSIS, MICROSCOPIC ONLY - Abnormal; Notable for the following components:    WBC, UA 11-20 (*)     Squamous Epithelial Cells, UA 3-6 (*)     All other components within normal limits   LIPASE - Normal   RAINBOW DRAW    Narrative:     The following orders were created for panel order [place] Draw.  Procedure                               Abnormality         Status                     ---------                               -----------         ------                     Green Top (Gel)[[id number]]                                  Final result               Lavender Top[[id number]]                                     Final result               Gold Top - SST[[id number]]                                   Final result               Light Blue Top[[id number]]                                   Final result                 Please view results for these tests on the individual orders.   CBC AND DIFFERENTIAL    Narrative:     The following orders were created for panel order CBC & Differential.  Procedure                               Abnormality         Status                     ---------                               -----------         ------                     CBC Auto Differential[[id number]]        Abnormal            Final result                 Please view results for these tests on the individual orders.   GREEN TOP   LAVENDER TOP   GOLD TOP - SST   LIGHT BLUE TOP     Medications   ondansetron (ZOFRAN) injection 4 mg (4 mg Intravenous Given 3/21/25 2138)   ketorolac (TORADOL) injection 15 mg (15 mg Intravenous Given 3/21/25 2138)   iopamidol (ISOVUE-370) 76 % injection 100 mL (100 mL Intravenous Given 3/21/25 2240)   pantoprazole (PROTONIX) injection 80 mg (80 mg Intravenous Given 3/22/25 0016)   fluconazole (DIFLUCAN) tablet 150 mg (150 mg Oral Given 3/22/25 0016)     CT Abdomen Pelvis With Contrast  Result Date:  3/21/2025  1.No acute findings in the abdomen or pelvis. 2.Colonic diverticulosis without evidence of diverticulitis. 3.Prior cholecystectomy. Electronically Signed: Dionicio King MD  3/21/2025 10:52 PM EDT  Workstation ID: [id number]                                                     Medical Decision Making  Problems Addressed:  Candidiasis of genitalia in female: complicated acute illness or injury  Constipation, unspecified constipation type: complicated acute illness or injury  Diverticulosis large intestine w/o perforation or abscess w/o bleeding: complicated acute illness or injury  Elevated LFTs: complicated acute illness or injury  Pain of upper abdomen: complicated acute illness or injury    Amount and/or Complexity of Data Reviewed  Labs: ordered.  Radiology: ordered.    Risk  Prescription drug management.      Patient presents to the ED for the above complaint, underwent the above exam and workup.    EKG reviewed: Not clinically indicated    Imaging reviewed: As reviewed interpreted by Dr. Multani, ED attending.  Results as above show no acute findings colonic diverticulosis without diverticulitis no prior cholecystectomy.    Reviewed external records from [facility] from visit on February 25, 2025 with KUB enema and mag citrate.    Differential diagnosis considered: Constipation, colitis, IBS    Patient was treated with the following medications while in the ED;   Medications   ondansetron (ZOFRAN) injection 4 mg (4 mg Intravenous Given 3/21/25 2138)   ketorolac (TORADOL) injection 15 mg (15 mg Intravenous Given 3/21/25 2138)   iopamidol (ISOVUE-370) 76 % injection 100 mL (100 mL Intravenous Given 3/21/25 2240)   pantoprazole (PROTONIX) injection 80 mg (80 mg Intravenous Given 3/22/25 0016)   fluconazole (DIFLUCAN) tablet 150 mg (150 mg Oral Given 3/22/25 0016)     Upon evaluation of patient IV was established labs imaging were obtained.  Results all as above.  Patient reports improvement in  pain with Toradol and Zofran.  She was educated on appropriate use of Colace Linzess and MiraLAX.  Increase fluid and fiber intake.  She has an appointment next week with Dr. Harrison and was encouraged to follow an appointment with Dr. Clark, gastroenterology.  All results and plan were discussed with patient and she is agreeable.    Consideration was given for admission, but the patient was stable for outpatient management as patient was ambulatory, nontoxic, stable, and afebrile.  Exam as above.    Disposition: Discussed need to follow-up diagnostics, including incidental findings.  Discharged with instructions to obtain outpatient follow-up with patient's symptoms and findings, with strict return precautions if patient develops new or worsening symptoms.    This document is intended for medical expert use only. Reading of this document by patients and/or patient's family without participating medical staff guidance may result in misinterpretation and unintended morbidity.  Any interpretation of such data is the responsibility of the patient and/or family member responsible for the patient in concert with their primary or specialist providers, not to be left for sources of online searches such as [employer], [facility] or similar queries. Relying on these approaches to knowledge may result in misinterpretation, misguided goals of care and even death should patients or family members try recommendations outside of the realm of professional medical care in a supervised inpatient environment.     Final diagnoses:   Pain of upper abdomen   Elevated LFTs   Constipation, unspecified constipation type   Diverticulosis large intestine w/o perforation or abscess w/o bleeding   Candidiasis of genitalia in female       ED Disposition  ED Disposition       ED Disposition   Discharge    Condition   Stable    Comment   --               June Harrison MD  [address] [place] [address] DR ENOC [place] [id number] [phone]          TERESA Clark  MD Alessio  [address]  [place] [address] [phone]               Medication List        ASK your doctor about these medications      fluconazole 150 MG tablet  Commonly known as: DIFLUCAN  Take 1 tablet by mouth 1 (One) Time for 1 dose.  Ask about: Should I take this medication?               Where to Get Your Medications        These medications were sent to [id number] - [place], [place] - [address] AT [address] OF  & [facility] - [phone]  - [phone] [place] [address], [place] [address]      Phone: [phone]   fluconazole 150 MG tablet            Shana Villavicencio, APRN  03/22/25 0411

## 2025-03-25 NOTE — PROGRESS NOTES
Subjective   Riddhi Cid is a 54 y.o. female. Presents to [facility]    Chief Complaint   Patient presents with    Abdominal Pain       History of Present Illness  Riddhi was seen at [facility]  on 03/21/25. She was seen for abdominal pain. Labs that were performed during the encounter included: CMP, CBC, and Lipase. Diagnostic studies that were performed included: CT Scan. Medication changes: fluconazole.   Abdominal Pain  This is a chronic problem. The current episode started more than 1 month ago (6-7 wks). The onset quality is gradual. The problem occurs daily. The problem has been unchanged. The pain is located in the epigastric region. The pain is mild. The quality of the pain is aching, burning and cramping. The abdominal pain does not radiate. Associated symptoms include belching, constipation, flatus and nausea. Pertinent negatives include no diarrhea, frequency, headaches, hematuria or vomiting. The pain is aggravated by eating. Relieved by: not eating. She has tried antacids (laxatives) for the symptoms.        I personally reviewed and updated the patient's allergies, medications, problem list, and past medical, surgical, social, and family history. I have reviewed and confirmed the accuracy of the History of Present Illness and Review of Symptoms as documented by the MA/LPN/RN. June Harrison MD    Allergies:  Allergies   Allergen Reactions    Sulfa Antibiotics Rash    Meloxicam GI Intolerance       Social History:  Social History     Socioeconomic History    Marital status:     Number of children: 3    Years of education: GED   Tobacco Use    Smoking status: Every Day     Current packs/day: 1.00     Average packs/day: 1 pack/day for 53.0 years (53.0 ttl pk-yrs)     Types: Cigarettes     Passive exposure: Current    Smokeless tobacco: Never   Vaping Use    Vaping status: Never Used   Substance and Sexual Activity    Alcohol use: No    Drug use: No    Sexual  activity: Defer       Family History:  Family History   Problem Relation Age of Onset    Stroke Mother     Hypertension Mother     Hyperlipidemia Mother     Hypothyroidism Mother     Cancer Father     Heart attack Father     Heart disease Father     Prostate cancer Father     Diabetes Father     Hypertension Father     Hyperlipidemia Father     Coronary artery disease Father     Breast cancer Paternal Grandmother     Hypothyroidism Grandchild     Ovarian cancer Neg Hx        Past Medical History :  Patient Active Problem List   Diagnosis    Class 3 severe obesity due to excess calories with serious comorbidity and body mass index (BMI) of 40.0 to 44.9 in adult    Degeneration of intervertebral disc of cervical region    Degeneration of intervertebral disc of lumbar region with discogenic back pain and lower extremity pain    Acquired hypothyroidism    Mixed hyperlipidemia    Major depressive disorder, recurrent, moderate    Obstructive sleep apnea    Vitamin B12 deficiency    Vitamin D deficiency    COPD (chronic obstructive pulmonary disease)    Thyroid nodule    Chronic midline low back pain with right-sided sciatica    Cervicalgia    Cervical stenosis of spine    Arthritis    Moderate persistent asthma without complication    Gastroparalysis    History of chicken pox    IBS (irritable bowel syndrome)    GERD (gastroesophageal reflux disease)    Dependent edema    Optic disc hemorrhage    Type 2 diabetes mellitus with hyperglycemia, without long-term current use of insulin    Tension headache    Allergic rhinitis    Disorder of peripheral nervous system    Fibromyositis    Tobacco dependence syndrome    Cervical smear, as part of routine gynecological examination    Disease due to severe acute respiratory syndrome coronavirus 2 (SARS-CoV-2)    Lung cancer screening declined by patient    Acute cough    Hospital discharge follow-up    Dizziness    Lesion of skin of scalp    Pulmonary nodules    Right calf pain     Thrush    Screening mammogram for breast cancer    Candida vaginitis       Medication List:    Current Outpatient Medications:     Accu-Chek FastClix Lancets misc, 1 each by Other route Daily., Disp: 100 each, Rfl: 3    albuterol (PROVENTIL) (2.5 MG/3ML) 0.083% nebulizer solution, Take 2.5 mg by nebulization Every 4 (Four) Hours As Needed for Wheezing., Disp: 180 mL, Rfl: 3    albuterol sulfate  (90 Base) MCG/ACT inhaler, INHALE 2 PUFFS BY MOUTH EVERY 4 HOURS AS NEEDED FOR WHEEZING, Disp: 18 g, Rfl: 12    aspirin 81 MG chewable tablet, Chew 1 tablet Daily., Disp: , Rfl:     Blood Glucose Monitoring Suppl (Accu-Chek Guide) w/Device kit, 1 each Daily., Disp: 1 kit, Rfl: 0    bumetanide (BUMEX) 1 MG tablet, Take 1 tablet by mouth Daily., Disp: 30 tablet, Rfl: 12    citalopram (CeleXA) 40 MG tablet, TAKE 1 TABLET BY MOUTH DAILY, Disp: 90 tablet, Rfl: 1    dexlansoprazole (Dexilant) 60 MG capsule, Take 1 capsule by mouth Daily., Disp: 90 capsule, Rfl: 3    Docusate Sodium (COLACE PO), Take  by mouth., Disp: , Rfl:     famotidine (PEPCID) 40 MG tablet, Take 1 tablet by mouth Every Night., Disp: , Rfl:     fenofibrate (TRICOR) 145 MG tablet, Take 1 tablet by mouth Daily., Disp: 90 tablet, Rfl: 3    fluconazole (DIFLUCAN) 150 MG tablet, Take 1 tablet by mouth 1 (One) Time Per Week., Disp: 4 tablet, Rfl: 0    fluticasone (FLONASE) 50 MCG/ACT nasal spray, SHAKE LIQUID AND USE 2 SPRAYS IN EACH NOSTRIL EVERY DAY, Disp: 48 g, Rfl: 3    Fluticasone-Salmeterol (Wixela Inhub) 250-50 MCG/ACT DISKUS, INHALE 1 PUFF BY MOUTH TWICE DAILY, Disp: 60 each, Rfl: 12    folic acid (FOLVITE) 1 MG tablet, Take 1 tablet by mouth Daily., Disp: , Rfl:     glucose blood (Accu-Chek Guide) test strip, 1 each by Other route Daily. Use as instructed, Disp: 100 each, Rfl: 3    HYDROcodone-acetaminophen (Norco)  MG per tablet, Take 1 tablet by mouth Every 6 (Six) Hours As Needed for Moderate Pain., Disp: 120 tablet, Rfl: 0     HYDROcodone-acetaminophen (Norco)  MG per tablet, Take 1 tablet by mouth Every 6 (Six) Hours As Needed for Moderate Pain., Disp: 120 tablet, Rfl: 0    HYDROcodone-acetaminophen (Norco)  MG per tablet, Take 1 tablet by mouth Every 6 (Six) Hours As Needed for Moderate Pain., Disp: 120 tablet, Rfl: 0    Lancets Misc. (ACCU-CHEK FASTCLIX LANCET) kit, Dx: E11.9, DM type 2, controlled, Disp: , Rfl:     levothyroxine (SYNTHROID, LEVOTHROID) 50 MCG tablet, Take 1 tablet by mouth Daily., Disp: , Rfl:     Linzess 290 MCG capsule capsule, Take 1 capsule by mouth Every Other Day., Disp: , Rfl:     metFORMIN ER (GLUCOPHAGE-XR) 500 MG 24 hr tablet, TAKE 2 TABLETS BY MOUTH DAILY WITH BREAKFAST, Disp: 180 tablet, Rfl: 3    montelukast (SINGULAIR) 10 MG tablet, TAKE 1 TABLET BY MOUTH EVERY NIGHT, Disp: 90 tablet, Rfl: 3    naloxone (NARCAN) 4 MG/0.1ML nasal spray, Call 911. Don't prime. [place] in 1 nostril for overdose. Repeat in 2-3 minutes in other nostril if no or minimal breathing/responsiveness., Disp: 2 each, Rfl: 0    Omega-3 Fatty Acids (fish oil) 1000 MG capsule capsule, 2 capsules Daily With Breakfast., Disp: , Rfl:     ondansetron ODT (ZOFRAN-ODT) 4 MG disintegrating tablet, DISSOLVE 1 TABLET ON THE TONGUE EVERY 8 HOURS AS NEEDED FOR VOMITING, Disp: 20 tablet, Rfl: 0    Polyethylene Glycol 3350 (MIRALAX PO), Take  by mouth., Disp: , Rfl:     potassium chloride (K-DUR,KLOR-CON) 20 MEQ CR tablet, Take 1 tablet by mouth Daily As Needed. Only if taking 2 bumetanide, Disp: , Rfl:     rosuvastatin (CRESTOR) 20 MG tablet, TAKE 1 TABLET BY MOUTH DAILY, Disp: 90 tablet, Rfl: 3    vitamin B-12 (CYANOCOBALAMIN) 1000 MCG tablet, Take 1 tablet by mouth Daily., Disp: , Rfl:     vitamin D3 125 MCG (5000 UT) capsule capsule, Take 1 capsule by mouth Daily., Disp: , Rfl:     Insulin Glargine (BASAGLAR KWIKPEN) 100 UNIT/ML injection pen, Inject 10 Units under the skin into the appropriate area as directed Daily., Disp: 15 mL,  "Rfl: 12    Insulin Pen Needle (B-D UF III MINI PEN NEEDLES) 31G X 5 MM misc, Use 1 each Daily., Disp: 100 each, Rfl: 3    terconazole (TERAZOL 7) 0.4 % vaginal cream, Insert 1 applicator into the vagina Every Night., Disp: 45 g, Rfl: 1    Past Surgical History:  Past Surgical History:   Procedure Laterality Date    BREAST BIOPSY Right     CARPAL TUNNEL RELEASE Right     CERVICAL DISCECTOMY ANTERIOR      CHOLECYSTECTOMY      ESSURE TUBAL LIGATION           Physical Exam:      Vital Signs:    Vitals:    03/28/25 1119   BP: 134/82   Pulse: 108   Resp: 19   Temp: 97.5 °F (36.4 °C)   SpO2: 95%        /82 (BP Location: Right arm, Patient Position: Sitting, Cuff Size: Adult)   Pulse 108   Temp 97.5 °F (36.4 °C) (Temporal)   Resp 19   Ht 160 cm (63\")   Wt 116 kg (255 lb)   LMP  (LMP Unknown)   SpO2 95% Comment: ra  BMI 45.17 kg/m²     Wt Readings from Last 3 Encounters:   03/28/25 116 kg (255 lb)   03/21/25 116 kg (256 lb 13.4 oz)   10/24/24 111 kg (245 lb)       Result Review :   The following data was reviewed by: June Harrison MD on 03/28/2025:  A1C Last 3 Results          7/1/2024    10:11 10/7/2024    10:27 3/28/2025    11:37   HGBA1C Last 3 Results   Hemoglobin A1C 7.3  7.1  8.6                Latest Reference Range & Units 03/21/25 21:36 03/28/25 11:37   Sodium 136 - 145 mmol/L 139    Potassium 3.5 - 5.2 mmol/L 4.3    Chloride 98 - 107 mmol/L 101    CO2 22.0 - 29.0 mmol/L 26.2    Anion Gap 5.0 - 15.0 mmol/L 11.8    BUN 6 - 20 mg/dL 8    Creatinine 0.57 - 1.00 mg/dL 0.83    BUN/Creatinine Ratio 7.0 - 25.0  9.6    eGFR >60.0 mL/min/1.73 83.4    Glucose 65 - 99 mg/dL 230 (H)    Calcium 8.6 - 10.5 mg/dL 9.3    Alkaline Phosphatase 39 - 117 U/L 162 (H)    Total Protein 6.0 - 8.5 g/dL 7.1    Albumin 3.5 - 5.2 g/dL 4.2    Globulin gm/dL 2.9    A/G Ratio g/dL 1.4    AST (SGOT) 1 - 32 U/L 56 (H)    ALT (SGPT) 1 - 33 U/L 46 (H)    Total Bilirubin 0.0 - 1.2 mg/dL 0.3    Hemoglobin A1C 4.5 - 5.7 %  8.6 ! "   Lipase 13 - 60 U/L 41    WBC 3.40 - 10.80 10*3/mm3 7.57    RBC 3.77 - 5.28 10*6/mm3 4.59    Hemoglobin 12.0 - 15.9 g/dL 13.6    Hematocrit 34.0 - 46.6 % 42.3    Platelets 140 - 450 10*3/mm3 200    RDW 12.3 - 15.4 % 12.9    MCV 79.0 - 97.0 fL 92.2    MCH 26.6 - 33.0 pg 29.6    MCHC 31.5 - 35.7 g/dL 32.2    MPV 6.0 - 12.0 fL 11.5    RDW-SD 37.0 - 54.0 fl 43.5    Neutrophil Rel % 42.7 - 76.0 % 63.3    Lymphocyte Rel % 19.6 - 45.3 % 27.9    Monocyte Rel % 5.0 - 12.0 % 4.6 (L)    Eosinophil Rel % 0.3 - 6.2 % 3.0    Basophil Rel % 0.0 - 1.5 % 0.8    Immature Granulocyte Rel % 0.0 - 0.5 % 0.4    Neutrophils Absolute 1.70 - 7.00 10*3/mm3 4.79    Lymphocytes Absolute 0.70 - 3.10 10*3/mm3 2.11    Monocytes Absolute 0.10 - 0.90 10*3/mm3 0.35    Eosinophils Absolute 0.00 - 0.40 10*3/mm3 0.23    Basophils Absolute 0.00 - 0.20 10*3/mm3 0.06    Immature Grans, Absolute 0.00 - 0.05 10*3/mm3 0.03    nRBC 0.0 - 0.2 /100 WBC 0.0    (H): Data is abnormally high  !: Data is abnormal  (L): Data is abnormally low    Physical Exam  Vitals reviewed.   Constitutional:       Appearance: Normal appearance. She is well-developed.   HENT:      Head: Normocephalic and atraumatic.   Eyes:      General:         Right eye: No discharge.         Left eye: No discharge.   Cardiovascular:      Rate and Rhythm: Normal rate and regular rhythm.      Heart sounds: Normal heart sounds. No murmur heard.     No friction rub. No gallop.   Pulmonary:      Effort: Pulmonary effort is normal. No respiratory distress.      Breath sounds: Normal breath sounds. No wheezing or rales.   Abdominal:      General: Abdomen is flat. Bowel sounds are normal. There is no distension.      Palpations: Abdomen is soft. There is no mass.      Tenderness: There is no abdominal tenderness. There is no guarding or rebound.      Hernia: No hernia is present.   Skin:     General: Skin is warm and dry.      Findings: No rash.   Neurological:      Mental Status: She is alert and  oriented to person, place, and time.      Coordination: Coordination normal.      Gait: Gait normal.   Psychiatric:         Behavior: Behavior is cooperative.         Assessment and Plan:  Problems Addressed this Visit          Cardiac and Vasculature    Mixed hyperlipidemia       She is on crestor and fenofibrate.   Continue current treatment  Check labs         Relevant Orders    Comprehensive Metabolic Panel    Lipid Panel With / Chol / HDL Ratio       Endocrine and Metabolic    Class 3 severe obesity due to excess calories with serious comorbidity and body mass index (BMI) of 40.0 to 44.9 in adult    Patient's (Body mass index is 45.17 kg/m².) indicates that they are morbidly/severely obese (BMI > 40 or > 35 with obesity - related health condition) with health conditions that include hypertension and diabetes mellitus . Weight is unchanged. BMI  is above average; BMI management plan is completed. We discussed low calorie, low carb based diet program, portion control, and increasing exercise.          Type 2 diabetes mellitus with hyperglycemia, without long-term current use of insulin    Diabetes is worsening.   Medication changes per orders.  Recommended a Mediterranean style of eating  Regular aerobic exercise.  Diabetes will be reassessed in 3 months         Relevant Medications    Insulin Glargine (BASAGLAR KWIKPEN) 100 UNIT/ML injection pen    Insulin Pen Needle (B-D UF III MINI PEN NEEDLES) 31G X 5 MM misc    Other Relevant Orders    POC Glycosylated Hemoglobin (Hb A1C) (Completed)       Gastrointestinal Abdominal     Gastroparalysis - Primary    She sees Dr Chao  Can not be on GLP1         Relevant Medications    famotidine (PEPCID) 40 MG tablet    GERD (gastroesophageal reflux disease)    Worse  She has been off her medication  Restart famotidine         Relevant Medications    famotidine (PEPCID) 40 MG tablet     Other Visit Diagnoses         Yeast infection        Relevant Medications    fluconazole  (DIFLUCAN) 150 MG tablet    terconazole (TERAZOL 7) 0.4 % vaginal cream          Diagnoses         Codes Comments      Gastroparalysis    -  Primary ICD-10-CM: K31.84  ICD-9-CM: 536.3       Class 3 severe obesity due to excess calories with serious comorbidity and body mass index (BMI) of 45.0 to 49.9 in adult     ICD-10-CM: E66.813, Z68.42, E66.01  ICD-9-CM: 278.01, V85.42       Type 2 diabetes mellitus with hyperglycemia, without long-term current use of insulin     ICD-10-CM: E11.65  ICD-9-CM: 250.00, 790.29       Mixed hyperlipidemia     ICD-10-CM: E78.2  ICD-9-CM: 272.2       Yeast infection     ICD-10-CM: B37.9  ICD-9-CM: 112.9       Gastroesophageal reflux disease, unspecified whether esophagitis present     ICD-10-CM: K21.9  ICD-9-CM: 530.81                          An After Visit Summary and PPPS were given to the patient.       This document is intended for medical expert use only. Reading of this document by patients and/or patient's family without participating medical staff guidance may result in misinterpretation and unintended morbidity. Any interpretation of such data is the responsibility of the patient and/or family member responsible for the patient in concert with their primary or specialist providers, not to be left for sources of online searches such as [employer], [employer] or similar queries. Relying on these approaches to knowledge may result in misinterpretation, misguided goals of care and even death should patients or family members try recommendations outside of the realm of professional medical care.

## 2025-03-28 ENCOUNTER — OFFICE VISIT (OUTPATIENT)
Dept: FAMILY MEDICINE CLINIC | Facility: CLINIC | Age: 55
End: 2025-03-28
Payer: MEDICARE

## 2025-03-28 VITALS
DIASTOLIC BLOOD PRESSURE: 82 MMHG | TEMPERATURE: 97.5 F | BODY MASS INDEX: 45.18 KG/M2 | RESPIRATION RATE: 19 BRPM | HEART RATE: 108 BPM | OXYGEN SATURATION: 95 % | HEIGHT: 63 IN | WEIGHT: 255 LBS | SYSTOLIC BLOOD PRESSURE: 134 MMHG

## 2025-03-28 DIAGNOSIS — E11.65 TYPE 2 DIABETES MELLITUS WITH HYPERGLYCEMIA, WITHOUT LONG-TERM CURRENT USE OF INSULIN: ICD-10-CM

## 2025-03-28 DIAGNOSIS — E66.813 CLASS 3 SEVERE OBESITY DUE TO EXCESS CALORIES WITH SERIOUS COMORBIDITY AND BODY MASS INDEX (BMI) OF 45.0 TO 49.9 IN ADULT: ICD-10-CM

## 2025-03-28 DIAGNOSIS — K21.9 GASTROESOPHAGEAL REFLUX DISEASE, UNSPECIFIED WHETHER ESOPHAGITIS PRESENT: ICD-10-CM

## 2025-03-28 DIAGNOSIS — E66.01 CLASS 3 SEVERE OBESITY DUE TO EXCESS CALORIES WITH SERIOUS COMORBIDITY AND BODY MASS INDEX (BMI) OF 45.0 TO 49.9 IN ADULT: ICD-10-CM

## 2025-03-28 DIAGNOSIS — K31.84 GASTROPARALYSIS: Primary | ICD-10-CM

## 2025-03-28 DIAGNOSIS — E78.2 MIXED HYPERLIPIDEMIA: ICD-10-CM

## 2025-03-28 DIAGNOSIS — B37.9 YEAST INFECTION: ICD-10-CM

## 2025-03-28 LAB
EXPIRATION DATE: ABNORMAL
HBA1C MFR BLD: 8.6 % (ref 4.5–5.7)
Lab: ABNORMAL

## 2025-03-28 RX ORDER — FLUCONAZOLE 150 MG/1
150 TABLET ORAL WEEKLY
Qty: 4 TABLET | Refills: 0 | Status: SHIPPED | OUTPATIENT
Start: 2025-03-28

## 2025-03-28 RX ORDER — FAMOTIDINE 40 MG/1
40 TABLET, FILM COATED ORAL NIGHTLY
Start: 2025-03-28

## 2025-03-28 RX ORDER — EMPAGLIFLOZIN 10 MG/1
1 TABLET, FILM COATED ORAL DAILY
COMMUNITY
Start: 2025-02-19 | End: 2025-03-28

## 2025-03-28 RX ORDER — TERCONAZOLE 4 MG/G
1 CREAM VAGINAL NIGHTLY
Qty: 45 G | Refills: 1 | Status: SHIPPED | OUTPATIENT
Start: 2025-03-28

## 2025-03-28 RX ORDER — INSULIN GLARGINE 100 [IU]/ML
10 INJECTION, SOLUTION SUBCUTANEOUS DAILY
Qty: 15 ML | Refills: 12 | Status: SHIPPED | OUTPATIENT
Start: 2025-03-28

## 2025-03-28 RX ORDER — FLURBIPROFEN SODIUM 0.3 MG/ML
1 SOLUTION/ DROPS OPHTHALMIC DAILY
Qty: 100 EACH | Refills: 3 | Status: SHIPPED | OUTPATIENT
Start: 2025-03-28

## 2025-04-01 NOTE — ASSESSMENT & PLAN NOTE
Diabetes is worsening.   Medication changes per orders.  Recommended a Mediterranean style of eating  Regular aerobic exercise.  Diabetes will be reassessed in 3 months

## 2025-04-01 NOTE — ASSESSMENT & PLAN NOTE
Patient's (Body mass index is 45.17 kg/m².) indicates that they are morbidly/severely obese (BMI > 40 or > 35 with obesity - related health condition) with health conditions that include hypertension and diabetes mellitus . Weight is unchanged. BMI  is above average; BMI management plan is completed. We discussed low calorie, low carb based diet program, portion control, and increasing exercise.

## 2025-04-02 ENCOUNTER — CLINICAL SUPPORT (OUTPATIENT)
Dept: FAMILY MEDICINE CLINIC | Facility: CLINIC | Age: 55
End: 2025-04-02
Payer: MEDICARE

## 2025-04-02 NOTE — PROGRESS NOTES
Patient was in today to show her how to give insulin. Patient was explained how to give injection to her self and I helped her give herself her first injection. Advised if she has any questions to contact the office. She verbalized understanding.

## 2025-04-10 ENCOUNTER — OFFICE (AMBULATORY)
Dept: URBAN - METROPOLITAN AREA CLINIC 64 | Facility: CLINIC | Age: 55
End: 2025-04-10
Payer: MEDICARE

## 2025-04-10 VITALS
WEIGHT: 258 LBS | HEART RATE: 74 BPM | DIASTOLIC BLOOD PRESSURE: 69 MMHG | SYSTOLIC BLOOD PRESSURE: 116 MMHG | HEIGHT: 63 IN

## 2025-04-10 DIAGNOSIS — K21.9 GASTRO-ESOPHAGEAL REFLUX DISEASE WITHOUT ESOPHAGITIS: ICD-10-CM

## 2025-04-10 DIAGNOSIS — E11.43 TYPE 2 DIABETES MELLITUS WITH DIABETIC AUTONOMIC (POLY)NEURO: ICD-10-CM

## 2025-04-10 DIAGNOSIS — R14.0 ABDOMINAL DISTENSION (GASEOUS): ICD-10-CM

## 2025-04-10 DIAGNOSIS — R11.0 NAUSEA: ICD-10-CM

## 2025-04-10 DIAGNOSIS — R10.10 UPPER ABDOMINAL PAIN, UNSPECIFIED: ICD-10-CM

## 2025-04-10 DIAGNOSIS — K59.00 CONSTIPATION, UNSPECIFIED: ICD-10-CM

## 2025-04-10 PROCEDURE — 99214 OFFICE O/P EST MOD 30 MIN: CPT | Performed by: NURSE PRACTITIONER

## 2025-04-10 RX ORDER — ERYTHROMYCIN 250 MG/1
1000 TABLET, FILM COATED ORAL
Qty: 40 | Refills: 3 | Status: ACTIVE
Start: 2025-04-10

## 2025-04-10 RX ORDER — ONDANSETRON 4 MG/1
TABLET, ORALLY DISINTEGRATING ORAL
Qty: 45 | Refills: 1 | Status: ACTIVE

## 2025-04-10 RX ORDER — PRUCALOPRIDE 2 MG/1
2 TABLET, FILM COATED ORAL
Qty: 30 | Refills: 11 | Status: ACTIVE
Start: 2025-04-10

## 2025-04-11 ENCOUNTER — TRANSCRIBE ORDERS (OUTPATIENT)
Dept: ADMINISTRATIVE | Facility: HOSPITAL | Age: 55
End: 2025-04-11
Payer: MEDICARE

## 2025-04-11 DIAGNOSIS — R10.84 ABDOMINAL PAIN, GENERALIZED: Primary | ICD-10-CM

## 2025-04-11 DIAGNOSIS — R74.8 ELEVATED LIVER ENZYMES: ICD-10-CM

## 2025-04-11 LAB
COMP. METABOLIC PANEL (14): ALBUMIN: 4 G/DL (ref 3.8–4.9)
COMP. METABOLIC PANEL (14): ALKALINE PHOSPHATASE: 162 IU/L — HIGH (ref 44–121)
COMP. METABOLIC PANEL (14): ALT (SGPT): 40 IU/L — HIGH (ref 0–32)
COMP. METABOLIC PANEL (14): AST (SGOT): 44 IU/L — HIGH (ref 0–40)
COMP. METABOLIC PANEL (14): BILIRUBIN, TOTAL: 0.4 MG/DL (ref 0–1.2)
COMP. METABOLIC PANEL (14): BUN/CREATININE RATIO: 11 (ref 9–23)
COMP. METABOLIC PANEL (14): BUN: 8 MG/DL (ref 6–24)
COMP. METABOLIC PANEL (14): CALCIUM: 9.4 MG/DL (ref 8.7–10.2)
COMP. METABOLIC PANEL (14): CARBON DIOXIDE, TOTAL: 26 MMOL/L (ref 20–29)
COMP. METABOLIC PANEL (14): CHLORIDE: 106 MMOL/L (ref 96–106)
COMP. METABOLIC PANEL (14): CREATININE: 0.72 MG/DL (ref 0.57–1)
COMP. METABOLIC PANEL (14): EGFR: 99 ML/MIN/1.73 (ref 59–?)
COMP. METABOLIC PANEL (14): GLOBULIN, TOTAL: 2.7 G/DL (ref 1.5–4.5)
COMP. METABOLIC PANEL (14): GLUCOSE: 139 MG/DL — HIGH (ref 70–99)
COMP. METABOLIC PANEL (14): POTASSIUM: 4.8 MMOL/L (ref 3.5–5.2)
COMP. METABOLIC PANEL (14): PROTEIN, TOTAL: 6.7 G/DL (ref 6–8.5)
COMP. METABOLIC PANEL (14): SODIUM: 145 MMOL/L — HIGH (ref 134–144)
LIPASE: 34 U/L (ref 14–72)

## 2025-04-14 ENCOUNTER — OFFICE VISIT (OUTPATIENT)
Dept: PAIN MEDICINE | Facility: CLINIC | Age: 55
End: 2025-04-14
Payer: MEDICARE

## 2025-04-14 VITALS
SYSTOLIC BLOOD PRESSURE: 147 MMHG | HEART RATE: 98 BPM | OXYGEN SATURATION: 91 % | RESPIRATION RATE: 16 BRPM | DIASTOLIC BLOOD PRESSURE: 76 MMHG

## 2025-04-14 DIAGNOSIS — M51.362 DEGENERATION OF INTERVERTEBRAL DISC OF LUMBAR REGION WITH DISCOGENIC BACK PAIN AND LOWER EXTREMITY PAIN: ICD-10-CM

## 2025-04-14 DIAGNOSIS — M50.30 DEGENERATION OF INTERVERTEBRAL DISC OF CERVICAL REGION: ICD-10-CM

## 2025-04-14 DIAGNOSIS — M54.2 CERVICALGIA: ICD-10-CM

## 2025-04-14 DIAGNOSIS — M48.02 CERVICAL STENOSIS OF SPINE: ICD-10-CM

## 2025-04-14 DIAGNOSIS — M54.41 CHRONIC MIDLINE LOW BACK PAIN WITH RIGHT-SIDED SCIATICA: ICD-10-CM

## 2025-04-14 DIAGNOSIS — Z79.899 HIGH RISK MEDICATION USE: Primary | ICD-10-CM

## 2025-04-14 DIAGNOSIS — G89.29 CHRONIC MIDLINE LOW BACK PAIN WITH RIGHT-SIDED SCIATICA: ICD-10-CM

## 2025-04-14 PROCEDURE — G0463 HOSPITAL OUTPT CLINIC VISIT: HCPCS | Performed by: PHYSICAL MEDICINE & REHABILITATION

## 2025-04-14 PROCEDURE — 1160F RVW MEDS BY RX/DR IN RCRD: CPT | Performed by: PHYSICAL MEDICINE & REHABILITATION

## 2025-04-14 PROCEDURE — 1125F AMNT PAIN NOTED PAIN PRSNT: CPT | Performed by: PHYSICAL MEDICINE & REHABILITATION

## 2025-04-14 PROCEDURE — G2211 COMPLEX E/M VISIT ADD ON: HCPCS | Performed by: PHYSICAL MEDICINE & REHABILITATION

## 2025-04-14 PROCEDURE — 1159F MED LIST DOCD IN RCRD: CPT | Performed by: PHYSICAL MEDICINE & REHABILITATION

## 2025-04-14 PROCEDURE — 99214 OFFICE O/P EST MOD 30 MIN: CPT | Performed by: PHYSICAL MEDICINE & REHABILITATION

## 2025-04-14 RX ORDER — HYDROCODONE BITARTRATE AND ACETAMINOPHEN 10; 325 MG/1; MG/1
1 TABLET ORAL EVERY 6 HOURS PRN
Qty: 120 TABLET | Refills: 0 | Status: SHIPPED | OUTPATIENT
Start: 2025-04-14

## 2025-04-14 RX ORDER — ERYTHROMYCIN 250 MG/1
TABLET, COATED ORAL
COMMUNITY
Start: 2025-04-11

## 2025-04-14 NOTE — PROGRESS NOTES
Subjective   Riddhi Cid is a 54 y.o. female.     History of Present Illness  CC: Neck pain    Neck pain radiates to b/l shoulders, and left arm pain, began > 10 years ago, treated by me since 7/14/21. ACDF C5/7 (4/11/2016), also h/o fibromyalgia,  also pain in lower back and b/l legs and feet. 10/10 at worst, 6/10 at best, worse with activity, improves with rest and meds, always present, varies, aching, radiates in BLE. Imaging reviewed, satisfactory ACDF. Referred for pain management. Neck pain improved with ACDF, tapered off Junction with worsening pain. Taking Cymbalta which helps, tried Lyrica with weight gain, trying to lose weight. Restarted Junction at BID - qdaily prn but insufficient, increased to BID-TID #75, then TID, then QID prn, reduced to #105, now 10mg QID prn. Known h/o of CTS. Saw Juan Franz for worsening neck pain to LUE, ACDF is intact, started Diclofenac for DJD, Elavil. Could not tolerate even low-dose Gabapentin with drowsiness. Started Celebrex but did not feel like doing anything, stopped, stopped Mobic. Stopping numerous meds due to side effects, started Gabapentin with Dr. Pisano with benefit.DDD on MRI, started PT with benefit.   Back Pain  Pertinent negatives include no abdominal pain, bladder incontinence, chest pain, fever, numbness or weakness.   Foot Pain  Symptoms include nausea and neck pain.    Pertinent negative symptoms include no abdominal pain, no chest pain, no chills, no fatigue, no fever, no myalgias, no numbness, no vomiting and no weakness.   Neck Pain   Pertinent negatives include no chest pain, fever, numbness, trouble swallowing or weakness.        The following portions of the patient's history were reviewed and updated as appropriate: allergies, current medications, past family history, past medical history, past social history, past surgical history and problem list.    Review of Systems   Constitutional:  Negative for chills, fatigue and fever.   HENT:  Negative for  hearing loss and trouble swallowing.    Eyes:  Negative for visual disturbance.   Respiratory:  Negative for shortness of breath.    Cardiovascular:  Negative for chest pain.   Gastrointestinal:  Positive for constipation and nausea. Negative for abdominal pain, diarrhea and vomiting.   Genitourinary:  Negative for urinary incontinence.   Musculoskeletal:  Positive for arthralgias, back pain and neck pain. Negative for joint swelling and myalgias.   Neurological:  Positive for headache. Negative for dizziness, weakness and numbness.       Objective   Physical Exam   Constitutional: She is oriented to person, place, and time. She appears well-developed and well-nourished.   HENT:   Head: Normocephalic and atraumatic.   Eyes: Pupils are equal, round, and reactive to light.   Cardiovascular: Normal rate, regular rhythm and normal heart sounds.   Pulmonary/Chest: Effort normal and breath sounds normal.   Abdominal: Soft. Normal appearance and bowel sounds are normal. She exhibits no distension. There is no abdominal tenderness.   Musculoskeletal:      Comments: CAM boot on LLE   Neurological: She is alert and oriented to person, place, and time. She has normal reflexes. She displays normal reflexes. No sensory deficit.   Psychiatric: Her behavior is normal. Mood, judgment and thought content normal.         Assessment & Plan   Diagnoses and all orders for this visit:    1. Chronic midline low back pain with right-sided sciatica (Primary)    2. Cervicalgia    3. Cervical stenosis of spine    4. Degeneration of intervertebral disc of cervical region    5. Degeneration of intervertebral disc of lumbar region with discogenic back pain and lower extremity pain        INspect reviewed, in order. Low risk. Repeat UDS 5/6/24 in order.  Reduced to Norco 10/325mg q6-8h #105, too painful, restarted q6h prn #120, then reduced to #105 successfully but pain worsening. Reduced to 7.5mg QID prn, intolerable, increased back to 10mg QID  prn, briefly increased by Ortho for L ankle fx pain, likely having surgery soon, has increased pain. Filled 3/26/25.   Patient's symptoms are still adequately managed by current medication regimen, is doing well at this strength and dosage, therefore I will continue to prescribe unchanged as the most appropriate course of treatment.  Afraid to start Lyrica due to possible weight gain. Restarted Gabapentin, takes 300-900mg/day as tolerated, helping a great deal.  Severe GERD, IBS, family h/o CV dz. Stopped Diclofenac, could not tolerate Celebrex 200mg qdaily. Taking Naproxen with PCP but hard on her stomach, no personal CV issues. Began Mobic 7.5mg qdaily prn.  Began Tizanidine 4mg qHS prn.  Began Licart prn, can take NSAIDs per patient.  Cont other meds as prescribed.  Cont TENS, unit provided here, patient reports it helps her pain significantly.  Cont b/l CTS braces at night only.  Patient reports she cannot start PT at this time as her daughter currently works 12h shifts, may be able to begin in future if her daughter can change to an 8h shift schedule.  Will begin neuropathic comp cream if her current cream does not work.  Ordered LSO for truncal stability. Cont PT.  New X-ray T-spine to eval for worsening pathology.  Letter to return to work. Does not drive or operate heavy machinery while using pain medication.  RTC 3 months for f/u.

## 2025-04-15 RX ORDER — MONTELUKAST SODIUM 10 MG/1
10 TABLET ORAL NIGHTLY
Qty: 90 TABLET | Refills: 3 | Status: SHIPPED | OUTPATIENT
Start: 2025-04-15

## 2025-04-22 NOTE — PROGRESS NOTES
Subjective   Riddhi Cid is a 54 y.o. female. Presents to [facility]    Chief Complaint   Patient presents with    Diabetes       Diabetes  She presents for her follow-up diabetic visit. She has type 2 diabetes mellitus. Pertinent negatives for hypoglycemia include no dizziness or sweats. Pertinent negatives for diabetes include no chest pain, no fatigue and no foot ulcerations. There are no hypoglycemic complications. There are no diabetic complications. Risk factors for coronary artery disease include diabetes mellitus, dyslipidemia, hypertension, obesity and stress. Current diabetic treatment includes oral agent (monotherapy). She is compliant with treatment most of the time. She is following a generally healthy diet. Meal planning includes avoidance of concentrated sweets. She participates in exercise intermittently. Her breakfast blood glucose range is generally 140-180 mg/dl. Her overall blood glucose range is 180-200 mg/dl. An ACE inhibitor/angiotensin II receptor blocker is not being taken. She sees a podiatrist.Eye exam is not current.      She sees Dr Menard. She says her sugar is improving.     I personally reviewed and updated the patient's allergies, medications, problem list, and past medical, surgical, social, and family history. I have reviewed and confirmed the accuracy of the History of Present Illness and Review of Symptoms as documented by the MA/LPN/RN. June Harrison MD    Allergies:  Allergies   Allergen Reactions    Sulfa Antibiotics Rash    Meloxicam GI Intolerance       Social History:  Social History     Socioeconomic History    Marital status:     Number of children: 3    Years of education: GED   Tobacco Use    Smoking status: Every Day     Current packs/day: 1.00     Average packs/day: 1 pack/day for 53.0 years (53.0 ttl pk-yrs)     Types: Cigarettes     Passive exposure: Current    Smokeless tobacco: Never   Vaping Use    Vaping status: Never Used    Substance and Sexual Activity    Alcohol use: No    Drug use: No    Sexual activity: Defer       Family History:  Family History   Problem Relation Age of Onset    Stroke Mother     Hypertension Mother     Hyperlipidemia Mother     Hypothyroidism Mother     Cancer Father     Heart attack Father     Heart disease Father     Prostate cancer Father     Diabetes Father     Hypertension Father     Hyperlipidemia Father     Coronary artery disease Father     Breast cancer Paternal Grandmother     Hypothyroidism Grandchild     Ovarian cancer Neg Hx        Past Medical History :  Patient Active Problem List   Diagnosis    Class 3 severe obesity due to excess calories with serious comorbidity and body mass index (BMI) of 40.0 to 44.9 in adult    Degeneration of intervertebral disc of cervical region    Degeneration of intervertebral disc of lumbar region with discogenic back pain and lower extremity pain    Acquired hypothyroidism    Mixed hyperlipidemia    Major depressive disorder, recurrent, moderate    Obstructive sleep apnea    Vitamin B12 deficiency    Vitamin D deficiency    COPD (chronic obstructive pulmonary disease)    Thyroid nodule    Chronic midline low back pain with right-sided sciatica    Cervicalgia    Cervical stenosis of spine    Arthritis    Moderate persistent asthma without complication    Gastroparalysis    History of chicken pox    IBS (irritable bowel syndrome)    GERD (gastroesophageal reflux disease)    Dependent edema    Optic disc hemorrhage    Type 2 diabetes mellitus with hyperglycemia, without long-term current use of insulin    Tension headache    Allergic rhinitis    Disorder of peripheral nervous system    Fibromyositis    Tobacco dependence syndrome    Cervical smear, as part of routine gynecological examination    Disease due to severe acute respiratory syndrome coronavirus 2 (SARS-CoV-2)    Lung cancer screening declined by patient    Acute cough    Hospital discharge follow-up     Dizziness    Lesion of skin of scalp    Pulmonary nodules    Right calf pain    Thrush    Screening mammogram for breast cancer    Candida vaginitis       Medication List:    Current Outpatient Medications:     Accu-Chek FastClix Lancets misc, 1 each by Other route Daily., Disp: 100 each, Rfl: 3    albuterol (PROVENTIL) (2.5 MG/3ML) 0.083% nebulizer solution, Take 2.5 mg by nebulization Every 4 (Four) Hours As Needed for Wheezing., Disp: 180 mL, Rfl: 3    albuterol sulfate  (90 Base) MCG/ACT inhaler, INHALE 2 PUFFS BY MOUTH EVERY 4 HOURS AS NEEDED FOR WHEEZING, Disp: 18 g, Rfl: 12    aspirin 81 MG chewable tablet, Chew 1 tablet Daily., Disp: , Rfl:     Blood Glucose Monitoring Suppl (Accu-Chek Guide) w/Device kit, 1 each Daily., Disp: 1 kit, Rfl: 0    bumetanide (BUMEX) 1 MG tablet, Take 1 tablet by mouth Daily., Disp: 30 tablet, Rfl: 12    citalopram (CeleXA) 40 MG tablet, TAKE 1 TABLET BY MOUTH DAILY, Disp: 90 tablet, Rfl: 1    dexlansoprazole (Dexilant) 60 MG capsule, Take 1 capsule by mouth Daily., Disp: 90 capsule, Rfl: 3    Docusate Sodium (COLACE PO), Take  by mouth., Disp: , Rfl:     erythromycin base (E-MYCIN) 250 MG tablet, TAKE 1 TABLET BY MOUTH FOUR TIMES DAILY FOR 10 DAYS, Disp: , Rfl:     famotidine (PEPCID) 40 MG tablet, Take 1 tablet by mouth Every Night., Disp: , Rfl:     fenofibrate (TRICOR) 145 MG tablet, Take 1 tablet by mouth Daily., Disp: 90 tablet, Rfl: 3    fluticasone (FLONASE) 50 MCG/ACT nasal spray, SHAKE LIQUID AND USE 2 SPRAYS IN EACH NOSTRIL EVERY DAY, Disp: 48 g, Rfl: 3    Fluticasone-Salmeterol (Wixela Inhub) 250-50 MCG/ACT DISKUS, INHALE 1 PUFF BY MOUTH TWICE DAILY, Disp: 60 each, Rfl: 12    folic acid (FOLVITE) 1 MG tablet, Take 1 tablet by mouth Daily., Disp: , Rfl:     glucose blood (Accu-Chek Guide) test strip, 1 each by Other route Daily. Use as instructed, Disp: 100 each, Rfl: 3    HYDROcodone-acetaminophen (Norco)  MG per tablet, Take 1 tablet by mouth Every 6  (Six) Hours As Needed for Moderate Pain., Disp: 120 tablet, Rfl: 0    HYDROcodone-acetaminophen (Norco)  MG per tablet, Take 1 tablet by mouth Every 6 (Six) Hours As Needed for Moderate Pain., Disp: 120 tablet, Rfl: 0    HYDROcodone-acetaminophen (Norco)  MG per tablet, Take 1 tablet by mouth Every 6 (Six) Hours As Needed for Moderate Pain., Disp: 120 tablet, Rfl: 0    Insulin Glargine (BASAGLAR KWIKPEN) 100 UNIT/ML injection pen, Inject 10 Units under the skin into the appropriate area as directed Daily., Disp: 15 mL, Rfl: 12    Insulin Pen Needle (B-D UF III MINI PEN NEEDLES) 31G X 5 MM misc, Use 1 each Daily., Disp: 100 each, Rfl: 3    Lancets Misc. (ACCU-CHEK FASTCLIX LANCET) kit, Dx: E11.9, DM type 2, controlled, Disp: , Rfl:     levothyroxine (SYNTHROID, LEVOTHROID) 50 MCG tablet, Take 1 tablet by mouth Daily., Disp: , Rfl:     Linzess 290 MCG capsule capsule, Take 1 capsule by mouth Every Other Day., Disp: , Rfl:     metFORMIN ER (GLUCOPHAGE-XR) 500 MG 24 hr tablet, TAKE 2 TABLETS BY MOUTH DAILY WITH BREAKFAST, Disp: 180 tablet, Rfl: 3    montelukast (SINGULAIR) 10 MG tablet, TAKE 1 TABLET BY MOUTH EVERY NIGHT, Disp: 90 tablet, Rfl: 3    naloxone (NARCAN) 4 MG/0.1ML nasal spray, Call 911. Don't prime. [place] in 1 nostril for overdose. Repeat in 2-3 minutes in other nostril if no or minimal breathing/responsiveness., Disp: 2 each, Rfl: 0    Omega-3 Fatty Acids (fish oil) 1000 MG capsule capsule, 2 capsules Daily With Breakfast., Disp: , Rfl:     ondansetron ODT (ZOFRAN-ODT) 4 MG disintegrating tablet, DISSOLVE 1 TABLET ON THE TONGUE EVERY 8 HOURS AS NEEDED FOR VOMITING, Disp: 20 tablet, Rfl: 0    Polyethylene Glycol 3350 (MIRALAX PO), Take  by mouth., Disp: , Rfl:     potassium chloride (K-DUR,KLOR-CON) 20 MEQ CR tablet, Take 1 tablet by mouth Daily As Needed. Only if taking 2 bumetanide, Disp: , Rfl:     rosuvastatin (CRESTOR) 20 MG tablet, TAKE 1 TABLET BY MOUTH DAILY, Disp: 90 tablet, Rfl: 3     "terconazole (TERAZOL 7) 0.4 % vaginal cream, Insert 1 applicator into the vagina Every Night., Disp: 45 g, Rfl: 1    vitamin B-12 (CYANOCOBALAMIN) 1000 MCG tablet, Take 1 tablet by mouth Daily., Disp: , Rfl:     vitamin D3 125 MCG (5000 UT) capsule capsule, Take 1 capsule by mouth Daily., Disp: , Rfl:     losartan (COZAAR) 25 MG tablet, Take 1 tablet by mouth Daily., Disp: 30 tablet, Rfl: 12    Past Surgical History:  Past Surgical History:   Procedure Laterality Date    BREAST BIOPSY Right     CARPAL TUNNEL RELEASE Right     CERVICAL DISCECTOMY ANTERIOR      CHOLECYSTECTOMY      ESSURE TUBAL LIGATION           Physical Exam:      Vital Signs:    Vitals:    04/25/25 0905   BP: 138/90   Pulse: 109   Resp: 19   Temp: 97.1 °F (36.2 °C)   SpO2: 96%        /90 (BP Location: Right arm, Patient Position: Sitting, Cuff Size: Adult)   Pulse 109   Temp 97.1 °F (36.2 °C) (Temporal)   Resp 19   Ht 160 cm (63\")   Wt 115 kg (253 lb 6.4 oz)   SpO2 96% Comment: ra  BMI 44.89 kg/m²     Wt Readings from Last 3 Encounters:   04/25/25 115 kg (253 lb 6.4 oz)   03/28/25 116 kg (255 lb)   03/21/25 116 kg (256 lb 13.4 oz)       Result Review :                Physical Exam  Vitals reviewed.   Constitutional:       Appearance: Normal appearance. She is well-developed.   HENT:      Head: Normocephalic and atraumatic.   Eyes:      General:         Right eye: No discharge.         Left eye: No discharge.   Cardiovascular:      Rate and Rhythm: Normal rate and regular rhythm.      Heart sounds: Normal heart sounds. No murmur heard.     No friction rub. No gallop.   Pulmonary:      Effort: Pulmonary effort is normal. No respiratory distress.      Breath sounds: Normal breath sounds. No wheezing or rales.   Skin:     General: Skin is warm and dry.      Findings: No rash.   Neurological:      Mental Status: She is alert and oriented to person, place, and time.      Coordination: Coordination normal.      Gait: Gait normal. "   Psychiatric:         Behavior: Behavior is cooperative.         Assessment and Plan:  Problems Addressed this Visit          Cardiac and Vasculature    Mixed hyperlipidemia     She is on crestor and fenofibrate.   Continue current treatment  Check labs         Relevant Orders    Comprehensive Metabolic Panel    Lipid Panel With / Chol / HDL Ratio       Endocrine and Metabolic    Class 3 severe obesity due to excess calories with serious comorbidity and body mass index (BMI) of 40.0 to 44.9 in adult    Patient's (Body mass index is 44.89 kg/m².) indicates that they are morbidly/severely obese (BMI > 40 or > 35 with obesity - related health condition) with health conditions that include hypertension and diabetes mellitus . Weight is improving with lifestyle modifications. BMI  is above average; BMI management plan is completed. We discussed low calorie, low carb based diet program, portion control, and increasing exercise.          Acquired hypothyroidism    Relevant Orders    TSH    Vitamin B12 deficiency    recheck         Relevant Orders    Vitamin B12    Vitamin D deficiency    recheck         Relevant Orders    Vitamin D,25-Hydroxy    Type 2 diabetes mellitus with hyperglycemia, without long-term current use of insulin - Primary       Musculoskeletal and Injuries    Fibromyositis    She sees pain management  She is flaring today  Discussed getting some movement in today to prevent getting stiff.           Other Visit Diagnoses         Benign essential HTN        Relevant Medications    losartan (COZAAR) 25 MG tablet          Diagnoses         Codes Comments      Type 2 diabetes mellitus with hyperglycemia, without long-term current use of insulin    -  Primary ICD-10-CM: E11.65  ICD-9-CM: 250.00, 790.29       Benign essential HTN     ICD-10-CM: I10  ICD-9-CM: 401.1       Mixed hyperlipidemia     ICD-10-CM: E78.2  ICD-9-CM: 272.2       Acquired hypothyroidism     ICD-10-CM: E03.9  ICD-9-CM: 244.9       Vitamin  B12 deficiency     ICD-10-CM: E53.8  ICD-9-CM: 266.2       Vitamin D deficiency     ICD-10-CM: E55.9  ICD-9-CM: 268.9       Fibromyositis     ICD-10-CM: M79.7  ICD-9-CM: 729.1       Class 3 severe obesity due to excess calories with serious comorbidity and body mass index (BMI) of 40.0 to 44.9 in adult     ICD-10-CM: E66.813, E66.01, Z68.41  ICD-9-CM: 278.01, V85.41                          An After Visit Summary and PPPS were given to the patient.       This document is intended for medical expert use only. Reading of this document by patients and/or patient's family without participating medical staff guidance may result in misinterpretation and unintended morbidity. Any interpretation of such data is the responsibility of the patient and/or family member responsible for the patient in concert with their primary or specialist providers, not to be left for sources of online searches such as [employer], [employer] or similar queries. Relying on these approaches to knowledge may result in misinterpretation, misguided goals of care and even death should patients or family members try recommendations outside of the realm of professional medical care.

## 2025-04-25 ENCOUNTER — OFFICE VISIT (OUTPATIENT)
Dept: FAMILY MEDICINE CLINIC | Facility: CLINIC | Age: 55
End: 2025-04-25
Payer: MEDICARE

## 2025-04-25 VITALS
RESPIRATION RATE: 19 BRPM | OXYGEN SATURATION: 96 % | WEIGHT: 253.4 LBS | DIASTOLIC BLOOD PRESSURE: 90 MMHG | SYSTOLIC BLOOD PRESSURE: 138 MMHG | TEMPERATURE: 97.1 F | HEIGHT: 63 IN | HEART RATE: 109 BPM | BODY MASS INDEX: 44.9 KG/M2

## 2025-04-25 DIAGNOSIS — I10 BENIGN ESSENTIAL HTN: ICD-10-CM

## 2025-04-25 DIAGNOSIS — E66.01 CLASS 3 SEVERE OBESITY DUE TO EXCESS CALORIES WITH SERIOUS COMORBIDITY AND BODY MASS INDEX (BMI) OF 40.0 TO 44.9 IN ADULT: ICD-10-CM

## 2025-04-25 DIAGNOSIS — E66.813 CLASS 3 SEVERE OBESITY DUE TO EXCESS CALORIES WITH SERIOUS COMORBIDITY AND BODY MASS INDEX (BMI) OF 40.0 TO 44.9 IN ADULT: ICD-10-CM

## 2025-04-25 DIAGNOSIS — M79.7 FIBROMYOSITIS: ICD-10-CM

## 2025-04-25 DIAGNOSIS — E53.8 VITAMIN B12 DEFICIENCY: ICD-10-CM

## 2025-04-25 DIAGNOSIS — E11.65 TYPE 2 DIABETES MELLITUS WITH HYPERGLYCEMIA, WITHOUT LONG-TERM CURRENT USE OF INSULIN: Primary | ICD-10-CM

## 2025-04-25 DIAGNOSIS — E78.2 MIXED HYPERLIPIDEMIA: ICD-10-CM

## 2025-04-25 DIAGNOSIS — E55.9 VITAMIN D DEFICIENCY: ICD-10-CM

## 2025-04-25 DIAGNOSIS — E03.9 ACQUIRED HYPOTHYROIDISM: ICD-10-CM

## 2025-04-25 RX ORDER — LOSARTAN POTASSIUM 25 MG/1
25 TABLET ORAL DAILY
Qty: 90 TABLET | OUTPATIENT
Start: 2025-04-25

## 2025-04-25 RX ORDER — LOSARTAN POTASSIUM 25 MG/1
25 TABLET ORAL DAILY
Qty: 30 TABLET | Refills: 12 | Status: SHIPPED | OUTPATIENT
Start: 2025-04-25

## 2025-04-25 NOTE — ASSESSMENT & PLAN NOTE
She sees pain management  She is flaring today  Discussed getting some movement in today to prevent getting stiff.

## 2025-05-03 ENCOUNTER — HOSPITAL ENCOUNTER (OUTPATIENT)
Dept: MRI IMAGING | Facility: HOSPITAL | Age: 55
Discharge: HOME | End: 2025-05-03
Payer: MEDICARE

## 2025-05-03 DIAGNOSIS — R74.8 ELEVATED LIVER ENZYMES: ICD-10-CM

## 2025-05-03 DIAGNOSIS — R10.84 ABDOMINAL PAIN, GENERALIZED: ICD-10-CM

## 2025-05-03 PROCEDURE — 74181 MRI ABDOMEN W/O CONTRAST: CPT

## 2025-05-11 NOTE — ASSESSMENT & PLAN NOTE
Patient's (Body mass index is 44.89 kg/m².) indicates that they are morbidly/severely obese (BMI > 40 or > 35 with obesity - related health condition) with health conditions that include hypertension and diabetes mellitus . Weight is improving with lifestyle modifications. BMI  is above average; BMI management plan is completed. We discussed low calorie, low carb based diet program, portion control, and increasing exercise.

## 2025-05-19 DIAGNOSIS — E03.9 ACQUIRED HYPOTHYROIDISM: Primary | ICD-10-CM

## 2025-05-19 DIAGNOSIS — E03.9 ACQUIRED HYPOTHYROIDISM: ICD-10-CM

## 2025-05-19 RX ORDER — LEVOTHYROXINE SODIUM 50 UG/1
50 TABLET ORAL DAILY
Qty: 30 TABLET | Refills: 3 | Status: SHIPPED | OUTPATIENT
Start: 2025-05-19 | End: 2025-05-19

## 2025-05-19 RX ORDER — LEVOTHYROXINE SODIUM 50 UG/1
50 TABLET ORAL DAILY
Qty: 90 TABLET | Refills: 1 | Status: SHIPPED | OUTPATIENT
Start: 2025-05-19

## 2025-06-26 ENCOUNTER — TELEPHONE (OUTPATIENT)
Dept: FAMILY MEDICINE CLINIC | Facility: CLINIC | Age: 55
End: 2025-06-26

## 2025-06-26 RX ORDER — POTASSIUM CHLORIDE 1500 MG/1
20 TABLET, EXTENDED RELEASE ORAL DAILY
Qty: 90 TABLET | Refills: 3 | Status: SHIPPED | OUTPATIENT
Start: 2025-06-26

## 2025-06-26 RX ORDER — FLUTICASONE PROPIONATE 50 MCG
2 SPRAY, SUSPENSION (ML) NASAL DAILY
Qty: 48 G | Refills: 3 | Status: SHIPPED | OUTPATIENT
Start: 2025-06-26

## 2025-06-26 NOTE — TELEPHONE ENCOUNTER
Caller: Riddhi Cid    Relationship: Self    Best call back number: [phone]    What medication are you requesting:   potassium chloride (K-DUR,KLOR-CON) 20 MEQ CR tablet     Have you had these symptoms before:    [] Yes  [] No    Have you been treated for these symptoms before:   [] Yes  [] No    If a prescription is needed, what is your preferred pharmacy and phone number: [id number] [place] [address] AT [facility] OF  &  - [phone] [place] [phone] FX     Additional notes:  PLEASE CALL TO CONFIRM/DENY.

## 2025-07-14 ENCOUNTER — OFFICE VISIT (OUTPATIENT)
Dept: PAIN MEDICINE | Facility: CLINIC | Age: 55
End: 2025-07-14
Payer: MEDICARE

## 2025-07-14 VITALS
HEART RATE: 78 BPM | RESPIRATION RATE: 16 BRPM | SYSTOLIC BLOOD PRESSURE: 126 MMHG | DIASTOLIC BLOOD PRESSURE: 74 MMHG | OXYGEN SATURATION: 95 %

## 2025-07-14 DIAGNOSIS — M54.2 CERVICALGIA: Primary | ICD-10-CM

## 2025-07-14 DIAGNOSIS — G89.29 CHRONIC MIDLINE LOW BACK PAIN WITH RIGHT-SIDED SCIATICA: ICD-10-CM

## 2025-07-14 DIAGNOSIS — M51.362 DEGENERATION OF INTERVERTEBRAL DISC OF LUMBAR REGION WITH DISCOGENIC BACK PAIN AND LOWER EXTREMITY PAIN: ICD-10-CM

## 2025-07-14 DIAGNOSIS — M54.41 CHRONIC MIDLINE LOW BACK PAIN WITH RIGHT-SIDED SCIATICA: ICD-10-CM

## 2025-07-14 DIAGNOSIS — M48.02 CERVICAL STENOSIS OF SPINE: ICD-10-CM

## 2025-07-14 DIAGNOSIS — M50.30 DEGENERATION OF INTERVERTEBRAL DISC OF CERVICAL REGION: ICD-10-CM

## 2025-07-14 PROCEDURE — G0463 HOSPITAL OUTPT CLINIC VISIT: HCPCS | Performed by: PHYSICAL MEDICINE & REHABILITATION

## 2025-07-14 RX ORDER — HYDROCODONE BITARTRATE AND ACETAMINOPHEN 10; 325 MG/1; MG/1
1 TABLET ORAL EVERY 6 HOURS PRN
Qty: 120 TABLET | Refills: 0 | Status: SHIPPED | OUTPATIENT
Start: 2025-07-14

## 2025-07-14 RX ORDER — METOCLOPRAMIDE 10 MG/1
TABLET ORAL
COMMUNITY
Start: 2025-06-03

## 2025-07-14 NOTE — PROGRESS NOTES
Subjective   Riddhi Cid is a 54 y.o. female.     History of Present Illness  CC: Neck pain    Neck pain radiates to b/l shoulders, and left arm pain, began > 10 years ago, treated by me since 7/14/21. ACDF C5/7 (4/11/2016), also h/o fibromyalgia,  also pain in lower back and b/l legs and feet. 10/10 at worst, 6/10 at best, worse with activity, improves with rest and meds, always present, varies, aching, radiates in BLE. Imaging reviewed, satisfactory ACDF. Referred for pain management. Neck pain improved with ACDF, tapered off [place] with worsening pain. Taking Cymbalta which helps, tried Lyrica with weight gain, trying to lose weight. Restarted [place] at BID - qdaily prn but insufficient, increased to BID-TID #75, then TID, then QID prn, reduced to #105, now 10mg QID prn. Known h/o of CTS. Saw Juan Franz for worsening neck pain to LUE, ACDF is intact, started Diclofenac for DJD, Elavil. Could not tolerate even low-dose Gabapentin with drowsiness. Started Celebrex but did not feel like doing anything, stopped, stopped Mobic. Stopping numerous meds due to side effects, started Gabapentin with Dr. Pisano with benefit.DDD on MRI, started PT with benefit.   Pain  Symptoms: nausea and neck pain    Symptoms: no abdominal pain, no chest pain, no chills, no fatigue, no fever, no myalgias, no numbness, no vomiting and no weakness    Back Pain  Pertinent negatives include no abdominal pain, bladder incontinence, chest pain, fever, numbness or weakness.   Foot Pain  Symptoms include nausea and neck pain.    Pertinent negative symptoms include no abdominal pain, no chest pain, no chills, no fatigue, no fever, no myalgias, no numbness, no vomiting and no weakness.   Neck Pain   Pertinent negatives include no chest pain, fever, numbness, trouble swallowing or weakness.        The following portions of the patient's history were reviewed and updated as appropriate: allergies, current medications, past family history, past  medical history, past social history, past surgical history and problem list.    Review of Systems   Constitutional:  Negative for chills, fatigue and fever.   HENT:  Negative for hearing loss and trouble swallowing.    Eyes:  Negative for visual disturbance.   Respiratory:  Negative for shortness of breath.    Cardiovascular:  Negative for chest pain.   Gastrointestinal:  Positive for constipation and nausea. Negative for abdominal pain, diarrhea and vomiting.   Genitourinary:  Negative for urinary incontinence.   Musculoskeletal:  Positive for arthralgias, back pain and neck pain. Negative for joint swelling and myalgias.   Neurological:  Positive for headache. Negative for dizziness, weakness and numbness.       Objective   Physical Exam   Constitutional: She is oriented to person, place, and time. She appears well-developed and well-nourished.   HENT:   Head: Normocephalic and atraumatic.   Eyes: Pupils are equal, round, and reactive to light.   Cardiovascular: Normal rate, regular rhythm and normal heart sounds.   Pulmonary/Chest: Effort normal and breath sounds normal.   Abdominal: Soft. Normal appearance and bowel sounds are normal. She exhibits no distension. There is no abdominal tenderness.   Musculoskeletal:      Comments: CAM boot on LLE   Neurological: She is alert and oriented to person, place, and time. She has normal reflexes. She displays normal reflexes. No sensory deficit.   Psychiatric: Her behavior is normal. Mood, judgment and thought content normal.         Assessment & Plan   Diagnoses and all orders for this visit:    1. Chronic midline low back pain with right-sided sciatica (Primary)    2. Cervicalgia    3. Cervical stenosis of spine    4. Degeneration of intervertebral disc of cervical region    5. Degeneration of intervertebral disc of lumbar region with discogenic back pain and lower extremity pain        INspect reviewed, in order. Low risk. Repeat UDS 4/14/25 in order.  Reduced to  Norco 10/325mg q6-8h #105, too painful, restarted q6h prn #120, then reduced to #105 successfully but pain worsening. Reduced to 7.5mg QID prn, intolerable, increased back to 10mg QID prn, briefly increased by Ortho for L ankle fx pain, likely having surgery soon, has increased pain. Filled 6/24/25.   Patient's symptoms are still adequately managed by current medication regimen, is doing well at this strength and dosage, therefore I will continue to prescribe unchanged as the most appropriate course of treatment.  Afraid to start Lyrica due to possible weight gain. Restarted Gabapentin, takes 300-900mg/day as tolerated, helping a great deal.  Severe GERD, IBS, family h/o CV dz. Stopped Diclofenac, could not tolerate Celebrex 200mg qdaily. Taking Naproxen with PCP but hard on her stomach, no personal CV issues. Began Mobic 7.5mg qdaily prn.  Began Tizanidine 4mg qHS prn.  Began Licart prn, can take NSAIDs per patient.  Cont other meds as prescribed.  Cont TENS, unit provided here, patient reports it helps her pain significantly.  Cont b/l CTS braces at night only.  Patient reports she cannot start PT at this time as her daughter currently works 12h shifts, may be able to begin in future if her daughter can change to an 8h shift schedule.  Will begin neuropathic comp cream if her current cream does not work.  Ordered LSO for truncal stability. Cont PT.  New X-ray T-spine to eval for worsening pathology.  Letter to return to work. Does not drive or operate heavy machinery while using pain medication.  RTC 3 months for f/u.                 Physical Exam

## 2025-07-16 RX ORDER — ROSUVASTATIN CALCIUM 20 MG/1
20 TABLET, COATED ORAL DAILY
Qty: 90 TABLET | Refills: 3 | Status: SHIPPED | OUTPATIENT
Start: 2025-07-16

## 2025-07-21 NOTE — PROGRESS NOTES
Subjective   Riddhi Cid is a 54 y.o. female. Presents to [facility]    Chief Complaint   Patient presents with    Diabetes    Shortness of Breath       Diabetes  She presents for her follow-up diabetic visit. She has type 2 diabetes mellitus. Hypoglycemia symptoms include headaches. Pertinent negatives for hypoglycemia include no dizziness or sweats. Pertinent negatives for diabetes include no chest pain, no fatigue and no foot ulcerations. Risk factors for coronary artery disease include diabetes mellitus, dyslipidemia, hypertension, obesity and stress. Current diabetic treatment includes oral agent (monotherapy). She is compliant with treatment most of the time. She is following a generally healthy diet. Meal planning includes avoidance of concentrated sweets. She participates in exercise intermittently. Her breakfast blood glucose range is generally 140-180 mg/dl. Her overall blood glucose range is 140-180 mg/dl. An ACE inhibitor/angiotensin II receptor blocker is not being taken. She sees a podiatrist.Eye exam is not current.   Shortness of Breath  Chronicity:  New  Onset:  In the past 7 days  Frequency:  Daily  Progression since onset:  Unchanged  Associated symptoms: headaches and wheezing    Associated symptoms: no chest pain    Aggravating factors:  Nothing  Treatments tried:  Nothing     She is wheezing. Some cough.       I personally reviewed and updated the patient's allergies, medications, problem list, and past medical, surgical, social, and family history. I have reviewed and confirmed the accuracy of the History of Present Illness and Review of Symptoms as documented by the MA/LPN/RN. June Harrison MD    Allergies:  Allergies   Allergen Reactions    Sulfa Antibiotics Rash    Meloxicam GI Intolerance       Social History:  Social History     Socioeconomic History    Marital status:     Number of children: 3    Years of education: GED   Tobacco Use    Smoking status:  Every Day     Current packs/day: 1.00     Average packs/day: 1 pack/day for 53.0 years (53.0 ttl pk-yrs)     Types: Cigarettes     Passive exposure: Current    Smokeless tobacco: Never   Vaping Use    Vaping status: Never Used   Substance and Sexual Activity    Alcohol use: No    Drug use: No    Sexual activity: Defer       Family History:  Family History   Problem Relation Age of Onset    Stroke Mother     Hypertension Mother     Hyperlipidemia Mother     Hypothyroidism Mother     Cancer Father     Heart attack Father     Heart disease Father     Prostate cancer Father     Diabetes Father     Hypertension Father     Hyperlipidemia Father     Coronary artery disease Father     Breast cancer Paternal Grandmother     Hypothyroidism Grandchild     Ovarian cancer Neg Hx        Past Medical History :  Patient Active Problem List   Diagnosis    Class 3 severe obesity due to excess calories with serious comorbidity and body mass index (BMI) of 40.0 to 44.9 in adult    Degeneration of intervertebral disc of cervical region    Degeneration of intervertebral disc of lumbar region with discogenic back pain and lower extremity pain    Acquired hypothyroidism    Mixed hyperlipidemia    Major depressive disorder, recurrent, moderate    Obstructive sleep apnea    Vitamin B12 deficiency    Vitamin D deficiency    COPD (chronic obstructive pulmonary disease)    Thyroid nodule    Chronic midline low back pain with right-sided sciatica    Cervicalgia    Cervical stenosis of spine    Arthritis    Moderate persistent asthma without complication    Gastroparalysis    History of chicken pox    IBS (irritable bowel syndrome)    GERD (gastroesophageal reflux disease)    Dependent edema    Optic disc hemorrhage    Type 2 diabetes mellitus with hyperglycemia, without long-term current use of insulin    Tension headache    Allergic rhinitis    Disorder of peripheral nervous system    Fibromyositis    Tobacco dependence syndrome    Cervical  smear, as part of routine gynecological examination    Disease due to severe acute respiratory syndrome coronavirus 2 (SARS-CoV-2)    Lung cancer screening declined by patient    Acute cough    Hospital discharge follow-up    Dizziness    Lesion of skin of scalp    Pulmonary nodules    Right calf pain    Thrush    Screening mammogram for breast cancer    Candida vaginitis    Essential hypertension       Medication List:    Current Outpatient Medications:     Accu-Chek FastClix Lancets misc, 1 each by Other route Daily., Disp: 100 each, Rfl: 3    albuterol sulfate  (90 Base) MCG/ACT inhaler, INHALE 2 PUFFS BY MOUTH EVERY 4 HOURS AS NEEDED FOR WHEEZING, Disp: 18 g, Rfl: 12    aspirin 81 MG chewable tablet, Chew 1 tablet Daily., Disp: , Rfl:     Blood Glucose Monitoring Suppl (Accu-Chek Guide) w/Device kit, 1 each Daily., Disp: 1 kit, Rfl: 0    bumetanide (BUMEX) 1 MG tablet, Take 1 tablet by mouth Daily., Disp: 30 tablet, Rfl: 12    citalopram (CeleXA) 40 MG tablet, TAKE 1 TABLET BY MOUTH DAILY, Disp: 90 tablet, Rfl: 1    dexlansoprazole (Dexilant) 60 MG capsule, Take 1 capsule by mouth Daily., Disp: 90 capsule, Rfl: 3    Docusate Sodium (COLACE PO), Take  by mouth., Disp: , Rfl:     fenofibrate (TRICOR) 145 MG tablet, Take 1 tablet by mouth Daily., Disp: 90 tablet, Rfl: 3    fluticasone (FLONASE) 50 MCG/ACT nasal spray, SHAKE LIQUID AND USE 2 SPRAYS IN EACH NOSTRIL EVERY DAY, Disp: 48 g, Rfl: 3    Fluticasone-Salmeterol (Wixela Inhub) 250-50 MCG/ACT DISKUS, INHALE 1 PUFF BY MOUTH TWICE DAILY, Disp: 60 each, Rfl: 12    folic acid (FOLVITE) 1 MG tablet, Take 1 tablet by mouth Daily., Disp: , Rfl:     glucose blood (Accu-Chek Guide) test strip, 1 each by Other route Daily. Use as instructed, Disp: 100 each, Rfl: 3    HYDROcodone-acetaminophen (Norco)  MG per tablet, Take 1 tablet by mouth Every 6 (Six) Hours As Needed for Moderate Pain., Disp: 120 tablet, Rfl: 0    HYDROcodone-acetaminophen (Norco)   MG per tablet, Take 1 tablet by mouth Every 6 (Six) Hours As Needed for Moderate Pain., Disp: 120 tablet, Rfl: 0    HYDROcodone-acetaminophen (Norco)  MG per tablet, Take 1 tablet by mouth Every 6 (Six) Hours As Needed for Moderate Pain., Disp: 120 tablet, Rfl: 0    Insulin Glargine (BASAGLAR KWIKPEN) 100 UNIT/ML injection pen, Inject 12 Units under the skin into the appropriate area as directed Daily., Disp: 15 mL, Rfl: 12    Insulin Pen Needle (B-D UF III MINI PEN NEEDLES) 31G X 5 MM misc, Use 1 each Daily., Disp: 100 each, Rfl: 3    Lancets Misc. (ACCU-CHEK FASTCLIX LANCET) kit, Dx: E11.9, DM type 2, controlled, Disp: , Rfl:     levothyroxine (SYNTHROID, LEVOTHROID) 50 MCG tablet, TAKE 1 TABLET BY MOUTH DAILY, Disp: 90 tablet, Rfl: 1    Linzess 290 MCG capsule capsule, Take 1 capsule by mouth Every Other Day., Disp: , Rfl:     losartan (COZAAR) 25 MG tablet, Take 1 tablet by mouth Daily., Disp: 30 tablet, Rfl: 12    metFORMIN ER (GLUCOPHAGE-XR) 500 MG 24 hr tablet, TAKE 2 TABLETS BY MOUTH DAILY WITH BREAKFAST, Disp: 180 tablet, Rfl: 3    metoclopramide (REGLAN) 10 MG tablet, TAKE 1 TABLET BY MOUTH BEFORE MEALS AND AT BEDTIME, Disp: , Rfl:     montelukast (SINGULAIR) 10 MG tablet, TAKE 1 TABLET BY MOUTH EVERY NIGHT, Disp: 90 tablet, Rfl: 3    naloxone (NARCAN) 4 MG/0.1ML nasal spray, Call 911. Don't prime. [place] in 1 nostril for overdose. Repeat in 2-3 minutes in other nostril if no or minimal breathing/responsiveness., Disp: 2 each, Rfl: 0    Omega-3 Fatty Acids (fish oil) 1000 MG capsule capsule, 2 capsules Daily With Breakfast., Disp: , Rfl:     ondansetron ODT (ZOFRAN-ODT) 4 MG disintegrating tablet, DISSOLVE 1 TABLET ON THE TONGUE EVERY 8 HOURS AS NEEDED FOR VOMITING, Disp: 20 tablet, Rfl: 0    potassium chloride (KLOR-CON M20) 20 MEQ CR tablet, Take 1 tablet by mouth Daily. Only if taking 2 bumetanide, Disp: 90 tablet, Rfl: 3    rosuvastatin (CRESTOR) 20 MG tablet, TAKE 1 TABLET BY MOUTH DAILY, Disp:  "90 tablet, Rfl: 3    vitamin B-12 (CYANOCOBALAMIN) 1000 MCG tablet, Take 1 tablet by mouth Daily., Disp: , Rfl:     vitamin D3 125 MCG (5000 UT) capsule capsule, Take 1 capsule by mouth Daily., Disp: , Rfl:     Budeson-Glycopyrrol-Formoterol (BREZTRI) 160-9-4.8 MCG/ACT aerosol inhaler, Inhale 2 puffs 2 (Two) Times a Day., Disp: 5.9 g, Rfl: 0    ipratropium (ATROVENT) 0.02 % nebulizer solution, Take 2.5 mL by nebulization Every 8 (Eight) Hours., Disp: 90 mL, Rfl: 5    ipratropium-albuterol (DUO-NEB) 0.5-2.5 mg/3 ml nebulizer, Take 3 mL by nebulization Every 4 (Four) Hours As Needed for Wheezing., Disp: 90 mL, Rfl: 5    Past Surgical History:  Past Surgical History:   Procedure Laterality Date    BREAST BIOPSY Right     CARPAL TUNNEL RELEASE Right     CERVICAL DISCECTOMY ANTERIOR      CHOLECYSTECTOMY      ESSURE TUBAL LIGATION           Physical Exam:      Vital Signs:    Vitals:    07/25/25 0855   BP: 138/84   Pulse: 83   Resp: 18   Temp: 97.8 °F (36.6 °C)   SpO2: 94%        /84 (BP Location: Right arm, Patient Position: Sitting, Cuff Size: Adult)   Pulse 83   Temp 97.8 °F (36.6 °C) (Temporal)   Resp 18   Ht 160 cm (63\")   Wt 114 kg (251 lb 6.4 oz)   SpO2 94% Comment: ra  BMI 44.53 kg/m²     Wt Readings from Last 3 Encounters:   07/25/25 114 kg (251 lb 6.4 oz)   04/25/25 115 kg (253 lb 6.4 oz)   03/28/25 116 kg (255 lb)       Result Review :   The following data was reviewed by: June Harrison MD on 07/25/2025:  A1C Last 3 Results          10/7/2024    10:27 3/28/2025    11:37 7/25/2025    09:12   HGBA1C Last 3 Results   Hemoglobin A1C 7.1  8.6  8.2               Labs pending      Physical Exam  Vitals reviewed.   Constitutional:       Appearance: Normal appearance. She is well-developed.   HENT:      Head: Normocephalic and atraumatic.   Eyes:      General:         Right eye: No discharge.         Left eye: No discharge.   Cardiovascular:      Rate and Rhythm: Normal rate and regular rhythm.      " Heart sounds: Normal heart sounds. No murmur heard.     No friction rub. No gallop.   Pulmonary:      Effort: Pulmonary effort is normal. No respiratory distress.      Breath sounds: Normal breath sounds. No wheezing or rales.   Skin:     General: Skin is warm and dry.      Findings: No rash.   Neurological:      Mental Status: She is alert and oriented to person, place, and time.      Coordination: Coordination normal.      Gait: Gait normal.   Psychiatric:         Behavior: Behavior is cooperative.         Assessment and Plan:  Problems Addressed this Visit          Cardiac and Vasculature    Mixed hyperlipidemia     Labs ordered  She has not gotten them yet         Relevant Medications    Insulin Glargine (BASAGLAR KWIKPEN) 100 UNIT/ML injection pen    Essential hypertension    Hypertension is stable and controlled  Continue current treatment regimen.  Dietary sodium restriction.  Weight loss.  Blood pressure will be reassessed in 3 months.         Relevant Medications    bumetanide (BUMEX) 1 MG tablet       Endocrine and Metabolic    Class 3 severe obesity due to excess calories with serious comorbidity and body mass index (BMI) of 40.0 to 44.9 in adult    Patient's (Body mass index is 44.53 kg/m².) indicates that they are morbidly/severely obese (BMI > 40 or > 35 with obesity - related health condition) with health conditions that include hypertension and diabetes mellitus . Weight is improving with lifestyle modifications. BMI  is above average; BMI management plan is completed. We discussed low calorie, low carb based diet program, portion control, and increasing exercise.          Relevant Medications    Insulin Glargine (BASAGLAR KWIKPEN) 100 UNIT/ML injection pen    Type 2 diabetes mellitus with hyperglycemia, without long-term current use of insulin - Primary    Diabetes is improving with treatment.   Continue current treatment regimen.  Recommended a Mediterranean style of eating  Diabetes will be  reassessed in 3 months  Not at goal  Discussed diet changes         Relevant Medications    Insulin Glargine (BASAGLAR KWIKPEN) 100 UNIT/ML injection pen    Other Relevant Orders    POC Glycosylated Hemoglobin (Hb A1C) (Completed)       Pulmonary and Pneumonias    COPD (chronic obstructive pulmonary disease)    Relevant Medications    ipratropium (ATROVENT) 0.02 % nebulizer solution    Budeson-Glycopyrrol-Formoterol (BREZTRI) 160-9-4.8 MCG/ACT aerosol inhaler    ipratropium-albuterol (DUO-NEB) 0.5-2.5 mg/3 ml nebulizer    Acute cough    Relevant Medications    ipratropium (ATROVENT) 0.02 % nebulizer solution    Budeson-Glycopyrrol-Formoterol (BREZTRI) 160-9-4.8 MCG/ACT aerosol inhaler    ipratropium-albuterol (DUO-NEB) 0.5-2.5 mg/3 ml nebulizer       Symptoms and Signs    Dependent edema    Discussed elevation. She is on bumex also         Relevant Medications    bumetanide (BUMEX) 1 MG tablet     Other Visit Diagnoses         SOB (shortness of breath)        Relevant Medications    ipratropium (ATROVENT) 0.02 % nebulizer solution    Budeson-Glycopyrrol-Formoterol (BREZTRI) 160-9-4.8 MCG/ACT aerosol inhaler    ipratropium-albuterol (DUO-NEB) 0.5-2.5 mg/3 ml nebulizer          Diagnoses         Codes Comments      Type 2 diabetes mellitus with hyperglycemia, without long-term current use of insulin    -  Primary ICD-10-CM: E11.65  ICD-9-CM: 250.00, 790.29       Class 3 severe obesity due to excess calories with serious comorbidity and body mass index (BMI) of 40.0 to 44.9 in adult     ICD-10-CM: E66.813, Z68.41  ICD-9-CM: 278.01, V85.41       SOB (shortness of breath)     ICD-10-CM: R06.02  ICD-9-CM: 786.05       Acute cough     ICD-10-CM: R05.1  ICD-9-CM: 786.2       Dependent edema     ICD-10-CM: R60.9  ICD-9-CM: 782.3       Essential hypertension     ICD-10-CM: I10  ICD-9-CM: 401.9       Mixed hyperlipidemia     ICD-10-CM: E78.2  ICD-9-CM: 272.2       Simple chronic bronchitis     ICD-10-CM: J41.0  ICD-9-CM: 491.0                           An After Visit Summary and PPPS were given to the patient.       This document is intended for medical expert use only. Reading of this document by patients and/or patient's family without participating medical staff guidance may result in misinterpretation and unintended morbidity. Any interpretation of such data is the responsibility of the patient and/or family member responsible for the patient in concert with their primary or specialist providers, not to be left for sources of online searches such as [employer], [employer] or similar queries. Relying on these approaches to knowledge may result in misinterpretation, misguided goals of care and even death should patients or family members try recommendations outside of the realm of professional medical care.

## 2025-07-25 ENCOUNTER — TELEPHONE (OUTPATIENT)
Dept: FAMILY MEDICINE CLINIC | Facility: CLINIC | Age: 55
End: 2025-07-25

## 2025-07-25 ENCOUNTER — OFFICE VISIT (OUTPATIENT)
Dept: FAMILY MEDICINE CLINIC | Facility: CLINIC | Age: 55
End: 2025-07-25
Payer: MEDICARE

## 2025-07-25 VITALS
WEIGHT: 251.4 LBS | RESPIRATION RATE: 18 BRPM | SYSTOLIC BLOOD PRESSURE: 138 MMHG | TEMPERATURE: 97.8 F | HEIGHT: 63 IN | DIASTOLIC BLOOD PRESSURE: 84 MMHG | OXYGEN SATURATION: 94 % | HEART RATE: 83 BPM | BODY MASS INDEX: 44.54 KG/M2

## 2025-07-25 DIAGNOSIS — E66.813 CLASS 3 SEVERE OBESITY DUE TO EXCESS CALORIES WITH SERIOUS COMORBIDITY AND BODY MASS INDEX (BMI) OF 40.0 TO 44.9 IN ADULT: ICD-10-CM

## 2025-07-25 DIAGNOSIS — R05.1 ACUTE COUGH: ICD-10-CM

## 2025-07-25 DIAGNOSIS — R60.9 DEPENDENT EDEMA: ICD-10-CM

## 2025-07-25 DIAGNOSIS — R06.02 SOB (SHORTNESS OF BREATH): ICD-10-CM

## 2025-07-25 DIAGNOSIS — I10 ESSENTIAL HYPERTENSION: ICD-10-CM

## 2025-07-25 DIAGNOSIS — J41.0 SIMPLE CHRONIC BRONCHITIS: ICD-10-CM

## 2025-07-25 DIAGNOSIS — E11.65 TYPE 2 DIABETES MELLITUS WITH HYPERGLYCEMIA, WITHOUT LONG-TERM CURRENT USE OF INSULIN: Primary | ICD-10-CM

## 2025-07-25 DIAGNOSIS — E78.2 MIXED HYPERLIPIDEMIA: ICD-10-CM

## 2025-07-25 LAB
EXPIRATION DATE: ABNORMAL
HBA1C MFR BLD: 8.2 % (ref 4.5–5.7)
Lab: ABNORMAL

## 2025-07-25 RX ORDER — INSULIN GLARGINE 100 [IU]/ML
12 INJECTION, SOLUTION SUBCUTANEOUS DAILY
Qty: 15 ML | Refills: 12 | Status: SHIPPED | OUTPATIENT
Start: 2025-07-25

## 2025-07-25 RX ORDER — IPRATROPIUM BROMIDE AND ALBUTEROL SULFATE 2.5; .5 MG/3ML; MG/3ML
3 SOLUTION RESPIRATORY (INHALATION) EVERY 4 HOURS PRN
Qty: 90 ML | Refills: 5 | Status: SHIPPED | OUTPATIENT
Start: 2025-07-25

## 2025-07-25 RX ORDER — BUMETANIDE 1 MG/1
1 TABLET ORAL DAILY
Qty: 90 TABLET | OUTPATIENT
Start: 2025-07-25

## 2025-07-25 RX ORDER — ALBUTEROL SULFATE 0.83 MG/ML
2.5 SOLUTION RESPIRATORY (INHALATION) EVERY 4 HOURS PRN
Qty: 180 ML | Refills: 3 | Status: SHIPPED | OUTPATIENT
Start: 2025-07-25 | End: 2025-07-25 | Stop reason: SDUPTHER

## 2025-07-25 RX ORDER — BUMETANIDE 1 MG/1
1 TABLET ORAL DAILY
Qty: 30 TABLET | Refills: 12 | Status: SHIPPED | OUTPATIENT
Start: 2025-07-25

## 2025-07-30 ENCOUNTER — TELEPHONE (OUTPATIENT)
Dept: FAMILY MEDICINE CLINIC | Facility: CLINIC | Age: 55
End: 2025-07-30
Payer: MEDICARE

## 2025-07-30 NOTE — TELEPHONE ENCOUNTER
Caller: Riddhi Cid    Relationship to patient: Self    Best call back number: [phone]    Patient is needing: PT REQUESTING CALL BACK, HAS ADDITIONAL QUESTION ABOUT NEBULIZER SOLUTION (SEE PREVIOUS SINGED ENCOUNTER).

## 2025-07-30 NOTE — ASSESSMENT & PLAN NOTE
Diabetes is improving with treatment.   Continue current treatment regimen.  Recommended a Mediterranean style of eating  Diabetes will be reassessed in 3 months  Not at goal  Discussed diet changes

## 2025-07-30 NOTE — ASSESSMENT & PLAN NOTE
Patient's (Body mass index is 44.53 kg/m².) indicates that they are morbidly/severely obese (BMI > 40 or > 35 with obesity - related health condition) with health conditions that include hypertension and diabetes mellitus . Weight is improving with lifestyle modifications. BMI  is above average; BMI management plan is completed. We discussed low calorie, low carb based diet program, portion control, and increasing exercise.

## 2025-07-31 NOTE — TELEPHONE ENCOUNTER
Patient was given  ipratropium and albuterol neb advised to mix them both together she verbalized understanding.

## 2025-08-29 ENCOUNTER — TELEPHONE (OUTPATIENT)
Dept: FAMILY MEDICINE CLINIC | Facility: CLINIC | Age: 55
End: 2025-08-29
Payer: MEDICARE

## 2025-08-29 DIAGNOSIS — J41.0 SIMPLE CHRONIC BRONCHITIS: ICD-10-CM

## 2025-08-31 DIAGNOSIS — F33.1 MAJOR DEPRESSIVE DISORDER, RECURRENT, MODERATE: ICD-10-CM

## 2025-08-31 RX ORDER — CITALOPRAM HYDROBROMIDE 40 MG/1
40 TABLET ORAL DAILY
Qty: 90 TABLET | Refills: 1 | Status: SHIPPED | OUTPATIENT
Start: 2025-08-31